# Patient Record
Sex: FEMALE | Race: WHITE | NOT HISPANIC OR LATINO | Employment: FULL TIME | URBAN - METROPOLITAN AREA
[De-identification: names, ages, dates, MRNs, and addresses within clinical notes are randomized per-mention and may not be internally consistent; named-entity substitution may affect disease eponyms.]

---

## 2023-02-22 LAB
BASOPHILS (10*3/UL) IN BLOOD BY AUTOMATED COUNT: 0.09 X10E9/L (ref 0–0.1)
BASOPHILS/100 LEUKOCYTES IN BLOOD BY AUTOMATED COUNT: 1.4 % (ref 0–2)
EOSINOPHILS (10*3/UL) IN BLOOD BY AUTOMATED COUNT: 0.34 X10E9/L (ref 0–0.7)
EOSINOPHILS/100 LEUKOCYTES IN BLOOD BY AUTOMATED COUNT: 5.3 % (ref 0–6)
ERYTHROCYTE DISTRIBUTION WIDTH (RATIO) BY AUTOMATED COUNT: 11.7 % (ref 11.5–14.5)
ERYTHROCYTE MEAN CORPUSCULAR HEMOGLOBIN CONCENTRATION (G/DL) BY AUTOMATED: 32.5 G/DL (ref 32–36)
ERYTHROCYTE MEAN CORPUSCULAR VOLUME (FL) BY AUTOMATED COUNT: 98 FL (ref 80–100)
ERYTHROCYTES (10*6/UL) IN BLOOD BY AUTOMATED COUNT: 3.78 X10E12/L (ref 4–5.2)
HEMATOCRIT (%) IN BLOOD BY AUTOMATED COUNT: 37.2 % (ref 36–46)
HEMOGLOBIN (G/DL) IN BLOOD: 12.1 G/DL (ref 12–16)
IMMATURE GRANULOCYTES/100 LEUKOCYTES IN BLOOD BY AUTOMATED COUNT: 0.5 % (ref 0–0.9)
LEUKOCYTES (10*3/UL) IN BLOOD BY AUTOMATED COUNT: 6.4 X10E9/L (ref 4.4–11.3)
LYMPHOCYTES (10*3/UL) IN BLOOD BY AUTOMATED COUNT: 2.29 X10E9/L (ref 1.2–4.8)
LYMPHOCYTES/100 LEUKOCYTES IN BLOOD BY AUTOMATED COUNT: 35.9 % (ref 13–44)
MONOCYTES (10*3/UL) IN BLOOD BY AUTOMATED COUNT: 0.94 X10E9/L (ref 0.1–1)
MONOCYTES/100 LEUKOCYTES IN BLOOD BY AUTOMATED COUNT: 14.8 % (ref 2–10)
NEUTROPHILS (10*3/UL) IN BLOOD BY AUTOMATED COUNT: 2.68 X10E9/L (ref 1.2–7.7)
NEUTROPHILS/100 LEUKOCYTES IN BLOOD BY AUTOMATED COUNT: 42.1 % (ref 40–80)
NRBC (PER 100 WBCS) BY AUTOMATED COUNT: 0 /100 WBC (ref 0–0)
PLATELETS (10*3/UL) IN BLOOD AUTOMATED COUNT: 324 X10E9/L (ref 150–450)

## 2023-04-11 LAB
CREATININE (MG/DL) IN SER/PLAS: 0.66 MG/DL (ref 0.5–1.05)
GFR FEMALE: >90 ML/MIN/1.73M2
UREA NITROGEN (MG/DL) IN SER/PLAS: 17 MG/DL (ref 6–23)

## 2023-05-23 ENCOUNTER — OFFICE VISIT (OUTPATIENT)
Dept: PRIMARY CARE | Facility: CLINIC | Age: 58
End: 2023-05-23
Payer: COMMERCIAL

## 2023-05-23 VITALS
HEIGHT: 62 IN | SYSTOLIC BLOOD PRESSURE: 122 MMHG | TEMPERATURE: 97.4 F | WEIGHT: 98 LBS | DIASTOLIC BLOOD PRESSURE: 70 MMHG | HEART RATE: 68 BPM | BODY MASS INDEX: 18.03 KG/M2

## 2023-05-23 DIAGNOSIS — H92.03: Primary | ICD-10-CM

## 2023-05-23 PROBLEM — M51.369 DISC DEGENERATION, LUMBAR: Status: ACTIVE | Noted: 2019-04-05

## 2023-05-23 PROBLEM — F31.10 BIPOLAR DISORDER, CURRENT EPISODE MANIC WITHOUT PSYCHOTIC FEATURES (MULTI): Status: ACTIVE | Noted: 2023-05-23

## 2023-05-23 PROBLEM — F17.200 NICOTINE DEPENDENCE, UNSPECIFIED, UNCOMPLICATED: Status: ACTIVE | Noted: 2023-05-23

## 2023-05-23 PROBLEM — M54.32 BILATERAL SCIATICA: Status: ACTIVE | Noted: 2023-05-23

## 2023-05-23 PROBLEM — G35 MULTIPLE SCLEROSIS (MULTI): Status: ACTIVE | Noted: 2019-04-05

## 2023-05-23 PROBLEM — I10 BENIGN ESSENTIAL HYPERTENSION: Status: ACTIVE | Noted: 2019-04-05

## 2023-05-23 PROBLEM — E55.9 VITAMIN D DEFICIENCY: Status: ACTIVE | Noted: 2019-04-05

## 2023-05-23 PROBLEM — M51.36 DISC DEGENERATION, LUMBAR: Status: ACTIVE | Noted: 2019-04-05

## 2023-05-23 PROBLEM — M81.0 OSTEOPOROSIS: Status: ACTIVE | Noted: 2019-04-05

## 2023-05-23 PROBLEM — K29.70 GASTRITIS: Status: ACTIVE | Noted: 2019-04-05

## 2023-05-23 PROBLEM — E78.49 FAMILIAL HYPERLIPIDEMIA: Status: ACTIVE | Noted: 2023-05-23

## 2023-05-23 PROBLEM — M54.31 BILATERAL SCIATICA: Status: ACTIVE | Noted: 2023-05-23

## 2023-05-23 PROBLEM — M54.16 LUMBAR RADICULOPATHY: Status: ACTIVE | Noted: 2023-05-23

## 2023-05-23 PROCEDURE — 3078F DIAST BP <80 MM HG: CPT | Performed by: FAMILY MEDICINE

## 2023-05-23 PROCEDURE — 3074F SYST BP LT 130 MM HG: CPT | Performed by: FAMILY MEDICINE

## 2023-05-23 PROCEDURE — 99213 OFFICE O/P EST LOW 20 MIN: CPT | Performed by: FAMILY MEDICINE

## 2023-05-23 RX ORDER — LISINOPRIL 20 MG/1
20 TABLET ORAL DAILY
COMMUNITY
Start: 2019-01-28 | End: 2023-06-01 | Stop reason: WASHOUT

## 2023-05-23 RX ORDER — ALENDRONATE SODIUM 70 MG/1
70 TABLET ORAL
COMMUNITY
Start: 2020-03-19 | End: 2023-06-01 | Stop reason: SDUPTHER

## 2023-05-23 RX ORDER — OMEPRAZOLE 20 MG/1
20 TABLET, DELAYED RELEASE ORAL DAILY
COMMUNITY

## 2023-05-23 RX ORDER — MULTIVITAMIN/IRON/FOLIC ACID 18MG-0.4MG
1 TABLET ORAL DAILY
COMMUNITY

## 2023-05-23 RX ORDER — DIMETHYL FUMARATE 240 MG/1
240 CAPSULE ORAL 2 TIMES DAILY
COMMUNITY
Start: 2013-05-06 | End: 2023-12-28 | Stop reason: ALTCHOICE

## 2023-05-23 RX ORDER — CHOLECALCIFEROL (VITAMIN D3) 50 MCG
2000 TABLET ORAL DAILY
COMMUNITY
Start: 2018-01-07

## 2023-05-23 RX ORDER — HYDROCODONE BITARTRATE AND ACETAMINOPHEN 7.5; 325 MG/1; MG/1
1 TABLET ORAL EVERY 6 HOURS PRN
COMMUNITY
End: 2023-10-10 | Stop reason: SDUPTHER

## 2023-05-23 RX ORDER — ASPIRIN 81 MG/1
81 TABLET ORAL DAILY
COMMUNITY
End: 2024-01-10 | Stop reason: WASHOUT

## 2023-05-23 ASSESSMENT — ENCOUNTER SYMPTOMS
COUGH: 0
FEVER: 0
CHILLS: 0

## 2023-05-23 NOTE — PROGRESS NOTES
"Subjective   Patient ID: Caitlyn Atkins is a 58 y.o. female who presents for bumps on ears (Bilat x 2 weeks).    Caitlyn is here to discuss ear pain.  She has noticed ear pain over the last 2 weeks.  Located on the front part of her outer ear.  This was noticed after wearing earplugs.  She has been experimenting with cutting her earplugs in half to see if it will help.  Noticed a few small bumps on the outer ear and wants to make sure that there is no infection.         Review of Systems   Constitutional:  Negative for chills and fever.   HENT:  Negative for congestion.    Respiratory:  Negative for cough.        Objective   /70   Pulse 68   Temp 36.3 °C (97.4 °F)   Ht 1.575 m (5' 2\")   Wt (!) 44.5 kg (98 lb)   BMI 17.92 kg/m²     Physical Exam  Constitutional:       General: She is not in acute distress.  HENT:      Head: Normocephalic and atraumatic.      Right Ear: Tympanic membrane and external ear normal. No decreased hearing noted. No drainage or swelling. No mastoid tenderness.      Left Ear: Tympanic membrane and external ear normal. No decreased hearing noted. No drainage or swelling. No mastoid tenderness.      Mouth/Throat:      Mouth: Mucous membranes are moist. No oral lesions.      Pharynx: Oropharynx is clear.   Eyes:      Extraocular Movements: Extraocular movements intact.   Pulmonary:      Effort: Pulmonary effort is normal.   Neurological:      Mental Status: She is alert.   Psychiatric:         Mood and Affect: Mood normal.         Assessment/Plan   Diagnoses and all orders for this visit:  Pain of both earlobes  Comments:  Likely due to pressure from earplugs.  Recommend trying different sides of earplugs or over-the-ear hearing protection.         "

## 2023-06-01 ENCOUNTER — OFFICE VISIT (OUTPATIENT)
Dept: PRIMARY CARE | Facility: CLINIC | Age: 58
End: 2023-06-01
Payer: COMMERCIAL

## 2023-06-01 ENCOUNTER — TELEPHONE (OUTPATIENT)
Dept: PRIMARY CARE | Facility: CLINIC | Age: 58
End: 2023-06-01

## 2023-06-01 VITALS
BODY MASS INDEX: 17.19 KG/M2 | DIASTOLIC BLOOD PRESSURE: 70 MMHG | OXYGEN SATURATION: 97 % | TEMPERATURE: 97.1 F | WEIGHT: 94 LBS | HEART RATE: 64 BPM | SYSTOLIC BLOOD PRESSURE: 110 MMHG

## 2023-06-01 DIAGNOSIS — M25.551 PAIN OF RIGHT HIP: ICD-10-CM

## 2023-06-01 DIAGNOSIS — S32.9XXA CLOSED NONDISPLACED FRACTURE OF PELVIS, UNSPECIFIED PART OF PELVIS, INITIAL ENCOUNTER (MULTI): Primary | ICD-10-CM

## 2023-06-01 DIAGNOSIS — M80.00XG OSTEOPOROSIS WITH CURRENT PATHOLOGICAL FRACTURE WITH DELAYED HEALING, UNSPECIFIED OSTEOPOROSIS TYPE, SUBSEQUENT ENCOUNTER: ICD-10-CM

## 2023-06-01 PROCEDURE — 3078F DIAST BP <80 MM HG: CPT | Performed by: PHYSICIAN ASSISTANT

## 2023-06-01 PROCEDURE — 99214 OFFICE O/P EST MOD 30 MIN: CPT | Performed by: PHYSICIAN ASSISTANT

## 2023-06-01 PROCEDURE — 3074F SYST BP LT 130 MM HG: CPT | Performed by: PHYSICIAN ASSISTANT

## 2023-06-01 RX ORDER — ALENDRONATE SODIUM 70 MG/1
70 TABLET ORAL
Qty: 4 TABLET | Refills: 5 | Status: SHIPPED | OUTPATIENT
Start: 2023-06-01 | End: 2023-12-18

## 2023-06-01 ASSESSMENT — ENCOUNTER SYMPTOMS
ABDOMINAL PAIN: 0
SHORTNESS OF BREATH: 0

## 2023-06-01 NOTE — PROGRESS NOTES
"Subjective   Patient ID: Caitlyn Atkins is a 58 y.o. female who presents for pt to discuss pain from pelvic fx (Dx in April ).    HPI   Pt c/o pelvic fracture (multiple) and requests referral to orthopedic.   Has had right hip and pelvis pain since December. No known injuries. No falls prior to pain starting. Complained of this to pain management but they just said it was arthritis and they just did cortisone injection which didn't help. Pain mgmt just did cortisone injections.  Went to ER -- had negative xrays. .     Eventually had had MRI that showed questionable pelvic fx.  So had CT to clarify  that showed:  \"1. Findings suggestive of right-sided supra-acetabular insufficiency  fracture with diffuse periosteal reaction and a fracture line  extending the medial cortex with associated mild cortical step of as  described above.  2. Insufficiency fracture of the right sacral ala extending from  superior to the inferior aspect as described above.  3. Moderate lumbar spondylosis with grade 1 anterolisthesis of L4  over L5 vertebral body which is described in detail on immediate  prior MRI lumbar spine.\"    Patient states that her back hurts with prolonged sitting and walking/bending/lifting.   Continues to see specialists, includng for her MS. Has been stable.   Sees Pain mgmt Dr Maher for DDD lumbar.     Taking Calcium and Vit D for her osteoporosis Is inconsistent with Fosamax.     Smoking 1ppd still.     Review of Systems   Respiratory:  Negative for shortness of breath.    Cardiovascular:  Negative for chest pain.   Gastrointestinal:  Negative for abdominal pain.       Objective   /70   Pulse 64   Temp 36.2 °C (97.1 °F)   Wt (!) 42.6 kg (94 lb)   SpO2 97%   BMI 17.19 kg/m²     Physical Exam  Vitals and nursing note reviewed.   Constitutional:       Appearance: Normal appearance. She is well-developed.   Eyes:      General: No scleral icterus.  Cardiovascular:      Rate and Rhythm: Normal rate and " regular rhythm.      Heart sounds: No murmur heard.  Pulmonary:      Effort: Pulmonary effort is normal.      Breath sounds: Normal breath sounds.   Abdominal:      Palpations: Abdomen is soft. There is no mass.      Tenderness: There is no abdominal tenderness.   Musculoskeletal:      Cervical back: Neck supple.      Comments: Has mild tenderness over the lower lumbar spine diffusely.    Skin:     General: Skin is warm and dry.   Neurological:      Mental Status: She is alert.         Assessment/Plan   Diagnoses and all orders for this visit:  Closed nondisplaced fracture of pelvis, unspecified part of pelvis, initial encounter (CMS/[facility])  -     Referral to Orthopaedic Surgery; Future  Osteoporosis with current pathological fracture with delayed healing, unspecified osteoporosis type, subsequent encounter  -     XR DEXA bone density; Future  -     Referral to Rheumatology; Future  -     alendronate (Fosamax) 70 mg tablet; Take 1 tablet (70 mg) by mouth every 7 days.  Pain of right hip  -     Referral to Orthopaedic Surgery; Future       Reviewed imaging results. Referred to orthopedic such as Dr Ochoa.   Get DEXA scan to evaluate her osteoporosis. Due to multiple fractures that are probably pathological fractures, referred to rheumatologist for further treatment of her osteoporosis. In the meantime, take her Fosamax consistently as well as her Vit D and Calcium.   Follow up prn.

## 2023-07-21 LAB
ALANINE AMINOTRANSFERASE (SGPT) (U/L) IN SER/PLAS: 12 U/L (ref 7–45)
ALBUMIN (G/DL) IN SER/PLAS: 4.3 G/DL (ref 3.4–5)
ALKALINE PHOSPHATASE (U/L) IN SER/PLAS: 79 U/L (ref 33–110)
ANION GAP IN SER/PLAS: 14 MMOL/L (ref 10–20)
ASPARTATE AMINOTRANSFERASE (SGOT) (U/L) IN SER/PLAS: 18 U/L (ref 9–39)
BILIRUBIN TOTAL (MG/DL) IN SER/PLAS: 0.2 MG/DL (ref 0–1.2)
CALCIDIOL (25 OH VITAMIN D3) (NG/ML) IN SER/PLAS: 63 NG/ML
CALCIUM (MG/DL) IN SER/PLAS: 9.1 MG/DL (ref 8.6–10.6)
CARBON DIOXIDE, TOTAL (MMOL/L) IN SER/PLAS: 23 MMOL/L (ref 21–32)
CHLORIDE (MMOL/L) IN SER/PLAS: 111 MMOL/L (ref 98–107)
CREATININE (MG/DL) IN SER/PLAS: 0.68 MG/DL (ref 0.5–1.05)
GFR FEMALE: >90 ML/MIN/1.73M2
GLUCOSE (MG/DL) IN SER/PLAS: 95 MG/DL (ref 74–99)
MAGNESIUM (MG/DL) IN SER/PLAS: 1.85 MG/DL (ref 1.6–2.4)
PARATHYRIN INTACT (PG/ML) IN SER/PLAS: 21.6 PG/ML (ref 18.5–88)
PHOSPHATE (MG/DL) IN SER/PLAS: 3.7 MG/DL (ref 2.5–4.9)
POTASSIUM (MMOL/L) IN SER/PLAS: 3.5 MMOL/L (ref 3.5–5.3)
PROTEIN TOTAL: 6.5 G/DL (ref 6.4–8.2)
SODIUM (MMOL/L) IN SER/PLAS: 144 MMOL/L (ref 136–145)
UREA NITROGEN (MG/DL) IN SER/PLAS: 21 MG/DL (ref 6–23)

## 2023-07-24 LAB
ALBUMIN ELP: 4 G/DL (ref 3.4–5)
ALPHA 1: 0.2 G/DL (ref 0.2–0.6)
ALPHA 2: 0.6 G/DL (ref 0.4–1.1)
BETA: 0.7 G/DL (ref 0.5–1.2)
GAMMA GLOBULIN: 0.9 G/DL (ref 0.5–1.4)
PATH REVIEW-SERUM PROTEIN ELECTROPHORESIS: NORMAL
PROTEIN ELECTROPHORESIS INTERPRETATION: NORMAL
PROTEIN TOTAL: 6.5 G/DL (ref 6.4–8.2)

## 2023-07-25 LAB — N-TELOPEPTIDE,SERUM: 18.7 NM BCE

## 2023-08-16 ENCOUNTER — HOSPITAL ENCOUNTER (OUTPATIENT)
Dept: DATA CONVERSION | Facility: HOSPITAL | Age: 58
End: 2023-08-17
Attending: ORTHOPAEDIC SURGERY | Admitting: ORTHOPAEDIC SURGERY
Payer: COMMERCIAL

## 2023-08-16 DIAGNOSIS — M87.051 IDIOPATHIC ASEPTIC NECROSIS OF RIGHT FEMUR (MULTI): ICD-10-CM

## 2023-08-16 LAB
ABO GROUP (TYPE) IN BLOOD: NORMAL
ANTIBODY SCREEN: NORMAL
RH FACTOR: NORMAL

## 2023-08-17 LAB
ALANINE AMINOTRANSFERASE (SGPT) (U/L) IN SER/PLAS: 14 U/L (ref 7–45)
ALBUMIN (G/DL) IN SER/PLAS: 2.9 G/DL (ref 3.4–5)
ALKALINE PHOSPHATASE (U/L) IN SER/PLAS: 55 U/L (ref 33–110)
ANION GAP IN SER/PLAS: 9 MMOL/L (ref 10–20)
APPEARANCE, URINE: CLEAR
ASPARTATE AMINOTRANSFERASE (SGOT) (U/L) IN SER/PLAS: 41 U/L (ref 9–39)
BASOPHILS (10*3/UL) IN BLOOD BY AUTOMATED COUNT: 0.08 X10E9/L (ref 0–0.1)
BASOPHILS/100 LEUKOCYTES IN BLOOD BY AUTOMATED COUNT: 0.4 % (ref 0–2)
BILIRUBIN TOTAL (MG/DL) IN SER/PLAS: 0.2 MG/DL (ref 0–1.2)
BILIRUBIN, URINE: NEGATIVE
BLOOD, URINE: NEGATIVE
CALCIUM (MG/DL) IN SER/PLAS: 7.9 MG/DL (ref 8.6–10.3)
CARBON DIOXIDE, TOTAL (MMOL/L) IN SER/PLAS: 24 MMOL/L (ref 21–32)
CHLORIDE (MMOL/L) IN SER/PLAS: 108 MMOL/L (ref 98–107)
COLOR, URINE: YELLOW
CREATININE (MG/DL) IN SER/PLAS: 0.79 MG/DL (ref 0.5–1.05)
EOSINOPHILS (10*3/UL) IN BLOOD BY AUTOMATED COUNT: 0.01 X10E9/L (ref 0–0.7)
EOSINOPHILS/100 LEUKOCYTES IN BLOOD BY AUTOMATED COUNT: 0.1 % (ref 0–6)
ERYTHROCYTE DISTRIBUTION WIDTH (RATIO) BY AUTOMATED COUNT: 11.9 % (ref 11.5–14.5)
ERYTHROCYTE MEAN CORPUSCULAR HEMOGLOBIN CONCENTRATION (G/DL) BY AUTOMATED: 32.9 G/DL (ref 32–36)
ERYTHROCYTE MEAN CORPUSCULAR VOLUME (FL) BY AUTOMATED COUNT: 96 FL (ref 80–100)
ERYTHROCYTES (10*6/UL) IN BLOOD BY AUTOMATED COUNT: 2.97 X10E12/L (ref 4–5.2)
GFR FEMALE: 86 ML/MIN/1.73M2
GLUCOSE (MG/DL) IN SER/PLAS: 91 MG/DL (ref 74–99)
GLUCOSE, URINE: NEGATIVE MG/DL
HEMATOCRIT (%) IN BLOOD BY AUTOMATED COUNT: 28.6 % (ref 36–46)
HEMOGLOBIN (G/DL) IN BLOOD: 9.4 G/DL (ref 12–16)
IMMATURE GRANULOCYTES/100 LEUKOCYTES IN BLOOD BY AUTOMATED COUNT: 0.6 % (ref 0–0.9)
KETONES, URINE: NEGATIVE MG/DL
LEUKOCYTE ESTERASE, URINE: NEGATIVE
LEUKOCYTES (10*3/UL) IN BLOOD BY AUTOMATED COUNT: 18.5 X10E9/L (ref 4.4–11.3)
LYMPHOCYTES (10*3/UL) IN BLOOD BY AUTOMATED COUNT: 2.22 X10E9/L (ref 1.2–4.8)
LYMPHOCYTES/100 LEUKOCYTES IN BLOOD BY AUTOMATED COUNT: 12 % (ref 13–44)
MONOCYTES (10*3/UL) IN BLOOD BY AUTOMATED COUNT: 1.59 X10E9/L (ref 0.1–1)
MONOCYTES/100 LEUKOCYTES IN BLOOD BY AUTOMATED COUNT: 8.6 % (ref 2–10)
NEUTROPHILS (10*3/UL) IN BLOOD BY AUTOMATED COUNT: 14.46 X10E9/L (ref 1.2–7.7)
NEUTROPHILS/100 LEUKOCYTES IN BLOOD BY AUTOMATED COUNT: 78.3 % (ref 40–80)
NITRITE, URINE: NEGATIVE
PH, URINE: 6 (ref 5–8)
PLATELETS (10*3/UL) IN BLOOD AUTOMATED COUNT: 406 X10E9/L (ref 150–450)
POTASSIUM (MMOL/L) IN SER/PLAS: 3.9 MMOL/L (ref 3.5–5.3)
PROTEIN TOTAL: 5.3 G/DL (ref 6.4–8.2)
PROTEIN, URINE: NEGATIVE MG/DL
SODIUM (MMOL/L) IN SER/PLAS: 137 MMOL/L (ref 136–145)
SPECIFIC GRAVITY, URINE: 1.02 (ref 1–1.03)
UREA NITROGEN (MG/DL) IN SER/PLAS: 18 MG/DL (ref 6–23)
UROBILINOGEN, URINE: <2 MG/DL (ref 0–1.9)

## 2023-09-29 VITALS — WEIGHT: 93.03 LBS | HEIGHT: 60 IN | BODY MASS INDEX: 18.27 KG/M2

## 2023-09-30 NOTE — H&P
"    History & Physical Reviewed:   Pregnant/Lactating:  ·  Are You Pregnant no (1)   ·  Are You Currently Breastfeeding no (1)     I have reviewed the History and Physical dated:  31-Jul-2023   History and Physical reviewed and relevant findings noted. Patient examined to review pertinent physical  findings.: No significant changes   Home Medications Reviewed: no changes noted   Allergies Reviewed: no changes noted       ERAS (Enhanced Recovery After Surgery):  ·  ERAS Patient: yes   ·  CPM/PAT Utilization: yes   ·  Immunonutrition Recovery Drink Utilization: no   ·  Carbohydrate Supplement Drink Utilization: no     Consent:   COVID-19 Consent:  ·  COVID-19 Risk Consent Surgeon has reviewed key risks related to the risk of julio césar COVID-19 and if they contract COVID-19 what the risks are.       Electronic Signatures:  Anne Ochoa ()  (Signed 16-Aug-2023 14:12)   Authored: History & Physical Reviewed, ERAS, Consent,  Note Completion      Last Updated: 16-Aug-2023 14:12 by Anne Ochoa ()    References:  1.  Data Referenced From \"Patient Profile - Preop v3\" 16-Aug-2023 09:41   "

## 2023-09-30 NOTE — DISCHARGE SUMMARY
Send Summary:   Discharge Summary Providers:  Provider Role Provider Name   · Referring Zane Ang   · Primary Rafi Nicole       Note Recipients: Zane Ang, [facility] - [phone]  []  Rafi Nicole MD       Discharge:    Summary:   Admission Date: .16-Aug-2023 09:26:00   Discharge Date: 17-Aug-2023   Attending Physician at Discharge: Anne Ochoa   Admission Reason: Right hip avascular necrosis   Final Discharge Diagnoses: Status post right hip  replacement   Procedures: Date: 16-Aug-2023 12:30:00  Procedure Name: 1.  RIGHT TOTAL HIP ARTHROPLASTY   Condition at Discharge: Satisfactory   Disposition at Discharge: .Home   Hospital Course:    ORTHOPEDIC SURGERY DISCHARGE SUMMARY  Attending Physician: Dr Anne Ochoa   Reason for Hospitalization: Elective total hip arthroplasty     Admitting Diagnosis: Right hip AVN  Discharge Diagnosis: Same as admitting     Operations During Hospitalization: Right hip AVN     Consultations: Physical Therapy, Case Management      Hospital Course:  This patient presented to admitting provider as an outpatient for hip AVN. It was determined that they would benefit from surgery in the form of a hip replacement. The procedure, its risks, benefits, and potential complications were discussed in detail  with the patient prior to surgery. Understanding of all topics was conveyed by the patient, and consent was given for surgery. The patient was electively admitted for surgery. Surgery was scheduled and they underwent a hip replacement.  The procedure  was tolerated well and they were sent to the post operative recovery room in stable condition, where they also did well. They were subsequently sent to their hospital room for postoperative management. Once on the floor their postoperative course was  unremarkable and they did well.  Thier diet was advanced and well tolerated. Their pain was well controlled on oral pain meds; this was eventually transitioned to oral  medication alone prior to discharge.  They worked with Physical and Occupational Therapy  starting on POD# 0 who recommended they be discharged home. They remained afebrile with stable vital signs throughout her stay. Complete and comprehensive discharge instructions were provided to the patient as well as necessary prescriptions. The patient  had no further questions and was advised to call with any questions, concerns, or problems.     Patient was hemodynamically stable postoperatively. Discharge Antibiotics: Prior to surgery the patient was treated with antibiotics and continued with antibiotics 24 hours postoperatively    Transfers: PACU and then to the hospital surgical floor when PACU criteria was met.  Complications: Continued throughout the hospital course without complications.     PHYSICAL EXAM   GENERAL: A/Ox3, NAD. Appears healthy, well nourished  PSYCHIATRIC: Mood stable, appropriate memory recall  EYES: EOM intact, no scleral icterus  CARDIAC: regular rate  LUNGS: Breathing non-labored  SKIN: no erythema, rashes, or ecchymoses     MUSCULOSKELETAL:  Laterality:   Operative Hip Exam  - ROM, Extension: Full  - Dressing: clean and dry  - Negative homans  - Compartments soft  - Grossly NVI Distally on operative leg    NEUROVASCULAR:  - Neurovascular Status: sensation intact to light touch distally  - Capillary refill brisk at extremities, Bilateral dorsalis pedis pulse 2+        Discharge Information:    and Continuing Care:   Lab Results - Pending:    None  Radiology Results - Pending: None   Discharge Instructions:    Additional Orders:           Additional Instructions:   Anne Ochoa DO  Phone: [phone] - [facility] Medical Assistant    Postoperative Instructions: TOTAL HIP ARTHROPLASTY    The following is a general guide and may be applied to most patients that go home from the hospital after surgery. If you go to a rehab facility the timeline may deviate a little. Please do not hesitate to call  our office for any questions or additional  guidance. We will work with you to get the best experience from your new joint replacement.     WEEK 1  Relax?Don?t Overdo it!  - Get up once an hour for light activity while you are awake. (get a drink, go to the bathroom, etc.)  - Keep the surgical site iced and elevated above your heart, if possible, while you are resting.  - You will have some light exercises given to you from the physical therapist in the hospital. They will teach you posterior hip precautions that you should be mindful of for the first six weeks after surgery.    SWELLING AND BRUISING    BRUISING AND SWELLING AFTER SURGERY IS NORMAL. As the patient becomes more mobile the bruising and swelling will begin to migrate towards the ankle and foot and is usually noticed toward the end of the day.    WEEK 2-3  You will have a prescription for physical therapy given to you or electronically prescribed and you should start outpatient therapy one week after your operation. Be sure to do the home exercises on the days you are not attending physical therapy.    - Continue to progress walking as tolerated.   - Continue to use ice for soreness on the surgical site  - You may wean from your walker to a cane when you feel safe and comfortable to do so. Your physical therapist will also be helpful with progressing your assistive device.   - Be careful with bending and twisting - If it hurts, stop!!    FOLLOW UP  - Your first post-operative visit with Dr. Anne Ochoa should be scheduled for 2 WEEKS after surgery.  - You do not need new xrays for this visit  - Don?t be nervous! This visit is to see how you are doing and make sure your incision is healing nicely.     POSTOPERATIVE MEDICATIONS    PAIN MEDICATION    _______ Oxycodone  ? Oxycodone has been prescribed for post-operative pain control. Take one 5 mg tablet every 6 hours for pain. Alternate with Tramadol. See medication example sheet. This medication will  only be refilled ONCE every 7 days for a period of 6 weeks.  After 6 weeks, you will transition to acetaminophen (Tylenol) and over -the- counter anti-inflammatories such as Ibuprofen, Advil or Aleve in conjunction with ICE.   ? Side effects may be constipation and nausea, vomiting, sleepiness, dizziness, lightheadedness, headache, blurred vision, dry mouth sweating, itching (if you have itching, over-the -counter Benadryl can be used as needed).  ? You may NOT operate a motor vehicle while taking this medication or have been cleared by your surgeon or PA.     ________ Tramadol  ? Tramadol has been prescribed for post-operative pain control. Take one 50 mg tablet every 6 hours for pain. Alternate with Oxycodone. See medication example sheet. This medication will only be refilled ONCE every 7 days for a period of 6  weeks. After 6 weeks, you will transition to acetaminophen (Tylenol) and over- the- counter anti-inflammatories such as Ibuprofen, Advil or Aleve in conjunction with ICE.   ? Side effects may be constipation and nausea, vomiting, sleepiness, dizziness, lightheadedness, headache, blurred vision, dry mouth, sweating, itching (if you have itching, over-the -counter Benadryl can be used as needed).  ? You may NOT operate a motor vehicle while taking this medication or have been cleared by your surgeon or PA.     _________ Celecoxib (Celebrex) or Meloxicam (Mobic)  ? One of these will be prescribed (if you are able to take it) as an adjunct anti-inflammatory to assist in pain control. Take one twice daily for 4 weeks. See medication example sheet. You will not receive refills on this medication.  ? Side effects may include nausea or upset stomach    _________ Acetaminophen (Tylenol)  ? Acetaminophen has been prescribed as an adjunct for pain control. Take two 500 mg tablet every 6 - 8 hours for 4 weeks. See medication example sheet. No refills will be given after initial prescription.  ? Side effects may  include nausea, heartburn, drowsiness, and headache.    _________Cyclobenzaprine (Flexeril)  ? This is a muscle relaxer that will be prescribed   ? Take this AS NEEDED per instructions on bottle  ? This will be only prescribed once and is available to help with muscle spasms. There will be no refills of this medication    BLOOD THINNER    __________ Aspirin or Other Blood Thinner  ? Aspirin or another medication has been prescribed as a blood thinner to prevent blood clots in your leg or lungs. Take as prescribed on the bottle for 4 weeks. You will not receive a refill on this medication.  ? Do not take this medication if you are on another blood thinner.    ANTI NAUSEA    __________ Pantoprazole  ? Pantoprazole has been prescribed to help with nausea and protect your stomach while taking pain medication. Take one 40 mg tablet once daily for 4 weeks. You will not receive a refill on this medication.    ANTIBIOTIC    ? If you are deemed ?high risk? for infection after surgery and antibiotic will be prescribed. Please take this as directed for one week.     STOOL SOFTENERS    __________ Senna  ? Post-operative constipation can result due to a combination of inactivity, anesthesia and pain medication. To help prevent this, you should increase your water and fiber intake. Physical activity such as walking will also help stimulate the  bowels.   ? Senna has been prescribed to help with constipation while on Oxycodone and Tramadol. Take one tablet twice daily for 4 weeks. No refills will be given.  ? You may also take Miralax in combination with Senna to prevent constipation.  This can be purchased over the counter at your local pharmacy.  Take as instructed on the bottle.     Medication Refills - [phone]    If you need to request a medication refill, calls can be taken between Monday through Friday, 8am-1pm.  Any calls received outside of this timeframe will be handled on the next business day.  Medication requests  received on Saturday or Sunday will be  handled on Monday.    Per State and Institutional policy, pain medications can only be refilled every 7 days for six weeks following surgery.    Prescription refills will be placed upon request, if there are any issues we will contact you accordingly.  We are unable to place follow up calls to confirm receipt of refill requests.  Please follow up with your pharmacy to verify that your refill has  been sent and is ready for .    ICE  ? You have been prescribed to ice your total joint at a minimum of twice per hour for 20 minutes while awake during the first 6 weeks after surgery if you are using ice packs. This will help with pain control.  ? If you are using an ice machine, please follow ice machine instructions.    WOUND CARE    ? You will have an ace wrap on your leg put on during surgery. This compression wrap is for comfort and may be removed at any time. You may continue to use compression throughout your recovery if this is comfortable for you.  ? You have a waterproof bandage on your wound and may shower with this on. The waterproof bandage is to remain in place for 7 days. You may remove it yourself. You may leave your incision open to air after the bandage has been removed.    ? Under your waterproof bandage you have a mesh and glue dressin in place. Do not peel this off. This will be on for 3-4 weeks. You may trim the edges as they peel off on their own. You can continue to shower with this on. Let the water run  freely over your incision when showering and do not scrub.     ? DO NOT soak your incision in a bath, hot tub, pool or pond/lake for a minimum of 3 weeks following your surgery.    ? DO NOT use lotions, creams, ointments on your wound for a minimum of 3 weeks following your surgery. At that time you may use vitamin E to assist with softening of your incision.    DENTAL VISITS  It is recommended that you avoid any elective dental work (cleaning,  whitening, etc) for 3 months after your surgery. In case of a dental emergency (cavity, root canal) within the 3 month postoperative period you should be pre-medicated with antibiotics.  Please call us before any dental work so we can provide you with antibiotic coverage.     After 3 months, most healthy individuals do not require antibiotics for dental visits.    ?High Risk? patients (chemotherapy, history of transplant, immunocompromised, rheumatoid arthritis) will need antibiotics prior to dental work.  If you think you may be in this category please call the office for assistance.    EMERGENCIES  When to contact our office immediately:  ? Fever >101.5 for at least 48 hours after surgery or chills.  ? Excessive bleeding from incision(s). A small amount of drainage is normal and expected.  ? Signs of infection of incision(s)-excessive drainage that is soaking through your dressing (especially if it is pus-like), redness that is spreading out from the edges of your incision, or increased warmth around the area.  ? Excruciating pain for which the pain medication is not helping.  ? Severe calf pain.  ? Go directly to the emergency room or call 911, if you are experiencing chest pain or difficulty breathing.    RESTARTING HOME MEDICATIONS  ? You may restart your home medications the following day after your surgery UNLESS you have been given alternate instructions.    DIET  ? Resume your normal diet after surgery. If you are on a specific type of diet for your condition, resume that instead.        Total Joint Replacement Cold Therapy Recommendations      Cold therapy devices can be used before and after surgery to assist in comfort and help to reduce pain and swelling.  These devices differ from ice or ice packs whereas the mechanism circulates water through tubing and a pad to provide longer periods  of cold therapy to the desired site.  While in the hospital, you can use your cold devices around the clock for  optimal comfort.  We recommend using cold therapy after working with therapy or completing exercises on your own.  Once you are discharged  home, there is no set schedule in which you must follow while using cold therapy.  Below are a few points to remember when using a cold therapy device:    ? Read the ?s instructions prior to first the use.  ? Follow instructions for filling the cooler (water first, then ice).  ? Always make sure there is a layer of protection between the cold pad and your skin  o Clothing, Towel, Ace Bandage, etc.  ? Allow the device to circulate cold water throughout the pad prior wrapping the pad (approximately 10 minutes).  ? Place the pad appropriately to accommodate your needs and use the wraps provided to secure the pad to your body.  ? During waking hours, remove the cold pad every 1-2 hours to perform a skin check  o Detach the pad from the cooler and ambulate at least once every hour  ? After removing the pad, allow at least 30 minutes before resuming cold therapy  ? You may wear the cold therapy device during periods of sleep including overnight  o   If you wake up during the night, you can check the skin at this time.  You do not need to   wake up specifically to perform skin checks.  ? Empty the cooler and pad when device is not in use.  ? Follow ?s instructions for cleaning your cold therapy device.    OFFICE LOCATIONS    Location 1: [facility]  [address], [address]  Office Number: [phone]    Location 2: [facility]  [address]  [facility], [address]  Office Number: [phone]    Location 3: [address] [address]  Office Number: [phone]    Anne Ochoa DO  Joint Replacement and Adult Reconstructive Surgery  [facility]      Home Care Certification:           Home Care Agency:    Home Team [phone]          Skilled Disciplines  Ordered:   PT,  OT    Home Care Services:           Home Care Skilled Service:   Rehab (PT/OT/SP eval and treat)    Discharge Medications: Home Medication   Tecfidera 240 mg oral delayed release capsule - 1 cap(s) orally 2 times a day  Narcan 4 mg/0.1 mL nasal spray - null  Norco 7.5 mg-325 mg oral tablet - 1 tab(s) orally 3 times a day  Vitamin D3 2000 intl units (50 mcg) oral capsule - 1 cap(s) orally 2 times a day  B Complex 50 oral tablet, extended release - 1 tab(s) orally once a day  Lidoderm 5% topical film - Apply topically to affected area once a day   acetaminophen 500 mg oral tablet - 2 tab(s) orally every 6 to 8 hours  for pain   aspirin 81 mg oral tablet, chewable - 1 tab(s) chewed 2 times a day for four weeks to prevent the formation of blood clots  CeleBREX 100 mg oral capsule - 1 cap(s) orally 2 times a day for pain, inflammation, swelling  cyclobenzaprine 5 mg oral tablet - 1 tab(s) orally 3 times a day as needed for muscle spasms  doxycycline hyclate 100 mg oral capsule - 1 cap(s) orally 2 times a day to prevent infection  Protonix 40 mg oral delayed release tablet - 1 tab(s) orally once a day   senna 8.6 mg oral tablet - 1 tab(s) orally 2 times a day as needed to prevent constipation   traMADol 50 mg oral tablet - 1 tab(s) oral every 6 hours  Calcium 600 mg oral tablet - 1 cap(s) oral once a day  gabapentin 100 mg oral capsule - 1 cap(s) oral 3 times a day     PRN Medication     DNR Status:   ·  Code Status Code Status order at time of discharge: Full Code       Electronic Signatures:  Anne Ochoa)  (Signed 17-Aug-2023 09:17)   Authored: Send Summary, Summary Content, Ongoing Care,  DNR Status, Note Completion      Last Updated: 17-Aug-2023 09:17 by Anne Ochoa ()

## 2023-09-30 NOTE — PROGRESS NOTES
Service: General Internal Medicine     Subjective Data:   JOSE IBARRA is a 58 year old Female who is Hospital Day # 2 and POD #1 for 1.  RIGHT TOTAL HIP ARTHROPLASTY;    Additional Information:    JOSE IBARRA is a 58 year old Female with PMH of right hip AVN, MS, recently diagnosed HFpEF, chronic pain, tobacco use disorder, GERD, who presents for elective  right RITIKA.  IMS consulted for medical management.  Patient reports postop pain around 6-7/10 over the right hip.  Does not radiate down the leg.  Denies any fevers, cough, shortness of breath, chest pain, nausea, vomiting, diarrhea.  Only complaint at  this time is pain.  Postop vitals have been stable    8/17: Patient was seen and examined.  CBC shows leukocytosis which is likely a reaction patient has cough but no fever urinalysis was done and shows no signs of UTI, chest x-ray was done which showed no acute intrathoracic process.  Patient will be discharged  on p.o. doxycycline in view of cough and productive of yellowish sputum.  She is to follow-up as outpatient with primary care physician within 1 week of discharge and with orthopedic surgery as scheduled.  Orthopedic surgery plans discharge so general  medicine will therefore sign off.    Objective Data:     Objective Information:      T   P  R  BP   MAP  SpO2   Value  36.5  59  16  112/66      97%  Date/Time 8/17 13:47 8/17 13:47 8/17 13:47 8/17 13:47    8/17 13:47  Range  (36.5C - 37.3C )  (57 - 89 )  (16 - 16 )  (99 - 131 )/ (61 - 70 )    (95% - 99% )  Highest temp of 37.3 C was recorded at 8/17 4:38      Pain reported at 8/17 13:52: 8 = Severe    Physical Exam Narrative:  ·  Physical Exam:    PE:  Constitutional: Well developed, well nourished, in no acute distress. Laying back in bed comfortably and talkative  Eyes: PERRL, EOMI  Head/Neck: Normocephalic, atraumatic. Neck supple, no thyromegaly, JVD. Trachea midline  Respiratory/Thorax: Normal respiratory effort. Lungs CTAB with no  wheezing, rales, or rhonchi noted  Cardiovascular: RRR, no murmurs, rubs, or gallops noted. Peripheral pulses 2+  Gastrointestinal: Bowel sounds normal. Abdomen soft, nontender, nondistended, with no palpable masses  Musculoskeletal: No gross deformities.  Right leg strength diminished due to pain  Extremities: No lower extremity edema noted bilaterally  Neurological: Alert and oriented x3, CN II - VII grossly intact  Psychological: Appropriate mood and affect  Skin: No rashes or lesions noted    Recent Lab Results:    Results:    CBC: 8/17/2023 04:09              \     Hgb     /                              \     9.4 L    /  WBC  ----------------  Plt               18.5 H    ----------------    406              /     Hct     \                              /     28.6 L    \            RBC: 2.97 L    MCV: 96     Neutrophil %: 78.3      CMP: 8/17/2023 04:09  NA+        Cl-     BUN  /                         137    108 H   18  /  --------------------------------  Glucose                ---------------------------  91    K+     HCO3-   Creat \                         3.9    24    0.79  \           \  T Bili  /                    \  0.2  /  AST  x ---- x ALT        41 Hx ---- x 14         /  Alk P   \               /  55  \  Calcium : 7.9 L    Anion Gap : 9 L     Albumin : 2.9 L    T Protein : 5.3 L           Radiology Results:    Results:    I have reviewed this radiology result:         Impression:    Internal fixation of acetabular fracture and placement of right hip  endoprosthesis        MACRO:  None     Xray Pelvis 1 or 2 View [Aug 16 2023  4:47PM]      Impression:    Internal fixation of acetabular fracture and placement of right hip  endoprosthesis        MACRO:  None     Xray Pelvis 1 or 2 View [Aug 16 2023  4:47PM]      Assessment and Plan:   Code Status:  ·  Code Status Full Code       Impression 1: Right hip avascular necrosis   Plan for Impression 1: POD #0 right RITIKA.  PRN pain  control.  Incentive  spirometry.  Management per primary.  Continue to follow   Impression 2: MS   Plan for Impression 2: Continue home medications   Impression 3: HFpEF   Plan for Impression 3: Recently diagnosed.  Appears  well compensated.  Continue home medications, outpatient follow-up as planned   Impression 4: GERD   Plan for Impression 4: Continue home medications   Impression 5: Tobacco use disorder   Plan for Impression 5: Smoking cessation education   Impression 6: Leukocytosis   Plan for Impression 6: Urinalysis negative chest  x-ray shows  Acute intrathoracic process   Impression 7: DVT ppx   Plan for Impression 7: SCDs per primary       Electronic Signatures:  Soni Aranda)  (Signed 17-Aug-2023 15:35)   Authored: Service, Subjective Data, Objective Data, Assessment  and Plan, Note Completion      Last Updated: 17-Aug-2023 15:35 by Soni Aranda)

## 2023-10-01 NOTE — OP NOTE
PROCEDURE DETAILS    Preoperative Diagnosis:  1.  Right hip avascular necrosis  2.  Healed acetabular fracture, right hip  Postoperative Diagnosis:  1.  Right hip avascular necrosis  2.  Healed acetabular fracture, right hip  Surgeon: Anne Ochoa  Resident/Fellow/Other Assistant: None of these were associated with this case    Procedure:  1.  RIGHT TOTAL HIP ARTHROPLASTY    Anesthesia: No anesthesiologist associated with this case  Estimated Blood Loss: 50 cc  Findings: See operative note  Additional Details: OPERATIVE NOTE   MRN: [id number]  Surgery Date: 8/16/2023    Anesthesia: General, local    Implant(s):   Charlie Trident Hemispherical Shell: 46 with cobalt chrome dual mobility liner  [place] Accolade II femoral stem: 3 high offset  36 mm outer cross-linked polyethylene head with inner 22 mm +3 mm head  Charlie 6.5 mm cancellous screw x3    A 22 modifier is being added to this case due to increased complexity due to patient's recent acetabular fracture that required additional care and attention to preparation of the acetabulum for implant and additionally required use of a multihole cup  to place screws both proximally and distally to help bridge the fracture to help decrease the risk of postoperative fracture with weightbearing as well.    OPERATIVE INDICATIONS:  The patient  is well-known to me and initially presented as an outpatient. They were seen and evaluated for hip pain and found to have AVN of their femoral head with subchondral collapse.  Additionally patient had a recent pelvic fracture that appears  to be healed on CT scan.  We therefore reviewed the alternative option of elective total hip arthroplasty. The risks and benefits of this procedure were discussed in detail as an outpatient and are documented in our outpatient record. The patient expressed  full understanding and wished to proceed. Informed consent was obtained and was placed on the medical chart    The risks, benefits and  potential complications of the arthroplasty surgery were discussed with the patient in detail.  Risks including but not limited to the risk of anesthesia, DVT, pulmonary embolism with the possibility of death, retained infection,  neurovascular injury, and leg length discrepancy.  Specific details of the procedure, hospitalization, recovery, rehabilitation, and long-term precautions were also provided. Pre-operative teaching was provided. Implant/prosthesis selection was outlined,  and the many options available were explained; the final choice will be made at the time of the procedure to match the anatomy and condition of the bone, ligaments, tendons, and muscles. Understanding of all topics was conveyed to me by the patient, and  consent was given to proceed with a total hip arthroplasty.       Procedure:  The patient was identified in the preoperative area .Their hip was then marked by myself and the patient agreed to proceed with the outlined procedure. They were transferred to the operating room and positioned supine on the OR table. Appropriate surgical  huddle was performed with myself present. Anesthesia was then successfully induced. The patient was then positioned in the lateral decubitus position on a pegboard. All bony prominences were well-padded. The operative lower extremity was then prepped  and draped in the normal sterile fashion. A critical pause was taken and we identified the patient, the site of surgery, as well as the administration of preoperative IV antibiotics. The limb was then positioned neutral on a padded Stallworth stand and bony  landmarks were identified on the skin.    A posterior- superior hip incision was then performed with the #10 blade scalpel and a minimally  invasive direct superior approach was made. The subcutaneous tissues were developed down to the level of the fascia. Electrocautery was used to achieve hemostasis. The fibers of the gluteus rahul were split bluntly just short  of the level of the fascia  iwona.The exposed pericapsular fat was removed with electrocautery. The interval between the gluteus medius and the piriformis tendon was then developed. The conjoint tendon was isolated and released from its insertion on the trochanter, tagged and retracted  posteriorly, protecting the sciatic nerve through the remainder of the case. The gluteus minimus was elevated from the capsule and a capsulotomy was then performed. Intracapsular retractors were positioned around the femoral neck and the hip was dislocated  posteriorly. The dislocated femoral head was noted to be eburnated over the majority of its surface with circumferential head and neck junction osteophytes. Using a saw, a  provisional femoral neck osteotomy was then performed at a level based on the preoperative template. The femoral head was excised and evaluated with noted significant cartilage collapse due to underling AVN. The limb was then positioned neutral on a Stallworth  stand and I proceeded with acetabular exposure. A Surya-type retractor was placed over the anterior column and an inferior capsular retractor was positioned below the acetabular teardrop. The acetabulum was prepared with hemispherical reamers.  Patient's  head was significantly small and measured about a 42 mm.  We started with a size 39 mm reamer and the acetabular bed was medialized to the level of the medial wall. Reamers were then directed posteriorly and superiorly into the sclerotic subchondral bone.   We had to ream up to a size 46 mm for good fit given the smallest cup available is a 46 mm.  There is no fractures identified in the acetabulum while reaming.  Once good, bleeding cancellous bone was encountered in all four quadrants we stopped reaming.  The corresponding size of the last reamer was used to select a  Trident II hemispherical shell. This was then opened and impacted in approximately 40 degrees of abduction and 20 degrees of anteversion.   Given the previous fracture of the acetabulum I  elected to place 2 screws proximally in the cup superior into the ilium and one screw inferiorly to help bridge the fracture site to decrease the risk of postoperative fracture with weightbearing.  A trial liner was placed at this time at this time to  allow for visualization of the screws with intraoperative x-rays during trialing.  Given the patient's 46 mm head and her young age the only had options would be a 28 mm with a thick polyethylene or a 32 mm with a thin polyethylene.  Given her young age  elected to convert to dual mobility option due to the ability to get a 36 mm outer head with thick polyethylene outer head and to decrease the risk of postoperative dislocation.    The retractors were then removed and attention was drawn towards the femur. The femur was delivered into the wound and a box chisel was placed into the piriformis fossa. A canal awl was placed down the canal without resistance. Using the Accolade Broach  System, the femur was prepared to accept a broach with good fill and rotational stability. With this in place, there was good rotational and axial stability. The broach was noted to seat at the level of the calcar resection. No fractures were identified.  Multiple trial head and neck combinations were then made and the reduced hip was stable to 90 degrees of flexion, 70 degrees of internal rotation, and 20 degrees of adduction. The hip could be fully extended, externally rotated, and abducted without any  anterior dislocation. The leg lengths were restored equally. Intraoperative radiographs were then obtained which demonstrated satisfactory positioning of the components. No fractures were identified.  At this point interestingly enough 2 and 3 screws  are noted to be adequate position one of the screws 1 proximal to the ileum had Migrated through the Screw Hole.  This was noted to be in bone and somehow there was potential defect of the  screw that allowed it to go past the acetabular shell further  up into the bone.  The reconstruction however was satisfactory.    Satisfied with the reconstruction, the hip was dislocated and the femoral trials were removed.  The acetabulum was once again exposed.  I did try to remove the screw going through the superior aspect of the acetabulum and that it traveled through the  cup however I was unable to identify this.  Given that there is a good press-fit of the cup with good fixation of the proximal and distal screws I elected to just leave the screw in place given that I was unable to remove this without having to remove  the whole acetabular component.  At this point the final dual mobility coliform line was impacted into place.  The proximal femur was then reexposed.  The trial femoral broach was removed.    The final femoral stem and head were then impacted without complication. The hip was reduced one final time and all of the tissues were thoroughly irrigated. An anesthetic injection cocktail was infiltrated throughout the soft tissues. The capsule was  repaired anatomically with #1 Vicryl suture. The conjoint tendon was approximated to the posterior edge of the trochanter with #1 Vicryl suture and the gluteal fascia was repaired with interrupted suture. The rest of the incision was closed in layers  with a final layer of monocryl and skin glue. Occlusive dressing was then applied.  The drapes were then removed. The patient was then transferred to the PACU in stable condition. All counts were correct at the end of the case.     Specimen(s): none     Drains: none     Complications:  none     Plan:                         Weight Bearing Status:  WBAT Bilateral LE                        Range of Motion: Posterior hip precautions x6 weeks                        Aguirre: none placed                        Dressing: Maintain for one week                        DVT Prophylaxis:   Aspirin 81 mg twice daily for 4  weeks                        Antibiotics: Continue x24 hours ofelia-operatively and discharged home with 1 week oral doxycycline                        Discharge/Follow-up: Follow up in clinic in two weeks    Electronically signed by:  Anne Ochoa DO  Joint Replacement and Adult Reconstructive Surgery  [facility]                                  Attestation:   Note Completion:  Attending Attestation I performed the procedure without a resident         Electronic Signatures:  Anne Ochoa ()  (Signed 16-Aug-2023 12:40)   Authored: Post-Operative Note, Chart Review, Note Completion      Last Updated: 16-Aug-2023 12:40 by Anne Ochoa ()

## 2023-10-02 ENCOUNTER — TELEPHONE (OUTPATIENT)
Dept: ORTHOPEDIC SURGERY | Facility: CLINIC | Age: 58
End: 2023-10-02
Payer: COMMERCIAL

## 2023-10-02 DIAGNOSIS — M87.051 AVASCULAR NECROSIS OF BONE OF RIGHT HIP (MULTI): Primary | ICD-10-CM

## 2023-10-02 RX ORDER — CYCLOBENZAPRINE HCL 5 MG
5 TABLET ORAL 3 TIMES DAILY PRN
Qty: 30 TABLET | Refills: 0 | Status: SHIPPED | OUTPATIENT
Start: 2023-10-02 | End: 2023-10-10 | Stop reason: SDUPTHER

## 2023-10-03 ENCOUNTER — APPOINTMENT (OUTPATIENT)
Dept: PHYSICAL THERAPY | Facility: HOSPITAL | Age: 58
End: 2023-10-03
Payer: COMMERCIAL

## 2023-10-03 PROBLEM — S13.4XXA WHIPLASH INJURIES: Status: ACTIVE | Noted: 2023-10-03

## 2023-10-03 PROBLEM — L30.9 DERMATITIS: Status: ACTIVE | Noted: 2023-10-03

## 2023-10-03 PROBLEM — M80.00XA: Status: ACTIVE | Noted: 2023-10-03

## 2023-10-03 PROBLEM — S32.409A ACETABULAR FRACTURE (MULTI): Status: ACTIVE | Noted: 2023-10-03

## 2023-10-03 PROBLEM — M25.561 RIGHT KNEE PAIN: Status: ACTIVE | Noted: 2023-10-03

## 2023-10-03 PROBLEM — R29.898 LOWER EXTREMITY WEAKNESS: Status: ACTIVE | Noted: 2023-10-03

## 2023-10-03 PROBLEM — M46.1 SACROILIITIS (CMS-HCC): Status: ACTIVE | Noted: 2023-10-03

## 2023-10-03 PROBLEM — M87.051 AVASCULAR NECROSIS OF BONE OF HIP, RIGHT (MULTI): Status: ACTIVE | Noted: 2023-10-03

## 2023-10-03 PROBLEM — H52.4 BILATERAL PRESBYOPIA: Status: ACTIVE | Noted: 2023-10-03

## 2023-10-03 PROBLEM — R94.31 ABNORMAL EKG: Status: ACTIVE | Noted: 2023-10-03

## 2023-10-03 PROBLEM — M70.61 GREATER TROCHANTERIC BURSITIS OF RIGHT HIP: Status: ACTIVE | Noted: 2023-10-03

## 2023-10-03 PROBLEM — R26.9 GAIT ABNORMALITY: Status: ACTIVE | Noted: 2023-10-03

## 2023-10-03 PROBLEM — I25.10 CAD (CORONARY ARTERY DISEASE): Status: ACTIVE | Noted: 2023-10-03

## 2023-10-03 PROBLEM — R07.9 CHEST PAIN: Status: ACTIVE | Noted: 2023-10-03

## 2023-10-03 PROBLEM — S62.660A CLOSED NONDISPLACED FRACTURE OF DISTAL PHALANX OF RIGHT INDEX FINGER: Status: ACTIVE | Noted: 2023-10-03

## 2023-10-03 PROBLEM — R26.89 ANTALGIC GAIT: Status: ACTIVE | Noted: 2023-10-03

## 2023-10-03 PROBLEM — Z98.890 STATUS POST BILATERAL LASIK SURGERY: Status: ACTIVE | Noted: 2023-10-03

## 2023-10-03 PROBLEM — M54.9 BACKACHE: Status: ACTIVE | Noted: 2023-10-03

## 2023-10-03 PROBLEM — L03.011 PARONYCHIA OF FINGER OF RIGHT HAND: Status: ACTIVE | Noted: 2023-10-03

## 2023-10-03 PROBLEM — H43.819 POSTERIOR VITREOUS DETACHMENT: Status: ACTIVE | Noted: 2023-10-03

## 2023-10-03 PROBLEM — Z79.891 LONG TERM (CURRENT) USE OF OPIATE ANALGESIC: Status: ACTIVE | Noted: 2023-10-03

## 2023-10-03 PROBLEM — S61.309A NAIL AVULSION, FINGER: Status: ACTIVE | Noted: 2023-10-03

## 2023-10-03 PROBLEM — H90.3 BILATERAL SENSORINEURAL HEARING LOSS: Status: ACTIVE | Noted: 2023-10-03

## 2023-10-03 PROBLEM — M25.551 RIGHT HIP PAIN: Status: ACTIVE | Noted: 2023-10-03

## 2023-10-03 PROBLEM — H25.13 NUCLEAR SCLEROSIS OF BOTH EYES: Status: ACTIVE | Noted: 2023-10-03

## 2023-10-03 RX ORDER — DIROXIMEL FUMARATE 231 MG/1
462 CAPSULE ORAL 2 TIMES DAILY
COMMUNITY
End: 2023-12-28 | Stop reason: SDUPTHER

## 2023-10-03 RX ORDER — IBUPROFEN 600 MG/1
1 TABLET ORAL EVERY 6 HOURS PRN
COMMUNITY

## 2023-10-03 RX ORDER — OMEPRAZOLE 40 MG/1
40 CAPSULE, DELAYED RELEASE ORAL DAILY
COMMUNITY
End: 2024-01-10 | Stop reason: WASHOUT

## 2023-10-03 RX ORDER — ACETAMINOPHEN 325 MG/1
650 TABLET ORAL EVERY 4 HOURS
COMMUNITY

## 2023-10-03 RX ORDER — HYDROCODONE BITARTRATE AND ACETAMINOPHEN 10; 325 MG/1; MG/1
1 TABLET ORAL EVERY 6 HOURS PRN
COMMUNITY
Start: 2023-08-20 | End: 2023-10-10 | Stop reason: ALTCHOICE

## 2023-10-03 RX ORDER — LISINOPRIL 20 MG/1
20 TABLET ORAL DAILY
COMMUNITY
End: 2024-01-10 | Stop reason: WASHOUT

## 2023-10-03 RX ORDER — GABAPENTIN 100 MG/1
100 CAPSULE ORAL 3 TIMES DAILY
COMMUNITY
Start: 2023-07-26 | End: 2023-10-10 | Stop reason: SDUPTHER

## 2023-10-03 RX ORDER — NALOXONE HYDROCHLORIDE 4 MG/.1ML
4 SPRAY NASAL AS NEEDED
COMMUNITY

## 2023-10-05 ENCOUNTER — TREATMENT (OUTPATIENT)
Dept: PHYSICAL THERAPY | Facility: HOSPITAL | Age: 58
End: 2023-10-05
Payer: COMMERCIAL

## 2023-10-05 DIAGNOSIS — R26.9 UNSPECIFIED ABNORMALITIES OF GAIT AND MOBILITY: ICD-10-CM

## 2023-10-05 DIAGNOSIS — R29.898 WEAKNESS OF RIGHT LOWER EXTREMITY: ICD-10-CM

## 2023-10-05 DIAGNOSIS — R26.9 GAIT ABNORMALITY: Primary | ICD-10-CM

## 2023-10-05 DIAGNOSIS — M87.051 IDIOPATHIC ASEPTIC NECROSIS OF RIGHT FEMUR (MULTI): ICD-10-CM

## 2023-10-05 DIAGNOSIS — M87.051 AVASCULAR NECROSIS OF BONE OF HIP, RIGHT (MULTI): ICD-10-CM

## 2023-10-05 PROCEDURE — 97110 THERAPEUTIC EXERCISES: CPT | Mod: GP | Performed by: PHYSICAL THERAPIST

## 2023-10-05 NOTE — PROGRESS NOTES
Physical Therapy    Physical Therapy Treatment    Patient Name: Caitlyn Atkins  MRN: [id number]  Today's Date: 10/5/2023  Time Calculation  Start Time: 1115  Stop Time: 1155  Time Calculation (min): 40 min    Visit #7  Assessment:  Pt. Has progressed well with PT and wishes to be discharged with HEP       Plan:  Dishcarge       Current Problem  1. Gait abnormality        2. Unspecified abnormalities of gait and mobility  Follow Up In Physical Therapy      3. 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 Weakness of right lower extremity        5. Avascular necrosis of bone of hip, right (CMS/HCC)            Subjective   Pt reports 2/10 right lateral calf and foot pain     Precautions  Precautions  Precautions Comment: RITIKA precautions         Objective   Minimal antalgic gait pattern  5/5 right LE strength throughout           Outcome Measures:  70Other Measures  Lower Extremity Funtional Score (LEFS): 70    Treatments:  EXERCISES Date 10/5/23 Date Date Date    REPS REPS REPS REPS          Nustep L3 10 min      Bike              Shuttle  DLP       Shuttle SLP       Shuttle TR/HR              Qhip Flexion       Qhip Extension       Qhip Abduction       Qhip  TKE              Q Quad       Q Hamstring               3 way LE raise  X10 each      Gait assessment 5 minutes      Isometric right hip flexion, abd, add X2 each      Isometric right knee flexio and ext X2 each      Mini squat 2x10      Step up 5 in x10      Side step up 5 in x10                                                         Goals:  Encounter Problems       Encounter Problems (Active)       PT Problem       PT Goals       Start:  10/05/23    Expected End:  10/18/23       Gait/Locomotion: Pt. will present with normalized gait pattern for improved gait safety  , by week 5   Strength: Pt's right LE strength will improve to 4+/5 throughout to allow for improved  , by week 6

## 2023-10-10 ENCOUNTER — OFFICE VISIT (OUTPATIENT)
Dept: PAIN MEDICINE | Facility: CLINIC | Age: 58
End: 2023-10-10
Payer: COMMERCIAL

## 2023-10-10 DIAGNOSIS — M87.051 AVASCULAR NECROSIS OF BONE OF HIP, RIGHT (MULTI): Primary | ICD-10-CM

## 2023-10-10 DIAGNOSIS — M51.36 DISC DEGENERATION, LUMBAR: ICD-10-CM

## 2023-10-10 DIAGNOSIS — M87.051 AVASCULAR NECROSIS OF BONE OF RIGHT HIP (MULTI): ICD-10-CM

## 2023-10-10 DIAGNOSIS — Z79.891 LONG TERM (CURRENT) USE OF OPIATE ANALGESIC: ICD-10-CM

## 2023-10-10 DIAGNOSIS — M54.32 BILATERAL SCIATICA: ICD-10-CM

## 2023-10-10 DIAGNOSIS — M54.31 BILATERAL SCIATICA: ICD-10-CM

## 2023-10-10 PROCEDURE — 99214 OFFICE O/P EST MOD 30 MIN: CPT | Performed by: PHYSICAL MEDICINE & REHABILITATION

## 2023-10-10 RX ORDER — HYDROCODONE BITARTRATE AND ACETAMINOPHEN 7.5; 325 MG/1; MG/1
1 TABLET ORAL EVERY 6 HOURS PRN
Qty: 112 TABLET | Refills: 0 | Status: SHIPPED | OUTPATIENT
Start: 2023-11-12 | End: 2023-11-30 | Stop reason: SDUPTHER

## 2023-10-10 RX ORDER — CYCLOBENZAPRINE HCL 5 MG
5 TABLET ORAL 3 TIMES DAILY PRN
Qty: 84 TABLET | Refills: 1 | Status: SHIPPED | OUTPATIENT
Start: 2023-10-10 | End: 2023-12-11

## 2023-10-10 RX ORDER — HYDROCODONE BITARTRATE AND ACETAMINOPHEN 7.5; 325 MG/1; MG/1
1 TABLET ORAL EVERY 6 HOURS PRN
Qty: 112 TABLET | Refills: 0 | Status: SHIPPED | OUTPATIENT
Start: 2023-10-15 | End: 2023-11-12

## 2023-10-10 RX ORDER — GABAPENTIN 100 MG/1
100 CAPSULE ORAL 3 TIMES DAILY
Qty: 270 CAPSULE | Refills: 1 | Status: SHIPPED | OUTPATIENT
Start: 2023-10-10 | End: 2024-01-30 | Stop reason: SDUPTHER

## 2023-10-10 NOTE — PROGRESS NOTES
Chief complaint  Back and lower limb pain hip pain    History  Mrs. Atkins returned today for follow up office visit . she had the epidural steroid injections and the pain went down to 1/10. that lasted 4 weeks ago and the pain is coming back again  she had the bilateral L5 transforaminal epidural steroid injections in December the left side pain is gone. the pain in the right side was worse and we repeated the epidural steroid injections in January 30. that helped as above and the pain is coming back only on the right side       The lower back pain is across the lumbar spine . that is mechanical in characteristics , continuous and gets worse with movements mainly with forward flexion and bending.      the pain down the right lower limb . that is reaching the lateral leg down to the leg and lateral malleolus. the pain is intermittent and that is burning and tingling on a continuous fashion. the pain goes in aggravation intermittently with sharp lancinating, electrical like jolts and sensations. These are felt on the skin and deeper in the tissues.   the right sciatica pain is occurring once or twice a week when she does constant bending     The pain is interfering with activities of daily living, quality of life and quality of sleep. It is limiting the functions and everything takes longer to complete because of the slowing related to the pain. Movements are cautious to avoid aggravation of the symptoms.      she is on schedule with the pain medications and using those as prescribed for the pain control. Denied euphoria associated with the use of the pain medications. the pain is rated at 7/10 without the medications. But, with the pain medications the pain level drops to 2/10.      opioids treatment agreement renewed in January 2023  Oarrs pulled and scanned in the chart no concerns OD score is 130 and Narx is 361.  last urine toxicology testing in August 2022 and June 2023 and that was compatible with  hydrocodone and metabolites   Xray updated cervical spine   ORT Score is 0  Pain pathology and pain generators cervical spine   Modalities tried injections, elsewhere PT TENS and Non steroidal anti inflammatory medications     Physical examination  declined Chaperone for the visit and was adequately  draped for the exam.  Awake, alert, oriented for time, place and persons stressed about the pain from the neck  The speech is coherent well articulated and understood.   Pupils are equal, midsize, reactive to light and accommodations.      gait limping on the right . no cane      Elvin negative for axial loading and log rotation no cane and no assistive devices      straight leg raising increased the pain in the right lower limb at 55 Â° with decreased sensory over the lateral leg  plantar cutaneous reflex are downgoing   spasms in the lower back with tight range of motion      no pain behavior    Diagnosis     · Degeneration, intervertebral disc, lumbosacral (722.52) (M51.37)   · Right lumbar radiculopathy (724.4) (M54.16)    Plan  reviewed the pain generators in the cervical spine and whiplash   Discussed the mechanism of action of pain and ligaments injury will get TENS unit and X-ray discussed with her about home exercises program and trigger points   Went over the pain medications and mechanism of action and side effects  increase hydrocodone to three times a day for the next 10 days and then go back to twice a day   I explained the side effects from the acetaminophen in the medication and made aware of those. I also explained about the cumulative effects on the organs and mainly the liver.      continue with home exercises program and stretches and home exercises program       hydrocodone 7.5 mg at 4 times because the hydrocodone is not lasting more than 6hours. I explained the side effects from the acetaminophen in the medication and made aware of those. I also explained about the cumulative effects on the  organs and mainly the liver.     Based on the above findings and the clinical response to the opioids medications and improvement of the activities of daily living, sleep, and work performance. We made this complex decision to continue the opioids therapy in light of the evidence of the patient's responsibility in using the pain medications as prescribed for the nonmalignant chronic pain condition. We discussed about the use of the pain medications to treat the symptoms of chronic nonmalignant pain and we are not trying the repair the permanent damage in the tissues, rather we are trying to control the symptoms induced by the permanent damage to the tissues inducing the chronic pain situation and resulting disability. I explained the difference between controlling the pain and permanent tissues damage inducing the chronic pain and discussed it with the patient and stressed the importance of knowing the difference especially because of the side effects and the addicting and habit forming nature of the dangerous drugs we are using to treat the symptoms of the chronic pain.     discussed with her about the above and in light of the above, consider either repeat epidural steroid injections of surgery opinion     for the pain medications back to four times a day      We discussed that we are prescribing the medications on good rambo and legitimate medical reason   ,Follow-up 8 weeks or earlier if needed

## 2023-11-07 ENCOUNTER — APPOINTMENT (OUTPATIENT)
Dept: PODIATRY | Facility: CLINIC | Age: 58
End: 2023-11-07
Payer: COMMERCIAL

## 2023-11-07 ENCOUNTER — OFFICE VISIT (OUTPATIENT)
Dept: ORTHOPEDIC SURGERY | Facility: CLINIC | Age: 58
End: 2023-11-07
Payer: COMMERCIAL

## 2023-11-07 ENCOUNTER — ANCILLARY PROCEDURE (OUTPATIENT)
Dept: RADIOLOGY | Facility: CLINIC | Age: 58
End: 2023-11-07
Payer: COMMERCIAL

## 2023-11-07 VITALS — HEIGHT: 62 IN | BODY MASS INDEX: 18.4 KG/M2 | WEIGHT: 100 LBS

## 2023-11-07 DIAGNOSIS — M25.551 RIGHT HIP PAIN: ICD-10-CM

## 2023-11-07 DIAGNOSIS — M87.051 AVASCULAR NECROSIS OF BONE OF RIGHT HIP (MULTI): Primary | ICD-10-CM

## 2023-11-07 PROCEDURE — 73502 X-RAY EXAM HIP UNI 2-3 VIEWS: CPT | Mod: RIGHT SIDE | Performed by: RADIOLOGY

## 2023-11-07 PROCEDURE — 73502 X-RAY EXAM HIP UNI 2-3 VIEWS: CPT | Mod: RT,FY

## 2023-11-07 PROCEDURE — 99024 POSTOP FOLLOW-UP VISIT: CPT | Performed by: ORTHOPAEDIC SURGERY

## 2023-11-07 PROCEDURE — 3074F SYST BP LT 130 MM HG: CPT | Performed by: ORTHOPAEDIC SURGERY

## 2023-11-07 PROCEDURE — 3078F DIAST BP <80 MM HG: CPT | Performed by: ORTHOPAEDIC SURGERY

## 2023-11-07 ASSESSMENT — PAIN - FUNCTIONAL ASSESSMENT: PAIN_FUNCTIONAL_ASSESSMENT: 0-10

## 2023-11-07 ASSESSMENT — PAIN SCALES - GENERAL: PAINLEVEL_OUTOF10: 7

## 2023-11-07 NOTE — PROGRESS NOTES
ORTHOPEDIC TOTAL JOINT  POST-OPERATIVE FOLLOW UP      ============================  IMPRESSION/PLAN:  ============================  58 y.o. female s/p Right Total Hip Replacement completed on 08/16/2023.    PLAN:  -Weightbearing: Weight bearing as tolerated  -DVT Prophylaxis: No longer needed  -Radiology Studies: Reviewed current x-rays with her demonstrating stable implants and compared to prior films  -Therapies: Continue home exercises  -Follow-up at the 1 year cj of surgery with new x-rays        Caitlyn Atkins presents today for follow up of joint replacement above. Pain controlled with current treatment plan. Patient has completed physical therapy. She went back to work on 10/16/2023 and she started having increased pain due to a fall.  She states she also had a fall at work a week ago which made her pain worse.  They are currently ambulating with  no assistance . She states she has pain into her thigh now.     Review of Systems:   Constitutional: See HPI for pain assessment, No significant weight loss, recent trauma. Denies fevers/chills  Cardiovascular: No chest pain, shortness of breath  Respiratory: No difficulty breathing, cough  Gastrointestinal: No nausea, vomiting, diarrhea, constipation  Musculoskeletal: Noted in HPI, no arthralgias   Integumentary: No rashes, easy bruising, redness   Neurological: no numbness or tingling in extremities, no gait disturbances     Patient Active Problem List   Diagnosis    Benign essential hypertension    Bilateral sciatica    Bipolar disorder, current episode manic without psychotic features (CMS/HCC)    Gastritis    Disc degeneration, lumbar    Familial hyperlipidemia    Lumbar radiculopathy    Multiple sclerosis (CMS/HCC)    Nicotine dependence, unspecified, uncomplicated    Osteoporosis    Vitamin D deficiency    TMJ (dislocation of temporomandibular joint)    Abnormal EKG    Acetabular fracture (CMS/HCC)    Antalgic gait    Avascular necrosis of bone of hip,  right (CMS/HCC)    Backache    Bilateral presbyopia    Bilateral sensorineural hearing loss    CAD (coronary artery disease)    Chest pain    Closed nondisplaced fracture of distal phalanx of right index finger    Dermatitis    Gait abnormality    Greater trochanteric bursitis of right hip    Long term (current) use of opiate analgesic    Lower extremity weakness    Nail avulsion, finger    Nuclear sclerosis of both eyes    Paronychia of finger of right hand    Postmenopausal osteoporosis with pathological fracture    Posterior vitreous detachment    Right hip pain    Right knee pain    Sacroiliitis (CMS/HCC)    Status post bilateral LASIK surgery    Whiplash injuries       =================================  EXAM  =================================  GENERAL: A/Ox3, NAD. Appears healthy, well nourished  CARDIAC: regular rate  LUNGS: Breathing non-labored    MUSCULOSKELETAL:  Laterality: right Hip exam  - Strength: Abduction 5/5, Flexion 5/5  - Palpation: non TTP along surgical site  - EHL/PF/DF motor intact  - compartments soft, negative homans  - Gait: using no assistive device    NEUROVASCULAR:  - Neurovascular Status: sensation intact to light touch distally  - Capillary refill brisk at extremities, Bilateral dorsalis pedis pulse 2+     IMAGING: Multiple views right hip and pelvis demonstrate no signs of loosening or failure of right total arthroplasty. X-rays were personally reviewed by me.  Radiology reports were reviewed by me as well, if available at the time.      Anne Ochoa DO  Attending Surgeon  [facility] and Adult Reconstructive Surgery  [place], [place]

## 2023-11-14 ENCOUNTER — APPOINTMENT (OUTPATIENT)
Dept: ORTHOPEDIC SURGERY | Facility: CLINIC | Age: 58
End: 2023-11-14
Payer: COMMERCIAL

## 2023-11-16 ENCOUNTER — PREP FOR PROCEDURE (OUTPATIENT)
Dept: RADIOLOGY | Facility: HOSPITAL | Age: 58
End: 2023-11-16

## 2023-11-16 ENCOUNTER — HOSPITAL ENCOUNTER (OUTPATIENT)
Dept: RADIOLOGY | Facility: HOSPITAL | Age: 58
Discharge: HOME | End: 2023-11-16
Payer: COMMERCIAL

## 2023-11-16 VITALS
DIASTOLIC BLOOD PRESSURE: 59 MMHG | RESPIRATION RATE: 16 BRPM | SYSTOLIC BLOOD PRESSURE: 100 MMHG | HEART RATE: 52 BPM | TEMPERATURE: 98.4 F

## 2023-11-16 DIAGNOSIS — I25.10 ATHEROSCLEROTIC HEART DISEASE OF NATIVE CORONARY ARTERY WITHOUT ANGINA PECTORIS: ICD-10-CM

## 2023-11-16 LAB
CREAT SERPL-MCNC: 0.5 MG/DL (ref 0.6–1.3)
GFR SERPL CREATININE-BSD FRML MDRD: >90 ML/MIN/1.73M*2

## 2023-11-16 PROCEDURE — 2550000001 HC RX 255 CONTRASTS: Performed by: NURSE PRACTITIONER

## 2023-11-16 PROCEDURE — 2500000005 HC RX 250 GENERAL PHARMACY W/O HCPCS: Performed by: RADIOLOGY

## 2023-11-16 PROCEDURE — 82565 ASSAY OF CREATININE: CPT

## 2023-11-16 PROCEDURE — 75574 CT ANGIO HRT W/3D IMAGE: CPT | Performed by: RADIOLOGY

## 2023-11-16 PROCEDURE — 75574 CT ANGIO HRT W/3D IMAGE: CPT

## 2023-11-16 RX ORDER — METOPROLOL TARTRATE 1 MG/ML
5 INJECTION, SOLUTION INTRAVENOUS ONCE AS NEEDED
Status: CANCELLED | OUTPATIENT
Start: 2023-11-16

## 2023-11-16 RX ORDER — METOPROLOL TARTRATE 1 MG/ML
5 INJECTION, SOLUTION INTRAVENOUS ONCE AS NEEDED
Status: DISCONTINUED | OUTPATIENT
Start: 2023-11-16 | End: 2023-11-17 | Stop reason: HOSPADM

## 2023-11-16 RX ORDER — METOPROLOL TARTRATE 1 MG/ML
5 INJECTION, SOLUTION INTRAVENOUS ONCE
Status: CANCELLED | OUTPATIENT
Start: 2023-11-16 | End: 2023-11-16

## 2023-11-16 RX ORDER — METOPROLOL TARTRATE 50 MG/1
100 TABLET ORAL ONCE AS NEEDED
Status: DISCONTINUED | OUTPATIENT
Start: 2023-11-16 | End: 2023-11-17 | Stop reason: HOSPADM

## 2023-11-16 RX ORDER — LORAZEPAM 2 MG/ML
0.5 INJECTION INTRAMUSCULAR EVERY 5 MIN PRN
Status: CANCELLED | OUTPATIENT
Start: 2023-11-16

## 2023-11-16 RX ORDER — METOPROLOL TARTRATE 1 MG/ML
5 INJECTION, SOLUTION INTRAVENOUS ONCE
Status: COMPLETED | OUTPATIENT
Start: 2023-11-16 | End: 2023-11-16

## 2023-11-16 RX ORDER — METOPROLOL TARTRATE 1 MG/ML
5 INJECTION, SOLUTION INTRAVENOUS ONCE AS NEEDED
Status: COMPLETED | OUTPATIENT
Start: 2023-11-16 | End: 2023-11-16

## 2023-11-16 RX ORDER — METOPROLOL TARTRATE 100 MG/1
100 TABLET ORAL ONCE
Status: CANCELLED | OUTPATIENT
Start: 2023-11-16 | End: 2023-11-16

## 2023-11-16 RX ORDER — LORAZEPAM 2 MG/ML
0.5 INJECTION INTRAMUSCULAR EVERY 5 MIN PRN
Status: DISCONTINUED | OUTPATIENT
Start: 2023-11-16 | End: 2023-11-17 | Stop reason: HOSPADM

## 2023-11-16 RX ORDER — METOPROLOL TARTRATE 100 MG/1
100 TABLET ORAL ONCE AS NEEDED
Status: CANCELLED | OUTPATIENT
Start: 2023-11-16

## 2023-11-16 RX ORDER — NITROGLYCERIN 0.4 MG/1
0.8 TABLET SUBLINGUAL ONCE
Status: CANCELLED | OUTPATIENT
Start: 2023-11-16 | End: 2023-11-16

## 2023-11-16 RX ORDER — NITROGLYCERIN 0.4 MG/1
0.8 TABLET SUBLINGUAL ONCE
Status: DISCONTINUED | OUTPATIENT
Start: 2023-11-16 | End: 2023-11-17 | Stop reason: HOSPADM

## 2023-11-16 RX ORDER — METOPROLOL TARTRATE 50 MG/1
100 TABLET ORAL ONCE
Status: DISCONTINUED | OUTPATIENT
Start: 2023-11-16 | End: 2023-11-17 | Stop reason: HOSPADM

## 2023-11-16 RX ADMIN — METOPROLOL TARTRATE 5 MG: 1 INJECTION, SOLUTION INTRAVENOUS at 11:16

## 2023-11-16 RX ADMIN — IOHEXOL 75 ML: 350 INJECTION, SOLUTION INTRAVENOUS at 11:48

## 2023-11-16 RX ADMIN — METOPROLOL TARTRATE 5 MG: 1 INJECTION, SOLUTION INTRAVENOUS at 11:32

## 2023-11-16 RX ADMIN — METOPROLOL TARTRATE 5 MG: 1 INJECTION, SOLUTION INTRAVENOUS at 11:21

## 2023-11-16 RX ADMIN — METOPROLOL TARTRATE 5 MG: 1 INJECTION, SOLUTION INTRAVENOUS at 11:11

## 2023-11-20 ENCOUNTER — HOSPITAL ENCOUNTER (OUTPATIENT)
Dept: CARDIOLOGY | Facility: CLINIC | Age: 58
Discharge: HOME | End: 2023-11-20
Payer: COMMERCIAL

## 2023-11-20 ENCOUNTER — LAB (OUTPATIENT)
Dept: LAB | Facility: LAB | Age: 58
End: 2023-11-20
Payer: COMMERCIAL

## 2023-11-20 ENCOUNTER — TELEPHONE (OUTPATIENT)
Dept: CARDIOLOGY | Facility: CLINIC | Age: 58
End: 2023-11-20

## 2023-11-20 ENCOUNTER — OFFICE VISIT (OUTPATIENT)
Dept: NEUROLOGY | Facility: CLINIC | Age: 58
End: 2023-11-20
Payer: COMMERCIAL

## 2023-11-20 VITALS
DIASTOLIC BLOOD PRESSURE: 77 MMHG | HEIGHT: 62 IN | SYSTOLIC BLOOD PRESSURE: 143 MMHG | RESPIRATION RATE: 18 BRPM | BODY MASS INDEX: 18.4 KG/M2 | WEIGHT: 100 LBS | HEART RATE: 71 BPM

## 2023-11-20 DIAGNOSIS — G35 MULTIPLE SCLEROSIS (MULTI): ICD-10-CM

## 2023-11-20 DIAGNOSIS — R00.2 PALPITATIONS: ICD-10-CM

## 2023-11-20 DIAGNOSIS — R00.2 PALPITATIONS: Primary | ICD-10-CM

## 2023-11-20 DIAGNOSIS — G35 MULTIPLE SCLEROSIS (MULTI): Primary | ICD-10-CM

## 2023-11-20 LAB
ALBUMIN SERPL BCP-MCNC: 4 G/DL (ref 3.4–5)
ALP SERPL-CCNC: 128 U/L (ref 33–110)
ALT SERPL W P-5'-P-CCNC: 15 U/L (ref 7–45)
AST SERPL W P-5'-P-CCNC: 21 U/L (ref 9–39)
BASOPHILS # BLD AUTO: 0.08 X10*3/UL (ref 0–0.1)
BASOPHILS NFR BLD AUTO: 1.2 %
BILIRUB DIRECT SERPL-MCNC: 0.1 MG/DL (ref 0–0.3)
BILIRUB SERPL-MCNC: 0.4 MG/DL (ref 0–1.2)
EOSINOPHIL # BLD AUTO: 0.11 X10*3/UL (ref 0–0.7)
EOSINOPHIL NFR BLD AUTO: 1.6 %
ERYTHROCYTE [DISTWIDTH] IN BLOOD BY AUTOMATED COUNT: 13.3 % (ref 11.5–14.5)
HCT VFR BLD AUTO: 39.6 % (ref 36–46)
HGB BLD-MCNC: 12.6 G/DL (ref 12–16)
IMM GRANULOCYTES # BLD AUTO: 0.02 X10*3/UL (ref 0–0.7)
IMM GRANULOCYTES NFR BLD AUTO: 0.3 % (ref 0–0.9)
LYMPHOCYTES # BLD AUTO: 1.02 X10*3/UL (ref 1.2–4.8)
LYMPHOCYTES NFR BLD AUTO: 14.9 %
MCH RBC QN AUTO: 31.7 PG (ref 26–34)
MCHC RBC AUTO-ENTMCNC: 31.8 G/DL (ref 32–36)
MCV RBC AUTO: 100 FL (ref 80–100)
MONOCYTES # BLD AUTO: 0.93 X10*3/UL (ref 0.1–1)
MONOCYTES NFR BLD AUTO: 13.6 %
NEUTROPHILS # BLD AUTO: 4.67 X10*3/UL (ref 1.2–7.7)
NEUTROPHILS NFR BLD AUTO: 68.4 %
NRBC BLD-RTO: 0 /100 WBCS (ref 0–0)
PLATELET # BLD AUTO: 349 X10*3/UL (ref 150–450)
PROT SERPL-MCNC: 6.5 G/DL (ref 6.4–8.2)
RBC # BLD AUTO: 3.97 X10*6/UL (ref 4–5.2)
WBC # BLD AUTO: 6.8 X10*3/UL (ref 4.4–11.3)

## 2023-11-20 PROCEDURE — 99214 OFFICE O/P EST MOD 30 MIN: CPT | Performed by: NURSE PRACTITIONER

## 2023-11-20 PROCEDURE — 85025 COMPLETE CBC W/AUTO DIFF WBC: CPT

## 2023-11-20 PROCEDURE — 3078F DIAST BP <80 MM HG: CPT | Performed by: NURSE PRACTITIONER

## 2023-11-20 PROCEDURE — 36415 COLL VENOUS BLD VENIPUNCTURE: CPT

## 2023-11-20 PROCEDURE — 80076 HEPATIC FUNCTION PANEL: CPT

## 2023-11-20 PROCEDURE — 93248 EXT ECG>7D<15D REV&INTERPJ: CPT | Performed by: INTERNAL MEDICINE

## 2023-11-20 PROCEDURE — 3077F SYST BP >= 140 MM HG: CPT | Performed by: NURSE PRACTITIONER

## 2023-11-20 ASSESSMENT — VISUAL ACUITY: VA_NORMAL: 1

## 2023-11-20 ASSESSMENT — PAIN SCALES - GENERAL: PAINLEVEL: 0-NO PAIN

## 2023-11-20 NOTE — TELEPHONE ENCOUNTER
Spoke with pt and went over below info. Pt would like monitor mailed to her. I enrolled the pt onto [employer] and monitor will be sent to pt.

## 2023-11-20 NOTE — PROGRESS NOTES
Patient name:   Diagnosis if known: RRMS  Date of onset: 2010  Date of diagnosis: 2013  disease course at onset: RR  disease course now: RR  Current DMT: DMF  Previous DMTs: [facility]  Last MRI brain: 3/2023- stable  Last MRI cervical: 3/18/13  Last MRI thoracic: unknown  Last OCT:  CSF: 3/28/13  JCV:   VZV:  HEP panel:   NMO:   MOG:     Subjective   HPI  Caitlyn Atkins is a 58 y.o.   female, here for a follow up of her MS, she takes DMF, and tolerating well.  She reports right hip surgery back in August 2023, and is doing well and back to work.  She reports no new MS symptoms.      Objective   Neurological Exam  Mental Status  Alert. Oriented to person, place, time and situation. Oriented to person, place, and time. Recent and remote memory are intact. Speech is normal. Language is fluent with no aphasia. Attention and concentration are normal.    Cranial Nerves  CN I: Sense of smell is normal.  CN II: Visual acuity is normal.  CN III, IV, VI: Extraocular movements intact bilaterally. Normal lids and orbits bilaterally. Pupils equal round and reactive to light bilaterally.  CN V: Facial sensation is normal.  CN VII: Full and symmetric facial movement.  CN VIII: Hearing is normal.  CN IX, X: Palate elevates symmetrically  CN XI: Shoulder shrug strength is normal.  CN XII: Tongue midline without atrophy or fasciculations.    Motor  Normal muscle bulk throughout. Normal muscle tone. Strength is 5/5 throughout all four extremities.  Right hip replacement a few months ago..    Coordination    Finger-to-nose, rapid alternating movements and heel-to-shin normal bilaterally without dysmetria.  9 hole peg test: Right hand 27.1 sec. Left hand 24.2 sec..    Gait  Casual gait is normal including stance, stride, and arm swing. Timed get up and go test: 7 seconds.    Physical Exam  Constitutional:       Appearance: Normal appearance. She is underweight.   HENT:      Head: Normocephalic.      Right Ear: Tympanic membrane  normal.      Left Ear: Tympanic membrane normal.      Nose: Nose normal.      Mouth/Throat:      Mouth: Mucous membranes are moist.   Eyes:      General: Lids are normal.      Extraocular Movements: Extraocular movements intact.      Pupils: Pupils are equal, round, and reactive to light.   Cardiovascular:      Rate and Rhythm: Normal rate.   Pulmonary:      Effort: Pulmonary effort is normal.   Abdominal:      General: Abdomen is flat.   Musculoskeletal:         General: Normal range of motion.      Cervical back: Full passive range of motion without pain and normal range of motion.   Skin:     General: Skin is warm and dry.   Neurological:      Mental Status: She is alert and oriented to person, place, and time.      Motor: Motor strength is normal.     Coordination: Coordination is intact.   Psychiatric:         Attention and Perception: Attention normal.         Mood and Affect: Mood normal.         Speech: Speech normal.         Behavior: Behavior is cooperative.         Cognition and Memory: Cognition normal.     Provider Impression  Caitlyn Atkins is a 58 y.o.   female, here for a follow up of her MS, she takes DMF, and tolerating well.  She reports right hip surgery back in August 2023, and is doing well and back to work.  She reports no new MS symptoms.    We discussed the importance of living healthy life style with diet and exercise and the importance of V-D in patient's with MS.    The total face to face appointment was 35 minutes and more than 50% of the visit was spent counseling and coordination of care.    I personally reviewed laboratory, radiographic, and medical studies which were pertinent for today's visit.    Plan  - Continue DMF.  - Labs today.  - MRI March 2024.  - Follow up 6 mo.

## 2023-11-20 NOTE — TELEPHONE ENCOUNTER
Pt called and would like CT angio results. The Ct was done 11/16/23. Results are in epic. Please advise

## 2023-11-20 NOTE — TELEPHONE ENCOUNTER
Spoke with pt and went over below info. Pt states she had 2 days last weekend that she got flushed on her face and chest, felt heart racing and chest tightness. Please advise. Thank you

## 2023-11-30 ENCOUNTER — OFFICE VISIT (OUTPATIENT)
Dept: PAIN MEDICINE | Facility: CLINIC | Age: 58
End: 2023-11-30
Payer: COMMERCIAL

## 2023-11-30 ENCOUNTER — LAB (OUTPATIENT)
Dept: LAB | Facility: LAB | Age: 58
End: 2023-11-30
Payer: COMMERCIAL

## 2023-11-30 DIAGNOSIS — M87.051 AVASCULAR NECROSIS OF BONE OF HIP, RIGHT (MULTI): ICD-10-CM

## 2023-11-30 DIAGNOSIS — M54.32 BILATERAL SCIATICA: ICD-10-CM

## 2023-11-30 DIAGNOSIS — Z79.891 LONG TERM CURRENT USE OF OPIATE ANALGESIC: ICD-10-CM

## 2023-11-30 DIAGNOSIS — M54.31 BILATERAL SCIATICA: ICD-10-CM

## 2023-11-30 DIAGNOSIS — M51.36 DISC DEGENERATION, LUMBAR: ICD-10-CM

## 2023-11-30 DIAGNOSIS — Z79.891 LONG TERM (CURRENT) USE OF OPIATE ANALGESIC: ICD-10-CM

## 2023-11-30 DIAGNOSIS — M87.051 AVASCULAR NECROSIS OF BONE OF HIP, RIGHT (MULTI): Primary | ICD-10-CM

## 2023-11-30 DIAGNOSIS — M54.16 LUMBAR RADICULOPATHY: ICD-10-CM

## 2023-11-30 PROCEDURE — 99214 OFFICE O/P EST MOD 30 MIN: CPT | Performed by: PHYSICAL MEDICINE & REHABILITATION

## 2023-11-30 RX ORDER — HYDROCODONE BITARTRATE AND ACETAMINOPHEN 7.5; 325 MG/1; MG/1
1 TABLET ORAL EVERY 6 HOURS PRN
Qty: 112 TABLET | Refills: 0 | Status: SHIPPED | OUTPATIENT
Start: 2024-01-07 | End: 2024-01-30 | Stop reason: SDUPTHER

## 2023-11-30 RX ORDER — HYDROCODONE BITARTRATE AND ACETAMINOPHEN 7.5; 325 MG/1; MG/1
1 TABLET ORAL EVERY 6 HOURS PRN
Qty: 112 TABLET | Refills: 0 | Status: SHIPPED | OUTPATIENT
Start: 2023-12-10 | End: 2023-12-11 | Stop reason: SDUPTHER

## 2023-11-30 NOTE — PROGRESS NOTES
Chief complaint  Back pain and right leg pain   R hip pain after THR    History  Ms Atkins is back for visit. Still have back pain   Hip pain is better after THR  The pain in the back is deep achy worse in the mid back area.  This is associated with tight muscle bands.  This limits the range of motion of the lumbar spine mainly in forward flexion.  The pain in the back is radiating around the side on the lateral aspect of the right thigh going down toward the lateral aspect of the right leg into the lateral malleolus toward the lateral aspect of the foot towards the big toe.    This pain down to the right lower limb is more of a burning tingling sensation.  The pain in the right lower limb worsens with bending forward or with any lifting, it improves with laying on the side and resting.  This is similar to the associated pain in the middle to lower back.  Denied any bowel or bladder incontinence.  With the worst pain there is tendency to catch the toe especially on carpeted area.  This occurs mostly when tired and toward the end of the day.    The skin is intact with no breakdown.  No vesicles.    Pain level without medication is 8/10 , with the medication pain level 4/10.     The pain meds are helping control the pain and improving Activities of Daily living and quality of life and quality of sleep.    opioids treatment agreement 2023  Oarrs pulled and scanned in the chart  no concerns  last urine toxicology testing earlier this year and it was compliant we will repeat  Xray updated hip and spine   ORT Score is  0  Pain pathology and pain generators spine and hip   Modalities tried injection, surgery, physical therapy, TENS unit, nonsteroidal anti-inflammatory medication THR      Denied any fever or chills. No weight loss and no night sweats. No cough or sputum production. No diarrhea   The constipation has been responding to fibers and over the counter medications.     No bladder and bowel incontinence and no  other changes in bladder and bowel. No skin changes.  Reports tiredness and fatigability only if the pain is not controlled.   Denied opioids diversion and abuse and denies alcoholism. Denies overuse of the pain medications.    The control of the pain with the pain medications is helping the control of the symptoms and allowing the function and activities of daily living, enjoyment of life, improving the quality of life and sleep with less interruption by the pain. The goal is symptomatic control of the nonmalignant chronic pain and not to repair the permanent damage in the tissues inducing the chronic pain conditions. We are aiming to shift the focus from the nonmalignant chronic pain to other aspects of life by symptomatically treating this chronic pain. If this pain is not treated it will lead to major morbidity and it is also associated with increased risks of mortality. The patient understands those very clearly and also understand high risks of morbidity and mortality if not strictly adherent to the treatment recommendations and reporting any associated side effects. Also patient understand the full responsibility associated with these medications to avoid abuse or overuse or any use of these medications for anything besides treating the patient's own chronic pain and nothing else under any circumstances.        Physical examination  Awake, alert and oriented for time place and persons   declined Chaperone for the visit and was adequately  draped for the exam.      The pain in the back is deep achy worse in the mid back area.  This is associated with tight muscle bands.  This limits the range of motion of the lumbar spine mainly in forward flexion.  The pain in the back is radiating around the side on the lateral aspect of the right thigh going down toward the lateral aspect of the right leg into the lateral malleolus toward the lateral aspect of the foot towards the big toe.    This pain down to the right  lower limb is more of a burning tingling sensation.  The pain in the right lower limb worsens with bending forward or with any lifting, it improves with laying on the side and resting.  This is similar to the associated pain in the middle to lower back.  Denied any bowel or bladder incontinence.  With the worst pain there is tendency to catch the toe especially on carpeted area.  This occurs mostly when tired and toward the end of the day.    The skin is intact with no breakdown.  No vesicles.      Diagnosis  Problem List Items Addressed This Visit       Bilateral sciatica    Relevant Medications    HYDROcodone-acetaminophen (Norco) 7.5-325 mg tablet (Start on 1/7/2024)    HYDROcodone-acetaminophen (Norco) 7.5-325 mg tablet (Start on 12/10/2023)    Disc degeneration, lumbar    Relevant Medications    HYDROcodone-acetaminophen (Norco) 7.5-325 mg tablet (Start on 1/7/2024)    HYDROcodone-acetaminophen (Norco) 7.5-325 mg tablet (Start on 12/10/2023)    Lumbar radiculopathy    Relevant Medications    HYDROcodone-acetaminophen (Norco) 7.5-325 mg tablet (Start on 1/7/2024)    HYDROcodone-acetaminophen (Norco) 7.5-325 mg tablet (Start on 12/10/2023)    Avascular necrosis of bone of hip, right (CMS/HCC) - Primary    Relevant Medications    HYDROcodone-acetaminophen (Norco) 7.5-325 mg tablet (Start on 1/7/2024)    HYDROcodone-acetaminophen (Norco) 7.5-325 mg tablet (Start on 12/10/2023)    Long term current use of opiate analgesic    Relevant Medications    HYDROcodone-acetaminophen (Norco) 7.5-325 mg tablet (Start on 1/7/2024)    HYDROcodone-acetaminophen (Norco) 7.5-325 mg tablet (Start on 12/10/2023)        Plan  Reviewed the pain generators.  Went over the types of pain with neuropathic and nociceptive and different pathologies and therapeutic modalities. Discussed the mechanism of action of interventions from acupuncture, physical therapy , regular exercises, injections, botox, spinal cord stimulation, and role of  surgery     Went over pathology of the intervertebral disc displacement and the anatomical relation to the Nerve roots and relation to the radicular symptoms. Went over treatment modalities with conservative treatment including acupuncture   and epidural steroid injection with fluoroscopy guidance and last resort of surgery    Based on the above findings and the clinical response to the opioids medications and improvement of the activities of daily living, sleep, and work performance. We made this complex decision to continue the opioids therapy in light of the evidence of the patient's responsibility in using the pain medications as prescribed for the nonmalignant chronic pain condition. We discussed about the use of the pain medications to treat the symptoms of chronic nonmalignant pain and we are not trying the repair the permanent damage in the tissues, rather we are trying to control the symptoms induced by the permanent damage to the tissues inducing the chronic pain condition and resulting disability. I explained the difference and discussed it with the patient and stressed the importance of knowing the difference especially because of the potential side effects and the potential addicting effect and habit forming nature of the dangerous drugs we are using to treat the symptoms of the chronic pain.      We discussed that we are prescribing the medications on good rambo and legitimate medical reason.     We reviewed the side effects and precautions of opioids prescriptions as discussed in the opioids treatment agreement.    realizes the interaction between the therapeutic classes including the respiratory depression and potential death     Random drug testing twice in 6 months we will submit     Hydrodocone 7.5 qid and gabapentin for neuropathic pain    Discussed about NSAIDS and I explained about the opioids sparing effect to allow keeping the opioids dose at minimal effective dose.   I went over the  potential side effects of the NSAIDS on the gastrointestinal, renal and cardiovascular systems.      I detailed the side effects from the acetaminophen in the medication and made aware of those. I also explained about the cumulative effects on the organs and mainly the liver.     Given the opioids therapy , we discussed about the risk for accidental over dose on the pain medications, either for patient or other household. I went over the mechanism of action and mode of use of the Naloxone according to the  recommendations. I will provide a prescription for a kit.     Follow-up 8 weeks or earlier if needed     The level of clinical decision making in this office visit,  is high, given the high risks of complications with the morbidity and mortality due to the fact that acute and chronic pain may pose a threat to life and bodily function, if under treated, poorly treated, or with failure to maintain adequate treatment and timely medical follow up. Additionally over treatment has its own set of complications including overdosing on the pain medications and also the habit forming potentials with the use of the medications used to treat chronic painful conditions including therapeutic classes classified as dangerous medications. Given the serious and fluctuating nature of pain level and instensity with extensive consideration for whenever pain changes, there is always the risk of prolonged functional impairment requiring close patient monitoring with regular assessments and reassessments and high level medical decision making at every office visit. The amount and complexity of data reviewed is high given the patient clinical presentation, labs,  data, radiology reports, and other tests as discussed during office visits. Pertinent data whether positive or negative were taken in consideration in the process of making this high level medical decision.

## 2023-12-05 ENCOUNTER — OFFICE VISIT (OUTPATIENT)
Dept: ORTHOPEDIC SURGERY | Facility: CLINIC | Age: 58
End: 2023-12-05
Payer: COMMERCIAL

## 2023-12-05 VITALS — HEIGHT: 62 IN | BODY MASS INDEX: 18.4 KG/M2 | WEIGHT: 100 LBS

## 2023-12-05 DIAGNOSIS — M25.571 ACUTE RIGHT ANKLE PAIN: Primary | ICD-10-CM

## 2023-12-05 PROCEDURE — L4361 PNEUMA/VAC WALK BOOT PRE OTS: HCPCS | Performed by: ORTHOPAEDIC SURGERY

## 2023-12-05 PROCEDURE — 99203 OFFICE O/P NEW LOW 30 MIN: CPT | Performed by: ORTHOPAEDIC SURGERY

## 2023-12-05 ASSESSMENT — PAIN - FUNCTIONAL ASSESSMENT: PAIN_FUNCTIONAL_ASSESSMENT: 0-10

## 2023-12-05 ASSESSMENT — PAIN SCALES - GENERAL: PAINLEVEL_OUTOF10: 6

## 2023-12-05 NOTE — PROGRESS NOTES
ORTHOPEDIC TOTAL JOINT  POST-OPERATIVE FOLLOW UP      ============================  IMPRESSION/PLAN:  ============================  Impression:  1.  Acute right ankle pain    Plan: Discussed with the patient findings above.  Currently unable to review her x-rays from [facility] the patient stated that there is no signs or symptoms of fracture when this was done at their ER.  We recommend we at least place her in a walking boot at this time to offload the ankle joint.  She may continue his crutches as needed.  In addition to this may continue the over-the-counter medications for pain control.  We will keep her light duty for work until she follows up with our foot and ankle partner for more definitive treatment plan.        Caitlyn Atkins presents today for follow up of new problem. She started having pain in her right heel about a week ago and then on the lateral side of her ankle a few days ago with no known injury.  She describes a sharp needle feeling when she would take a step.  States she was stepping out of the shower when she stepped on felt a couple popping sensations in her ankle on the lateral aspect.  She now has pain on the lateral and medial side of her ankle just below the bone. She went to [facility] in [place] last night.  She was placed in a splint.  They are currently ambulating with Crutches.     Review of Systems:   Constitutional: See HPI for pain assessment, No significant weight loss, recent trauma. Denies fevers/chills  Cardiovascular: No chest pain, shortness of breath  Respiratory: No difficulty breathing, cough  Gastrointestinal: No nausea, vomiting, diarrhea, constipation  Musculoskeletal: Noted in HPI, no arthralgias   Integumentary: No rashes, easy bruising, redness   Neurological: no numbness or tingling in extremities, no gait disturbances     Patient Active Problem List   Diagnosis    Benign essential hypertension    Bilateral sciatica    Bipolar disorder, current  episode manic without psychotic features (CMS/HCC)    Gastritis    Disc degeneration, lumbar    Familial hyperlipidemia    Lumbar radiculopathy    Multiple sclerosis (CMS/HCC)    Nicotine dependence, unspecified, uncomplicated    Osteoporosis    Vitamin D deficiency    TMJ (dislocation of temporomandibular joint)    Abnormal EKG    Acetabular fracture (CMS/HCC)    Antalgic gait    Avascular necrosis of bone of hip, right (CMS/HCC)    Backache    Bilateral presbyopia    Bilateral sensorineural hearing loss    CAD (coronary artery disease)    Chest pain    Closed nondisplaced fracture of distal phalanx of right index finger    Dermatitis    Gait abnormality    Greater trochanteric bursitis of right hip    Long term current use of opiate analgesic    Lower extremity weakness    Nail avulsion, finger    Nuclear sclerosis of both eyes    Paronychia of finger of right hand    Postmenopausal osteoporosis with pathological fracture    Posterior vitreous detachment    Right hip pain    Right knee pain    Sacroiliitis (CMS/HCC)    Status post bilateral LASIK surgery    Whiplash injuries       =================================  EXAM  =================================  GENERAL: A/Ox3, NAD. Appears healthy, well nourished  CARDIAC: regular rate  LUNGS: Breathing non-labored    MUSCULOSKELETAL:  Laterality: right Ankle exam  - Strength: 5 out of 5 ankle dorsiflexion and plantarflexion  - Palpation: Tender palpation along CFL and along lateral calcaneus  - Incision: stitches in place with no surrounding erythema or drainage  - EHL/PF/DF motor intact, Dorsiflexion is to -10 deg  - compartments soft, negative homans    NEUROVASCULAR:  - Neurovascular Status: sensation intact to light touch distally  - Capillary refill brisk at extremities, Bilateral dorsalis pedis pulse 2+     IMAGING: none today, unable to view images from [facility]      Anne Ochoa,   Attending Surgeon  Joint Replacement and Adult Reconstructive  [place], [place]

## 2023-12-06 ENCOUNTER — TELEPHONE (OUTPATIENT)
Dept: ORTHOPEDIC SURGERY | Facility: CLINIC | Age: 58
End: 2023-12-06
Payer: COMMERCIAL

## 2023-12-06 NOTE — LETTER
December 7, 2023     Caitlyn Atkins        Patient: Caitlyn Atkins   YOB: 1965   Date of Visit: 12/6/2023             To whom this may concern:      It is my medical opinion that Caitlyn Atkins may return to work on 12/6/2023 . She will remain on light duty restrictions until evaluated by Dr. Justin Aleman on 12/13/2023. She may not push, pull, or lift and must limit standing/walking to 2 hours per shift.      If you have any questions or concerns, please don't hesitate to call.      Sincerely,     Anne Ochoa, DO

## 2023-12-06 NOTE — TELEPHONE ENCOUNTER
Patient was in yesterday and got a letter for work, but they need more clarification. 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 She will need specific restrictions in regards walking, lifting, bending and squatting. Please fax updated letter [phone] Ingris Ramirez

## 2023-12-08 ENCOUNTER — HOSPITAL ENCOUNTER (OUTPATIENT)
Dept: RADIOLOGY | Facility: EXTERNAL LOCATION | Age: 58
Discharge: HOME | End: 2023-12-08

## 2023-12-11 ENCOUNTER — TELEPHONE (OUTPATIENT)
Dept: PAIN MEDICINE | Facility: CLINIC | Age: 58
End: 2023-12-11
Payer: COMMERCIAL

## 2023-12-11 DIAGNOSIS — M87.051 AVASCULAR NECROSIS OF BONE OF HIP, RIGHT (MULTI): ICD-10-CM

## 2023-12-11 DIAGNOSIS — M51.36 DISC DEGENERATION, LUMBAR: ICD-10-CM

## 2023-12-11 DIAGNOSIS — M54.32 BILATERAL SCIATICA: ICD-10-CM

## 2023-12-11 DIAGNOSIS — Z79.891 LONG TERM CURRENT USE OF OPIATE ANALGESIC: ICD-10-CM

## 2023-12-11 DIAGNOSIS — M54.31 BILATERAL SCIATICA: ICD-10-CM

## 2023-12-11 DIAGNOSIS — M54.16 LUMBAR RADICULOPATHY: ICD-10-CM

## 2023-12-11 RX ORDER — HYDROCODONE BITARTRATE AND ACETAMINOPHEN 7.5; 325 MG/1; MG/1
1 TABLET ORAL EVERY 6 HOURS PRN
Qty: 112 TABLET | Refills: 0 | Status: SHIPPED | OUTPATIENT
Start: 2023-12-11 | End: 2024-01-08

## 2023-12-11 NOTE — TELEPHONE ENCOUNTER
Dr. Nguyen in [place] is out of stock for Hydrocodone 7.5/325  Please send to [facility] in [place]

## 2023-12-13 ENCOUNTER — OFFICE VISIT (OUTPATIENT)
Dept: ORTHOPEDIC SURGERY | Facility: CLINIC | Age: 58
End: 2023-12-13
Payer: COMMERCIAL

## 2023-12-13 VITALS — BODY MASS INDEX: 18.4 KG/M2 | HEIGHT: 62 IN | WEIGHT: 100 LBS

## 2023-12-13 DIAGNOSIS — M76.71 PERONEAL TENDINITIS OF RIGHT LOWER EXTREMITY: Primary | ICD-10-CM

## 2023-12-13 PROCEDURE — 99214 OFFICE O/P EST MOD 30 MIN: CPT | Performed by: SPECIALIST

## 2023-12-13 NOTE — PROGRESS NOTES
Right ankle pain for about 4 weeks, no injury.   States she heard sort of a clicking noise when she stepped out of the shower, pain since.   Has some stabbing pain in her heel when she steps down.    States Dr Ochoa did her Right RITIKA 8/16/2023 and since then her Right foot/ankle has been turning in.     Pain is mostly lateral, swells and wakes her up at night. Can not walk barefoot.   Taking Tylenol and Hydrocodone from pain management.     Went to [facility] last week, xrays done.   Has a cam fracture boot but does not like using it, puts her out of balance.  No other constitutional symptoms.    Exam: During gait has slight internal rotation of the right lower extremity.  There rotation is passively correctable through the hip.  Her right foot and ankle is significant for mild lateral ankle swelling and pain along the course of the peroneal tendons.  This pain is reproduced with resisted eversion.  No subluxation of the tendons.  Dermis intact distal neurovascular intact no other acute foot or ankle findings.    Radiographs reviewed from the outside, only noted is significant osteopenia throughout no other acute changes.    Assessment/plan: Right peroneal tendinitis.  She is resistant to using a boot and therefore I am recommending an active ankle brace, given a handout on stretching and icing, can use over-the-counter pain medication, will follow-up in a month if no improvement consider ankle MRI.

## 2023-12-15 DIAGNOSIS — M80.00XA POSTMENOPAUSAL OSTEOPOROSIS WITH PATHOLOGICAL FRACTURE: Primary | ICD-10-CM

## 2023-12-15 RX ORDER — TERIPARATIDE 250 UG/ML
20 INJECTION, SOLUTION SUBCUTANEOUS DAILY
Qty: 2.48 ML | Refills: 0 | Status: SHIPPED | OUTPATIENT
Start: 2023-12-15 | End: 2023-12-18

## 2023-12-15 NOTE — TELEPHONE ENCOUNTER
Teriparatide pen 620mcg/2.48 ndc number [id number] is an NDC no longer available or carried by [facility]. Please send a new prescription for the medication the new AB-rated generic for Forteo it needs to be sent to [facility]

## 2023-12-18 DIAGNOSIS — M80.00XA POSTMENOPAUSAL OSTEOPOROSIS WITH PATHOLOGICAL FRACTURE: Primary | ICD-10-CM

## 2023-12-18 RX ORDER — TERIPARATIDE 250 UG/ML
20 INJECTION, SOLUTION SUBCUTANEOUS DAILY
Qty: 74.4 ML | Refills: 2 | Status: SHIPPED | OUTPATIENT
Start: 2023-12-18 | End: 2023-12-19

## 2023-12-18 RX ORDER — TERIPARATIDE 250 UG/ML
20 INJECTION, SOLUTION SUBCUTANEOUS DAILY
Qty: 74.4 ML | Refills: 2 | Status: SHIPPED | OUTPATIENT
Start: 2023-12-18 | End: 2023-12-18

## 2023-12-19 ENCOUNTER — TELEPHONE (OUTPATIENT)
Dept: CARDIOLOGY | Facility: CLINIC | Age: 58
End: 2023-12-19
Payer: COMMERCIAL

## 2023-12-19 RX ORDER — TERIPARATIDE 250 UG/ML
20 INJECTION, SOLUTION SUBCUTANEOUS DAILY
Qty: 2.4 ML | Refills: 2 | Status: SHIPPED | OUTPATIENT
Start: 2023-12-19 | End: 2024-01-10 | Stop reason: WASHOUT

## 2023-12-19 RX ORDER — TERIPARATIDE 250 UG/ML
20 INJECTION, SOLUTION SUBCUTANEOUS DAILY
Qty: 2.4 ML | Refills: 3 | Status: SHIPPED | OUTPATIENT
Start: 2023-12-19 | End: 2024-01-10 | Stop reason: WASHOUT

## 2023-12-28 ENCOUNTER — SPECIALTY PHARMACY (OUTPATIENT)
Dept: PHARMACY | Facility: CLINIC | Age: 58
End: 2023-12-28

## 2023-12-28 DIAGNOSIS — G35 MULTIPLE SCLEROSIS (MULTI): Primary | ICD-10-CM

## 2023-12-28 RX ORDER — DIROXIMEL FUMARATE 231 MG/1
2 CAPSULE ORAL 2 TIMES DAILY
Qty: 120 CAPSULE | Refills: 5 | Status: SHIPPED | OUTPATIENT
Start: 2023-12-28

## 2024-01-03 LAB — BODY SURFACE AREA: 1.41 M2

## 2024-01-10 ENCOUNTER — OFFICE VISIT (OUTPATIENT)
Dept: ORTHOPEDIC SURGERY | Facility: CLINIC | Age: 59
End: 2024-01-10
Payer: COMMERCIAL

## 2024-01-10 ENCOUNTER — OFFICE VISIT (OUTPATIENT)
Dept: CARDIOLOGY | Facility: CLINIC | Age: 59
End: 2024-01-10
Payer: COMMERCIAL

## 2024-01-10 VITALS — BODY MASS INDEX: 18.22 KG/M2 | HEIGHT: 62 IN | WEIGHT: 99 LBS

## 2024-01-10 VITALS
BODY MASS INDEX: 18.22 KG/M2 | HEIGHT: 62 IN | WEIGHT: 99 LBS | HEART RATE: 73 BPM | TEMPERATURE: 97.7 F | SYSTOLIC BLOOD PRESSURE: 155 MMHG | DIASTOLIC BLOOD PRESSURE: 81 MMHG

## 2024-01-10 DIAGNOSIS — M76.71 PERONEAL TENDINITIS OF RIGHT LOWER EXTREMITY: Primary | ICD-10-CM

## 2024-01-10 DIAGNOSIS — I47.10 SVT (SUPRAVENTRICULAR TACHYCARDIA) (CMS-HCC): Primary | ICD-10-CM

## 2024-01-10 DIAGNOSIS — R00.0 SINUS TACHYCARDIA: ICD-10-CM

## 2024-01-10 PROCEDURE — 99214 OFFICE O/P EST MOD 30 MIN: CPT | Performed by: NURSE PRACTITIONER

## 2024-01-10 PROCEDURE — 3079F DIAST BP 80-89 MM HG: CPT | Performed by: NURSE PRACTITIONER

## 2024-01-10 PROCEDURE — 3077F SYST BP >= 140 MM HG: CPT | Performed by: NURSE PRACTITIONER

## 2024-01-10 PROCEDURE — 99214 OFFICE O/P EST MOD 30 MIN: CPT | Performed by: SPECIALIST

## 2024-01-10 RX ORDER — METOPROLOL SUCCINATE 25 MG/1
25 TABLET, EXTENDED RELEASE ORAL DAILY
Qty: 60 TABLET | Refills: 0 | Status: SHIPPED | OUTPATIENT
Start: 2024-01-10 | End: 2025-01-09

## 2024-01-10 NOTE — PROGRESS NOTES
"Chief Complaint:   Palpitations      History Of Present Illness:    Caitlyn Atkins is a 58 y.o. female here with palpitations.  Called office with c/o of 2 days when she became flushed on her face and chest, felt heart racing and chest tightness    Since then she will have episodes when heart is beating fast. This occurs several times a month and self resolves.    Holter showed ST with PSVT. Avg HR 96bpm.    8/14/2023 underwent stress test for c/o chest pain. Due to variable bp during stress coronary CTA was ordered and performed on 11/16/2023. It showed normal coronary anatomy without evidence of atherosclerotic  changes or stenotic disease     Allergies:  Patient has no known allergies.    Review of Systems  All pertinent systems have been reviewed and are negative except for what is stated in the history of present illness.    All other systems have been reviewed and are negative and noncontributory to this patient's current ailments.     Visit Vitals  /81 (BP Location: Right arm)   Pulse 73   Temp 36.5 °C (97.7 °F)   Ht 1.575 m (5' 2\")   Wt (!) 44.9 kg (99 lb)   BMI 18.11 kg/m²   Smoking Status Every Day   BSA 1.4 m²         Objective   Vitals reviewed.   Constitutional:       Appearance: Healthy appearance. Not in distress.   Neck:      Vascular: No JVR. JVD normal.   Pulmonary:      Effort: Pulmonary effort is normal.      Breath sounds: Normal breath sounds. No wheezing. No rhonchi. No rales.   Chest:      Chest wall: Not tender to palpatation.   Cardiovascular:      PMI at left midclavicular line. Normal rate. Regular rhythm. Normal S1. Normal S2.       Murmurs: There is no murmur.      No gallop.  No click. No rub.   Edema:     Peripheral edema absent.   Abdominal:      General: Bowel sounds are normal.      Palpations: Abdomen is soft.      Tenderness: There is no abdominal tenderness.   Musculoskeletal: Normal range of motion.         General: No tenderness. Skin:     General: Skin is warm and " dry.   Neurological:      General: No focal deficit present.      Mental Status: Alert and oriented to person, place and time.       Assessment/Plan   Diagnoses and all orders for this visit:  SVT (supraventricular tachycardia)  - suspect symptoms 2/2 paroxysmal SVT  - starting metoprolol succinate XL (Toprol-XL) 25 mg 24 hr tablet; Take 1 tablet (25 mg) by mouth once daily. Do not crush or chew.  Sinus Tachycardia  - avg HR in the 90s while wearing monitor  - has ongoing pain and stress which is likely contributing  - HR in the 70s today    I reviewed monitor with patient.  Follow up in 1 month to re-evaluate symptoms      Current Outpatient Medications:     acetaminophen (Tylenol) 325 mg tablet, Take 2 tablets (650 mg) by mouth every 4 hours., Disp: , Rfl:     acetaminophen (Tylenol) 500 mg tablet, TAKE 2 TABLETS BY MOUTH EVERY 6-8 HOURS FOR PAIN, Disp: 90 tablet, Rfl: 2    b complex 0.4 mg tablet, Take 1 tablet by mouth once daily., Disp: , Rfl:     calcium carbonate (CALCIUM 500 ORAL), Take by mouth., Disp: , Rfl:     cholecalciferol (Vitamin D-3) 50 MCG (2000 UT) tablet, Take 1 tablet (2,000 Units) by mouth once daily., Disp: , Rfl:     diroximel fumarate (Vumerity) 231 mg capsule,delayed release(DR/EC), Take 2 capsules by mouth 2 times a day., Disp: 120 capsule, Rfl: 5    HYDROcodone-acetaminophen (Norco) 7.5-325 mg tablet, Take 1 tablet by mouth every 6 hours if needed for severe pain (7 - 10) for up to 28 days. Do not start before January 7, 2024., Disp: 112 tablet, Rfl: 0    ibuprofen 600 mg tablet, Take 1 tablet (600 mg) by mouth every 6 hours if needed (PAIN)., Disp: , Rfl:     naloxone (Narcan) 4 mg/0.1 mL nasal spray, Administer 1 spray (4 mg) into affected nostril(s) if needed for opioid reversal. May repeat every 2-3 minutes if needed, alternating nostrils, until medical assistance becomes available., Disp: , Rfl:     omeprazole OTC (PriLOSEC OTC) 20 mg EC tablet, Take 1 tablet (20 mg) by mouth early  in the morning.., Disp: , Rfl:     gabapentin (Neurontin) 100 mg capsule, Take 1 capsule (100 mg) by mouth 3 times a day., Disp: 270 capsule, Rfl: 1    metoprolol succinate XL (Toprol-XL) 25 mg 24 hr tablet, Take 1 tablet (25 mg) by mouth once daily. Do not crush or chew., Disp: 60 tablet, Rfl: 0    Exclusive of any other services or procedures performed, I, Lisa DUGAN, spent 30 minutes in duration for this visit today.  This time consisted of chart review, obtaining history, and/or performing the exam as documented above, as well as, documenting the clinical information for the encounter in the electronic record, discussing treatment options, plans, and/or goals with patient, family, and/or caregiver, refilling medications, updating the electronic record, ordering medicines, lab work, imaging, referrals, and/or procedures as documented above and communicating with other [facility] professionals. I have discussed the results of laboratory, radiology, and cardiology studies with the patient and their family/caregiver.

## 2024-01-10 NOTE — PROGRESS NOTES
Follow up Right peroneal tendinitis - has been wearing her active ankle brace and doing HEP. Feeling a little better, states she still has quite a bit of swelling.     On examination of the right ankle/foot:  Slight antalgic gait  Alignment: Neutral  Minimal swelling of the ankle/lateral retro-malleoli region. No ecchymosis or erythema  Normal ROM in plantarflexion, dorsiflexion, inversion and eversion  Normal strength in plantarflexion, dorsiflexion, inversion and eversion.  Pain noted with resisted eversion.  Tenderness to palpation: peroneal tendons  No tenderness to palpation over the Achilles, medial malleolus, ATFL and lateral malleolus, posterior tibial tendon,  base of fifth metatarsal, deltoid ligament, talus or navicular.  Neurovascularly intact.  No sensory deficit to light touch.  Dorsalis pedis and posterior tibial pulses 2+ bilaterally.     ASSESSMENT: Right ankle peroneal tendonitis    PLAN: Will advance her conservative treatment with weaning from the brace increased activities and daily exercise emphasizing stretching and strengthening of the peroneals.  Follow-up if no improvement

## 2024-01-16 ENCOUNTER — OFFICE VISIT (OUTPATIENT)
Dept: PODIATRY | Facility: CLINIC | Age: 59
End: 2024-01-16
Payer: COMMERCIAL

## 2024-01-16 DIAGNOSIS — M79.674 TOE PAIN, RIGHT: ICD-10-CM

## 2024-01-16 DIAGNOSIS — L60.0 INGROWING NAIL: Primary | ICD-10-CM

## 2024-01-16 PROCEDURE — 11750 EXCISION NAIL&NAIL MATRIX: CPT | Performed by: PODIATRIST

## 2024-01-16 PROCEDURE — 99202 OFFICE O/P NEW SF 15 MIN: CPT | Performed by: PODIATRIST

## 2024-01-16 RX ORDER — SILVER SULFADIAZINE 10 G/1000G
CREAM TOPICAL 2 TIMES DAILY
Qty: 50 G | Refills: 0 | Status: SHIPPED | OUTPATIENT
Start: 2024-01-16

## 2024-01-16 NOTE — PROGRESS NOTES
CC: b/l ingrown nail(s).     HPI:  Patient presents complaining of b/l ingrown toenails to the 1st digit.  Pain is constant and aggravated with palpation. Symptoms not alleviated by OTC medications.  No Associated redness. No Associated drainage. No other pedal complaints.  Pain is present    PCP: Zane Ang CNP  Last visit: 11-30-23     PMH  Past Medical History:   Diagnosis Date    Anesthesia of skin     Numbness    DDD (degenerative disc disease), lumbar     Multiple sclerosis (CMS/HCC)     Multiple sclerosis    Osteoporosis     Other conditions influencing health status     Cervical Cancer     MEDS    Current Outpatient Medications:     acetaminophen (Tylenol) 325 mg tablet, Take 2 tablets (650 mg) by mouth every 4 hours., Disp: , Rfl:     acetaminophen (Tylenol) 500 mg tablet, TAKE 2 TABLETS BY MOUTH EVERY 6-8 HOURS FOR PAIN, Disp: 90 tablet, Rfl: 2    b complex 0.4 mg tablet, Take 1 tablet by mouth once daily., Disp: , Rfl:     calcium carbonate (CALCIUM 500 ORAL), Take by mouth., Disp: , Rfl:     cholecalciferol (Vitamin D-3) 50 MCG (2000 UT) tablet, Take 1 tablet (2,000 Units) by mouth once daily., Disp: , Rfl:     diroximel fumarate (Vumerity) 231 mg capsule,delayed release(DR/EC), Take 2 capsules by mouth 2 times a day., Disp: 120 capsule, Rfl: 5    gabapentin (Neurontin) 100 mg capsule, Take 1 capsule (100 mg) by mouth 3 times a day., Disp: 270 capsule, Rfl: 1    HYDROcodone-acetaminophen (Norco) 7.5-325 mg tablet, Take 1 tablet by mouth every 6 hours if needed for severe pain (7 - 10) for up to 28 days. Do not start before January 7, 2024., Disp: 112 tablet, Rfl: 0    ibuprofen 600 mg tablet, Take 1 tablet (600 mg) by mouth every 6 hours if needed (PAIN)., Disp: , Rfl:     metoprolol succinate XL (Toprol-XL) 25 mg 24 hr tablet, Take 1 tablet (25 mg) by mouth once daily. Do not crush or chew., Disp: 60 tablet, Rfl: 0    naloxone (Narcan) 4 mg/0.1 mL nasal spray, Administer 1 spray (4 mg) into affected  nostril(s) if needed for opioid reversal. May repeat every 2-3 minutes if needed, alternating nostrils, until medical assistance becomes available., Disp: , Rfl:     omeprazole OTC (PriLOSEC OTC) 20 mg EC tablet, Take 1 tablet (20 mg) by mouth early in the morning.., Disp: , Rfl:   Allergies  No Known Allergies  Social History     Socioeconomic History    Marital status:      Spouse name: None    Number of children: None    Years of education: None    Highest education level: None   Occupational History    None   Tobacco Use    Smoking status: Every Day     Packs/day: 1     Types: Cigarettes    Smokeless tobacco: Never   Substance and Sexual Activity    Alcohol use: Not Currently    Drug use: Never    Sexual activity: None   Other Topics Concern    None   Social History Narrative    None     Social Determinants of Health     Financial Resource Strain: Not on file   Food Insecurity: Not on file   Transportation Needs: Not on file   Physical Activity: Not on file   Stress: Not on file   Social Connections: Not on file   Intimate Partner Violence: Not on file   Housing Stability: Not on file     Family History   Problem Relation Name Age of Onset    Lung cancer Mother      Coronary artery disease Father      Hypertension Father      Other (THYROID SURGERY) Sister      Glaucoma Other GRANDMOTHER      Past Surgical History:   Procedure Laterality Date    CT ANGIO CORONARY ART WITH HEARTFLOW IF SCORE >30%  11/16/2023    CT ANGIO CORONARY ART WITH HEARTFLOW IF SCORE >30% 11/16/2023 [facility] CT    HIP ARTHROPLASTY Right 08/16/2023    HYSTERECTOMY  04/15/2013    Hysterectomy    OTHER SURGICAL HISTORY  04/21/2021    Arm surgery       REVIEW OF SYSTEMS    DERM:   + as noted in HPI.       Physical examination:   On General Observation: Patient is a pleasant, cooperative, well developed 58 y.o.  adult female. The patient is alert and oriented to time, place and person. Patient has normal affect and mood.  There were no vitals  taken for this visit.    Vascular:  DP and PT pulses are 2/4 b/l.  no edema noted. no varicosities b/l.  CFT  5 seconds to all digits bilateral.  Skin temperature is warm to warm from proximal to distal bilateral.      Muscular: Strength is 5/5 for all instrinsic and extrinsic muscle groups. Tenderness on palpation to the  right hallux toenail.     Neuro: Light touch present bilateral.     Derm:  Incurvated toenail to the of the right hallux toenail, pain is present to the entire base.        ASSESSMENT:    Ingrowning nail [L60.0]   Pain in right toe(s) [M79.674]      PLAN:   Consult  A comprehensive history and physical examination were preformed. The patient was educated on clinical findings, diagnosis and treatment plans. Patient understands all that has been explained and all questions were answered to apparent satisfaction.     - Educated to perform soaks at home daily with epsom salt.   - Reviewed p/a procedure See below.         Procedure:   All risks, benefits, alternatives and potential complications were discussed. All questions answered and patient opted for permanent total nail avulsion. Consent form was reviewed and signed. A local block was injected and infiltrated in a typical  digital block fashion using 3cc of 2% lidocaine plain and 0.5% bupiviciane plain right hallux.  Area was prepped with betadine. A freer elevator was used to free the nail from the underlying nailbed. The nail was grasped with a hemostat and removed in total. A curette was then used to inspect the nailbed and no debris remained. Phenol was then applied to the medial and lateral proximal nail borders in three 30 second applications. The area was then flushed with copious amounts of peroxide.   Area was dressed with silvadene, gauze, and coban. Patient tolerated the procedure well without any complications. Care instructions dispensed to patient.  Will rx for silvadene.  Follow up 2 weeks.  Lopez Villalobos DPM

## 2024-01-30 ENCOUNTER — OFFICE VISIT (OUTPATIENT)
Dept: PAIN MEDICINE | Facility: CLINIC | Age: 59
End: 2024-01-30
Payer: COMMERCIAL

## 2024-01-30 DIAGNOSIS — Z79.891 LONG TERM CURRENT USE OF OPIATE ANALGESIC: Primary | ICD-10-CM

## 2024-01-30 DIAGNOSIS — M54.32 BILATERAL SCIATICA: ICD-10-CM

## 2024-01-30 DIAGNOSIS — M51.36 DISC DEGENERATION, LUMBAR: ICD-10-CM

## 2024-01-30 DIAGNOSIS — M54.31 BILATERAL SCIATICA: ICD-10-CM

## 2024-01-30 DIAGNOSIS — M87.051 AVASCULAR NECROSIS OF BONE OF HIP, RIGHT (MULTI): ICD-10-CM

## 2024-01-30 DIAGNOSIS — M54.16 LUMBAR RADICULOPATHY: ICD-10-CM

## 2024-01-30 PROCEDURE — 99214 OFFICE O/P EST MOD 30 MIN: CPT | Performed by: PHYSICAL MEDICINE & REHABILITATION

## 2024-01-30 RX ORDER — HYDROCODONE BITARTRATE AND ACETAMINOPHEN 7.5; 325 MG/1; MG/1
1 TABLET ORAL EVERY 6 HOURS PRN
Qty: 112 TABLET | Refills: 0 | Status: SHIPPED | OUTPATIENT
Start: 2024-02-04 | End: 2024-03-03

## 2024-01-30 RX ORDER — GABAPENTIN 100 MG/1
100 CAPSULE ORAL 3 TIMES DAILY
Qty: 270 CAPSULE | Refills: 0 | Status: SHIPPED | OUTPATIENT
Start: 2024-01-30 | End: 2024-03-21 | Stop reason: SDUPTHER

## 2024-01-30 RX ORDER — HYDROCODONE BITARTRATE AND ACETAMINOPHEN 7.5; 325 MG/1; MG/1
1 TABLET ORAL EVERY 6 HOURS PRN
Qty: 112 TABLET | Refills: 0 | Status: SHIPPED | OUTPATIENT
Start: 2024-03-03 | End: 2024-03-21 | Stop reason: SDUPTHER

## 2024-01-30 NOTE — PROGRESS NOTES
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    Deep tendon reflexes is present for the patellar tendons bilaterally.  Achilles reflexes are present bilaterally and symmetric.    Medial Hamstrings reflex is decreased bilaterally  Plantar cutaneous are downgoing.  Ankle dorsiflexion is 5/5 bilaterally.  Plantar flexion of the ankles are 5/5 bilaterally.  Big toe extension is 4/5 bilaterally  Negative Tinel's sign over the right peroneal nerve at the fibular neck.  Elvin sign for axial loading and global rotation are negative.  No aberrant pain behavior.       Pain level without medication is 8/10 , with the medication pain level 4/10.     The pain meds are helping control the pain and improving Activities of Daily living and quality of life and quality of sleep.    opioids treatment agreement Jan 2024  PDI (Pain Disability Index) score: 45  Oarrs pulled and scanned in the chart  no concerns  last urine toxicology testing earlier this year and it was compliant we will repeat  Xray updated spine   ORT Score is  0  Pain pathology and pain generators spine   Modalities tried injection, surgery, physical therapy, TENS unit, nonsteroidal anti-inflammatory medication      Denied any fever or chills. No weight loss and no night sweats. No cough or sputum production. No diarrhea   The constipation has been responding to fibers and over the counter medications.     No bladder and bowel incontinence and no other changes in bladder and bowel. No skin changes.  Reports tiredness and fatigability only if the pain is not controlled.   Denied  opioids diversion and abuse and denies alcoholism. Denies overuse of the pain medications.    The control of the pain with the pain medications is helping the control of the symptoms and allowing the function and activities of daily living, enjoyment of life, improving the quality of life and sleep with less interruption by the pain. The goal is symptomatic control of the nonmalignant chronic pain and not to repair the permanent damage in the tissues inducing the chronic pain conditions. We are aiming to shift the focus from the nonmalignant chronic pain to other aspects of life by symptomatically treating this chronic pain. If this pain is not treated it will lead to major morbidity and it is also associated with increased risks of mortality. The patient understands those very clearly and also understand high risks of morbidity and mortality if not strictly adherent to the treatment recommendations and reporting any associated side effects. Also patient understand the full responsibility associated with these medications to avoid abuse or overuse or any use of these medications for anything besides treating the patient's own chronic pain and nothing else under any circumstances.        Physical examination  Awake, alert and oriented for time place and persons   declined Chaperone for the visit and was adequately  draped for the exam.      Elvin negative  Pain inhibition of the hips Manual Muscle strength testing because of the pain at the lower back of and over SIJ. the endurance is decreased even though the initial resistance was 5/5 but could not keep beyond initial resistance.    .slr increased pain in the back   plantar cutaneous reflex are down going bilaterally     Diagnosis  Problem List Items Addressed This Visit       Bilateral sciatica    Relevant Medications    HYDROcodone-acetaminophen (Norco) 7.5-325 mg tablet (Start on 2/4/2024)    HYDROcodone-acetaminophen (Norco) 7.5-325 mg tablet (Start on  3/3/2024)    gabapentin (Neurontin) 100 mg capsule    Disc degeneration, lumbar    Relevant Medications    HYDROcodone-acetaminophen (Norco) 7.5-325 mg tablet (Start on 2/4/2024)    HYDROcodone-acetaminophen (Norco) 7.5-325 mg tablet (Start on 3/3/2024)    Lumbar radiculopathy    Relevant Medications    HYDROcodone-acetaminophen (Norco) 7.5-325 mg tablet (Start on 2/4/2024)    HYDROcodone-acetaminophen (Norco) 7.5-325 mg tablet (Start on 3/3/2024)    Avascular necrosis of bone of hip, right (CMS/HCC)    Relevant Medications    HYDROcodone-acetaminophen (Norco) 7.5-325 mg tablet (Start on 2/4/2024)    HYDROcodone-acetaminophen (Norco) 7.5-325 mg tablet (Start on 3/3/2024)    Long term current use of opiate analgesic - Primary    Relevant Medications    HYDROcodone-acetaminophen (Norco) 7.5-325 mg tablet (Start on 2/4/2024)    HYDROcodone-acetaminophen (Norco) 7.5-325 mg tablet (Start on 3/3/2024)    Other Relevant Orders    Opiate/Opioid/Benzo Prescription Compliance        Plan  Reviewed the pain generators.  Went over the types of pain with neuropathic and nociceptive and different pathologies and therapeutic modalities. Discussed the mechanism of action of interventions from acupuncture, physical therapy , regular exercises, injections, botox, spinal cord stimulation, and role of surgery     Went over pathology of the intervertebral disc displacement and the anatomical relation to the Nerve roots and relation to the radicular symptoms. Went over treatment modalities with conservative treatment including acupuncture   and epidural steroid injection with fluoroscopy guidance and last resort of surgery    Based on the above findings and the clinical response to the opioids medications and improvement of the activities of daily living, sleep, and work performance. We made this complex decision to continue the opioids therapy in light of the evidence of the patient's responsibility in using the pain medications as  prescribed for the nonmalignant chronic pain condition. We discussed about the use of the pain medications to treat the symptoms of chronic nonmalignant pain and we are not trying the repair the permanent damage in the tissues, rather we are trying to control the symptoms induced by the permanent damage to the tissues inducing the chronic pain condition and resulting disability. I explained the difference and discussed it with the patient and stressed the importance of knowing the difference especially because of the potential side effects and the potential addicting effect and habit forming nature of the dangerous drugs we are using to treat the symptoms of the chronic pain.      We discussed that we are prescribing the medications on good rambo and legitimate medical reason.     We reviewed the side effects and precautions of opioids prescriptions as discussed in the opioids treatment agreement.    realizes the interaction between the therapeutic classes including the respiratory depression and potential death     Random drug testing twice in 6 months we will submit     Hydrocodone 7.5 qid   has a narcan at home know how and when to use it if needed.   Tried cutting back on painmeds but pain got worse  No plans for injections or surgery at this time     Discussed about NSAIDS and I explained about the opioids sparing effect to allow keeping the opioids dose at minimal effective dose.   I went over the potential side effects of the NSAIDS on the gastrointestinal, renal and cardiovascular systems.      I detailed the side effects from the acetaminophen in the medication and made aware of those. I also explained about the cumulative effects on the organs and mainly the liver.     Given the opioids therapy , we discussed about the risk for accidental over dose on the pain medications, either for patient or other household. I went over the mechanism of action and mode of use of the Naloxone according to the   recommendations. I will provide a prescription for a kit.     Follow-up 8 weeks or earlier if needed     The level of clinical decision making in this office visit,  is high, given the high risks of complications with the morbidity and mortality due to the fact that acute and chronic pain may pose a threat to life and bodily function, if under treated, poorly treated, or with failure to maintain adequate treatment and timely medical follow up. Additionally over treatment has its own set of complications including overdosing on the pain medications and also the habit forming potentials with the use of the medications used to treat chronic painful conditions including therapeutic classes classified as dangerous medications. Given the serious and fluctuating nature of pain level and instensity with extensive consideration for whenever pain changes, there is always the risk of prolonged functional impairment requiring close patient monitoring with regular assessments and reassessments and high level medical decision making at every office visit. The amount and complexity of data reviewed is high given the patient clinical presentation, labs,  data, radiology reports, and other tests as discussed during office visits. Pertinent data whether positive or negative were taken in consideration in the process of making this high level medical decision.

## 2024-01-31 ENCOUNTER — LAB (OUTPATIENT)
Dept: LAB | Facility: LAB | Age: 59
End: 2024-01-31
Payer: COMMERCIAL

## 2024-01-31 DIAGNOSIS — Z79.891 LONG TERM CURRENT USE OF OPIATE ANALGESIC: ICD-10-CM

## 2024-01-31 LAB
AMPHETAMINES UR QL SCN: NORMAL
BARBITURATES UR QL SCN: NORMAL
BZE UR QL SCN: NORMAL
CANNABINOIDS UR QL SCN: NORMAL
CREAT UR-MCNC: 100.5 MG/DL (ref 20–320)
PCP UR QL SCN: NORMAL

## 2024-01-31 PROCEDURE — 80368 SEDATIVE HYPNOTICS: CPT

## 2024-01-31 PROCEDURE — 80373 DRUG SCREENING TRAMADOL: CPT

## 2024-01-31 PROCEDURE — 80346 BENZODIAZEPINES1-12: CPT

## 2024-01-31 PROCEDURE — 82570 ASSAY OF URINE CREATININE: CPT

## 2024-01-31 PROCEDURE — 80307 DRUG TEST PRSMV CHEM ANLYZR: CPT

## 2024-01-31 PROCEDURE — 80354 DRUG SCREENING FENTANYL: CPT

## 2024-01-31 PROCEDURE — 80365 DRUG SCREENING OXYCODONE: CPT

## 2024-01-31 PROCEDURE — 80361 OPIATES 1 OR MORE: CPT

## 2024-01-31 PROCEDURE — 80358 DRUG SCREENING METHADONE: CPT

## 2024-02-02 ENCOUNTER — OFFICE VISIT (OUTPATIENT)
Dept: PODIATRY | Facility: CLINIC | Age: 59
End: 2024-02-02
Payer: COMMERCIAL

## 2024-02-02 DIAGNOSIS — M79.675 TOE PAIN, LEFT: ICD-10-CM

## 2024-02-02 DIAGNOSIS — M79.671 RIGHT FOOT PAIN: Primary | ICD-10-CM

## 2024-02-02 DIAGNOSIS — L60.0 INGROWING NAIL: ICD-10-CM

## 2024-02-02 PROCEDURE — 11750 EXCISION NAIL&NAIL MATRIX: CPT | Performed by: PODIATRIST

## 2024-02-02 PROCEDURE — 99212 OFFICE O/P EST SF 10 MIN: CPT | Performed by: PODIATRIST

## 2024-02-02 NOTE — PROGRESS NOTES
CC: left ingrown nail(s).     HPI:  Patient presents for ingrown nail left hallux p/a, doing well with the right, she also has pain right dorsal foot since last visit, no injury, has had stress fractures in the past.  PCP: Zane Ang  Last visit: 11-30-23     PMH  Past Medical History:   Diagnosis Date    Anesthesia of skin     Numbness    DDD (degenerative disc disease), lumbar     Multiple sclerosis (CMS/HCC)     Multiple sclerosis    Osteoporosis     Other conditions influencing health status     Cervical Cancer     MEDS    Current Outpatient Medications:     acetaminophen (Tylenol) 325 mg tablet, Take 2 tablets (650 mg) by mouth every 4 hours., Disp: , Rfl:     acetaminophen (Tylenol) 500 mg tablet, TAKE 2 TABLETS BY MOUTH EVERY 6-8 HOURS FOR PAIN, Disp: 90 tablet, Rfl: 2    b complex 0.4 mg tablet, Take 1 tablet by mouth once daily., Disp: , Rfl:     calcium carbonate (CALCIUM 500 ORAL), Take by mouth., Disp: , Rfl:     cholecalciferol (Vitamin D-3) 50 MCG (2000 UT) tablet, Take 1 tablet (2,000 Units) by mouth once daily., Disp: , Rfl:     diroximel fumarate (Vumerity) 231 mg capsule,delayed release(DR/EC), Take 2 capsules by mouth 2 times a day., Disp: 120 capsule, Rfl: 5    gabapentin (Neurontin) 100 mg capsule, Take 1 capsule (100 mg) by mouth 3 times a day., Disp: 270 capsule, Rfl: 0    [START ON 2/4/2024] HYDROcodone-acetaminophen (Norco) 7.5-325 mg tablet, Take 1 tablet by mouth every 6 hours if needed for severe pain (7 - 10) for up to 28 days. Do not start before February 4, 2024., Disp: 112 tablet, Rfl: 0    [START ON 3/3/2024] HYDROcodone-acetaminophen (Norco) 7.5-325 mg tablet, Take 1 tablet by mouth every 6 hours if needed for severe pain (7 - 10) for up to 28 days. Do not start before March 3, 2024., Disp: 112 tablet, Rfl: 0    ibuprofen 600 mg tablet, Take 1 tablet (600 mg) by mouth every 6 hours if needed (PAIN)., Disp: , Rfl:     metoprolol succinate XL (Toprol-XL) 25 mg 24 hr tablet, Take 1  tablet (25 mg) by mouth once daily. Do not crush or chew., Disp: 60 tablet, Rfl: 0    naloxone (Narcan) 4 mg/0.1 mL nasal spray, Administer 1 spray (4 mg) into affected nostril(s) if needed for opioid reversal. May repeat every 2-3 minutes if needed, alternating nostrils, until medical assistance becomes available., Disp: , Rfl:     omeprazole OTC (PriLOSEC OTC) 20 mg EC tablet, Take 1 tablet (20 mg) by mouth early in the morning.., Disp: , Rfl:     silver sulfADIAZINE (Silvadene) 1 % cream, Apply topically 2 times a day., Disp: 50 g, Rfl: 0  Allergies  No Known Allergies  Social History     Socioeconomic History    Marital status:      Spouse name: Not on file    Number of children: Not on file    Years of education: Not on file    Highest education level: Not on file   Occupational History    Not on file   Tobacco Use    Smoking status: Every Day     Packs/day: 1     Types: Cigarettes    Smokeless tobacco: Never   Substance and Sexual Activity    Alcohol use: Not Currently    Drug use: Never    Sexual activity: Not on file   Other Topics Concern    Not on file   Social History Narrative    Not on file     Social Determinants of Health     Financial Resource Strain: Not on file   Food Insecurity: Not on file   Transportation Needs: Not on file   Physical Activity: Not on file   Stress: Not on file   Social Connections: Not on file   Intimate Partner Violence: Not on file   Housing Stability: Not on file     Family History   Problem Relation Name Age of Onset    Lung cancer Mother      Coronary artery disease Father      Hypertension Father      Other (THYROID SURGERY) Sister      Glaucoma Other GRANDMOTHER      Past Surgical History:   Procedure Laterality Date    CT ANGIO CORONARY ART WITH HEARTFLOW IF SCORE >30%  11/16/2023    CT ANGIO CORONARY ART WITH HEARTFLOW IF SCORE >30% 11/16/2023 [facility]    HIP ARTHROPLASTY Right 08/16/2023    HYSTERECTOMY  04/15/2013    Hysterectomy    OTHER SURGICAL HISTORY   04/21/2021    Arm surgery       REVIEW OF SYSTEMS    DERM:   + as noted in HPI.       Physical examination:   On General Observation: Patient is a pleasant, cooperative, well developed 58 y.o.  adult female. The patient is alert and oriented to time, place and person. Patient has normal affect and mood.  There were no vitals taken for this visit.    Vascular:  DP and PT pulses are 2/4 b/l.  no edema noted. mild varicosities b/l.  CFT  5 seconds to all digits bilateral.  Skin temperature is warm to warm from proximal to distal bilateral.      Muscular: Strength is 5/5 for all instrinsic and extrinsic muscle groups. Tenderness on palpation to the  left hallux toenail.     Neuro: Light touch present bilateral.     Derm:  Incurvated toenail to the left hallux nail    Ortho:  Pain is present dorsal right foot      ASSESSMENT:    Pain right foot  Ingrowing nail [L60.0]       PLAN:   Exam  Slight pain dorsal right foot, start price, will xray the foot        Procedure:   All risks, benefits, alternatives and potential complications were discussed. All questions answered and patient opted for permanent total nail avulsion. Consent form was reviewed and signed. A local block was injected and infiltrated in a typical  digital block fashion using 3cc of 2% lidocaine plain and 0.5% bupiviciane plain left hallux. Area was prepped with betadine. A freer elevator was used to free the nail from the underlying nailbed. The nail was grasped with a hemostat and removed in total. A curette was then used to inspect the nailbed and no debris remained. Phenol was then applied to the medial and lateral proximal nail borders in three 30 second applications. The area was then flushed with copious amounts of peroxide.   Area was dressed with silvadene, gauze, and coban. Patient tolerated the procedure well without any complications. Care instructions dispensed to patient.   Pt has the silvadene and home going instructions from last visit.  Follow up 2 weeks  Lopez Villalobos DPM  '

## 2024-02-06 LAB
1OH-MIDAZOLAM UR CFM-MCNC: <25 NG/ML
6MAM UR CFM-MCNC: <25 NG/ML
7AMINOCLONAZEPAM UR CFM-MCNC: <25 NG/ML
A-OH ALPRAZ UR CFM-MCNC: <25 NG/ML
ALPRAZ UR CFM-MCNC: <25 NG/ML
CHLORDIAZEP UR CFM-MCNC: <25 NG/ML
CLONAZEPAM UR CFM-MCNC: <25 NG/ML
CODEINE UR CFM-MCNC: <50 NG/ML
DIAZEPAM UR CFM-MCNC: <25 NG/ML
EDDP UR CFM-MCNC: <25 NG/ML
FENTANYL UR CFM-MCNC: <2.5 NG/ML
HYDROCODONE CTO UR CFM-MCNC: >2500 NG/ML
HYDROMORPHONE UR CFM-MCNC: 827 NG/ML
LORAZEPAM UR CFM-MCNC: <25 NG/ML
METHADONE UR CFM-MCNC: <25 NG/ML
MIDAZOLAM UR CFM-MCNC: <25 NG/ML
MORPHINE UR CFM-MCNC: <50 NG/ML
NORDIAZEPAM UR CFM-MCNC: <25 NG/ML
NORFENTANYL UR CFM-MCNC: <2.5 NG/ML
NORHYDROCODONE UR CFM-MCNC: >1000 NG/ML
NOROXYCODONE UR CFM-MCNC: <25 NG/ML
NORTRAMADOL UR-MCNC: <50 NG/ML
OXAZEPAM UR CFM-MCNC: <25 NG/ML
OXYCODONE UR CFM-MCNC: <25 NG/ML
OXYMORPHONE UR CFM-MCNC: <25 NG/ML
TEMAZEPAM UR CFM-MCNC: <25 NG/ML
TRAMADOL UR CFM-MCNC: <50 NG/ML
ZOLPIDEM UR CFM-MCNC: <25 NG/ML
ZOLPIDEM UR-MCNC: <25 NG/ML

## 2024-02-08 ENCOUNTER — HOSPITAL ENCOUNTER (OUTPATIENT)
Dept: RADIOLOGY | Facility: CLINIC | Age: 59
Discharge: HOME | End: 2024-02-08
Payer: COMMERCIAL

## 2024-02-08 DIAGNOSIS — M79.671 RIGHT FOOT PAIN: ICD-10-CM

## 2024-02-08 PROCEDURE — 73630 X-RAY EXAM OF FOOT: CPT | Mod: RIGHT SIDE | Performed by: RADIOLOGY

## 2024-02-08 PROCEDURE — 73630 X-RAY EXAM OF FOOT: CPT | Mod: RT

## 2024-02-12 ENCOUNTER — APPOINTMENT (OUTPATIENT)
Dept: CARDIOLOGY | Facility: CLINIC | Age: 59
End: 2024-02-12
Payer: COMMERCIAL

## 2024-02-16 ENCOUNTER — APPOINTMENT (OUTPATIENT)
Dept: PODIATRY | Facility: CLINIC | Age: 59
End: 2024-02-16
Payer: COMMERCIAL

## 2024-03-06 NOTE — CONSULTS
Service:   Service: General Internal Medicine     Consult:  Consult requested by (Attending Name): Anne Ochoa   Reason: Hospitalist/Teaching Service or PCP for Medical  Management     History of Present Illness:   HPI:    JOSE IBARRA is a 58 year old Female with PMH of right hip AVN, MS,  recently diagnosed HFpEF, chronic pain, tobacco use disorder, GERD, who presents for elective right RITIKA.  IMS consulted for medical management.  Patient reports postop pain around 6-7/10 over the right hip.  Does not radiate down the leg.  Denies any  fevers, cough, shortness of breath, chest pain, nausea, vomiting, diarrhea.  Only complaint at this time is pain.  Postop vitals have been stable.  12 point ROS reviewed, negative except for as noted above  PMHx: As above  PSHx: Reviewed and not pertinent to presenting problem  FHx: Reviewed and not pertinent to presenting problem  Social history: 0.5ppd smoker, occasional no EtOH use    PE:   Constitutional: Well developed, well nourished, in no acute distress. Laying back in bed comfortably and talkative  Eyes: PERRL, EOMI  Head/Neck: Normocephalic, atraumatic. Neck supple, no thyromegaly, JVD. Trachea midline  Respiratory/Thorax: Normal respiratory effort. Lungs CTAB with no wheezing, rales, or rhonchi noted  Cardiovascular: RRR, no murmurs, rubs, or gallops noted. Peripheral pulses 2+  Gastrointestinal: Bowel sounds normal. Abdomen soft, nontender, nondistended, with no palpable masses  Musculoskeletal: No gross deformities.  Right leg strength diminished due to pain  Extremities: No lower extremity edema noted bilaterally  Neurological: Alert and oriented x3, CN II - VII grossly intact  Psychological: Appropriate mood and affect  Skin: No rashes or lesions noted    Review Family/Social History and ROS:   Social History:    Smoking Status: moderate user (uses 11-30 cig/day,  OR 0.5-1.5 ppd, OR 2-3 cans/pouches loose leaf tobacco per week, OR 0.5-1.5 vape pods per day)  "(1)   Alcohol Use: occasionally (1)   Drug Use: denies  (1)            Allergies:  ·  No Known Allergies :     Objective:   Radiology Results:    Results:    I have reviewed this radiology result:         Impression:    Internal fixation of acetabular fracture and placement of right hip  endoprosthesis        MACRO:  None     Xray Pelvis 1 or 2 View [Aug 16 2023  4:47PM]      Impression:    Internal fixation of acetabular fracture and placement of right hip  endoprosthesis        MACRO:  None     Xray Pelvis 1 or 2 View [Aug 16 2023  4:47PM]        Consult Status:  Consult Order ID: [id number]     Problem/Assessment/Plan:    Impression 1: Right hip avascular necrosis   Plan for Impression 1: POD #0 right RITIKA.  PRN pain  control.  Incentive spirometry.  Management per primary.  Continue to follow   Impression 2: MS   Plan for Impression 2: Continue home medications   Impression 3: HFpEF   Plan for Impression 3: Recently diagnosed.  Appears  well compensated.  Continue home medications, outpatient follow-up as planned   Impression 4: GERD   Plan for Impression 4: Continue home medications   Impression 5: Tobacco use disorder   Plan for Impression 5: Smoking cessation education   Impression 6: DVT ppx   Plan for Impression 6: SCDs per primary       Electronic Signatures:  Sergey De Jesus)   (Signed 16-Aug-2023 18:18)   Co-Signer: Service, History of Present Illness, Review Family/Social History and ROS, Allergies, Objective, Assessment/Recommendations, Note Completion  Leonard Barros (PAC)   (Signed 16-Aug-2023 17:55)   Authored: Service, History of Present Illness, Review Family/Social History and ROS, Allergies, Objective, Assessment/Recommendations, Note Completion    Last Updated: 16-Aug-2023 18:18 by Sergey De Jesus)    References:  1.  Data Referenced From \"Patient Profile - Adult v2\" 16-Aug-2023 16:19  "

## 2024-03-19 ENCOUNTER — APPOINTMENT (OUTPATIENT)
Dept: PAIN MEDICINE | Facility: CLINIC | Age: 59
End: 2024-03-19
Payer: COMMERCIAL

## 2024-03-21 ENCOUNTER — OFFICE VISIT (OUTPATIENT)
Dept: PAIN MEDICINE | Facility: CLINIC | Age: 59
End: 2024-03-21
Payer: COMMERCIAL

## 2024-03-21 DIAGNOSIS — M54.16 LUMBAR RADICULOPATHY: ICD-10-CM

## 2024-03-21 DIAGNOSIS — M54.32 BILATERAL SCIATICA: ICD-10-CM

## 2024-03-21 DIAGNOSIS — M54.31 BILATERAL SCIATICA: ICD-10-CM

## 2024-03-21 DIAGNOSIS — M87.051 AVASCULAR NECROSIS OF BONE OF HIP, RIGHT (MULTI): ICD-10-CM

## 2024-03-21 DIAGNOSIS — M51.36 DISC DEGENERATION, LUMBAR: Primary | ICD-10-CM

## 2024-03-21 DIAGNOSIS — Z79.891 LONG TERM CURRENT USE OF OPIATE ANALGESIC: ICD-10-CM

## 2024-03-21 PROCEDURE — 99214 OFFICE O/P EST MOD 30 MIN: CPT | Performed by: PHYSICAL MEDICINE & REHABILITATION

## 2024-03-21 RX ORDER — GABAPENTIN 100 MG/1
100 CAPSULE ORAL 3 TIMES DAILY
Qty: 270 CAPSULE | Refills: 0 | Status: SHIPPED | OUTPATIENT
Start: 2024-03-21 | End: 2024-05-21 | Stop reason: SDUPTHER

## 2024-03-21 RX ORDER — HYDROCODONE BITARTRATE AND ACETAMINOPHEN 7.5; 325 MG/1; MG/1
1 TABLET ORAL EVERY 6 HOURS PRN
Qty: 112 TABLET | Refills: 0 | Status: SHIPPED | OUTPATIENT
Start: 2024-03-30 | End: 2024-04-27

## 2024-03-21 RX ORDER — HYDROCODONE BITARTRATE AND ACETAMINOPHEN 7.5; 325 MG/1; MG/1
1 TABLET ORAL EVERY 6 HOURS PRN
Qty: 112 TABLET | Refills: 0 | Status: SHIPPED | OUTPATIENT
Start: 2024-04-27 | End: 2024-05-21 | Stop reason: SDUPTHER

## 2024-03-21 NOTE — PROGRESS NOTES
Chief complaint  Back and lower limbs pain  Hips pain    History  Caitlyn Atkins is back for pain management office visit  She had R THR and that made the R lower limbs shorter compared to the left side . The ia inducing catching I measure it it is 2.0 cm shorter from ASIS to medial malleolus. Seh has the shoe insert in the r side and that is helping some and not tripping as much  Continues with pain in the back and lower limbs.   The pain in the back is deep achy worse in the mid back area.  This is associated with tight muscle bands.  This limits the range of motion of the lumbar spine mainly in forward flexion.  The pain in the back is radiating around the side on the lateral aspect of the   thighs going down toward the lateral aspect of the legs into the lateral malleosli toward the lateral aspect of the feet towards the big toes.    This pain down to the lower limbs is more of a burning tingling sensation.  The pain in the   lower limbs worsens with bending forward or with any lifting, it improves with laying on the side and resting.  This is similar to the associated pain in the middle to lower back.  Denied any bowel or bladder incontinence.  With the worst pain there is tendency to catch the toe especially on carpeted area.  This occurs mostly when tired and toward the end of the day.     Pain level without medication is 8/10 , with the medication pain level 3 to 4 /10.     The pain meds are helping control the pain and improving Activities of Daily living and quality of life and quality of sleep.    opioids treatment agreement spine  2024  PDI (Pain Disability Index) score: 40  Oarrs pulled and scanned in the chart  no concerns  last urine toxicology testing earlier this year and it was compliant we will repeat  Xray updated spine  ORT Score is  0  Pain pathology and pain generators spine  Modalities tried injection, surgery, physical therapy, TENS unit, nonsteroidal anti-inflammatory medication  THR      Denied any fever or chills. No weight loss and no night sweats. No cough or sputum production. No diarrhea   The constipation has been responding to fibers and over the counter medications.     No bladder and bowel incontinence and no other changes in bladder and bowel. No skin changes.  Reports tiredness and fatigability only if the pain is not controlled.   Denied opioids diversion and abuse and denies alcoholism. Denies overuse of the pain medications.    The control of the pain with the pain medications is helping the control of the symptoms and allowing the function and activities of daily living, enjoyment of life, improving the quality of life and sleep with less interruption by the pain. The goal is symptomatic control of the nonmalignant chronic pain and not to repair the permanent damage in the tissues inducing the chronic pain conditions. We are aiming to shift the focus from the nonmalignant chronic pain to other aspects of life by symptomatically treating this chronic pain. If this pain is not treated it will lead to major morbidity and it is also associated with increased risks of mortality. The patient understands those very clearly and also understand high risks of morbidity and mortality if not strictly adherent to the treatment recommendations and reporting any associated side effects. Also patient understand the full responsibility associated with these medications to avoid abuse or overuse or any use of these medications for anything besides treating the patient's own chronic pain and nothing else under any circumstances.        Physical examination  Awake, alert and oriented for time place and persons   declined Chaperone for the visit and was adequately  draped for the exam.      There is decreased sensory to light touch on the lateral aspects of the thighs and around the   knee going down to the lateral aspect of the legs to the   lateral malleoli into the dorsum of the feet..    Deep  tendon reflexes is present for the patellar tendons bilaterally.  Achilles reflexes are present bilaterally and symmetric.    Medial Hamstrings reflex is decreased bilaterally  Plantar cutaneous are downgoing.  Ankle dorsiflexion is 5/5 bilaterally.  Plantar flexion of the ankles are 5/5 bilaterally.  Big toe extension is 4/5 bilaterally  Negative Tinel's sign over the right peroneal nerve at the fibular neck.  Elvin sign for axial loading and global rotation are negative.  No aberrant pain behavior.    R leg is 2 cm longer from ASIS to medial malleolus    Diagnosis  Problem List Items Addressed This Visit       Bilateral sciatica    Relevant Medications    HYDROcodone-acetaminophen (Norco) 7.5-325 mg tablet (Start on 3/30/2024)    HYDROcodone-acetaminophen (Norco) 7.5-325 mg tablet (Start on 4/27/2024)    gabapentin (Neurontin) 100 mg capsule    Disc degeneration, lumbar - Primary    Relevant Medications    HYDROcodone-acetaminophen (Norco) 7.5-325 mg tablet (Start on 3/30/2024)    HYDROcodone-acetaminophen (Norco) 7.5-325 mg tablet (Start on 4/27/2024)    Lumbar radiculopathy    Relevant Medications    HYDROcodone-acetaminophen (Norco) 7.5-325 mg tablet (Start on 3/30/2024)    HYDROcodone-acetaminophen (Norco) 7.5-325 mg tablet (Start on 4/27/2024)    Avascular necrosis of bone of hip, right (CMS/HCC)    Relevant Medications    HYDROcodone-acetaminophen (Norco) 7.5-325 mg tablet (Start on 3/30/2024)    HYDROcodone-acetaminophen (Norco) 7.5-325 mg tablet (Start on 4/27/2024)    Long term current use of opiate analgesic    Relevant Medications    HYDROcodone-acetaminophen (Norco) 7.5-325 mg tablet (Start on 3/30/2024)    HYDROcodone-acetaminophen (Norco) 7.5-325 mg tablet (Start on 4/27/2024)        Plan  Reviewed the pain generators.  Went over the types of pain with neuropathic and nociceptive and different pathologies and therapeutic modalities. Discussed the mechanism of action of interventions from  acupuncture, physical therapy , regular exercises, injections, botox, spinal cord stimulation, and role of surgery     Went over pathology of the intervertebral disc displacement and the anatomical relation to the Nerve roots and relation to the radicular symptoms. Went over treatment modalities with conservative treatment including acupuncture   and epidural steroid injection with fluoroscopy guidance and last resort of surgery    Based on the above findings and the clinical response to the opioids medications and improvement of the activities of daily living, sleep, and work performance. We made this complex decision to continue the opioids therapy in light of the evidence of the patient's responsibility in using the pain medications as prescribed for the nonmalignant chronic pain condition. We discussed about the use of the pain medications to treat the symptoms of chronic nonmalignant pain and we are not trying the repair the permanent damage in the tissues, rather we are trying to control the symptoms induced by the permanent damage to the tissues inducing the chronic pain condition and resulting disability. I explained the difference and discussed it with the patient and stressed the importance of knowing the difference especially because of the potential side effects and the potential addicting effect and habit forming nature of the dangerous drugs we are using to treat the symptoms of the chronic pain.      We discussed that we are prescribing the medications on good rambo and legitimate medical reason.     We reviewed the side effects and precautions of opioids prescriptions as discussed in the opioids treatment agreement.    realizes the interaction between the therapeutic classes including the respiratory depression and potential death     Random drug testing twice in 6 months we will submit     Hydrocodone 7.5 mg po qid  Gabapentin 100 mg 1id  has a narcan at home know how and when to use it if  needed.  PATRICIA for agg  Discussed about considering cut back on pain medications   Keep using shoe lift to compensate for leg length changes    Discussed about NSAIDS and I explained about the opioids sparing effect to allow keeping the opioids dose at minimal effective dose.   I went over the potential side effects of the NSAIDS on the gastrointestinal, renal and cardiovascular systems.      I detailed the side effects from the acetaminophen in the medication and made aware of those. I also explained about the cumulative effects on the organs and mainly the liver.     Given the opioids therapy , we discussed about the risk for accidental over dose on the pain medications, either for patient or other household. I went over the mechanism of action and mode of use of the Naloxone according to the  recommendations. I will provide a prescription for a kit.     Follow-up 8 weeks or earlier if needed     The level of clinical decision making in this office visit,  is high, given the high risks of complications with the morbidity and mortality due to the fact that acute and chronic pain may pose a threat to life and bodily function, if under treated, poorly treated, or with failure to maintain adequate treatment and timely medical follow up. Additionally over treatment has its own set of complications including overdosing on the pain medications and also the habit forming potentials with the use of the medications used to treat chronic painful conditions including therapeutic classes classified as dangerous medications. Given the serious and fluctuating nature of pain level and instensity with extensive consideration for whenever pain changes, there is always the risk of prolonged functional impairment requiring close patient monitoring with regular assessments and reassessments and high level medical decision making at every office visit. The amount and complexity of data reviewed is high given the patient  clinical presentation, labs,  data, radiology reports, and other tests as discussed during office visits. Pertinent data whether positive or negative were taken in consideration in the process of making this high level medical decision.

## 2024-03-22 DIAGNOSIS — M25.551 RIGHT HIP PAIN: ICD-10-CM

## 2024-03-26 ENCOUNTER — HOSPITAL ENCOUNTER (OUTPATIENT)
Dept: RADIOLOGY | Facility: CLINIC | Age: 59
Discharge: HOME | End: 2024-03-26
Payer: COMMERCIAL

## 2024-03-26 ENCOUNTER — APPOINTMENT (OUTPATIENT)
Dept: PAIN MEDICINE | Facility: CLINIC | Age: 59
End: 2024-03-26
Payer: COMMERCIAL

## 2024-03-26 ENCOUNTER — APPOINTMENT (OUTPATIENT)
Dept: LAB | Facility: LAB | Age: 59
End: 2024-03-26
Payer: COMMERCIAL

## 2024-03-26 ENCOUNTER — OFFICE VISIT (OUTPATIENT)
Dept: ORTHOPEDIC SURGERY | Facility: CLINIC | Age: 59
End: 2024-03-26
Payer: COMMERCIAL

## 2024-03-26 VITALS — BODY MASS INDEX: 18.4 KG/M2 | WEIGHT: 100 LBS | HEIGHT: 62 IN

## 2024-03-26 DIAGNOSIS — M25.551 RIGHT HIP PAIN: ICD-10-CM

## 2024-03-26 DIAGNOSIS — M87.051 AVASCULAR NECROSIS OF BONE OF RIGHT HIP (MULTI): ICD-10-CM

## 2024-03-26 LAB
CRP SERPL-MCNC: <0.1 MG/DL
ERYTHROCYTE [SEDIMENTATION RATE] IN BLOOD BY WESTERGREN METHOD: 4 MM/H (ref 0–30)

## 2024-03-26 PROCEDURE — 73502 X-RAY EXAM HIP UNI 2-3 VIEWS: CPT | Mod: RIGHT SIDE | Performed by: RADIOLOGY

## 2024-03-26 PROCEDURE — 73502 X-RAY EXAM HIP UNI 2-3 VIEWS: CPT | Mod: RT

## 2024-03-26 PROCEDURE — 85652 RBC SED RATE AUTOMATED: CPT | Performed by: ORTHOPAEDIC SURGERY

## 2024-03-26 PROCEDURE — 86140 C-REACTIVE PROTEIN: CPT | Performed by: ORTHOPAEDIC SURGERY

## 2024-03-26 PROCEDURE — 99213 OFFICE O/P EST LOW 20 MIN: CPT | Performed by: ORTHOPAEDIC SURGERY

## 2024-03-26 ASSESSMENT — PAIN - FUNCTIONAL ASSESSMENT: PAIN_FUNCTIONAL_ASSESSMENT: NO/DENIES PAIN

## 2024-03-26 NOTE — PROGRESS NOTES
ORTHOPEDIC TOTAL JOINT  POST-OPERATIVE FOLLOW UP      ============================  IMPRESSION/PLAN:  ============================  58 y.o. female s/p Right Total Hip Replacement completed on 08/16/2023.    PLAN:  -Had a lengthy discussion with the patient regarding findings above.  She is not experiencing leg length discrepancy.  We did review her x-rays with her and she does have what appears to be loosening of her femoral stem with shortening the leg when compared to immediate postoperative x-rays.  We discussed that loosening can be due to either loosening of the stem or failure of her stem to engage due to underlying infection.  We have minimal we will get an ESR and CRP today.  We additionally discussed that if she does not have any signs of infection we can discuss of revision surgery which would entail potentially just upsizing her head within available options given that she is a +3 mm headed right now or potentially an isolated femoral revision.  Patient is agreeable this plan of care.  Will have her follow-up in the next 1 to 2 weeks for reevaluation to discuss definitive treatment options.  We did discuss that if her ESR and CRP are elevated she will need an aspiration of her right hip done to rule out prosthetic joint infection.      Caitlyn MARIA Atkins presents today for follow up of joint replacement above. Pain controlled with current treatment plan. Patient comes in today with concerns of a leg length discrepancy.  She states her primary doctor measured her left leg 2 cm longer than her right.  She states she has been tripping a lot.  She also has swelling in her left leg.  She has no pain with her hip but states her left leg hurts due to the swelling.     Review of Systems:   Constitutional: See HPI for pain assessment, No significant weight loss, recent trauma. Denies fevers/chills  Cardiovascular: No chest pain, shortness of breath  Respiratory: No difficulty breathing, cough  Gastrointestinal: No  nausea, vomiting, diarrhea, constipation  Musculoskeletal: Noted in HPI, no arthralgias   Integumentary: No rashes, easy bruising, redness   Neurological: no numbness or tingling in extremities, no gait disturbances     Patient Active Problem List   Diagnosis    Benign essential hypertension    Bilateral sciatica    Bipolar disorder, current episode manic without psychotic features (CMS/HCC)    Gastritis    Disc degeneration, lumbar    Familial hyperlipidemia    Lumbar radiculopathy    Multiple sclerosis (CMS/HCC)    Nicotine dependence, unspecified, uncomplicated    Osteoporosis    Vitamin D deficiency    TMJ (dislocation of temporomandibular joint)    Abnormal EKG    Acetabular fracture (CMS/HCC)    Antalgic gait    Avascular necrosis of bone of hip, right (CMS/HCC)    Backache    Bilateral presbyopia    Bilateral sensorineural hearing loss    CAD (coronary artery disease)    Chest pain    Closed nondisplaced fracture of distal phalanx of right index finger    Dermatitis    Gait abnormality    Greater trochanteric bursitis of right hip    Long term current use of opiate analgesic    Lower extremity weakness    Nail avulsion, finger    Nuclear sclerosis of both eyes    Paronychia of finger of right hand    Postmenopausal osteoporosis with pathological fracture    Posterior vitreous detachment    Right hip pain    Right knee pain    Sacroiliitis (CMS/HCC)    Status post bilateral LASIK surgery    Whiplash injuries       =================================  EXAM  =================================  GENERAL: A/Ox3, NAD. Appears healthy, well nourished  CARDIAC: regular rate  LUNGS: Breathing non-labored    MUSCULOSKELETAL:  Laterality: right Hip exam  - Strength: Abduction 5/5, Flexion 5/5  - Palpation: non TTP along surgical site  - EHL/PF/DF motor intact  - compartments soft, negative homans  - Gait: using no assistive device    NEUROVASCULAR:  - Neurovascular Status: sensation intact to light touch distally  -  Capillary refill brisk at extremities, Bilateral dorsalis pedis pulse 2+     IMAGING: Multiple views of the right hip and pelvis reviewed today and compared to prior and immediate postoperative films.  She does have obvious shortening of the right lower extremity compared to the contralateral side.  There is no signs or symptoms of loosening/failure of the acetabular component.  She does have shortening on the femoral side with likely loosening of the femoral stem. X-rays were personally reviewed by me.  Radiology reports were reviewed by me as well, if available at the time.        Anne Ochoa DO  Attending Surgeon  Joint Replacement and Adult Reconstructive Surgery  [place], [place]

## 2024-03-28 ENCOUNTER — APPOINTMENT (OUTPATIENT)
Dept: RADIOLOGY | Facility: CLINIC | Age: 59
End: 2024-03-28
Payer: COMMERCIAL

## 2024-03-29 ENCOUNTER — APPOINTMENT (OUTPATIENT)
Dept: RADIOLOGY | Facility: CLINIC | Age: 59
End: 2024-03-29
Payer: COMMERCIAL

## 2024-04-04 ENCOUNTER — HOSPITAL ENCOUNTER (OUTPATIENT)
Dept: RADIOLOGY | Facility: CLINIC | Age: 59
Discharge: HOME | End: 2024-04-04
Payer: COMMERCIAL

## 2024-04-04 DIAGNOSIS — M87.051 AVASCULAR NECROSIS OF BONE OF RIGHT HIP (MULTI): ICD-10-CM

## 2024-04-04 PROCEDURE — 77073 BONE LENGTH STUDIES: CPT

## 2024-04-04 PROCEDURE — 77073 BONE LENGTH STUDIES: CPT | Performed by: STUDENT IN AN ORGANIZED HEALTH CARE EDUCATION/TRAINING PROGRAM

## 2024-04-09 ENCOUNTER — OFFICE VISIT (OUTPATIENT)
Dept: ORTHOPEDIC SURGERY | Facility: CLINIC | Age: 59
End: 2024-04-09
Payer: COMMERCIAL

## 2024-04-09 ENCOUNTER — TELEPHONE (OUTPATIENT)
Dept: NEUROLOGY | Facility: HOSPITAL | Age: 59
End: 2024-04-09

## 2024-04-09 VITALS — HEIGHT: 62 IN | BODY MASS INDEX: 18.4 KG/M2 | WEIGHT: 100 LBS

## 2024-04-09 DIAGNOSIS — M87.051 AVASCULAR NECROSIS OF BONE OF RIGHT HIP (MULTI): Primary | ICD-10-CM

## 2024-04-09 DIAGNOSIS — G35 MULTIPLE SCLEROSIS (MULTI): Primary | ICD-10-CM

## 2024-04-09 PROCEDURE — 99213 OFFICE O/P EST LOW 20 MIN: CPT | Performed by: ORTHOPAEDIC SURGERY

## 2024-04-09 ASSESSMENT — PAIN SCALES - GENERAL: PAINLEVEL_OUTOF10: 5 - MODERATE PAIN

## 2024-04-09 ASSESSMENT — PAIN - FUNCTIONAL ASSESSMENT: PAIN_FUNCTIONAL_ASSESSMENT: 0-10

## 2024-04-09 NOTE — PROGRESS NOTES
ORTHOPEDIC TOTAL JOINT  POST-OPERATIVE FOLLOW UP      ============================  IMPRESSION/PLAN:  ============================  58 y.o. female s/p Right Total Hip Replacement completed on 08/16/2023.    PLAN:  -Had a lengthy discussion with the patient regarding findings above.  Reviewed her full-length films with her.  She does of note have a slightly longer right lower extremity but her right thigh has effectively shortened since her surgery due to likely loosening of her stem.  There are changes on her implant that appear that the stem is now stable and grown in.  We did discuss the option of a revision surgery with possibly at minimum extending the length of her leg by the head size given her current implant options.  We also discussed that if she did proceed with a revision surgery at the test her femoral stem to make sure she is not grossly loose and if she is unable to get her full return of length and potential need an isolated femoral revision as well.  At this point she would like to hold off on surgical intervention and I do agree with this as she has a high risk of infection in the first year after surgery and would potentially consider revision surgery in the fall if necessary.  She will follow-up as needed otherwise.      Caitlyn MARIA Wilbert presents today for follow up of joint replacement above. Pain controlled with current treatment plan. Patient comes in today with concerns of a leg length discrepancy.  She states she has been falling a lot lately..  Review full-length film x-rays and discussed treatment plan    Review of Systems:   Constitutional: See HPI for pain assessment, No significant weight loss, recent trauma. Denies fevers/chills  Cardiovascular: No chest pain, shortness of breath  Respiratory: No difficulty breathing, cough  Gastrointestinal: No nausea, vomiting, diarrhea, constipation  Musculoskeletal: Noted in HPI, no arthralgias   Integumentary: No rashes, easy bruising, redness    Neurological: no numbness or tingling in extremities, no gait disturbances     Patient Active Problem List   Diagnosis    Benign essential hypertension    Bilateral sciatica    Bipolar disorder, current episode manic without psychotic features (CMS/HCC)    Gastritis    Disc degeneration, lumbar    Familial hyperlipidemia    Lumbar radiculopathy    Multiple sclerosis (CMS/HCC)    Nicotine dependence, unspecified, uncomplicated    Osteoporosis    Vitamin D deficiency    TMJ (dislocation of temporomandibular joint)    Abnormal EKG    Acetabular fracture (CMS/HCC)    Antalgic gait    Avascular necrosis of bone of hip, right (CMS/HCC)    Backache    Bilateral presbyopia    Bilateral sensorineural hearing loss    CAD (coronary artery disease)    Chest pain    Closed nondisplaced fracture of distal phalanx of right index finger    Dermatitis    Gait abnormality    Greater trochanteric bursitis of right hip    Long term current use of opiate analgesic    Lower extremity weakness    Nail avulsion, finger    Nuclear sclerosis of both eyes    Paronychia of finger of right hand    Postmenopausal osteoporosis with pathological fracture    Posterior vitreous detachment    Right hip pain    Right knee pain    Sacroiliitis (CMS/HCC)    Status post bilateral LASIK surgery    Whiplash injuries       =================================  EXAM  =================================  GENERAL: A/Ox3, NAD. Appears healthy, well nourished  CARDIAC: regular rate  LUNGS: Breathing non-labored    MUSCULOSKELETAL:  Laterality: right Hip exam  - Strength: Abduction 5/5, Flexion 5/5  - Palpation: non TTP along surgical site  - EHL/PF/DF motor intact  - compartments soft, negative homans  - Gait: using no assistive device    NEUROVASCULAR:  - Neurovascular Status: sensation intact to light touch distally  - Capillary refill brisk at extremities, Bilateral dorsalis pedis pulse 2+     IMAGING: Multiple views of the right hip and pelvis reviewed today  and compared to prior and immediate postoperative films.  She does have obvious shortening of the right lower extremity compared to the contralateral side.  The femoral stem does have proximal metaphyseal remodeling that shows that is likely stable at this point.  There is no signs or symptoms of loosening/failure of the acetabular component.  She does have shortening on the femoral side with likely loosening of the femoral stem. X-rays were personally reviewed by me.  Radiology reports were reviewed by me as well, if available at the time.        Anne Ochoa DO  Attending Surgeon  Joint Replacement and Adult Reconstructive Surgery  [place], [place]

## 2024-04-12 ENCOUNTER — APPOINTMENT (OUTPATIENT)
Dept: RADIOLOGY | Facility: CLINIC | Age: 59
End: 2024-04-12
Payer: COMMERCIAL

## 2024-04-23 ENCOUNTER — LAB (OUTPATIENT)
Dept: LAB | Facility: LAB | Age: 59
End: 2024-04-23
Payer: COMMERCIAL

## 2024-04-23 DIAGNOSIS — G35 MULTIPLE SCLEROSIS (MULTI): ICD-10-CM

## 2024-04-23 PROCEDURE — 36415 COLL VENOUS BLD VENIPUNCTURE: CPT

## 2024-04-23 PROCEDURE — 82565 ASSAY OF CREATININE: CPT

## 2024-04-23 PROCEDURE — 84520 ASSAY OF UREA NITROGEN: CPT

## 2024-04-24 LAB
BUN SERPL-MCNC: 10 MG/DL (ref 6–23)
CREAT SERPL-MCNC: 0.79 MG/DL (ref 0.5–1.05)
EGFRCR SERPLBLD CKD-EPI 2021: 86 ML/MIN/1.73M*2

## 2024-04-26 ENCOUNTER — HOSPITAL ENCOUNTER (OUTPATIENT)
Dept: RADIOLOGY | Facility: CLINIC | Age: 59
Discharge: HOME | End: 2024-04-26
Payer: COMMERCIAL

## 2024-04-26 ENCOUNTER — OFFICE VISIT (OUTPATIENT)
Dept: PRIMARY CARE | Facility: CLINIC | Age: 59
End: 2024-04-26
Payer: COMMERCIAL

## 2024-04-26 VITALS
HEART RATE: 76 BPM | SYSTOLIC BLOOD PRESSURE: 100 MMHG | HEIGHT: 62 IN | OXYGEN SATURATION: 96 % | TEMPERATURE: 96.9 F | BODY MASS INDEX: 18.03 KG/M2 | WEIGHT: 98 LBS | DIASTOLIC BLOOD PRESSURE: 60 MMHG

## 2024-04-26 DIAGNOSIS — M62.838 CERVICAL PARASPINAL MUSCLE SPASM: ICD-10-CM

## 2024-04-26 DIAGNOSIS — M54.2 NECK PAIN: Primary | ICD-10-CM

## 2024-04-26 DIAGNOSIS — Z12.31 VISIT FOR SCREENING MAMMOGRAM: ICD-10-CM

## 2024-04-26 DIAGNOSIS — M54.2 NECK PAIN: ICD-10-CM

## 2024-04-26 DIAGNOSIS — Z00.00 ROUTINE GENERAL MEDICAL EXAMINATION AT A HEALTH CARE FACILITY: ICD-10-CM

## 2024-04-26 DIAGNOSIS — G35 MULTIPLE SCLEROSIS (MULTI): ICD-10-CM

## 2024-04-26 PROCEDURE — 99214 OFFICE O/P EST MOD 30 MIN: CPT | Performed by: PHYSICIAN ASSISTANT

## 2024-04-26 PROCEDURE — 3074F SYST BP LT 130 MM HG: CPT | Performed by: PHYSICIAN ASSISTANT

## 2024-04-26 PROCEDURE — A9575 INJ GADOTERATE MEGLUMI 0.1ML: HCPCS | Performed by: NURSE PRACTITIONER

## 2024-04-26 PROCEDURE — 3078F DIAST BP <80 MM HG: CPT | Performed by: PHYSICIAN ASSISTANT

## 2024-04-26 PROCEDURE — 72050 X-RAY EXAM NECK SPINE 4/5VWS: CPT | Performed by: RADIOLOGY

## 2024-04-26 PROCEDURE — 72050 X-RAY EXAM NECK SPINE 4/5VWS: CPT

## 2024-04-26 PROCEDURE — 2550000001 HC RX 255 CONTRASTS: Performed by: NURSE PRACTITIONER

## 2024-04-26 PROCEDURE — 70553 MRI BRAIN STEM W/O & W/DYE: CPT

## 2024-04-26 PROCEDURE — 70553 MRI BRAIN STEM W/O & W/DYE: CPT | Performed by: RADIOLOGY

## 2024-04-26 RX ORDER — GADOTERATE MEGLUMINE 376.9 MG/ML
9 INJECTION INTRAVENOUS
Status: COMPLETED | OUTPATIENT
Start: 2024-04-26 | End: 2024-04-26

## 2024-04-26 RX ORDER — TIZANIDINE 2 MG/1
1-2 TABLET ORAL EVERY 8 HOURS PRN
Qty: 20 TABLET | Refills: 0 | Status: SHIPPED | OUTPATIENT
Start: 2024-04-26

## 2024-04-26 RX ADMIN — GADOTERATE MEGLUMINE 9 ML: 376.9 INJECTION INTRAVENOUS at 11:18

## 2024-04-26 ASSESSMENT — ENCOUNTER SYMPTOMS: HEADACHES: 0

## 2024-04-26 NOTE — PROGRESS NOTES
"Subjective   Patient ID: Caitlyn Atkins is a 58 y.o. female who presents for pt having neck pain,nki x 2 wks.    HPI   Patient complains of neck pain the past 2 weeks, worse on the right side. It is very stiff.  NKI. Extends to between her shoulder blades. No pain or paresthesias radiating into arms.   Has had some intermittent soreness in her neck over the years.    Symptoms are slightly improving the past few days.   Using IcyHot with lidocaine helps.   Is on ibuprofen and norco from pain mgmt.     No recent flare ups of her MS (has MRI brain today).   No changes is usually physical activity -- stays very active with physical work, both at her job and in the yard at home.     Overdue for mammogram and has been a few years since lipids checked.      reports that she has been smoking cigarettes. She has never used smokeless tobacco.    Review of Systems   Cardiovascular:  Negative for chest pain.   Neurological:  Negative for headaches.       Objective   /60   Pulse 76   Temp 36.1 °C (96.9 °F)   Ht 1.575 m (5' 2\")   Wt (!) 44.5 kg (98 lb)   SpO2 96%   BMI 17.92 kg/m²     Physical Exam  Vitals and nursing note reviewed.   Constitutional:       General: She is not in acute distress.     Appearance: Normal appearance. She is well-developed.   HENT:      Head: Normocephalic.   Eyes:      General: No scleral icterus.  Cardiovascular:      Rate and Rhythm: Normal rate and regular rhythm.   Pulmonary:      Effort: Pulmonary effort is normal.      Breath sounds: Normal breath sounds.   Musculoskeletal:      Cervical back: Neck supple.      Comments: Has tenderness and tension of paracervical musculature (R>L) extending down into right upper trapezius. Decreased ROM with extension, rotation, and sidebending.    Lymphadenopathy:      Cervical: No cervical adenopathy.   Skin:     General: Skin is warm and dry.   Neurological:      Mental Status: She is alert.         Assessment/Plan   Diagnoses and all orders for " this visit:  Neck pain  -     XR cervical spine complete 4-5 views; Future  -     tiZANidine (Zanaflex) 2 mg tablet; Take 0.5-1 tablets (1-2 mg) by mouth every 8 hours if needed for muscle spasms.  Cervical paraspinal muscle spasm  -     XR cervical spine complete 4-5 views; Future  -     tiZANidine (Zanaflex) 2 mg tablet; Take 0.5-1 tablets (1-2 mg) by mouth every 8 hours if needed for muscle spasms.  Visit for screening mammogram  -     BI mammo bilateral screening; Future  Routine general medical examination at a health care facility  -     Lipid Panel; Future  -     Basic Metabolic Panel; Future       Get xray of c-spine.   Use heating pad on neck.   Reviewed cervical stretches to do daily.   Rx tizanidine (warned of sedation).   Use ibuprofen that she has (with food and drink plenty of water).   Overdue for mammogram, so gave order for this.   Gave order for fasting labs to do in near future.   Follow up if symptoms increase or persist. Consider PT order if persists.

## 2024-05-21 ENCOUNTER — OFFICE VISIT (OUTPATIENT)
Dept: PAIN MEDICINE | Facility: CLINIC | Age: 59
End: 2024-05-21
Payer: COMMERCIAL

## 2024-05-21 ENCOUNTER — LAB (OUTPATIENT)
Dept: LAB | Facility: LAB | Age: 59
End: 2024-05-21
Payer: COMMERCIAL

## 2024-05-21 DIAGNOSIS — M54.16 LUMBAR RADICULOPATHY: ICD-10-CM

## 2024-05-21 DIAGNOSIS — M54.32 BILATERAL SCIATICA: ICD-10-CM

## 2024-05-21 DIAGNOSIS — M87.051 AVASCULAR NECROSIS OF BONE OF HIP, RIGHT (MULTI): ICD-10-CM

## 2024-05-21 DIAGNOSIS — Z79.891 LONG TERM CURRENT USE OF OPIATE ANALGESIC: ICD-10-CM

## 2024-05-21 DIAGNOSIS — M51.36 DISC DEGENERATION, LUMBAR: Primary | ICD-10-CM

## 2024-05-21 DIAGNOSIS — M54.31 BILATERAL SCIATICA: ICD-10-CM

## 2024-05-21 DIAGNOSIS — M47.816 LUMBAR SPONDYLOSIS: ICD-10-CM

## 2024-05-21 PROCEDURE — 99214 OFFICE O/P EST MOD 30 MIN: CPT | Performed by: PHYSICAL MEDICINE & REHABILITATION

## 2024-05-21 PROCEDURE — 80354 DRUG SCREENING FENTANYL: CPT

## 2024-05-21 PROCEDURE — 80368 SEDATIVE HYPNOTICS: CPT

## 2024-05-21 PROCEDURE — 80365 DRUG SCREENING OXYCODONE: CPT

## 2024-05-21 PROCEDURE — 80361 OPIATES 1 OR MORE: CPT

## 2024-05-21 PROCEDURE — 80307 DRUG TEST PRSMV CHEM ANLYZR: CPT

## 2024-05-21 PROCEDURE — 80358 DRUG SCREENING METHADONE: CPT

## 2024-05-21 PROCEDURE — 82570 ASSAY OF URINE CREATININE: CPT

## 2024-05-21 PROCEDURE — 80346 BENZODIAZEPINES1-12: CPT

## 2024-05-21 PROCEDURE — 80373 DRUG SCREENING TRAMADOL: CPT

## 2024-05-21 RX ORDER — GABAPENTIN 100 MG/1
100 CAPSULE ORAL 3 TIMES DAILY
Qty: 270 CAPSULE | Refills: 0 | Status: SHIPPED | OUTPATIENT
Start: 2024-05-21 | End: 2024-08-19

## 2024-05-21 RX ORDER — HYDROCODONE BITARTRATE AND ACETAMINOPHEN 7.5; 325 MG/1; MG/1
1 TABLET ORAL EVERY 6 HOURS PRN
Qty: 112 TABLET | Refills: 0 | Status: SHIPPED | OUTPATIENT
Start: 2024-06-22 | End: 2024-07-20

## 2024-05-21 RX ORDER — HYDROCODONE BITARTRATE AND ACETAMINOPHEN 7.5; 325 MG/1; MG/1
1 TABLET ORAL EVERY 6 HOURS PRN
Qty: 112 TABLET | Refills: 0 | Status: SHIPPED | OUTPATIENT
Start: 2024-05-25 | End: 2024-06-22

## 2024-05-21 NOTE — PROGRESS NOTES
Chief complaint  Back and neck pain     History  Caitlyn Atkins is back for pain management office visit  Seeing chiro and got xray and showed DDD as we know  The pain in the back is deep on both sides.  The pain is above the belt line, it is continuous but it gets worse with extension of the lumbar spine to either side. The pain improves with leaning forward.  Extension combined with rotation to the either side worsens the pain.  There are no reported radiation of the pain to the lower limbs.     No bowel or bladder incontinence.  No sensory or motor changes in the lower limbs.    No changes or in the color or textures of the skin of the lower limbs.       Pain level without medication is 8/10 , with the medication pain level 3 to 4/10.     The pain meds are helping control the pain and improving Activities of Daily living and quality of life and quality of sleep.    opioids treatment agreement Jan 2024  Pill count today, using count tray, and in front of patient :  15    pills , last fill was on 4/27  for 112 hydrocodone 7.5 tabs,  the count is correct  Oarrs pulled and scanned in the chart  no concerns  last urine toxicology testing earlier this year and it was compliant we will repeat  Xray updated spine   ORT Score is  0  Pain pathology and pain generators spine the femur pain is better   Modalities tried injection, surgery, physical therapy, TENS unit, nonsteroidal anti-inflammatory medication       Denied any fever or chills. No weight loss and no night sweats. No cough or sputum production. No diarrhea   The constipation has been responding to fibers and over the counter medications.     No bladder and bowel incontinence and no other changes in bladder and bowel. No skin changes.  Reports tiredness and fatigability only if the pain is not controlled.   Denied opioids diversion and abuse and denies alcoholism. Denies overuse of the pain medications.    The control of the pain with the pain medications is  helping the control of the symptoms and allowing the function and activities of daily living, enjoyment of life, improving the quality of life and sleep with less interruption by the pain. The goal is symptomatic control of the nonmalignant chronic pain and not to repair the permanent damage in the tissues inducing the chronic pain conditions. We are aiming to shift the focus from the nonmalignant chronic pain to other aspects of life by symptomatically treating this chronic pain. If this pain is not treated it will lead to major morbidity and it is also associated with increased risks of mortality. The patient understands those very clearly and also understand high risks of morbidity and mortality if not strictly adherent to the treatment recommendations and reporting any associated side effects. Also patient understand the full responsibility associated with these medications to avoid abuse or overuse or any use of these medications for anything besides treating the patient's own chronic pain and nothing else under any circumstances.        Physical examination  Awake, alert and oriented for time place and persons   declined Chaperone for the visit and was adequately  draped for the exam.  Examination of the lumbar spine showed mild reversal of the lumbar lordosis .    Tight muscle bands over the   lower lumbar paraspinals area.  Staples test on the   lower lumbar area increases the pain with stabilization of the lower lumbar facets bilaterally at  L45 and L5S1,  combined with extension and rotation of the lumbar spine to the eith side reproduced and worsened the back pain on that side.  The pain improved with leaning forward.  Straight leg raising was negative.  No sensorimotor deficits in the lower limbs.  Elvin testing were negative for axial loading and log rotation.  No aberrant pain behavior.      Diagnosis  Problem List Items Addressed This Visit       Bilateral sciatica    Relevant Medications     HYDROcodone-acetaminophen (Norco) 7.5-325 mg tablet (Start on 5/25/2024)    HYDROcodone-acetaminophen (Norco) 7.5-325 mg tablet (Start on 6/22/2024)    gabapentin (Neurontin) 100 mg capsule    Disc degeneration, lumbar - Primary    Relevant Medications    HYDROcodone-acetaminophen (Norco) 7.5-325 mg tablet (Start on 5/25/2024)    HYDROcodone-acetaminophen (Norco) 7.5-325 mg tablet (Start on 6/22/2024)    Lumbar radiculopathy    Relevant Medications    HYDROcodone-acetaminophen (Norco) 7.5-325 mg tablet (Start on 5/25/2024)    HYDROcodone-acetaminophen (Norco) 7.5-325 mg tablet (Start on 6/22/2024)    Avascular necrosis of bone of hip, right (Multi)    Relevant Medications    HYDROcodone-acetaminophen (Norco) 7.5-325 mg tablet (Start on 5/25/2024)    HYDROcodone-acetaminophen (Norco) 7.5-325 mg tablet (Start on 6/22/2024)    Long term current use of opiate analgesic    Relevant Medications    HYDROcodone-acetaminophen (Norco) 7.5-325 mg tablet (Start on 5/25/2024)    HYDROcodone-acetaminophen (Norco) 7.5-325 mg tablet (Start on 6/22/2024)    Other Relevant Orders    Opiate/Opioid/Benzo Prescription Compliance    Lumbar spondylosis        Plan  Reviewed the pain generators.  Went over the types of pain with neuropathic and nociceptive and different pathologies and therapeutic modalities. Discussed the mechanism of action of interventions from acupuncture, physical therapy , regular exercises, injections, botox, spinal cord stimulation, and role of surgery     Went over pathology of the intervertebral disc displacement and the anatomical relation to the Nerve roots and relation to the radicular symptoms. Went over treatment modalities with conservative treatment including acupuncture   and epidural steroid injection with fluoroscopy guidance and last resort of surgery    Based on the above findings and the clinical response to the opioids medications and improvement of the activities of daily living, sleep, and work  performance. We made this complex decision to continue the opioids therapy in light of the evidence of the patient's responsibility in using the pain medications as prescribed for the nonmalignant chronic pain condition. We discussed about the use of the pain medications to treat the symptoms of chronic nonmalignant pain and we are not trying the repair the permanent damage in the tissues, rather we are trying to control the symptoms induced by the permanent damage to the tissues inducing the chronic pain condition and resulting disability. I explained the difference and discussed it with the patient and stressed the importance of knowing the difference especially because of the potential side effects and the potential addicting effect and habit forming nature of the dangerous drugs we are using to treat the symptoms of the chronic pain.      We discussed that we are prescribing the medications on good rambo and legitimate medical reason.     We reviewed the side effects and precautions of opioids prescriptions as discussed in the opioids treatment agreement.    realizes the interaction between the therapeutic classes including the respiratory depression and potential death     Random drug testing   we will submit     Long discussion about putting effort in cutting back on pain medications. Discussed about pain level changing and we do not know if the medications at this amount are still needed until we try and cut back slowly on pain medications. If the cut is tolerated then we continue. If cut not tolerated then, will go back on the pain medications level.  The goal from this is to keep the pain medications at the lowest effective dose.   Hydrocodone 7.5 mg qid   has a narcan at home know how and when to use it if needed.   Discussed about considering cut back on pain medications    Gabapenting 100 mg tid for burning p0ain      Discussed about NSAIDS and I explained about the opioids sparing effect to allow  keeping the opioids dose at minimal effective dose.   I went over the potential side effects of the NSAIDS on the gastrointestinal, renal and cardiovascular systems.      I detailed the side effects from the acetaminophen in the medication and made aware of those. I also explained about the cumulative effects on the organs and mainly the liver.     Given the opioids therapy , we discussed about the risk for accidental over dose on the pain medications, either for patient or other household. I went over the mechanism of action and mode of use of the Naloxone according to the  recommendations. I will provide a prescription for a kit.     Follow-up 8 weeks or earlier if needed     The level of clinical decision making in this office visit,  is high, given the high risks of complications with the morbidity and mortality due to the fact that acute and chronic pain may pose a threat to life and bodily function, if under treated, poorly treated, or with failure to maintain adequate treatment and timely medical follow up. Additionally over treatment has its own set of complications including overdosing on the pain medications and also the habit forming potentials with the use of the medications used to treat chronic painful conditions including therapeutic classes classified as dangerous medications. Given the serious and fluctuating nature of pain level and instensity with extensive consideration for whenever pain changes, there is always the risk of prolonged functional impairment requiring close patient monitoring with regular assessments and reassessments and high level medical decision making at every office visit. The amount and complexity of data reviewed is high given the patient clinical presentation, labs,  data, radiology reports, and other tests as discussed during office visits. Pertinent data whether positive or negative were taken in consideration in the process of making this high level  medical decision.

## 2024-05-22 LAB
AMPHETAMINES UR QL SCN: NORMAL
BARBITURATES UR QL SCN: NORMAL
BZE UR QL SCN: NORMAL
CANNABINOIDS UR QL SCN: NORMAL
CREAT UR-MCNC: 115.8 MG/DL (ref 20–320)
PCP UR QL SCN: NORMAL

## 2024-05-24 LAB
1OH-MIDAZOLAM UR CFM-MCNC: <25 NG/ML
6MAM UR CFM-MCNC: <25 NG/ML
7AMINOCLONAZEPAM UR CFM-MCNC: <25 NG/ML
A-OH ALPRAZ UR CFM-MCNC: <25 NG/ML
ALPRAZ UR CFM-MCNC: <25 NG/ML
CHLORDIAZEP UR CFM-MCNC: <25 NG/ML
CLONAZEPAM UR CFM-MCNC: <25 NG/ML
CODEINE UR CFM-MCNC: <50 NG/ML
DIAZEPAM UR CFM-MCNC: <25 NG/ML
EDDP UR CFM-MCNC: <25 NG/ML
FENTANYL UR CFM-MCNC: <2.5 NG/ML
HYDROCODONE CTO UR CFM-MCNC: >2500 NG/ML
HYDROMORPHONE UR CFM-MCNC: 893 NG/ML
LORAZEPAM UR CFM-MCNC: <25 NG/ML
METHADONE UR CFM-MCNC: <25 NG/ML
MIDAZOLAM UR CFM-MCNC: <25 NG/ML
MORPHINE UR CFM-MCNC: <50 NG/ML
NORDIAZEPAM UR CFM-MCNC: <25 NG/ML
NORFENTANYL UR CFM-MCNC: <2.5 NG/ML
NORHYDROCODONE UR CFM-MCNC: >1000 NG/ML
NOROXYCODONE UR CFM-MCNC: <25 NG/ML
NORTRAMADOL UR-MCNC: <50 NG/ML
OXAZEPAM UR CFM-MCNC: <25 NG/ML
OXYCODONE UR CFM-MCNC: <25 NG/ML
OXYMORPHONE UR CFM-MCNC: <25 NG/ML
TEMAZEPAM UR CFM-MCNC: <25 NG/ML
TRAMADOL UR CFM-MCNC: <50 NG/ML
ZOLPIDEM UR CFM-MCNC: <25 NG/ML
ZOLPIDEM UR-MCNC: <25 NG/ML

## 2024-06-12 ENCOUNTER — APPOINTMENT (OUTPATIENT)
Dept: RADIOLOGY | Facility: HOSPITAL | Age: 59
End: 2024-06-12
Payer: COMMERCIAL

## 2024-06-26 ENCOUNTER — HOSPITAL ENCOUNTER (OUTPATIENT)
Dept: RADIOLOGY | Facility: HOSPITAL | Age: 59
Discharge: HOME | End: 2024-06-26
Payer: COMMERCIAL

## 2024-06-26 VITALS — HEIGHT: 62 IN | WEIGHT: 100 LBS | BODY MASS INDEX: 18.4 KG/M2

## 2024-06-26 DIAGNOSIS — Z12.31 VISIT FOR SCREENING MAMMOGRAM: ICD-10-CM

## 2024-06-26 PROCEDURE — 77067 SCR MAMMO BI INCL CAD: CPT | Performed by: RADIOLOGY

## 2024-06-26 PROCEDURE — 77067 SCR MAMMO BI INCL CAD: CPT

## 2024-06-26 PROCEDURE — 77063 BREAST TOMOSYNTHESIS BI: CPT | Performed by: RADIOLOGY

## 2024-07-11 ENCOUNTER — TELEPHONE (OUTPATIENT)
Dept: PAIN MEDICINE | Facility: CLINIC | Age: 59
End: 2024-07-11

## 2024-07-15 ENCOUNTER — APPOINTMENT (OUTPATIENT)
Dept: PAIN MEDICINE | Facility: CLINIC | Age: 59
End: 2024-07-15
Payer: COMMERCIAL

## 2024-07-15 DIAGNOSIS — M51.36 DISC DEGENERATION, LUMBAR: ICD-10-CM

## 2024-07-15 DIAGNOSIS — Z79.891 LONG TERM CURRENT USE OF OPIATE ANALGESIC: ICD-10-CM

## 2024-07-15 DIAGNOSIS — M47.816 LUMBAR SPONDYLOSIS: ICD-10-CM

## 2024-07-15 DIAGNOSIS — S32.401A CLOSED NONDISPLACED FRACTURE OF RIGHT ACETABULUM, UNSPECIFIED PORTION OF ACETABULUM, INITIAL ENCOUNTER (MULTI): Primary | ICD-10-CM

## 2024-07-15 DIAGNOSIS — M54.31 BILATERAL SCIATICA: ICD-10-CM

## 2024-07-15 DIAGNOSIS — M87.051 AVASCULAR NECROSIS OF BONE OF HIP, RIGHT (MULTI): ICD-10-CM

## 2024-07-15 DIAGNOSIS — M54.32 BILATERAL SCIATICA: ICD-10-CM

## 2024-07-15 DIAGNOSIS — M54.16 LUMBAR RADICULOPATHY: ICD-10-CM

## 2024-07-15 PROCEDURE — 99214 OFFICE O/P EST MOD 30 MIN: CPT | Performed by: PHYSICAL MEDICINE & REHABILITATION

## 2024-07-15 RX ORDER — HYDROCODONE BITARTRATE AND ACETAMINOPHEN 7.5; 325 MG/1; MG/1
1 TABLET ORAL EVERY 6 HOURS PRN
Qty: 112 TABLET | Refills: 0 | Status: SHIPPED | OUTPATIENT
Start: 2024-08-17 | End: 2024-09-14

## 2024-07-15 RX ORDER — HYDROCODONE BITARTRATE AND ACETAMINOPHEN 7.5; 325 MG/1; MG/1
1 TABLET ORAL EVERY 6 HOURS PRN
Qty: 112 TABLET | Refills: 0 | Status: SHIPPED | OUTPATIENT
Start: 2024-07-20 | End: 2024-08-17

## 2024-07-15 NOTE — PROGRESS NOTES
Chief complaint  Back and leg pain intermittent     History  Caitlyn Atkins is back for pain management office visit  Back pain is the worse . The pain in the back is deep on both sides.  The pain is above the belt line, it is continuous but it gets worse with extension of the lumbar spine to either side. The pain improves with leaning forward.  Extension combined with rotation to the either side worsens the pain.  There are no reported radiation of the pain to the lower limbs.     No bowel or bladder incontinence.  No sensory or motor changes in the lower limbs.    No changes or in the color or textures of the skin of the lower limbs.     Long discussion about putting effort in cutting back on pain medications. Discussed about pain level changing and we do not know if the medications at this amount are still needed until we try and cut back slowly on pain medications. If the cut is tolerated then we continue. If cut not tolerated then, will go back on the pain medications level.  The goal from this is to keep the pain medications at the lowest effective dose.       Pain level without medication is 7/10 , with the medication pain level 3/10.     The pain meds are helping control the pain and improving Activities of Daily living and quality of life and quality of sleep.    opioids treatment agreement Jan 2024  Pill count today, using count tray, and in front of patient :  20    pills , last fill was on 6/22  for 112 tabs,  the count is correct  Oarrs pulled and reviewed, no concerns  last urine toxicology testing earlier this year and it was compliant we will repeat  Xray updated spine   ORT Score is   0  Pain pathology and pain generators spine   Modalities tried injection, surgery, physical therapy, TENS unit, nonsteroidal anti-inflammatory medication       Review of Systems :  Denied any fever or chills. No weight loss and no night sweats. No cough or sputum production. No diarrhea   The constipation has been  "responding to fibers and over the counter medications.     No bladder and bowel incontinence and no other changes in bladder and bowel. No skin changes.  Reports tiredness and fatigability only if the pain is not controlled.   Denied opioids diversion and abuse and denies alcoholism. Denies overuse of the pain medications.  No reported euphoria sensation or getting a \"high\" on the pain medications.    The control of the pain with the pain medications is helping the control of the symptoms and allowing the function and activities of daily living, enjoyment of life, improving the quality of life and sleep with less interruption by the pain. The goal is symptomatic control of the nonmalignant chronic pain and not to repair the permanent damage in the tissues inducing the chronic pain conditions. We are aiming to shift the focus from the nonmalignant chronic pain to other aspects of life by symptomatically treating this chronic pain. If this pain is not treated it will lead to major morbidity and it is also associated with increased risks of mortality. The patient understands those very clearly and also understand high risks of morbidity and mortality if not strictly adherent to the treatment recommendations and reporting any associated side effects. Also patient understand the full responsibility associated with these medications to avoid abuse or overuse or any use of these medications for anything besides treating the patient's own chronic pain and nothing else under any circumstances.        Physical examination  Awake, alert and oriented for time place and persons   declined Chaperone for the visit and was adequately  draped for the exam.  Examination of the lumbar spine showed mild reversal of the lumbar lordosis .    Tight muscle bands over the   lower lumbar paraspinals area.  Staples test on the   lower lumbar area increases the pain with stabilization of the lower lumbar facets bilaterally at  L45 and L5S1,  " combined with extension and rotation of the lumbar spine to the eith side reproduced and worsened the back pain on that side.  The pain improved with leaning forward.  Straight leg raising was negative.  No sensorimotor deficits in the lower limbs.  Elvin testing were negative for axial loading and log rotation.  No aberrant pain behavior.      Diagnosis  Problem List Items Addressed This Visit       Bilateral sciatica    Relevant Medications    HYDROcodone-acetaminophen (Norco) 7.5-325 mg tablet (Start on 7/20/2024)    HYDROcodone-acetaminophen (Norco) 7.5-325 mg tablet (Start on 8/17/2024)    Disc degeneration, lumbar    Relevant Medications    HYDROcodone-acetaminophen (Norco) 7.5-325 mg tablet (Start on 7/20/2024)    HYDROcodone-acetaminophen (Norco) 7.5-325 mg tablet (Start on 8/17/2024)    Lumbar radiculopathy    Relevant Medications    HYDROcodone-acetaminophen (Norco) 7.5-325 mg tablet (Start on 7/20/2024)    HYDROcodone-acetaminophen (Norco) 7.5-325 mg tablet (Start on 8/17/2024)    Acetabular fracture (Multi) - Primary    Avascular necrosis of bone of hip, right (Multi)    Relevant Medications    HYDROcodone-acetaminophen (Norco) 7.5-325 mg tablet (Start on 7/20/2024)    HYDROcodone-acetaminophen (Norco) 7.5-325 mg tablet (Start on 8/17/2024)    Long term current use of opiate analgesic    Relevant Medications    HYDROcodone-acetaminophen (Norco) 7.5-325 mg tablet (Start on 7/20/2024)    HYDROcodone-acetaminophen (Norco) 7.5-325 mg tablet (Start on 8/17/2024)    Lumbar spondylosis        Plan  Reviewed the pain generators.  Went over the types of pain with neuropathic and nociceptive and different pathologies and therapeutic modalities. Discussed the mechanism of action of interventions from acupuncture, physical therapy , regular exercises, injections, botox, spinal cord stimulation, and role of surgery     Went over pathology of the intervertebral disc displacement and the anatomical relation to the  Nerve roots and relation to the radicular symptoms. Went over treatment modalities with conservative treatment including acupuncture   and epidural steroid injection with fluoroscopy guidance and last resort of surgery    Based on the above findings and the clinical response to the opioids medications and improvement of the activities of daily living, sleep, and work performance. We made this complex decision to continue the opioids therapy in light of the evidence of the patient's responsibility in using the pain medications as prescribed for the nonmalignant chronic pain condition. We discussed about the use of the pain medications to treat the symptoms of chronic nonmalignant pain and we are not trying the repair the permanent damage in the tissues, rather we are trying to control the symptoms induced by the permanent damage to the tissues inducing the chronic pain condition and resulting disability. I explained the difference and discussed it with the patient and stressed the importance of knowing the difference especially because of the potential side effects and the potential addicting effect and habit forming nature of the dangerous drugs we are using to treat the symptoms of the chronic pain.      We discussed that we are prescribing the medications on good rambo and legitimate medical reason.     We reviewed the side effects and precautions of opioids prescriptions as discussed in the opioids treatment agreement.    realizes the interaction between the therapeutic classes including the respiratory depression and potential death     Random drug testing   we will submit     Consider facet blocks and RFA  realizes the interaction between the therapeutic classes including the respiratory depression and potential death   has a narcan at home know how and when to use it if needed.     Discussed about NSAIDS and I explained about the opioids sparing effect to allow keeping the opioids dose at minimal effective  dose.   I went over the potential side effects of the NSAIDS on the gastrointestinal, renal and cardiovascular systems.      I detailed the side effects from the acetaminophen in the medication and made aware of those. I also explained about the cumulative effects on the organs and mainly the liver.     Given the opioids therapy , we discussed about the risk for accidental over dose on the pain medications, either for patient or other household. I went over the mechanism of action and mode of use of the Naloxone according to the  recommendations. I will provide a prescription for a kit.     Follow-up 8 weeks or earlier if needed     The level of clinical decision making in this office visit,  is high, given the high risks of complications with the morbidity and mortality due to the fact that acute and chronic pain may pose a threat to life and bodily function, if under treated, poorly treated, or with failure to maintain adequate treatment and timely medical follow up. Additionally over treatment has its own set of complications including overdosing on the pain medications and also the habit forming potentials with the use of the medications used to treat chronic painful conditions including therapeutic classes classified as dangerous medications. Given the serious and fluctuating nature of pain level and instensity with extensive consideration for whenever pain changes, there is always the risk of prolonged functional impairment requiring close patient monitoring with regular assessments and reassessments and high level medical decision making at every office visit. The amount and complexity of data reviewed is high given the patient clinical presentation, labs,  data, radiology reports, and other tests as discussed during office visits. Pertinent data whether positive or negative were taken in consideration in the process of making this high level medical decision.

## 2024-07-16 ENCOUNTER — APPOINTMENT (OUTPATIENT)
Dept: PAIN MEDICINE | Facility: CLINIC | Age: 59
End: 2024-07-16
Payer: COMMERCIAL

## 2024-08-13 ENCOUNTER — APPOINTMENT (OUTPATIENT)
Dept: ORTHOPEDIC SURGERY | Facility: CLINIC | Age: 59
End: 2024-08-13
Payer: COMMERCIAL

## 2024-08-27 VITALS
HEART RATE: 69 BPM | OXYGEN SATURATION: 99 % | WEIGHT: 100 LBS | SYSTOLIC BLOOD PRESSURE: 128 MMHG | DIASTOLIC BLOOD PRESSURE: 74 MMHG | TEMPERATURE: 97.2 F | HEIGHT: 62 IN | RESPIRATION RATE: 16 BRPM | BODY MASS INDEX: 18.4 KG/M2

## 2024-08-27 PROCEDURE — 4500999001 HC ED NO CHARGE

## 2024-08-27 ASSESSMENT — COLUMBIA-SUICIDE SEVERITY RATING SCALE - C-SSRS
6. HAVE YOU EVER DONE ANYTHING, STARTED TO DO ANYTHING, OR PREPARED TO DO ANYTHING TO END YOUR LIFE?: NO
1. IN THE PAST MONTH, HAVE YOU WISHED YOU WERE DEAD OR WISHED YOU COULD GO TO SLEEP AND NOT WAKE UP?: NO
2. HAVE YOU ACTUALLY HAD ANY THOUGHTS OF KILLING YOURSELF?: NO

## 2024-08-28 ENCOUNTER — HOSPITAL ENCOUNTER (EMERGENCY)
Facility: HOSPITAL | Age: 59
Discharge: HOME | End: 2024-08-28
Payer: COMMERCIAL

## 2024-09-10 ENCOUNTER — APPOINTMENT (OUTPATIENT)
Dept: PAIN MEDICINE | Facility: CLINIC | Age: 59
End: 2024-09-10
Payer: COMMERCIAL

## 2024-09-10 DIAGNOSIS — M54.32 BILATERAL SCIATICA: ICD-10-CM

## 2024-09-10 DIAGNOSIS — M54.16 LUMBAR RADICULOPATHY: ICD-10-CM

## 2024-09-10 DIAGNOSIS — M54.31 BILATERAL SCIATICA: ICD-10-CM

## 2024-09-10 DIAGNOSIS — M47.816 LUMBAR SPONDYLOSIS: ICD-10-CM

## 2024-09-10 DIAGNOSIS — M54.2 NECK PAIN: ICD-10-CM

## 2024-09-10 DIAGNOSIS — M87.051 AVASCULAR NECROSIS OF BONE OF HIP, RIGHT (MULTI): ICD-10-CM

## 2024-09-10 DIAGNOSIS — Z79.891 LONG TERM CURRENT USE OF OPIATE ANALGESIC: ICD-10-CM

## 2024-09-10 DIAGNOSIS — M51.36 DISC DEGENERATION, LUMBAR: Primary | ICD-10-CM

## 2024-09-10 DIAGNOSIS — M62.838 CERVICAL PARASPINAL MUSCLE SPASM: ICD-10-CM

## 2024-09-10 PROCEDURE — 99214 OFFICE O/P EST MOD 30 MIN: CPT | Performed by: PHYSICAL MEDICINE & REHABILITATION

## 2024-09-10 RX ORDER — HYDROCODONE BITARTRATE AND ACETAMINOPHEN 7.5; 325 MG/1; MG/1
1 TABLET ORAL EVERY 6 HOURS PRN
Qty: 112 TABLET | Refills: 0 | Status: SHIPPED | OUTPATIENT
Start: 2024-09-14 | End: 2024-10-12

## 2024-09-10 RX ORDER — HYDROCODONE BITARTRATE AND ACETAMINOPHEN 7.5; 325 MG/1; MG/1
1 TABLET ORAL EVERY 6 HOURS PRN
Qty: 112 TABLET | Refills: 0 | Status: SHIPPED | OUTPATIENT
Start: 2024-10-12 | End: 2024-11-09

## 2024-09-10 RX ORDER — TIZANIDINE 2 MG/1
1-2 TABLET ORAL DAILY PRN
Qty: 28 TABLET | Refills: 1 | Status: SHIPPED | OUTPATIENT
Start: 2024-09-10 | End: 2024-10-08

## 2024-09-10 RX ORDER — GABAPENTIN 100 MG/1
100 CAPSULE ORAL 3 TIMES DAILY
Qty: 270 CAPSULE | Refills: 0 | Status: SHIPPED | OUTPATIENT
Start: 2024-09-10 | End: 2024-12-09

## 2024-09-10 NOTE — PROGRESS NOTES
Chief complaint  Back and R leg pain   Rshoulder pain     History  Caitlyn Atkins is back for pain management office visit  The worse is the pain in the R leg  The pain in the back is deep achy worse in the mid back area.  This is associated with tight muscle bands.  This limits the range of motion of the lumbar spine mainly in forward flexion.  The pain in the back is radiating around the side on the lateral aspect of the right thigh going down toward the lateral aspect of the right leg into the lateral malleolus toward the lateral aspect of the foot towards the big toe.    This pain down to the right lower limb is more of a burning tingling sensation.  The pain in the right lower limb worsens with bending forward or with any lifting, it improves with laying on the side and resting.  This is similar to the associated pain in the middle to lower back.  Denied any bowel or bladder incontinence.  With the worst pain there is tendency to catch the toe especially on carpeted area.  This occurs mostly when tired and toward the end of the day.    The skin is intact with no breakdown.  No vesicles.    Also shldr impingment    Went over pain meds and injection. Recommeneded injection to consider but back on p ain meds. Long discussion about putting effort in cutting back on pain medications. Discussed about pain level changing and we do not know if the medications at this amount are still needed until we try and cut back slowly on pain medications. If the cut is tolerated then we continue. If cut not tolerated then, will go back on the pain medications level.  The goal from this is to keep the pain medications at the lowest effective dose.       Pain level without medication is 8/10 , with the medication pain level 3/10.     The pain meds are helping control the pain and improving Activities of Daily living and quality of life and quality of sleep.    opioids treatment agreement Jan 2024  Pill count today, using count  "tray, and in front of patient :  13    pills , last fill was on 8/17  for 112 tabs,  the count is correct  Oarrs pulled and reviewed, no concerns  last urine toxicology testing earlier this year and it was compliant we will repeat  Xray updated spine   ORT Score is  0  Pain pathology and pain generators spine   Modalities tried injection, surgery, physical therapy, TENS unit, nonsteroidal anti-inflammatory medication       Review of Systems :  Denied any fever or chills. No weight loss and no night sweats. No cough or sputum production. No diarrhea   The constipation has been responding to fibers and over the counter medications.     No bladder and bowel incontinence and no other changes in bladder and bowel. No skin changes.  Reports tiredness and fatigability only if the pain is not controlled.     Denied opioids diversion and abuse and denies alcoholism. Denies overuse of the pain medications.  No reported euphoria sensation or getting a \"high\" on the pain medications.    The control of the pain with the pain medications is helping the control of the symptoms and allowing the function and activities of daily living, enjoyment of life, improving the quality of life and sleep with less interruption by the pain. The goal is symptomatic control of the nonmalignant chronic pain and not to repair the permanent damage in the tissues inducing the chronic pain conditions. We are aiming to shift the focus from the nonmalignant chronic pain to other aspects of life by symptomatically treating this chronic pain. If this pain is not treated it will lead to major morbidity and it is also associated with increased risks of mortality. The patient understands those very clearly and also understand high risks of morbidity and mortality if not strictly adherent to the treatment recommendations and reporting any associated side effects. Also patient understand the full responsibility associated with these medications to avoid abuse " or overuse or any use of these medications for anything besides treating the patient's own chronic pain and nothing else under any circumstances.        Physical examination  Awake, alert and oriented for time place and persons   declined Chaperone for the visit and was adequately  draped for the exam.      R shoulder impnhigment    Examination of the lumbar spine showed tight muscle bands in the mid and lower back area, this is more pronounced on the right compared to the left.  Additionally, this is inducing mild reversal of the lumbar lordosis with functional scoliosis with right-sided concavity.  This scoliosis corrected with  bending of the lumbar spine.     Straight leg raising increased the pain in the back and down the lateral aspect of the right knee onto the lateral leg to the lateral malleolus and foot.     There is decreased sensory to light touch on the lateral aspect of the thigh and around the right knee going down to the lateral aspect of the leg to the right lateral malleolus into the dorsum of the foot..    Deep tendon reflexes is present for the patellar tendons bilaterally.  Achilles reflexes are present bilaterally and symmetric.    Medial Hamstrings reflex is decreased on the right compared to the left side.  Plantar cutaneous are downgoing.  Ankle dorsiflexion is 5/5 bilaterally.  Plantar flexion of the ankles are 5/5 bilaterally.  Big toe extension is 4/5 on the right compared to 5/5 on the left side.    Negative Tinel's sign over the right peroneal nerve at the fibular neck.  Elvin sign for axial loading and global rotation are negative.  No aberrant pain behavior.           Diagnosis  Problem List Items Addressed This Visit       Bilateral sciatica    Relevant Medications    HYDROcodone-acetaminophen (Norco) 7.5-325 mg tablet (Start on 9/14/2024)    HYDROcodone-acetaminophen (Norco) 7.5-325 mg tablet (Start on 10/12/2024)    gabapentin (Neurontin) 100 mg capsule    Disc degeneration,  lumbar - Primary    Relevant Medications    HYDROcodone-acetaminophen (Norco) 7.5-325 mg tablet (Start on 9/14/2024)    HYDROcodone-acetaminophen (Norco) 7.5-325 mg tablet (Start on 10/12/2024)    Lumbar radiculopathy    Relevant Medications    HYDROcodone-acetaminophen (Norco) 7.5-325 mg tablet (Start on 9/14/2024)    HYDROcodone-acetaminophen (Norco) 7.5-325 mg tablet (Start on 10/12/2024)    Avascular necrosis of bone of hip, right (Multi)    Relevant Medications    HYDROcodone-acetaminophen (Norco) 7.5-325 mg tablet (Start on 9/14/2024)    HYDROcodone-acetaminophen (Norco) 7.5-325 mg tablet (Start on 10/12/2024)    Long term current use of opiate analgesic    Relevant Medications    HYDROcodone-acetaminophen (Norco) 7.5-325 mg tablet (Start on 9/14/2024)    HYDROcodone-acetaminophen (Norco) 7.5-325 mg tablet (Start on 10/12/2024)    Lumbar spondylosis     Other Visit Diagnoses       Neck pain        Relevant Medications    tiZANidine (Zanaflex) 2 mg tablet    Cervical paraspinal muscle spasm        Relevant Medications    tiZANidine (Zanaflex) 2 mg tablet             Plan  Reviewed the pain generators.  Went over the types of pain with neuropathic and nociceptive and different pathologies and therapeutic modalities. Discussed the mechanism of action of interventions from acupuncture, physical therapy , regular exercises, injections, botox, spinal cord stimulation, and role of surgery     Went over pathology of the intervertebral disc displacement and the anatomical relation to the Nerve roots and relation to the radicular symptoms. Went over treatment modalities with conservative treatment including acupuncture   and epidural steroid injection with fluoroscopy guidance and last resort of surgery    Based on the above findings and the clinical response to the opioids medications and improvement of the activities of daily living, sleep, and work performance. We made this complex decision to continue the opioids  therapy in light of the evidence of the patient's responsibility in using the pain medications as prescribed for the nonmalignant chronic pain condition. We discussed about the use of the pain medications to treat the symptoms of chronic nonmalignant pain and we are not trying the repair the permanent damage in the tissues, rather we are trying to control the symptoms induced by the permanent damage to the tissues inducing the chronic pain condition and resulting disability. I explained the difference and discussed it with the patient and stressed the importance of knowing the difference especially because of the potential side effects and the potential addicting effect and habit forming nature of the dangerous drugs we are using to treat the symptoms of the chronic pain.      We discussed that we are prescribing the medications on good rambo and legitimate medical reason.     We reviewed the side effects and precautions of opioids prescriptions as discussed in the opioids treatment agreement.    realizes the interaction between the therapeutic classes including the respiratory depression and potential death     Random drug testing   we will submit     Cnsider MRI of R shoulder  Also dw her about PATRICIA for the short term radic relief and consider SCS for longer term  Risks not limited to infection, sepsis, abscess, bleeding , nerve injury, paralysis, and death...   Continue with meds and sarita for neuropathic pain . Tizanidine for muscle relaxers     Discussed about NSAIDS and I explained about the opioids sparing effect to allow keeping the opioids dose at minimal effective dose.   I went over the potential side effects of the NSAIDS on the gastrointestinal, renal and cardiovascular systems.      I detailed the side effects from the acetaminophen in the medication and made aware of those. I also explained about the cumulative effects on the organs and mainly the liver.     Given the opioids therapy , we discussed  about the risk for accidental over dose on the pain medications, either for patient or other household. I went over the mechanism of action and mode of use of the Naloxone according to the  recommendations. I will provide a prescription for a kit.     Follow-up 8 weeks or earlier if needed     The level of clinical decision making in this office visit,  is high, given the high risks of complications with the morbidity and mortality due to the fact that acute and chronic pain may pose a threat to life and bodily function, if under treated, poorly treated, or with failure to maintain adequate treatment and timely medical follow up. Additionally over treatment has its own set of complications including overdosing on the pain medications and also the habit forming potentials with the use of the medications used to treat chronic painful conditions including therapeutic classes classified as dangerous medications. Given the serious and fluctuating nature of pain level and instensity with extensive consideration for whenever pain changes, there is always the risk of prolonged functional impairment requiring close patient monitoring with regular assessments and reassessments and high level medical decision making at every office visit. The amount and complexity of data reviewed is high given the patient clinical presentation, labs,  data, radiology reports, and other tests as discussed during office visits. Pertinent data whether positive or negative were taken in consideration in the process of making this high level medical decision.

## 2024-11-05 ENCOUNTER — APPOINTMENT (OUTPATIENT)
Dept: PAIN MEDICINE | Facility: CLINIC | Age: 59
End: 2024-11-05
Payer: COMMERCIAL

## 2024-11-05 ENCOUNTER — LAB (OUTPATIENT)
Dept: LAB | Facility: LAB | Age: 59
End: 2024-11-05
Payer: COMMERCIAL

## 2024-11-05 DIAGNOSIS — M62.838 CERVICAL PARASPINAL MUSCLE SPASM: ICD-10-CM

## 2024-11-05 DIAGNOSIS — M54.2 NECK PAIN: ICD-10-CM

## 2024-11-05 DIAGNOSIS — Z79.891 LONG TERM CURRENT USE OF OPIATE ANALGESIC: ICD-10-CM

## 2024-11-05 DIAGNOSIS — M51.369 DISC DEGENERATION, LUMBAR: ICD-10-CM

## 2024-11-05 DIAGNOSIS — Z79.891 LONG TERM CURRENT USE OF OPIATE ANALGESIC: Primary | ICD-10-CM

## 2024-11-05 DIAGNOSIS — M54.31 BILATERAL SCIATICA: ICD-10-CM

## 2024-11-05 DIAGNOSIS — M54.32 BILATERAL SCIATICA: ICD-10-CM

## 2024-11-05 DIAGNOSIS — M54.16 LUMBAR RADICULOPATHY: ICD-10-CM

## 2024-11-05 DIAGNOSIS — M87.051 AVASCULAR NECROSIS OF BONE OF HIP, RIGHT (MULTI): ICD-10-CM

## 2024-11-05 PROCEDURE — 80365 DRUG SCREENING OXYCODONE: CPT

## 2024-11-05 PROCEDURE — 80358 DRUG SCREENING METHADONE: CPT

## 2024-11-05 PROCEDURE — 80307 DRUG TEST PRSMV CHEM ANLYZR: CPT

## 2024-11-05 PROCEDURE — 80346 BENZODIAZEPINES1-12: CPT

## 2024-11-05 PROCEDURE — 80354 DRUG SCREENING FENTANYL: CPT

## 2024-11-05 PROCEDURE — 80368 SEDATIVE HYPNOTICS: CPT

## 2024-11-05 PROCEDURE — 99214 OFFICE O/P EST MOD 30 MIN: CPT | Performed by: PHYSICAL MEDICINE & REHABILITATION

## 2024-11-05 PROCEDURE — 80373 DRUG SCREENING TRAMADOL: CPT

## 2024-11-05 PROCEDURE — 80361 OPIATES 1 OR MORE: CPT

## 2024-11-05 PROCEDURE — 82570 ASSAY OF URINE CREATININE: CPT

## 2024-11-05 RX ORDER — HYDROCODONE BITARTRATE AND ACETAMINOPHEN 7.5; 325 MG/1; MG/1
1 TABLET ORAL EVERY 6 HOURS PRN
Qty: 112 TABLET | Refills: 0 | Status: SHIPPED | OUTPATIENT
Start: 2024-11-08 | End: 2024-12-06

## 2024-11-05 RX ORDER — HYDROCODONE BITARTRATE AND ACETAMINOPHEN 7.5; 325 MG/1; MG/1
1 TABLET ORAL EVERY 6 HOURS PRN
Qty: 112 TABLET | Refills: 0 | Status: SHIPPED | OUTPATIENT
Start: 2024-12-06 | End: 2025-01-03

## 2024-11-05 RX ORDER — TIZANIDINE 2 MG/1
1-2 TABLET ORAL DAILY PRN
Qty: 28 TABLET | Refills: 1 | Status: SHIPPED | OUTPATIENT
Start: 2024-11-05 | End: 2024-12-03

## 2024-11-05 NOTE — PROGRESS NOTES
Chief complaint  Back and R leg pain     History  Caitlyn Atkins is back for pain management office visit  Continues with back and R leg pain with sciatica as before  Tried cutting back and now at the lowest effective for her  With the cold weather pain is starting to get worse, will not try cutting back further at this time pending the weather warming up again next season    The pain is interfering with activities of daily living, quality of life and quality of sleep. It is limiting the functions and everything takes longer to complete because of the slowing related to the pain. Movements are cautious to avoid aggravation of the symptoms.  The meds are heling    Long discussion about putting effort in cutting back on pain medications. Discussed about pain level changing and we do not know if the medications at this amount are still needed until we try and cut back slowly on pain medications. If the cut is tolerated then we continue. If cut not tolerated then, will go back on the pain medications level.  The goal from this is to keep the pain medications at the lowest effective dose.       Pain level without medication is 8/10 , with the medication pain level 2/10.     The pain meds are helping control the pain and improving Activities of Daily living and quality of life and quality of sleep.    opioids treatment agreement Jan 2024  Pill count today, using count tray, and in front of patient :  9    pills , last fill was on 10/12  for 112 tabs,  the count is correct  Oarrs pulled and reviewed, no concerns  last urine toxicology testing earlier this year and it was compliant we will repeat  Xray updated spine  ORT Score is  0  Pain pathology and pain generators spine  Modalities tried injection, surgery, physical therapy, TENS unit, nonsteroidal anti-inflammatory medication spine      Review of Systems :  Denied any fever or chills. No weight loss and no night sweats. No cough or sputum production. No diarrhea  Patient requesting past ultrasound and biopsy results to be faxed to the referring doctor.      [address]    [phone] - Attn: Dr. Destiny Roland "  The constipation has been responding to fibers and over the counter medications.     No bladder and bowel incontinence and no other changes in bladder and bowel. No skin changes.  Reports tiredness and fatigability only if the pain is not controlled.     Denied opioids diversion and abuse and denies alcoholism. Denies overuse of the pain medications.  No reported euphoria sensation or getting a \"high\" on the pain medications.    The control of the pain with the pain medications is helping the control of the symptoms and allowing the function and activities of daily living, enjoyment of life, improving the quality of life and sleep with less interruption by the pain. The goal is symptomatic control of the nonmalignant chronic pain and not to repair the permanent damage in the tissues inducing the chronic pain conditions. We are aiming to shift the focus from the nonmalignant chronic pain to other aspects of life by symptomatically treating this chronic pain. If this pain is not treated it will lead to major morbidity and it is also associated with increased risks of mortality. The patient understands those very clearly and also understand high risks of morbidity and mortality if not strictly adherent to the treatment recommendations and reporting any associated side effects. Also patient understand the full responsibility associated with these medications to avoid abuse or overuse or any use of these medications for anything besides treating the patient's own chronic pain and nothing else under any circumstances.        Physical examination  Awake, alert and oriented for time place and persons   declined Chaperone for the visit and was adequately  draped for the exam.    Examination of the lumbar spine showed tight muscle bands in the mid and lower back area, this is more pronounced on the right compared to the left.  Additionally, this is inducing mild reversal of the lumbar lordosis with functional scoliosis with " right-sided concavity.  This scoliosis corrected with  bending of the lumbar spine.     Straight leg raising increased the pain in the back and down the lateral aspect of the right knee onto the lateral leg to the lateral malleolus and foot.     There is decreased sensory to light touch on the lateral aspect of the thigh and around the right knee going down to the lateral aspect of the leg to the right lateral malleolus into the dorsum of the foot..    Deep tendon reflexes is present for the patellar tendons bilaterally.  Achilles reflexes are present bilaterally and symmetric.    Medial Hamstrings reflex is decreased on the right compared to the left side.  Plantar cutaneous are downgoing.  Ankle dorsiflexion is 5/5 bilaterally.  Plantar flexion of the ankles are 5/5 bilaterally.  Big toe extension is 4/5 on the right compared to 5/5 on the left side.    Negative Tinel's sign over the right peroneal nerve at the fibular neck.  Elvin sign for axial loading and global rotation are negative.  No aberrant pain behavior.           Diagnosis  Problem List Items Addressed This Visit       Bilateral sciatica    Relevant Medications    HYDROcodone-acetaminophen (Norco) 7.5-325 mg tablet (Start on 11/8/2024)    HYDROcodone-acetaminophen (Norco) 7.5-325 mg tablet (Start on 12/6/2024)    Disc degeneration, lumbar    Relevant Medications    HYDROcodone-acetaminophen (Norco) 7.5-325 mg tablet (Start on 11/8/2024)    HYDROcodone-acetaminophen (Norco) 7.5-325 mg tablet (Start on 12/6/2024)    Lumbar radiculopathy    Relevant Medications    HYDROcodone-acetaminophen (Norco) 7.5-325 mg tablet (Start on 11/8/2024)    HYDROcodone-acetaminophen (Norco) 7.5-325 mg tablet (Start on 12/6/2024)    Avascular necrosis of bone of hip, right (Multi)    Relevant Medications    HYDROcodone-acetaminophen (Norco) 7.5-325 mg tablet (Start on 11/8/2024)    HYDROcodone-acetaminophen (Norco) 7.5-325 mg tablet (Start on 12/6/2024)    Long term  current use of opiate analgesic - Primary    Relevant Medications    HYDROcodone-acetaminophen (Norco) 7.5-325 mg tablet (Start on 11/8/2024)    HYDROcodone-acetaminophen (Norco) 7.5-325 mg tablet (Start on 12/6/2024)    Other Relevant Orders    Opiate/Opioid/Benzo Prescription Compliance     Other Visit Diagnoses       Neck pain        Relevant Medications    tiZANidine (Zanaflex) 2 mg tablet    Cervical paraspinal muscle spasm        Relevant Medications    tiZANidine (Zanaflex) 2 mg tablet             Plan  Reviewed the pain generators.  Went over the types of pain with neuropathic and nociceptive and different pathologies and therapeutic modalities. Discussed the mechanism of action of interventions from acupuncture, physical therapy , regular exercises, injections, botox, spinal cord stimulation, and role of surgery     Went over pathology of the intervertebral disc displacement and the anatomical relation to the Nerve roots and relation to the radicular symptoms. Went over treatment modalities with conservative treatment including acupuncture   and epidural steroid injection with fluoroscopy guidance and last resort of surgery    Based on the above findings and the clinical response to the opioids medications and improvement of the activities of daily living, sleep, and work performance. We made this complex decision to continue the opioids therapy in light of the evidence of the patient's responsibility in using the pain medications as prescribed for the nonmalignant chronic pain condition. We discussed about the use of the pain medications to treat the symptoms of chronic nonmalignant pain and we are not trying the repair the permanent damage in the tissues, rather we are trying to control the symptoms induced by the permanent damage to the tissues inducing the chronic pain condition and resulting disability. I explained the difference and discussed it with the patient and stressed the importance of knowing  the difference especially because of the potential side effects and the potential addicting effect and habit forming nature of the dangerous drugs we are using to treat the symptoms of the chronic pain.      We discussed that we are prescribing the medications on good rambo and legitimate medical reason.     We reviewed the side effects and precautions of opioids prescriptions as discussed in the opioids treatment agreement.    realizes the interaction between the therapeutic classes including the respiratory depression and potential death     Random drug testing   we will submit     Cnsider scs and surgery consultation if not candidate  realizes the interaction between the therapeutic classes including the respiratory depression and potential death   Long discussion about putting effort in cutting back on pain medications. Discussed about pain level changing and we do not know if the medications at this amount are still needed until we try and cut back slowly on pain medications. If the cut is tolerated then we continue. If cut not tolerated then, will go back on the pain medications level.  The goal from this is to keep the pain medications at the lowest effective dose.     Discussed about NSAIDS and I explained about the opioids sparing effect to allow keeping the opioids dose at minimal effective dose.   I went over the potential side effects of the NSAIDS on the gastrointestinal, renal and cardiovascular systems.      I detailed the side effects from the acetaminophen in the medication and made aware of those. I also explained about the cumulative effects on the organs and mainly the liver.     Given the opioids therapy , we discussed about the risk for accidental over dose on the pain medications, either for patient or other household. I went over the mechanism of action and mode of use of the Naloxone according to the  recommendations. I will provide a prescription for a kit.     Follow-up 8 weeks  or earlier if needed     The level of clinical decision making in this office visit,  is high, given the high risks of complications with the morbidity and mortality due to the fact that acute and chronic pain may pose a threat to life and bodily function, if under treated, poorly treated, or with failure to maintain adequate treatment and timely medical follow up. Additionally over treatment has its own set of complications including overdosing on the pain medications and also the habit forming potentials with the use of the medications used to treat chronic painful conditions including therapeutic classes classified as dangerous medications. Given the serious and fluctuating nature of pain level and instensity with extensive consideration for whenever pain changes, there is always the risk of prolonged functional impairment requiring close patient monitoring with regular assessments and reassessments and high level medical decision making at every office visit. The amount and complexity of data reviewed is high given the patient clinical presentation, labs,  data, radiology reports, and other tests as discussed during office visits. Pertinent data whether positive or negative were taken in consideration in the process of making this high level medical decision.

## 2024-11-06 LAB
AMPHETAMINES UR QL SCN: NORMAL
BARBITURATES UR QL SCN: NORMAL
BZE UR QL SCN: NORMAL
CANNABINOIDS UR QL SCN: NORMAL
CREAT UR-MCNC: 92.7 MG/DL (ref 20–320)
PCP UR QL SCN: NORMAL

## 2024-11-12 LAB
1OH-MIDAZOLAM UR CFM-MCNC: <25 NG/ML
6MAM UR CFM-MCNC: <25 NG/ML
7AMINOCLONAZEPAM UR CFM-MCNC: <25 NG/ML
A-OH ALPRAZ UR CFM-MCNC: <25 NG/ML
ALPRAZ UR CFM-MCNC: <25 NG/ML
CHLORDIAZEP UR CFM-MCNC: <25 NG/ML
CLONAZEPAM UR CFM-MCNC: <25 NG/ML
CODEINE UR CFM-MCNC: <50 NG/ML
DIAZEPAM UR CFM-MCNC: <25 NG/ML
EDDP UR CFM-MCNC: <25 NG/ML
FENTANYL UR CFM-MCNC: <2.5 NG/ML
HYDROCODONE CTO UR CFM-MCNC: 1466 NG/ML
HYDROMORPHONE UR CFM-MCNC: 417 NG/ML
LORAZEPAM UR CFM-MCNC: <25 NG/ML
METHADONE UR CFM-MCNC: <25 NG/ML
MIDAZOLAM UR CFM-MCNC: <25 NG/ML
MORPHINE UR CFM-MCNC: <50 NG/ML
NORDIAZEPAM UR CFM-MCNC: <25 NG/ML
NORFENTANYL UR CFM-MCNC: <2.5 NG/ML
NORHYDROCODONE UR CFM-MCNC: >1000 NG/ML
NOROXYCODONE UR CFM-MCNC: <25 NG/ML
NORTRAMADOL UR-MCNC: <50 NG/ML
OXAZEPAM UR CFM-MCNC: <25 NG/ML
OXYCODONE UR CFM-MCNC: <25 NG/ML
OXYMORPHONE UR CFM-MCNC: <25 NG/ML
TEMAZEPAM UR CFM-MCNC: <25 NG/ML
TRAMADOL UR CFM-MCNC: <50 NG/ML
ZOLPIDEM UR CFM-MCNC: <25 NG/ML
ZOLPIDEM UR-MCNC: <25 NG/ML

## 2024-12-11 DIAGNOSIS — M54.31 BILATERAL SCIATICA: ICD-10-CM

## 2024-12-11 DIAGNOSIS — Z79.891 LONG TERM CURRENT USE OF OPIATE ANALGESIC: ICD-10-CM

## 2024-12-11 DIAGNOSIS — M51.369 DISC DEGENERATION, LUMBAR: ICD-10-CM

## 2024-12-11 DIAGNOSIS — M87.051 AVASCULAR NECROSIS OF BONE OF HIP, RIGHT (MULTI): ICD-10-CM

## 2024-12-11 DIAGNOSIS — M54.32 BILATERAL SCIATICA: ICD-10-CM

## 2024-12-11 DIAGNOSIS — M54.16 LUMBAR RADICULOPATHY: ICD-10-CM

## 2024-12-11 RX ORDER — HYDROCODONE BITARTRATE AND ACETAMINOPHEN 7.5; 325 MG/1; MG/1
1 TABLET ORAL EVERY 6 HOURS PRN
Qty: 56 TABLET | Refills: 0 | Status: SHIPPED | OUTPATIENT
Start: 2024-12-11 | End: 2024-12-25

## 2024-12-16 ENCOUNTER — APPOINTMENT (OUTPATIENT)
Dept: ORTHOPEDIC SURGERY | Facility: CLINIC | Age: 59
End: 2024-12-16
Payer: COMMERCIAL

## 2024-12-16 ENCOUNTER — HOSPITAL ENCOUNTER (OUTPATIENT)
Dept: RADIOLOGY | Facility: CLINIC | Age: 59
Discharge: HOME | End: 2024-12-16
Payer: COMMERCIAL

## 2024-12-16 VITALS — HEIGHT: 62 IN | WEIGHT: 100 LBS | BODY MASS INDEX: 18.4 KG/M2

## 2024-12-16 DIAGNOSIS — M25.561 RIGHT KNEE PAIN, UNSPECIFIED CHRONICITY: ICD-10-CM

## 2024-12-16 DIAGNOSIS — S83.241A ACUTE MEDIAL MENISCUS TEAR OF RIGHT KNEE, INITIAL ENCOUNTER: ICD-10-CM

## 2024-12-16 PROCEDURE — 99204 OFFICE O/P NEW MOD 45 MIN: CPT | Performed by: STUDENT IN AN ORGANIZED HEALTH CARE EDUCATION/TRAINING PROGRAM

## 2024-12-16 PROCEDURE — 3008F BODY MASS INDEX DOCD: CPT | Performed by: STUDENT IN AN ORGANIZED HEALTH CARE EDUCATION/TRAINING PROGRAM

## 2024-12-16 PROCEDURE — 73564 X-RAY EXAM KNEE 4 OR MORE: CPT | Mod: RT

## 2024-12-16 NOTE — PROGRESS NOTES
PRIMARY CARE PHYSICIAN: Zane Ang PA-C  REFERRING PROVIDER: No referring provider defined for this encounter.     CONSULT ORTHOPAEDIC: Knee Evaluation    ASSESSMENT & PLAN    Impression: 59 y.o. female with an acute right knee injury concerning for a medial meniscus tear.    Plan:   I explained to the patient the nature of their diagnosis.  I reviewed their imaging studies with them.    Based on the history, physical exam and imaging studies above, the patient's presentation is consistent with the above diagnosis.  I had a long discussion with the patient regarding their presentation and the treatment options.  We discussed initial nonoperative versus operative management options as well as potential further diagnostic imaging.  Patient had an acute mechanism of injury, symptoms and physical exam findings as described below that are concerning for medial meniscus tear of the right knee.  Her pain and dysfunction are affecting activities of daily living. At this time I recommend proceeding with further diagnostic imaging with an MRI of the right knee. The MRI is medically necessary and indicated given her subjective complaints of instability and physical exam findings as described below . The MRI will be used for potential presurgical planning purposes. The MRI should be performed in a hospital setting so the surgeon has immediate access to the images.  In the meantime patient will ice, elevate and avoid aggravating positions.  She will follow-up once MRI is completed to review the images and discuss a treatment plan going forward.  Once the MRI is completed to review the images and discuss a treatment plan going forward      Follow-Up: Patient will follow-up once the MRI is completed to review the images and discuss a treatment plan going forward    At the end of the visit, all questions were answered in full. The patient is in agreement with the plan and recommendations. They will call the office with any  questions/concerns.    Note dictated with [employer] software. Completed without full typed error editing and sent to avoid delay.       SUBJECTIVE  CHIEF COMPLAINT:   Chief Complaint   Patient presents with    Right Knee - Pain        HPI: Caitlyn Atkins is a 59 y.o. patient who presents today with right knee pain.  Patient reports her pain began on 11/30/2024 after an acute injury she sustained.  She states that her car broke down and she had to walk home.  While walking home she stepped in a hole, twisting her right knee.  She had immediate pain localized to the medial joint line.  This pain has been persistent since then and she feels that it is getting worse.  Pain is worse with ambulation, direct pressure, and lateral movements.  She does feel that she is suffering from mechanical symptoms including popping catching locking of the knee.  Is causing her to struggle at her job.  She has also noticed associated swelling.    They deny any constant or progressive numbness or tingling in their legs.     REVIEW OF SYSTEMS  Constitutional: See HPI for pain assessment, No significant weight loss, recent trauma  Cardiovascular: No chest pain, shortness of breath  Respiratory: No difficulty breathing, cough  Gastrointestinal: No nausea, vomiting, diarrhea, constipation  Musculoskeletal: Noted in HPI, positive for pain, restricted motion, stiffness  Integumentary: No rashes, easy bruising, redness   Neurological: no numbness or tingling in extremities, no gait disturbances   Psychiatric: No mood changes, memory changes, social issues  Heme/Lymph: no excessive swelling, easy bruising, excessive bleeding  ENT: No hearing changes  Eyes: No vision changes    Past Medical History:   Diagnosis Date    Anesthesia of skin     Numbness    DDD (degenerative disc disease), lumbar     Multiple sclerosis (Multi)     Multiple sclerosis    Osteoporosis     Other conditions influencing health status     Cervical  Cancer        No Known Allergies     Past Surgical History:   Procedure Laterality Date    CT ANGIO CORONARY ART WITH HEARTFLOW IF SCORE >30%  11/16/2023    CT ANGIO CORONARY ART WITH HEARTFLOW IF SCORE >30% 11/16/2023 [facility]    HIP ARTHROPLASTY Right 08/16/2023    HYSTERECTOMY  04/15/2013    Hysterectomy    OTHER SURGICAL HISTORY  04/21/2021    Arm surgery        Family History   Problem Relation Name Age of Onset    Lung cancer Mother      Coronary artery disease Father      Hypertension Father      Other (THYROID SURGERY) Sister      Glaucoma Other GRANDMOTHER         Social History     Socioeconomic History    Marital status:      Spouse name: Not on file    Number of children: Not on file    Years of education: Not on file    Highest education level: Not on file   Occupational History    Not on file   Tobacco Use    Smoking status: Every Day     Current packs/day: 1.00     Types: Cigarettes    Smokeless tobacco: Never   Substance and Sexual Activity    Alcohol use: Not Currently    Drug use: Never    Sexual activity: Not on file   Other Topics Concern    Not on file   Social History Narrative    Not on file     Social Drivers of Health     Financial Resource Strain: Not on file   Food Insecurity: Not on file   Transportation Needs: Not on file   Physical Activity: Not on file   Stress: Not on file   Social Connections: Not on file   Intimate Partner Violence: Not on file   Housing Stability: Not on file        CURRENT MEDICATIONS:   Current Outpatient Medications   Medication Sig Dispense Refill    acetaminophen (Tylenol) 325 mg tablet Take 2 tablets (650 mg) by mouth every 4 hours.      b complex 0.4 mg tablet Take 1 tablet by mouth once daily.      calcium carbonate (CALCIUM 500 ORAL) Take by mouth.      cholecalciferol (Vitamin D-3) 50 MCG (2000 UT) tablet Take 1 tablet (2,000 Units) by mouth once daily.      diroximel fumarate (Vumerity) 231 mg capsule,delayed release(DR/EC) Take 2 capsules by mouth  "2 times a day. 120 capsule 5    gabapentin (Neurontin) 100 mg capsule Take 1 capsule (100 mg) by mouth 3 times a day. 270 capsule 0    HYDROcodone-acetaminophen (Norco) 7.5-325 mg tablet Take 1 tablet by mouth every 6 hours if needed for severe pain (7 - 10) for up to 14 days. 56 tablet 0    ibuprofen 600 mg tablet Take 1 tablet (600 mg) by mouth every 6 hours if needed (PAIN).      metoprolol succinate XL (Toprol-XL) 25 mg 24 hr tablet Take 1 tablet (25 mg) by mouth once daily. Do not crush or chew. 60 tablet 0    naloxone (Narcan) 4 mg/0.1 mL nasal spray Administer 1 spray (4 mg) into affected nostril(s) if needed for opioid reversal. May repeat every 2-3 minutes if needed, alternating nostrils, until medical assistance becomes available.      omeprazole OTC (PriLOSEC OTC) 20 mg EC tablet Take 1 tablet (20 mg) by mouth early in the morning..      silver sulfADIAZINE (Silvadene) 1 % cream Apply topically 2 times a day. 50 g 0    tiZANidine (Zanaflex) 2 mg tablet Take 0.5-1 tablets (1-2 mg) by mouth once daily as needed for muscle spasms for up to 28 days. 28 tablet 1     No current facility-administered medications for this visit.        OBJECTIVE    PHYSICAL EXAM      1/10/2024     9:04 AM 1/10/2024    10:43 AM 3/26/2024     8:24 AM 4/9/2024    10:01 AM 4/26/2024     8:15 AM 6/26/2024     9:59 AM 8/27/2024    11:34 PM   Vitals   Systolic 155    100  128   Diastolic 81    60  74   BP Location Right arm         Heart Rate 73    76  69   Temp 36.5 °C (97.7 °F)    36.1 °C (96.9 °F)  36.2 °C (97.2 °F)   Resp       16   Height 1.575 m (5' 2\") 1.575 m (5' 2\") 1.575 m (5' 2\") 1.575 m (5' 2\") 1.575 m (5' 2\") 1.575 m (5' 2\") 1.575 m (5' 2\")   Weight (lb) 99 99 100 100 98 100 100   BMI 18.11 kg/m2 18.11 kg/m2 18.29 kg/m2 18.29 kg/m2 17.92 kg/m2 18.29 kg/m2 18.29 kg/m2   BSA (m2) 1.4 m2 1.4 m2 1.41 m2 1.41 m2 1.4 m2 1.41 m2 1.41 m2   Visit Report Report    Report Report    Report Report Report Report        There is no height " or weight on file to calculate BMI.    GENERAL: A/Ox3, NAD. Appears healthy, well nourished  PSYCHIATRIC: Mood stable, appropriate memory recall  EYES: EOM intact, no scleral icterus  CARDIOVASCULAR: Palpable peripheral pulses  LUNGS: Breathing non-labored on room air  SKIN: no erythema, rashes, or ecchymoses     MUSCULOSKELETAL:  Laterality: Right knee Exam  - Skin intact  - No erythema or warmth  - No ecchymosis or soft tissue swelling  - Alignment: Neutral  - Palpation: Positive tenderness to palpation medial joint line  - ROM: 0 - 0 - 120  - Effusion: Trace  - Strength: knee extension and flexion 5/5, EHL/PF/DF motor intact  - Stability:        Anterior drawer stable       Posterior drawer stable       Varus/valgus stable       Negative Lachman  -Positive Davi's  - Gait: Antalgic  - Hip Exam: flexion to 100+ degrees, full extension, internal/external rotation adequate, and no pain with log roll  NEUROVASCULAR:  - Neurovascular Status: sensation intact to light touch distally, lower extremity motor intact  - Capillary refill brisk at extremities, Bilateral dorsalis pedis pulse 2+    Imaging: Multiple views of the affected right knee(s) demonstrate: No acute osseous abnormality, no fracture, minimal degenerative changes.   X-rays were personally reviewed and interpreted by me.  Radiology reports were reviewed by me as well, if readily available at the time.      Juan Garcia MD  Attending Surgeon    Sports Medicine Orthopaedic Surgery  [facility] [place]  [facility] [employer]

## 2024-12-30 ENCOUNTER — APPOINTMENT (OUTPATIENT)
Dept: PAIN MEDICINE | Facility: CLINIC | Age: 59
End: 2024-12-30
Payer: COMMERCIAL

## 2024-12-30 DIAGNOSIS — M54.16 LUMBAR RADICULOPATHY: ICD-10-CM

## 2024-12-30 DIAGNOSIS — M51.369 DISC DEGENERATION, LUMBAR: ICD-10-CM

## 2024-12-30 DIAGNOSIS — Z79.891 LONG TERM CURRENT USE OF OPIATE ANALGESIC: ICD-10-CM

## 2024-12-30 DIAGNOSIS — M87.051 AVASCULAR NECROSIS OF BONE OF HIP, RIGHT (MULTI): ICD-10-CM

## 2024-12-30 DIAGNOSIS — M54.32 BILATERAL SCIATICA: ICD-10-CM

## 2024-12-30 DIAGNOSIS — M47.816 LUMBAR SPONDYLOSIS: ICD-10-CM

## 2024-12-30 DIAGNOSIS — M51.362 DEGENERATION OF INTERVERTEBRAL DISC OF LUMBAR REGION WITH DISCOGENIC BACK PAIN AND LOWER EXTREMITY PAIN: Primary | ICD-10-CM

## 2024-12-30 DIAGNOSIS — M62.838 CERVICAL PARASPINAL MUSCLE SPASM: ICD-10-CM

## 2024-12-30 DIAGNOSIS — M54.2 NECK PAIN: ICD-10-CM

## 2024-12-30 DIAGNOSIS — M54.31 BILATERAL SCIATICA: ICD-10-CM

## 2024-12-30 PROCEDURE — 99214 OFFICE O/P EST MOD 30 MIN: CPT | Performed by: PHYSICAL MEDICINE & REHABILITATION

## 2024-12-30 RX ORDER — HYDROCODONE BITARTRATE AND ACETAMINOPHEN 7.5; 325 MG/1; MG/1
1 TABLET ORAL EVERY 6 HOURS PRN
Qty: 100 TABLET | Refills: 0 | Status: SHIPPED | OUTPATIENT
Start: 2025-01-02 | End: 2025-01-30

## 2024-12-30 RX ORDER — TIZANIDINE 2 MG/1
2 TABLET ORAL DAILY PRN
Qty: 28 TABLET | Refills: 2 | Status: SHIPPED | OUTPATIENT
Start: 2024-12-30 | End: 2025-01-27

## 2024-12-30 RX ORDER — GABAPENTIN 100 MG/1
100 CAPSULE ORAL 3 TIMES DAILY
Qty: 270 CAPSULE | Refills: 1 | Status: SHIPPED | OUTPATIENT
Start: 2024-12-30 | End: 2025-03-30

## 2024-12-30 RX ORDER — HYDROCODONE BITARTRATE AND ACETAMINOPHEN 7.5; 325 MG/1; MG/1
1 TABLET ORAL EVERY 6 HOURS PRN
Qty: 100 TABLET | Refills: 0 | Status: SHIPPED | OUTPATIENT
Start: 2025-01-30 | End: 2025-02-27

## 2024-12-30 NOTE — PROGRESS NOTES
Chief complaint  R knee pain       History  Caitlyn Atkins is back for pain management office visit  Caitlyn Atkins was at home.  Could not physically come to the office because of upper respiratory tract symptoms .  I was at the office.  I used secure audiovisual communication provided by the Epic system. Caitlyn Atkins  agreed on this form of communication and consented to the visit via A/V method.  The patient also understood that this will be billed as office visit.    She continues to have the pain in the back this is deep achy stabbing intermittently radiating to the lower limb.  The right hip pain has been getting better after the surgery however she is lately having pain in the knee.  She consulted with orthopedics and she was prescribed an MRI of the knee.  Going to have MRI fo the knee.  She will let us know when this is done and we will track it and we will check on the results  She has a mechanical pain she is taking the medication to control the symptoms to allow her to continue to function and to work.  I discussed with her about the pain medication and she has been on this dose for a while we should try to see where her actual pain level stand and we can decrease the medication slowly to check where her lowest effective dose is    Pain level without medication is 7 or 8/10 , with the medication pain level 1 or 2/10.     The pain meds are helping control the pain and improving Activities of Daily living and quality of life and quality of sleep.    opioids treatment agreement in early 2024 2024  Pill count this is a secure A/V visit but she mentions that she had enough medication until the next fill date which is January 2 this is compatible with the OARRS report with the last filled on December 5  Oarrs pulled and reviewed, no concerns  last urine toxicology testing earlier this year and it was compliant we will repeat  Xray updated spine and joints mainly the hip and knee  ORT Score is 0  Pain  "pathology and pain generators spine and knee  Modalities tried injection, surgery, physical therapy, TENS unit, nonsteroidal anti-inflammatory medication spine and knee    Review of Systems :  Denied any fever or chills. No weight loss and no night sweats. No cough or sputum production. No diarrhea   The constipation has been responding to fibers and over the counter medications.     No bladder and bowel incontinence and no other changes in bladder and bowel. No skin changes.  Reports tiredness and fatigability only if the pain is not controlled.     Denied opioids diversion and abuse and denies alcoholism. Denies overuse of the pain medications.  No reported euphoria sensation or getting a \"high\" on the pain medications.    The control of the pain with the pain medications is helping the control of the symptoms and allowing the function and activities of daily living, enjoyment of life, improving the quality of life and sleep with less interruption by the pain. The goal is symptomatic control of the nonmalignant chronic pain and not to repair the permanent damage in the tissues inducing the chronic pain conditions. We are aiming to shift the focus from the nonmalignant chronic pain to other aspects of life by symptomatically treating this chronic pain. If this pain is not treated it will lead to major morbidity and it is also associated with increased risks of mortality. The patient understands those very clearly and also understand high risks of morbidity and mortality if not strictly adherent to the treatment recommendations and reporting any associated side effects. Also patient understand the full responsibility associated with these medications to avoid abuse or overuse or any use of these medications for anything besides treating the patient's own chronic pain and nothing else under any circumstances.        Physical examination  Awake, alert and oriented for time place and persons   This is a secure A/V " visit    Diagnosis  Problem List Items Addressed This Visit       Bilateral sciatica    Relevant Medications    HYDROcodone-acetaminophen (Norco) 7.5-325 mg tablet (Start on 1/2/2025)    HYDROcodone-acetaminophen (Norco) 7.5-325 mg tablet (Start on 1/30/2025)    gabapentin (Neurontin) 100 mg capsule    Disc degeneration, lumbar - Primary    Relevant Medications    HYDROcodone-acetaminophen (Norco) 7.5-325 mg tablet (Start on 1/2/2025)    HYDROcodone-acetaminophen (Norco) 7.5-325 mg tablet (Start on 1/30/2025)    Lumbar radiculopathy    Relevant Medications    HYDROcodone-acetaminophen (Norco) 7.5-325 mg tablet (Start on 1/2/2025)    HYDROcodone-acetaminophen (Norco) 7.5-325 mg tablet (Start on 1/30/2025)    Avascular necrosis of bone of hip, right (Multi)    Relevant Medications    HYDROcodone-acetaminophen (Norco) 7.5-325 mg tablet (Start on 1/2/2025)    HYDROcodone-acetaminophen (Norco) 7.5-325 mg tablet (Start on 1/30/2025)    Long term current use of opiate analgesic    Relevant Medications    HYDROcodone-acetaminophen (Norco) 7.5-325 mg tablet (Start on 1/2/2025)    HYDROcodone-acetaminophen (Norco) 7.5-325 mg tablet (Start on 1/30/2025)    Lumbar spondylosis     Other Visit Diagnoses       Neck pain        Relevant Medications    tiZANidine (Zanaflex) 2 mg tablet    Cervical paraspinal muscle spasm        Relevant Medications    tiZANidine (Zanaflex) 2 mg tablet             Plan  Reviewed the pain generators.  Went over the types of pain with neuropathic and nociceptive and different pathologies and therapeutic modalities. Discussed the mechanism of action of interventions from acupuncture, physical therapy , regular exercises, injections, botox, spinal cord stimulation, and role of surgery     Went over pathology of the intervertebral disc displacement and the anatomical relation to the Nerve roots and relation to the radicular symptoms. Went over treatment modalities with conservative treatment including  acupuncture   and epidural steroid injection with fluoroscopy guidance and last resort of surgery    Based on the above findings and the clinical response to the opioids medications and improvement of the activities of daily living, sleep, and work performance. We made this complex decision to continue the opioids therapy in light of the evidence of the patient's responsibility in using the pain medications as prescribed for the nonmalignant chronic pain condition. We discussed about the use of the pain medications to treat the symptoms of chronic nonmalignant pain and we are not trying the repair the permanent damage in the tissues, rather we are trying to control the symptoms induced by the permanent damage to the tissues inducing the chronic pain condition and resulting disability. I explained the difference and discussed it with the patient and stressed the importance of knowing the difference especially because of the potential side effects and the potential addicting effect and habit forming nature of the dangerous drugs we are using to treat the symptoms of the chronic pain.      We discussed that we are prescribing the medications on good rambo and legitimate medical reason.     We reviewed the side effects and precautions of opioids prescriptions as discussed in the opioids treatment agreement.    realizes the interaction between the therapeutic classes including the respiratory depression and potential death     Random drug testing   we will submit     As we discussed above at this time we will decrease the amount of pain medication from 112 tablets for the month 100 tablets for the month.  Again today, long discussion about putting effort in cutting back on pain medications. Discussed about pain level changing and we do not know if the medications at this amount are still needed until we try and cut back slowly on pain medications. If the cut is tolerated then we continue. If cut not tolerated then, will  go back on the pain medications level.  The goal from this is to keep the pain medications at the lowest effective dose.    Discussed about NSAIDS and I explained about the opioids sparing effect to allow keeping the opioids dose at minimal effective dose.   I went over the potential side effects of the NSAIDS on the gastrointestinal, renal and cardiovascular systems.      I detailed the side effects from the acetaminophen in the medication and made aware of those. I also explained about the cumulative effects on the organs and mainly the liver.     Given the opioids therapy , we discussed about the risk for accidental over dose on the pain medications, either for patient or other household. I went over the mechanism of action and mode of use of the Naloxone according to the  recommendations. I will provide a prescription for a kit.     Follow-up 8 weeks or earlier if needed     The level of clinical decision making in this office visit,  is high, given the high risks of complications with the morbidity and mortality due to the fact that acute and chronic pain may pose a threat to life and bodily function, if under treated, poorly treated, or with failure to maintain adequate treatment and timely medical follow up. Additionally over treatment has its own set of complications including overdosing on the pain medications and also the habit forming potentials with the use of the medications used to treat chronic painful conditions including therapeutic classes classified as dangerous medications. Given the serious and fluctuating nature of pain level and instensity with extensive consideration for whenever pain changes, there is always the risk of prolonged functional impairment requiring close patient monitoring with regular assessments and reassessments and high level medical decision making at every office visit. The amount and complexity of data reviewed is high given the patient clinical presentation,  labs,  data, radiology reports, and other tests as discussed during office visits. Pertinent data whether positive or negative were taken in consideration in the process of making this high level medical decision.

## 2024-12-31 ENCOUNTER — HOSPITAL ENCOUNTER (OUTPATIENT)
Dept: RADIOLOGY | Facility: HOSPITAL | Age: 59
Discharge: HOME | End: 2024-12-31
Payer: COMMERCIAL

## 2024-12-31 DIAGNOSIS — S83.241A ACUTE MEDIAL MENISCUS TEAR OF RIGHT KNEE, INITIAL ENCOUNTER: ICD-10-CM

## 2024-12-31 PROCEDURE — 73721 MRI JNT OF LWR EXTRE W/O DYE: CPT | Mod: RIGHT SIDE | Performed by: STUDENT IN AN ORGANIZED HEALTH CARE EDUCATION/TRAINING PROGRAM

## 2024-12-31 PROCEDURE — 73721 MRI JNT OF LWR EXTRE W/O DYE: CPT | Mod: RT

## 2025-01-03 ENCOUNTER — TELEMEDICINE (OUTPATIENT)
Dept: ORTHOPEDIC SURGERY | Facility: HOSPITAL | Age: 60
End: 2025-01-03
Payer: COMMERCIAL

## 2025-01-03 DIAGNOSIS — S72.431D CLOSED BICONDYLAR FRACTURE OF DISTAL FEMUR, RIGHT, WITH ROUTINE HEALING, SUBSEQUENT ENCOUNTER: Primary | ICD-10-CM

## 2025-01-03 DIAGNOSIS — S72.421D CLOSED BICONDYLAR FRACTURE OF DISTAL FEMUR, RIGHT, WITH ROUTINE HEALING, SUBSEQUENT ENCOUNTER: Primary | ICD-10-CM

## 2025-01-03 NOTE — PROGRESS NOTES
PRIMARY CARE PHYSICIAN: Zane Ang PA-C  REFERRING PROVIDER: No referring provider defined for this encounter.     CONSULT ORTHOPAEDIC: Knee Evaluation    ASSESSMENT & PLAN    Impression: 59 y.o. female with an acute right knee injury resulting in a nondisplaced metaphyseal of the distal femur fracture.    Plan:   I explained to the patient the nature of their diagnosis.  I reviewed their imaging studies with them.    Based on the history, physical exam and imaging studies above, the patient's presentation is consistent with the above diagnosis.  I had a long discussion with the patient regarding their presentation and the treatment options.  We discussed initial nonoperative versus operative management options as well as potential further diagnostic imaging.  I reviewed the patient's MRI findings with her.  She has a nondisplaced distal femur metaphyseal fracture.  This is not visible on x-ray.  I believe that this will heal fine without surgery.  She will focus on low impact activities.  She will utilize crutches to offload the knee as needed.  She will work on gentle knee range of motion.  She will ice and elevate.  She can progressively return to her activities over the next few months as she tolerates but will allow pain to be her guide.    Follow-Up: Patient will follow-up as needed    At the end of the visit, all questions were answered in full. The patient is in agreement with the plan and recommendations. They will call the office with any questions/concerns.    The patient provided verbal consent for the encounter. They were unable to attend a virtual video visit so a telephone visit was selected. A total of 30 minutes was spent reviewing the patient's history, examination and imaging, formulating and discussing a treatment plan with them, answering all their questions, documenting the visit and performing tasks related to the visit.    Note dictated with R2G software.  Completed without full typed error editing and sent to avoid delay.       SUBJECTIVE  CHIEF COMPLAINT: Acute right knee injury    HPI: Caitlyn Atkins returns for a follow-up evaluation of her acute right knee injury.  She presents via virtual telephone visit.  She is here to review her MRI and discuss a treatment plan going forward.  She notes that her pain in the knee is improving.    Please see below for full history from prior visit:    59 y.o. female who presents today with right knee pain.  Patient reports her pain began on 11/30/2024 after an acute injury she sustained.  She states that her car broke down and she had to walk home.  While walking home she stepped in a hole, twisting her right knee.  She had immediate pain localized to the medial joint line.  This pain has been persistent since then and she feels that it is getting worse.  Pain is worse with ambulation, direct pressure, and lateral movements.  She does feel that she is suffering from mechanical symptoms including popping catching locking of the knee.  Is causing her to struggle at her job.  She has also noticed associated swelling.    They deny any constant or progressive numbness or tingling in their legs.     REVIEW OF SYSTEMS  Constitutional: See HPI for pain assessment, No significant weight loss, recent trauma  Cardiovascular: No chest pain, shortness of breath  Respiratory: No difficulty breathing, cough  Gastrointestinal: No nausea, vomiting, diarrhea, constipation  Musculoskeletal: Noted in HPI, positive for pain, restricted motion, stiffness  Integumentary: No rashes, easy bruising, redness   Neurological: no numbness or tingling in extremities, no gait disturbances   Psychiatric: No mood changes, memory changes, social issues  Heme/Lymph: no excessive swelling, easy bruising, excessive bleeding  ENT: No hearing changes  Eyes: No vision changes    Past Medical History:   Diagnosis Date    Anesthesia of skin     Numbness    DDD  (degenerative disc disease), lumbar     Multiple sclerosis (Multi)     Multiple sclerosis    Osteoporosis     Other conditions influencing health status     Cervical Cancer        No Known Allergies     Past Surgical History:   Procedure Laterality Date    CT ANGIO CORONARY ART WITH HEARTFLOW IF SCORE >30%  11/16/2023    CT ANGIO CORONARY ART WITH HEARTFLOW IF SCORE >30% 11/16/2023 [facility]    HIP ARTHROPLASTY Right 08/16/2023    HYSTERECTOMY  04/15/2013    Hysterectomy    OTHER SURGICAL HISTORY  04/21/2021    Arm surgery        Family History   Problem Relation Name Age of Onset    Lung cancer Mother      Coronary artery disease Father      Hypertension Father      Other (THYROID SURGERY) Sister      Glaucoma Other GRANDMOTHER         Social History     Socioeconomic History    Marital status:      Spouse name: Not on file    Number of children: Not on file    Years of education: Not on file    Highest education level: Not on file   Occupational History    Not on file   Tobacco Use    Smoking status: Every Day     Current packs/day: 1.00     Types: Cigarettes    Smokeless tobacco: Never   Substance and Sexual Activity    Alcohol use: Not Currently    Drug use: Never    Sexual activity: Not on file   Other Topics Concern    Not on file   Social History Narrative    Not on file     Social Drivers of Health     Financial Resource Strain: Not on file   Food Insecurity: Not on file   Transportation Needs: Not on file   Physical Activity: Not on file   Stress: Not on file   Social Connections: Not on file   Intimate Partner Violence: Not on file   Housing Stability: Not on file        CURRENT MEDICATIONS:   Current Outpatient Medications   Medication Sig Dispense Refill    acetaminophen (Tylenol) 325 mg tablet Take 2 tablets (650 mg) by mouth every 4 hours.      b complex 0.4 mg tablet Take 1 tablet by mouth once daily.      calcium carbonate (CALCIUM 500 ORAL) Take by mouth.      cholecalciferol (Vitamin D-3) 50  "MCG (2000 UT) tablet Take 1 tablet (2,000 Units) by mouth once daily.      diroximel fumarate (Vumerity) 231 mg capsule,delayed release(DR/EC) Take 2 capsules by mouth 2 times a day. 120 capsule 5    gabapentin (Neurontin) 100 mg capsule Take 1 capsule (100 mg) by mouth 3 times a day. 270 capsule 1    HYDROcodone-acetaminophen (Norco) 7.5-325 mg tablet Take 1 tablet by mouth every 6 hours if needed for severe pain (7 - 10) for up to 28 days. Do not fill before January 2, 2025. 100 tablet 0    [START ON 1/30/2025] HYDROcodone-acetaminophen (Norco) 7.5-325 mg tablet Take 1 tablet by mouth every 6 hours if needed for severe pain (7 - 10) for up to 28 days. Do not fill before January 30, 2025. 100 tablet 0    ibuprofen 600 mg tablet Take 1 tablet (600 mg) by mouth every 6 hours if needed (PAIN).      metoprolol succinate XL (Toprol-XL) 25 mg 24 hr tablet Take 1 tablet (25 mg) by mouth once daily. Do not crush or chew. 60 tablet 0    naloxone (Narcan) 4 mg/0.1 mL nasal spray Administer 1 spray (4 mg) into affected nostril(s) if needed for opioid reversal. May repeat every 2-3 minutes if needed, alternating nostrils, until medical assistance becomes available.      omeprazole OTC (PriLOSEC OTC) 20 mg EC tablet Take 1 tablet (20 mg) by mouth early in the morning..      tiZANidine (Zanaflex) 2 mg tablet Take 1 tablet (2 mg) by mouth once daily as needed for muscle spasms for up to 28 days. 28 tablet 2     No current facility-administered medications for this visit.        OBJECTIVE    PHYSICAL EXAM      3/26/2024     8:24 AM 4/9/2024    10:01 AM 4/26/2024     8:15 AM 6/26/2024     9:59 AM 8/27/2024    11:34 PM 12/16/2024     8:13 AM 12/31/2024     1:47 PM   Vitals   Systolic   100  128     Diastolic   60  74     Heart Rate   76  69     Temp   36.1 °C (96.9 °F)  36.2 °C (97.2 °F)     Resp     16     Height 1.575 m (5' 2\") 1.575 m (5' 2\") 1.575 m (5' 2\") 1.575 m (5' 2\") 1.575 m (5' 2\") 1.575 m (5' 2\")    Weight (lb) 100 100 " 98 100 100 100 100   BMI 18.29 kg/m2 18.29 kg/m2 17.92 kg/m2 18.29 kg/m2 18.29 kg/m2 18.29 kg/m2 18.29 kg/m2   BSA (m2) 1.41 m2 1.41 m2 1.4 m2 1.41 m2 1.41 m2 1.41 m2 1.41 m2   Visit Report Report Report Report   Report       There is no height or weight on file to calculate BMI.    Prior in office examination demonstrated:    GENERAL: A/Ox3, NAD. Appears healthy, well nourished  PSYCHIATRIC: Mood stable, appropriate memory recall  EYES: EOM intact, no scleral icterus  CARDIOVASCULAR: Palpable peripheral pulses  LUNGS: Breathing non-labored on room air  SKIN: no erythema, rashes, or ecchymoses     MUSCULOSKELETAL:  Laterality: Right knee Exam  - Skin intact  - No erythema or warmth  - No ecchymosis or soft tissue swelling  - Alignment: Neutral  - Palpation: Positive tenderness to palpation medial joint line  - ROM: 0 - 0 - 120  - Effusion: Trace  - Strength: knee extension and flexion 5/5, EHL/PF/DF motor intact  - Stability:        Anterior drawer stable       Posterior drawer stable       Varus/valgus stable       Negative Lachman  -Positive Davi's  - Gait: Antalgic  - Hip Exam: flexion to 100+ degrees, full extension, internal/external rotation adequate, and no pain with log roll  NEUROVASCULAR:  - Neurovascular Status: sensation intact to light touch distally, lower extremity motor intact  - Capillary refill brisk at extremities, Bilateral dorsalis pedis pulse 2+    Imaging: MRI of the right knee reviewed by me demonstrates a nondisplaced distal femoral metaphyseal fracture, no evidence of displaced meniscus tear or ligamentous injury.  Images were personally reviewed and interpreted by me.  Radiology reports were reviewed by me as well, if readily available at the time.      Juan Garcia MD  Attending Surgeon    Sports Medicine Orthopaedic Surgery  [facility] [employer] [facility]

## 2025-01-13 ENCOUNTER — APPOINTMENT (OUTPATIENT)
Dept: ORTHOPEDIC SURGERY | Facility: CLINIC | Age: 60
End: 2025-01-13
Payer: COMMERCIAL

## 2025-02-14 ENCOUNTER — OFFICE VISIT (OUTPATIENT)
Dept: URGENT CARE | Age: 60
End: 2025-02-14
Payer: COMMERCIAL

## 2025-02-14 ENCOUNTER — ANCILLARY PROCEDURE (OUTPATIENT)
Dept: URGENT CARE | Age: 60
End: 2025-02-14
Payer: COMMERCIAL

## 2025-02-14 VITALS
DIASTOLIC BLOOD PRESSURE: 88 MMHG | TEMPERATURE: 98.3 F | OXYGEN SATURATION: 96 % | RESPIRATION RATE: 16 BRPM | SYSTOLIC BLOOD PRESSURE: 146 MMHG | HEART RATE: 66 BPM

## 2025-02-14 DIAGNOSIS — M79.671 RIGHT FOOT PAIN: ICD-10-CM

## 2025-02-14 DIAGNOSIS — L03.119 CELLULITIS OF FOOT: ICD-10-CM

## 2025-02-14 DIAGNOSIS — M25.571 ACUTE RIGHT ANKLE PAIN: Primary | ICD-10-CM

## 2025-02-14 DIAGNOSIS — M25.571 ACUTE RIGHT ANKLE PAIN: ICD-10-CM

## 2025-02-14 PROCEDURE — 73610 X-RAY EXAM OF ANKLE: CPT | Mod: RIGHT SIDE

## 2025-02-14 PROCEDURE — 73630 X-RAY EXAM OF FOOT: CPT | Mod: RIGHT SIDE

## 2025-02-14 RX ORDER — CEFADROXIL 500 MG/1
500 CAPSULE ORAL 2 TIMES DAILY
Qty: 14 CAPSULE | Refills: 0 | Status: SHIPPED | OUTPATIENT
Start: 2025-02-14 | End: 2025-02-21

## 2025-02-14 RX ORDER — METHYLPREDNISOLONE 4 MG/1
TABLET ORAL
Qty: 21 TABLET | Refills: 0 | Status: SHIPPED | OUTPATIENT
Start: 2025-02-14 | End: 2025-02-20

## 2025-02-14 ASSESSMENT — ENCOUNTER SYMPTOMS
DEPRESSION: 0
OCCASIONAL FEELINGS OF UNSTEADINESS: 0
LOSS OF SENSATION IN FEET: 0

## 2025-02-14 ASSESSMENT — COLUMBIA-SUICIDE SEVERITY RATING SCALE - C-SSRS
1. IN THE PAST MONTH, HAVE YOU WISHED YOU WERE DEAD OR WISHED YOU COULD GO TO SLEEP AND NOT WAKE UP?: NO
6. HAVE YOU EVER DONE ANYTHING, STARTED TO DO ANYTHING, OR PREPARED TO DO ANYTHING TO END YOUR LIFE?: NO
2. HAVE YOU ACTUALLY HAD ANY THOUGHTS OF KILLING YOURSELF?: NO

## 2025-02-14 ASSESSMENT — PATIENT HEALTH QUESTIONNAIRE - PHQ9
2. FEELING DOWN, DEPRESSED OR HOPELESS: NOT AT ALL
1. LITTLE INTEREST OR PLEASURE IN DOING THINGS: NOT AT ALL
SUM OF ALL RESPONSES TO PHQ9 QUESTIONS 1 AND 2: 0

## 2025-02-14 ASSESSMENT — PAIN SCALES - GENERAL: PAINLEVEL_OUTOF10: 10-WORST PAIN EVER

## 2025-02-14 NOTE — PROGRESS NOTES
Subjective   Patient ID: Caitlyn Atkins is a 59 y.o. female. They present today with a chief complaint of R foot pain (C/O R foot pain for 10 days, no trauma.).      Past Medical History  Allergies as of 02/14/2025    (No Known Allergies)       (Not in a hospital admission)       Past Medical History:   Diagnosis Date    Anesthesia of skin     Numbness    DDD (degenerative disc disease), lumbar     Multiple sclerosis (Multi)     Multiple sclerosis    Osteoporosis     Other conditions influencing health status     Cervical Cancer       Past Surgical History:   Procedure Laterality Date    CT ANGIO CORONARY ART WITH HEARTFLOW IF SCORE >30%  11/16/2023    CT ANGIO CORONARY ART WITH HEARTFLOW IF SCORE >30% 11/16/2023 [facility]    HIP ARTHROPLASTY Right 08/16/2023    HYSTERECTOMY  04/15/2013    Hysterectomy    OTHER SURGICAL HISTORY  04/21/2021    Arm surgery        reports that she has been smoking cigarettes. She has never used smokeless tobacco. She reports that she does not currently use alcohol. She reports that she does not use drugs.    Review of Systems  Review of Systems                               Objective    Vitals:    02/14/25 1842   BP: 146/88   BP Location: Left arm   Patient Position: Sitting   BP Cuff Size: Adult   Pulse: 66   Resp: 16   Temp: 36.8 °C (98.3 °F)   TempSrc: Oral   SpO2: 96%     No LMP recorded. Patient has had a hysterectomy.    Physical Exam  HENT:      Head: Normocephalic and atraumatic.      Nose: Nose normal.      Mouth/Throat:      Mouth: Mucous membranes are moist.   Eyes:      Extraocular Movements: Extraocular movements intact.      Conjunctiva/sclera: Conjunctivae normal.   Cardiovascular:      Rate and Rhythm: Normal rate.   Pulmonary:      Effort: Pulmonary effort is normal.   Musculoskeletal:      Right ankle: Swelling present. Tenderness present over the medial malleolus. Normal range of motion. Normal pulse.      Right Achilles Tendon: Normal.      Right foot: Normal range  of motion and normal capillary refill. Swelling and tenderness present. Normal pulse.   Skin:     General: Skin is warm.   Neurological:      Mental Status: She is alert and oriented to person, place, and time.   Psychiatric:         Mood and Affect: Mood normal.         Behavior: Behavior normal.             Point of Care Test & Imaging Results from this visit  No results found for this visit on 02/14/25.   XR foot right 3+ views    Result Date: 2/14/2025  Interpreted By:  Elise Flores, STUDY: XR FOOT RIGHT 3+ VIEWS;  2/14/2025 7:30 pm   INDICATION: Signs/Symptoms:R. foot pain.   COMPARISON: Radiographs 02/08/2024.   ACCESSION NUMBER(S): [id number]   ORDERING CLINICIAN: CRAIG SINGLETON   FINDINGS: Soft tissue edema along the right lateral foot and surrounding the 5th metatarsal joint. No acute fracture or dislocation. No cortical indistinctness or destruction to suggest acute osteomyelitis. Posttraumatic appearance of 5th metatarsal bone.       Lateral foot soft tissue edema; please correlate with cellulitis. No radiographic evidence of acute osteomyelitis.   MACRO None   Signed by: Elise Flores 2/14/2025 8:05 PM Dictation workstation:   [id number]    XR ankle right 3+ views    Result Date: 2/14/2025  Interpreted By:  Marisela Dean, STUDY: XR ANKLE RIGHT 3+ VIEWS; ;  2/14/2025 6:59 pm   INDICATION: Signs/Symptoms:ankle pain R..   ,M25.571 Pain in right ankle and joints of right foot   COMPARISON: None.   ACCESSION NUMBER(S): [id number]   ORDERING CLINICIAN: CRAIG SINGLETON   FINDINGS: Three views of the right ankle   Ankle mortise and talar dome are intact. Bones appear demineralized with mild degenerative changes present. No acute fracture dislocation identified. No significant joint effusion or soft tissue abnormality.       No acute osseous abnormality identified.     MACRO: None   Signed by: Marisela Dean 2/14/2025 7:11 PM Dictation workstation:   [id number]     Diagnostic study results (if any)  were reviewed by Prateek Zhou PA-C.    Assessment/Plan   Allergies, medications, history, and pertinent labs/EKGs/Imaging reviewed by Prateek Zhou PA-C.     Medical Decision Making  X-ray of right ankle shows no fracture or dislocation.  X-ray of right foot shows Lateral foot soft tissue edema; please correlate with cellulitis .  Will start patient on Medrol Dosepak.  Advised patient to apply Ace wrap to the right foot and ankle.  And to elevate the right foot.  And to apply ice as needed. I will start pt on cefadroxil for foot cellulitis.   -         Patient is educated about their diagnoses.     -          Discussed medications benefits and adverse effects.     -          Answered all patient’s questions.     -          Patient will call 911 or go to the nearest ED if worsen symptoms .     -          Patient is agreeable to the plan of care and is deemed stable upon discharge.     -          Follow up with your primary care provider in two days.    Orders and Diagnoses  Diagnoses and all orders for this visit:  Acute right ankle pain  -     XR ankle right 3+ views; Future  -     methylPREDNISolone (Medrol Dospak) 4 mg tablets; Take as directed on package.  Right foot pain  -     XR foot right 3+ views; Future  -     methylPREDNISolone (Medrol Dospak) 4 mg tablets; Take as directed on package.  Cellulitis of foot  -     cefadroxil (Duricef) 500 mg capsule; Take 1 capsule (500 mg) by mouth 2 times a day for 7 days.      Medical Admin Record      Patient disposition: Home    Electronically signed by Prateek Zhou PA-C  8:13 PM

## 2025-02-19 ENCOUNTER — TELEPHONE (OUTPATIENT)
Dept: PRIMARY CARE | Facility: CLINIC | Age: 60
End: 2025-02-19
Payer: COMMERCIAL

## 2025-02-19 DIAGNOSIS — M25.571 RIGHT ANKLE PAIN, UNSPECIFIED CHRONICITY: Primary | ICD-10-CM

## 2025-02-19 NOTE — TELEPHONE ENCOUNTER
Patient had an appt tomorrow with [facility] was cancelled is asking if she can have a Referral to Ortho was seen in Urgent Care Please Advise

## 2025-02-20 ENCOUNTER — APPOINTMENT (OUTPATIENT)
Dept: PRIMARY CARE | Facility: CLINIC | Age: 60
End: 2025-02-20
Payer: COMMERCIAL

## 2025-02-20 ENCOUNTER — APPOINTMENT (OUTPATIENT)
Dept: NEUROLOGY | Facility: CLINIC | Age: 60
End: 2025-02-20
Payer: COMMERCIAL

## 2025-02-25 ENCOUNTER — APPOINTMENT (OUTPATIENT)
Facility: CLINIC | Age: 60
End: 2025-02-25
Payer: COMMERCIAL

## 2025-02-25 DIAGNOSIS — M54.2 NECK PAIN: ICD-10-CM

## 2025-02-25 DIAGNOSIS — M62.838 CERVICAL PARASPINAL MUSCLE SPASM: ICD-10-CM

## 2025-02-25 DIAGNOSIS — M54.32 BILATERAL SCIATICA: ICD-10-CM

## 2025-02-25 DIAGNOSIS — M54.31 BILATERAL SCIATICA: ICD-10-CM

## 2025-02-25 DIAGNOSIS — M87.051 AVASCULAR NECROSIS OF BONE OF HIP, RIGHT (MULTI): ICD-10-CM

## 2025-02-25 DIAGNOSIS — Z79.891 LONG TERM CURRENT USE OF OPIATE ANALGESIC: ICD-10-CM

## 2025-02-25 DIAGNOSIS — M54.16 LUMBAR RADICULOPATHY: ICD-10-CM

## 2025-02-25 DIAGNOSIS — M51.369 DISC DEGENERATION, LUMBAR: ICD-10-CM

## 2025-02-25 PROCEDURE — 99214 OFFICE O/P EST MOD 30 MIN: CPT | Performed by: PHYSICAL MEDICINE & REHABILITATION

## 2025-02-25 RX ORDER — HYDROCODONE BITARTRATE AND ACETAMINOPHEN 7.5; 325 MG/1; MG/1
1 TABLET ORAL EVERY 6 HOURS PRN
Qty: 100 TABLET | Refills: 0 | Status: SHIPPED | OUTPATIENT
Start: 2025-03-27 | End: 2025-04-24

## 2025-02-25 RX ORDER — TIZANIDINE 2 MG/1
2 TABLET ORAL DAILY PRN
Qty: 28 TABLET | Refills: 2 | Status: SHIPPED | OUTPATIENT
Start: 2025-02-25 | End: 2025-03-25

## 2025-02-25 RX ORDER — HYDROCODONE BITARTRATE AND ACETAMINOPHEN 7.5; 325 MG/1; MG/1
1 TABLET ORAL EVERY 6 HOURS PRN
Qty: 100 TABLET | Refills: 0 | Status: SHIPPED | OUTPATIENT
Start: 2025-02-27 | End: 2025-03-27

## 2025-02-25 RX ORDER — GABAPENTIN 100 MG/1
100 CAPSULE ORAL 3 TIMES DAILY
Qty: 270 CAPSULE | Refills: 1 | Status: SHIPPED | OUTPATIENT
Start: 2025-02-25 | End: 2025-05-26

## 2025-02-25 NOTE — PROGRESS NOTES
Chief complaint  Back pain   New fracture r leg       History  Caitlyn Atkins is back for pain management office visit  Caitlyn Atkins was at home.  Could not physically come to the office today .  I was at the office.  I used secure audiovisual communication provided by the Epic system. Caitlyn Atkins  agreed on this form of communication and consented to the visit via A/V method.  The patient also understood that this will be billed as office visit.   Since after she had the surgery for her right hip she has been having weakness in the right leg last week she fell and she twisted it it turned out to have fracture.  She is cutting back on the medication but she is having difficulty adjusting she is currently taking 7.5 mg between 3 to 4 tablets a day she has tried to cut back further but she has not been able to.  She wanted to stay at the same dose now and actually asked for some increase to overcome the fracture of the and she is going to see orthopedic surgeon for that    Again today,  Long discussion about putting effort in cutting back on pain medications. Discussed about pain level changing and we do not know if the medications at this amount are still needed until we try and cut back slowly on pain medications. If the cut is tolerated then we continue. If cut not tolerated then, will go back on the pain medications level.  The goal from this is to keep the pain medications at the lowest effective dose.     Pain level without medication is 8/10 , with the medication pain level between 2 and 3/10.     Pain disability index improvement by 3-4 points, across different functional categories, with the pain control with the meds.  Went over the questionnaires during the AV visit today. Will fill the forms at the next in person visit.  The pain meds are helping control the pain and improving Activities of Daily living and quality of life and quality of sleep.    opioids treatment agreement today I sent him  "digital copy to sign after I went over the agreement with him       Pill count reported being on schedule with the medication. We will do full pill count at the next in person visit  last urine toxicology testing was compliant this was done on : November 2024  Xray updated spine and leg  ORT Score is 0  Pain pathology and pain generators spine and leg  Modalities tried injection, surgery, physical therapy, TENS unit, nonsteroidal anti-inflammatory medication multiple injections and surgeries    Review of Systems :  Denied any fever or chills. No weight loss and no night sweats. No cough or sputum production. No diarrhea   The constipation has been responding to fibers and over the counter medications.     No bladder and bowel incontinence and no other changes in bladder and bowel. No skin changes.  Reports tiredness and fatigability only if the pain is not controlled.     Denied opioids diversion and abuse and denies alcoholism. Denies overuse of the pain medications.  No reported euphoria sensation or getting a \"high\" on the pain medications.    The control of the pain with the pain medications is helping the control of the symptoms and allowing the function and activities of daily living, enjoyment of life, improving the quality of life and sleep with less interruption by the pain. The goal is symptomatic control of the nonmalignant chronic pain and not to repair the permanent damage in the tissues inducing the chronic pain conditions. We are aiming to shift the focus from the nonmalignant chronic pain to other aspects of life by symptomatically treating this chronic pain. If this pain is not treated it will lead to major morbidity and it is also associated with increased risks of mortality. The patient understands those very clearly and also understand high risks of morbidity and mortality if not strictly adherent to the treatment recommendations and reporting any associated side effects. Also patient understand " the full responsibility associated with these medications to avoid abuse or overuse or any use of these medications for anything besides treating the patient's own chronic pain and nothing else under any circumstances.        Physical examination  Awake, alert and oriented for time place and persons     This is a secure A/V visit    Diagnosis  Problem List Items Addressed This Visit       Bilateral sciatica    Relevant Medications    HYDROcodone-acetaminophen (Norco) 7.5-325 mg tablet (Start on 2/27/2025)    HYDROcodone-acetaminophen (Norco) 7.5-325 mg tablet (Start on 3/27/2025)    gabapentin (Neurontin) 100 mg capsule    Disc degeneration, lumbar    Relevant Medications    HYDROcodone-acetaminophen (Norco) 7.5-325 mg tablet (Start on 2/27/2025)    HYDROcodone-acetaminophen (Norco) 7.5-325 mg tablet (Start on 3/27/2025)    Lumbar radiculopathy    Relevant Medications    HYDROcodone-acetaminophen (Norco) 7.5-325 mg tablet (Start on 2/27/2025)    HYDROcodone-acetaminophen (Norco) 7.5-325 mg tablet (Start on 3/27/2025)    Avascular necrosis of bone of hip, right (Multi)    Relevant Medications    HYDROcodone-acetaminophen (Norco) 7.5-325 mg tablet (Start on 2/27/2025)    HYDROcodone-acetaminophen (Norco) 7.5-325 mg tablet (Start on 3/27/2025)    Long term current use of opiate analgesic    Relevant Medications    HYDROcodone-acetaminophen (Norco) 7.5-325 mg tablet (Start on 2/27/2025)    HYDROcodone-acetaminophen (Norco) 7.5-325 mg tablet (Start on 3/27/2025)     Other Visit Diagnoses       Neck pain        Relevant Medications    tiZANidine (Zanaflex) 2 mg tablet    Cervical paraspinal muscle spasm        Relevant Medications    tiZANidine (Zanaflex) 2 mg tablet             Plan  Reviewed the pain generators.  Went over the types of pain with neuropathic and nociceptive and different pathologies and therapeutic modalities. Discussed the mechanism of action of interventions from acupuncture, physical therapy , regular  exercises, injections, botox, spinal cord stimulation, and role of surgery     Went over pathology of the intervertebral disc displacement and the anatomical relation to the Nerve roots and relation to the radicular symptoms. Went over treatment modalities with conservative treatment including acupuncture   and epidural steroid injection with fluoroscopy guidance and last resort of surgery    Based on the above findings and the clinical response to the opioids medications and improvement of the activities of daily living, sleep, and work performance. We made this complex decision to continue the opioids therapy in light of the evidence of the patient's responsibility in using the pain medications as prescribed for the nonmalignant chronic pain condition. We discussed about the use of the pain medications to treat the symptoms of chronic nonmalignant pain and we are not trying the repair the permanent damage in the tissues, rather we are trying to control the symptoms induced by the permanent damage to the tissues inducing the chronic pain condition and resulting disability. I explained the difference and discussed it with the patient and stressed the importance of knowing the difference especially because of the potential side effects and the potential addicting effect and habit forming nature of the dangerous drugs we are using to treat the symptoms of the chronic pain.      We discussed that we are prescribing the medications on good rambo and legitimate medical reason.     We reviewed the side effects and precautions of opioids prescriptions as discussed in the opioids treatment agreement.    realizes the interaction between the therapeutic classes including the respiratory depression and potential death     Random drug testing   we will submit     At this time discussed with her about considering epidural injection for aggravation of the back but more importantly she need to see orthopedic for the fracture on  the right.  She had gone to urgent care and had x-rays but she have not sees Ortho yet.  For the pain medications and she is still adjusting we will not cut back further at this time she is at hydrocodone 7.5 mg averaging 100 tablets for the month she also take gabapentin 100 mg at 3 times a day for the neuropathic pain    Discussed about NSAIDS and I explained about the opioids sparing effect to allow keeping the opioids dose at minimal effective dose.   I went over the potential side effects of the NSAIDS on the gastrointestinal, renal and cardiovascular systems.      I detailed the side effects from the acetaminophen in the medication and made aware of those. I also explained about the cumulative effects on the organs and mainly the liver.     Given the opioids therapy , we discussed about the risk for accidental over dose on the pain medications, either for patient or other household. I went over the mechanism of action and mode of use of the Naloxone according to the  recommendations. I will provide a prescription for a kit.     Follow-up 8 weeks or earlier if needed     The level of clinical decision making in this office visit,  is high, given the high risks of complications with the morbidity and mortality due to the fact that acute and chronic pain may pose a threat to life and bodily function, if under treated, poorly treated, or with failure to maintain adequate treatment and timely medical follow up. Additionally over treatment has its own set of complications including overdosing on the pain medications and also the habit forming potentials with the use of the medications used to treat chronic painful conditions including therapeutic classes classified as dangerous medications. Given the serious and fluctuating nature of pain level and instensity with extensive consideration for whenever pain changes, there is always the risk of prolonged functional impairment requiring close patient  monitoring with regular assessments and reassessments and high level medical decision making at every office visit. The amount and complexity of data reviewed is high given the patient clinical presentation, labs,  data, radiology reports, and other tests as discussed during office visits. Pertinent data whether positive or negative were taken in consideration in the process of making this high level medical decision.

## 2025-02-28 ENCOUNTER — TELEPHONE (OUTPATIENT)
Dept: ORTHOPEDIC SURGERY | Facility: HOSPITAL | Age: 60
End: 2025-02-28

## 2025-02-28 ENCOUNTER — OFFICE VISIT (OUTPATIENT)
Dept: ORTHOPEDIC SURGERY | Age: 60
End: 2025-02-28
Payer: COMMERCIAL

## 2025-02-28 VITALS — BODY MASS INDEX: 18.4 KG/M2 | WEIGHT: 100 LBS | HEIGHT: 62 IN

## 2025-02-28 DIAGNOSIS — M84.361A STRESS FRACTURE OF RIGHT TIBIA, INITIAL ENCOUNTER: Primary | ICD-10-CM

## 2025-02-28 PROCEDURE — 3008F BODY MASS INDEX DOCD: CPT | Performed by: SPECIALIST

## 2025-02-28 PROCEDURE — 99214 OFFICE O/P EST MOD 30 MIN: CPT | Performed by: SPECIALIST

## 2025-02-28 NOTE — LETTER
February 28, 2025     Patient: Caitlyn Atkins   YOB: 1965   Date of Visit: 2/28/2025       To Whom It May Concern:    It is my medical opinion that Caitlyn Atkins should remain out of work until next evaluation in a month.  She will be evaluated for full duty at that time.  Next appointment on 03/28/2025.    If you have any questions or concerns, please don't hesitate to call.         Sincerely,        Justin Aleman MD    CC: No Recipients

## 2025-02-28 NOTE — PROGRESS NOTES
Pt is a 59 Y Female presenting today for right ankle pain and swelling. Pt was seen in Urgent care in [place] on 02/14/2025 No injury to foot. Pt does have a previous HX of a break of her 5th metatarsal in 2015.  XR of top of foot showed no fracture according to UC. They diagnosed her with Cellulitis and placed patient on ABX. Pt is currently Smoker. No history of DM    Exam: Right leg with mild diffuse swelling.  She has pain to palpation of the distal third of the tibia especially anteromedial.  Distal neurovascular status intact good active motion of the ankle no other acute findings.    Radiographs taken of the distal tib-fib ankle and foot region shows severe osteopenia but no other acute changes.    Assessment plan: Right tibial stress fracture.  This diagnosis is made by history and exam.  Can be verified with simple radiographs in a month but in the meantime will be treated in a walking cast.  She will likely need to be off full duty work for the next month until reassessment.  At next visit can get nonweightbearing tib-fib x-rays.  I highly recommended she consult with her primary care regarding aggressive treatment of osteoporosis.

## 2025-02-28 NOTE — TELEPHONE ENCOUNTER
I spoke to Caitlyn.  She had her HR fax over [facility] paperwork.  Her first day off will be 02/24/2025 to 03/31/2025 estimated.  We will fax it in when completed and let her know when it was faxed.

## 2025-03-07 ENCOUNTER — TRANSCRIBE ORDERS (OUTPATIENT)
Dept: ORTHOPEDIC SURGERY | Facility: HOSPITAL | Age: 60
End: 2025-03-07
Payer: COMMERCIAL

## 2025-03-07 DIAGNOSIS — M54.50 LOW BACK PAIN, UNSPECIFIED BACK PAIN LATERALITY, UNSPECIFIED CHRONICITY, UNSPECIFIED WHETHER SCIATICA PRESENT: ICD-10-CM

## 2025-03-10 ENCOUNTER — HOSPITAL ENCOUNTER (OUTPATIENT)
Dept: RADIOLOGY | Facility: CLINIC | Age: 60
Discharge: HOME | End: 2025-03-10
Payer: COMMERCIAL

## 2025-03-10 DIAGNOSIS — M54.50 LOW BACK PAIN, UNSPECIFIED BACK PAIN LATERALITY, UNSPECIFIED CHRONICITY, UNSPECIFIED WHETHER SCIATICA PRESENT: ICD-10-CM

## 2025-03-10 PROCEDURE — 72120 X-RAY BEND ONLY L-S SPINE: CPT

## 2025-03-11 ENCOUNTER — OFFICE VISIT (OUTPATIENT)
Dept: ORTHOPEDIC SURGERY | Facility: CLINIC | Age: 60
End: 2025-03-11
Payer: COMMERCIAL

## 2025-03-11 ENCOUNTER — APPOINTMENT (OUTPATIENT)
Dept: RADIOLOGY | Facility: CLINIC | Age: 60
End: 2025-03-11
Payer: COMMERCIAL

## 2025-03-11 VITALS — BODY MASS INDEX: 18.4 KG/M2 | WEIGHT: 100 LBS | HEIGHT: 62 IN

## 2025-03-11 DIAGNOSIS — M48.062 LUMBAR STENOSIS WITH NEUROGENIC CLAUDICATION: ICD-10-CM

## 2025-03-11 DIAGNOSIS — M43.10 ACQUIRED SPONDYLOLISTHESIS: ICD-10-CM

## 2025-03-11 DIAGNOSIS — M54.16 LUMBAR RADICULOPATHY: Primary | ICD-10-CM

## 2025-03-11 PROCEDURE — 99213 OFFICE O/P EST LOW 20 MIN: CPT | Performed by: ORTHOPAEDIC SURGERY

## 2025-03-11 PROCEDURE — 3008F BODY MASS INDEX DOCD: CPT | Performed by: ORTHOPAEDIC SURGERY

## 2025-03-11 ASSESSMENT — PAIN - FUNCTIONAL ASSESSMENT: PAIN_FUNCTIONAL_ASSESSMENT: 0-10

## 2025-03-11 NOTE — PROGRESS NOTES
HPI:Caitlyn Atkins is a pleasant 59-year-old woman who works at [employer] where she does a lot of repetitive lifting.  She has noticed increasing pain in her right buttock which radiates down the leg in an L4 distribution.  It is worse with bending.  In the past she has had steroid injections which have given her temporary relief.  She has not had recent injections.  She has not had recent physical therapy.  Patient is a smoker.      ROS:  Reviewed on EMR and patient intake sheet.    PMH/SH:  Reviewed on EMR and patient intake sheet.    Exam:  Physical Exam    Constitutional: Well appearing; no acute distress  Eyes: pupils are equal and round  Psych: normal affect  Respiratory: non-labored breathing  Cardiovascular: regular rate and rhythm  GI: non-distended abdomen  Musculoskeletal: no pain with range of motion of the hips bilaterally  Neurologic: [4+]/5 strength in the lower extremities bilaterally]; [negative] straight leg raise    Radiology:     X-rays demonstrate L4-5 spondylolisthesis with 13.2 mm of anterolisthesis.  MRI in 2023 demonstrates severe central canal and neuroforaminal stenosis at L4-5 in conjunction with her spondylolisthesis    Diagnosis:    Lumbar radiculopathy; lumbar stenosis; degenerative spondylolisthesis    Assessment and Plan:   59-year-old woman with lumbar radiculopathy secondary to an L4-5 degenerative spondylolisthesis and resulting canal and neuroforaminal stenosis.  We talked about short-term relief with steroid injections as that worked for her last time.  However, given the chronicity of her problem and her structural defect, surgical intervention will be the definitive fix for her problem when her quality of life becomes intolerable.  At that point, she would need physical therapy and smoking cessation.  I will see her back as necessary if the symptoms become intolerable.    The patient was in agreement with the plan. At the end of the visit today, the patient felt that all  questions had been answered satisfactorily.  The patient was pleased with the visit and very appreciative for the care rendered.     Thank you very much for the kind referral.  It is a privilege, and a pleasure, to partner with you in the care of your patients.  I would be delighted to assist you with any further consultations as needed.          Michele King MD    Chief of Spine Surgery, [facility]  Director of Spine Service, [facility]  , Department of Orthopaedics  [facility] [employer]  [address] [id number]  P: [phone]  [email or web address]    This note was dictated with voice recognition software.  It has not been proofread for grammatical errors, typographical mistakes or other semantic inconsistencies.

## 2025-03-11 NOTE — LETTER
March 11, 2025     Zane Ang PA-C  [address]  [address] [address]    Patient: Caitlyn Atkins   YOB: 1965   Date of Visit: 3/11/2025       Dear Dr. Zane Ang PA-C:    Thank you for referring Caitlyn Atkins to me for evaluation. Below are my notes for this consultation.  If you have questions, please do not hesitate to call me. I look forward to following your patient along with you.       Sincerely,     Michele King MD      CC: No Recipients  ______________________________________________________________________________________    HPI:Caitlyn Atkins is a pleasant 59-year-old woman who works at [employer] where she does a lot of repetitive lifting.  She has noticed increasing pain in her right buttock which radiates down the leg in an L4 distribution.  It is worse with bending.  In the past she has had steroid injections which have given her temporary relief.  She has not had recent injections.  She has not had recent physical therapy.  Patient is a smoker.      ROS:  Reviewed on EMR and patient intake sheet.    PMH/SH:  Reviewed on EMR and patient intake sheet.    Exam:  Physical Exam    Constitutional: Well appearing; no acute distress  Eyes: pupils are equal and round  Psych: normal affect  Respiratory: non-labored breathing  Cardiovascular: regular rate and rhythm  GI: non-distended abdomen  Musculoskeletal: no pain with range of motion of the hips bilaterally  Neurologic: [4+]/5 strength in the lower extremities bilaterally]; [negative] straight leg raise    Radiology:     X-rays demonstrate L4-5 spondylolisthesis with 13.2 mm of anterolisthesis.  MRI in 2023 demonstrates severe central canal and neuroforaminal stenosis at L4-5 in conjunction with her spondylolisthesis    Diagnosis:    Lumbar radiculopathy; lumbar stenosis; degenerative spondylolisthesis    Assessment and Plan:   59-year-old woman with lumbar radiculopathy secondary to an L4-5 degenerative  spondylolisthesis and resulting canal and neuroforaminal stenosis.  We talked about short-term relief with steroid injections as that worked for her last time.  However, given the chronicity of her problem and her structural defect, surgical intervention will be the definitive fix for her problem when her quality of life becomes intolerable.  At that point, she would need physical therapy and smoking cessation.  I will see her back as necessary if the symptoms become intolerable.    The patient was in agreement with the plan. At the end of the visit today, the patient felt that all questions had been answered satisfactorily.  The patient was pleased with the visit and very appreciative for the care rendered.     Thank you very much for the kind referral.  It is a privilege, and a pleasure, to partner with you in the care of your patients.  I would be delighted to assist you with any further consultations as needed.          Michele King MD    Chief of Spine Surgery, [facility]  Director of Spine Service, [facility]  , Department of Orthopaedics  [facility] [employer]  [id number] [place] [address]  P: [phone]  [facility]."[employer]    This note was dictated with voice recognition software.  It has not been proofread for grammatical errors, typographical mistakes or other semantic inconsistencies.

## 2025-03-26 ENCOUNTER — HOSPITAL ENCOUNTER (OUTPATIENT)
Dept: RADIOLOGY | Facility: CLINIC | Age: 60
Discharge: HOME | End: 2025-03-26
Payer: COMMERCIAL

## 2025-03-26 DIAGNOSIS — M84.361A STRESS FRACTURE OF RIGHT TIBIA, INITIAL ENCOUNTER: ICD-10-CM

## 2025-03-26 PROCEDURE — 73590 X-RAY EXAM OF LOWER LEG: CPT | Mod: RT

## 2025-03-28 ENCOUNTER — APPOINTMENT (OUTPATIENT)
Dept: ORTHOPEDIC SURGERY | Age: 60
End: 2025-03-28
Payer: COMMERCIAL

## 2025-03-28 VITALS — HEIGHT: 62 IN | BODY MASS INDEX: 18.4 KG/M2 | WEIGHT: 100 LBS

## 2025-03-28 DIAGNOSIS — M84.361A STRESS FRACTURE OF RIGHT TIBIA, INITIAL ENCOUNTER: Primary | ICD-10-CM

## 2025-03-28 PROCEDURE — 3008F BODY MASS INDEX DOCD: CPT | Performed by: SPECIALIST

## 2025-03-28 PROCEDURE — 99214 OFFICE O/P EST MOD 30 MIN: CPT | Performed by: SPECIALIST

## 2025-03-28 ASSESSMENT — ENCOUNTER SYMPTOMS
LOSS OF SENSATION IN FEET: 0
DEPRESSION: 0
OCCASIONAL FEELINGS OF UNSTEADINESS: 0

## 2025-03-28 ASSESSMENT — PAIN - FUNCTIONAL ASSESSMENT: PAIN_FUNCTIONAL_ASSESSMENT: NO/DENIES PAIN

## 2025-03-28 NOTE — LETTER
March 28, 2025     Patient: Caitlyn Atkins   YOB: 1965   Date of Visit: 3/28/2025       To Whom It May Concern:    It is my medical opinion that Caitlyn Atkins may return to work on 03/31/2025.    If you have any questions or concerns, please don't hesitate to call.         Sincerely,        Justin Aleman MD    CC: No Recipients

## 2025-03-28 NOTE — PROGRESS NOTES
Pt is a 60 Y/O Female presenting today for a follow up for right ankle pain and swelling. Pt was seen in Urgent care in [place] on 02/14/2025 No injury to foot. Patient states she has no concerns or complaints today. Patient wore a boot for one month and took advil and tylenol with improvement. Did not do PT or OT. Overall patient is doing well. XR done 3/26/2025    Exam: Right ankle is still mild diffuse swelling no erythema or ecchymosis.  Does have mild pain to palpation anterior joint line of the ankle no proximal tibial pain full range of motion of knee and ankle.  Distal neurovascular intact.  No other acute changes.    Radiographs: AP and lateral tib-fib today does show some increased calcification of the distal tibial metaphyseal region consistent with healing stress injury.    Healing distal tibial stress fracture.  She is weaned from her boot as her pain is under good control.  We will allow her to return to work on Monday, 3/31/2025.  Once again she should probably see her primary care about an up-to-date bone density test, she has been on Fosamax.

## 2025-04-03 ENCOUNTER — APPOINTMENT (OUTPATIENT)
Dept: PRIMARY CARE | Facility: CLINIC | Age: 60
End: 2025-04-03
Payer: COMMERCIAL

## 2025-04-03 VITALS
WEIGHT: 109.2 LBS | BODY MASS INDEX: 19.97 KG/M2 | SYSTOLIC BLOOD PRESSURE: 124 MMHG | TEMPERATURE: 97.5 F | HEART RATE: 81 BPM | OXYGEN SATURATION: 100 % | DIASTOLIC BLOOD PRESSURE: 80 MMHG

## 2025-04-03 DIAGNOSIS — M80.00XA POSTMENOPAUSAL OSTEOPOROSIS WITH PATHOLOGICAL FRACTURE: Primary | ICD-10-CM

## 2025-04-03 PROCEDURE — 3074F SYST BP LT 130 MM HG: CPT | Performed by: PHYSICIAN ASSISTANT

## 2025-04-03 PROCEDURE — 99214 OFFICE O/P EST MOD 30 MIN: CPT | Performed by: PHYSICIAN ASSISTANT

## 2025-04-03 PROCEDURE — 3079F DIAST BP 80-89 MM HG: CPT | Performed by: PHYSICIAN ASSISTANT

## 2025-04-03 RX ORDER — ALENDRONATE SODIUM 70 MG/1
70 TABLET ORAL
Qty: 4 TABLET | Refills: 5 | Status: SHIPPED | OUTPATIENT
Start: 2025-04-03

## 2025-04-03 NOTE — PROGRESS NOTES
Subjective   Patient ID: Caitlyn Atkins is a 59 y.o. female who presents for Ankle Injury (Follow up on Stress fracture of right tibia 2/28).    HPI     Has been seeing Dr Aleman for stress fx of right tibia (felt to be pathological fx).  Also had a fx of her right femur after stepping in a hole in December. Orthopedic told her to follow up here for DEXA scan.     Takes calcium and Vit D consistently. She was on alendronate since [id number] but states that she only took it about once per month (tolerated well). Dr Zhang wanted to switched her to Forteo (teriparatide ) in 2023 but only did those for a few months -- didn't like the shots.  Patient would like to restart alendronate for now.     Last DEXA 6/29/23.   Femur T-score -3.0  Femoral neck -3.2.   L-Spine -3.2     reports that she has been smoking cigarettes. She has never used smokeless tobacco.    Review of Systems    Objective   /80   Pulse 81   Temp 36.4 °C (97.5 °F)   Wt 49.5 kg (109 lb 3.2 oz)   SpO2 100%   BMI 19.97 kg/m²     Physical Exam  Vitals and nursing note reviewed.   Constitutional:       Appearance: Normal appearance. She is well-developed.   Eyes:      General: No scleral icterus.  Cardiovascular:      Rate and Rhythm: Normal rate and regular rhythm.   Pulmonary:      Effort: Pulmonary effort is normal.      Breath sounds: Normal breath sounds.   Abdominal:      Palpations: Abdomen is soft. There is no mass.      Tenderness: There is no abdominal tenderness.   Musculoskeletal:      Cervical back: Neck supple.   Skin:     General: Skin is warm and dry.   Neurological:      Mental Status: She is alert.         Assessment/Plan   Diagnoses and all orders for this visit:  Postmenopausal osteoporosis with pathological fracture  -     XR DEXA bone density; Future  -     alendronate (Fosamax) 70 mg tablet; Take 1 tablet (70 mg) by mouth every 7 days. Take in the morning with a full glass of water, on an empty stomach, and do not take  anything else by mouth or lie down for the next 30 min.  -     Referral to Endocrinology; Future       Get DEXA scan in May.   Restart Rx Alendronate despite taking this for 5 years previously especially since she didn't take this consistently during that time.   Take Calcium and Vit D3 consistently.  Referred to endocrinologist for further osteoporosis treatment.   Follow up prn.

## 2025-04-21 ENCOUNTER — APPOINTMENT (OUTPATIENT)
Dept: RADIOLOGY | Facility: HOSPITAL | Age: 60
DRG: 965 | End: 2025-04-21
Payer: COMMERCIAL

## 2025-04-21 ENCOUNTER — HOSPITAL ENCOUNTER (INPATIENT)
Facility: HOSPITAL | Age: 60
LOS: 1 days | Discharge: HOME | DRG: 965 | End: 2025-04-22
Attending: EMERGENCY MEDICINE | Admitting: SURGERY
Payer: COMMERCIAL

## 2025-04-21 DIAGNOSIS — S32.10XA CLOSED FRACTURE OF SACRUM, UNSPECIFIED PORTION OF SACRUM, INITIAL ENCOUNTER (MULTI): ICD-10-CM

## 2025-04-21 DIAGNOSIS — G89.29 ACUTE EXACERBATION OF CHRONIC LOW BACK PAIN: Primary | ICD-10-CM

## 2025-04-21 DIAGNOSIS — M54.50 ACUTE EXACERBATION OF CHRONIC LOW BACK PAIN: Primary | ICD-10-CM

## 2025-04-21 DIAGNOSIS — E87.6 HYPOKALEMIA: ICD-10-CM

## 2025-04-21 DIAGNOSIS — R29.6 MULTIPLE FALLS: ICD-10-CM

## 2025-04-21 LAB
ALBUMIN SERPL BCP-MCNC: 3.6 G/DL (ref 3.4–5)
ALBUMIN SERPL BCP-MCNC: 3.9 G/DL (ref 3.4–5)
ALP SERPL-CCNC: 209 U/L (ref 33–136)
ALT SERPL W P-5'-P-CCNC: 13 U/L (ref 7–45)
ANION GAP SERPL CALC-SCNC: 12 MMOL/L (ref 10–20)
ANION GAP SERPL CALC-SCNC: 14 MMOL/L
APPEARANCE UR: CLEAR
AST SERPL W P-5'-P-CCNC: 19 U/L (ref 9–39)
BASOPHILS # BLD AUTO: 0.1 X10*3/UL (ref 0–0.1)
BASOPHILS NFR BLD AUTO: 1.3 %
BILIRUB SERPL-MCNC: 0.4 MG/DL (ref 0–1.2)
BILIRUB UR STRIP.AUTO-MCNC: NEGATIVE MG/DL
BUN SERPL-MCNC: 13 MG/DL (ref 6–23)
BUN SERPL-MCNC: 14 MG/DL (ref 6–23)
CALCIUM SERPL-MCNC: 8.6 MG/DL (ref 8.6–10.6)
CALCIUM SERPL-MCNC: 8.9 MG/DL (ref 8.6–10.6)
CHLORIDE SERPL-SCNC: 107 MMOL/L (ref 98–107)
CHLORIDE SERPL-SCNC: 116 MMOL/L (ref 98–107)
CO2 SERPL-SCNC: 16 MMOL/L (ref 21–32)
CO2 SERPL-SCNC: 21 MMOL/L (ref 21–32)
COLOR UR: ABNORMAL
CREAT SERPL-MCNC: 0.85 MG/DL (ref 0.5–1.05)
CREAT SERPL-MCNC: 0.93 MG/DL (ref 0.5–1.05)
EGFRCR SERPLBLD CKD-EPI 2021: 71 ML/MIN/1.73M*2
EGFRCR SERPLBLD CKD-EPI 2021: 79 ML/MIN/1.73M*2
EOSINOPHIL # BLD AUTO: 0.35 X10*3/UL (ref 0–0.7)
EOSINOPHIL NFR BLD AUTO: 4.6 %
ERYTHROCYTE [DISTWIDTH] IN BLOOD BY AUTOMATED COUNT: 13.2 % (ref 11.5–14.5)
GLUCOSE SERPL-MCNC: 119 MG/DL (ref 74–99)
GLUCOSE SERPL-MCNC: 90 MG/DL (ref 74–99)
GLUCOSE UR STRIP.AUTO-MCNC: NORMAL MG/DL
HCT VFR BLD AUTO: 34.5 % (ref 36–46)
HGB BLD-MCNC: 11.9 G/DL (ref 12–16)
IMM GRANULOCYTES # BLD AUTO: 0.03 X10*3/UL (ref 0–0.7)
IMM GRANULOCYTES NFR BLD AUTO: 0.4 % (ref 0–0.9)
KETONES UR STRIP.AUTO-MCNC: NEGATIVE MG/DL
LEUKOCYTE ESTERASE UR QL STRIP.AUTO: ABNORMAL
LYMPHOCYTES # BLD AUTO: 0.87 X10*3/UL (ref 1.2–4.8)
LYMPHOCYTES NFR BLD AUTO: 11.5 %
MCH RBC QN AUTO: 33.2 PG (ref 26–34)
MCHC RBC AUTO-ENTMCNC: 34.5 G/DL (ref 32–36)
MCV RBC AUTO: 96 FL (ref 80–100)
MONOCYTES # BLD AUTO: 0.9 X10*3/UL (ref 0.1–1)
MONOCYTES NFR BLD AUTO: 11.9 %
NEUTROPHILS # BLD AUTO: 5.33 X10*3/UL (ref 1.2–7.7)
NEUTROPHILS NFR BLD AUTO: 70.3 %
NITRITE UR QL STRIP.AUTO: NEGATIVE
NRBC BLD-RTO: 0 /100 WBCS (ref 0–0)
PH UR STRIP.AUTO: 6.5 [PH]
PHOSPHATE SERPL-MCNC: 3.4 MG/DL (ref 2.5–4.9)
PLATELET # BLD AUTO: 408 X10*3/UL (ref 150–450)
POTASSIUM SERPL-SCNC: 2.6 MMOL/L (ref 3.5–5.3)
POTASSIUM SERPL-SCNC: 3.1 MMOL/L (ref 3.5–5.3)
PROT SERPL-MCNC: 7.3 G/DL (ref 6.4–8.2)
PROT UR STRIP.AUTO-MCNC: ABNORMAL MG/DL
RBC # BLD AUTO: 3.58 X10*6/UL (ref 4–5.2)
RBC # UR STRIP.AUTO: NEGATIVE MG/DL
RBC #/AREA URNS AUTO: NORMAL /HPF
SODIUM SERPL-SCNC: 139 MMOL/L (ref 136–145)
SODIUM SERPL-SCNC: 141 MMOL/L (ref 136–145)
SP GR UR STRIP.AUTO: 1.01
SQUAMOUS #/AREA URNS AUTO: NORMAL /HPF
UROBILINOGEN UR STRIP.AUTO-MCNC: NORMAL MG/DL
WBC # BLD AUTO: 7.6 X10*3/UL (ref 4.4–11.3)
WBC #/AREA URNS AUTO: NORMAL /HPF

## 2025-04-21 PROCEDURE — 72131 CT LUMBAR SPINE W/O DYE: CPT | Performed by: RADIOLOGY

## 2025-04-21 PROCEDURE — 2500000001 HC RX 250 WO HCPCS SELF ADMINISTERED DRUGS (ALT 637 FOR MEDICARE OP)

## 2025-04-21 PROCEDURE — 80053 COMPREHEN METABOLIC PANEL: CPT

## 2025-04-21 PROCEDURE — 36415 COLL VENOUS BLD VENIPUNCTURE: CPT

## 2025-04-21 PROCEDURE — 73502 X-RAY EXAM HIP UNI 2-3 VIEWS: CPT | Mod: RT

## 2025-04-21 PROCEDURE — 72128 CT CHEST SPINE W/O DYE: CPT | Performed by: RADIOLOGY

## 2025-04-21 PROCEDURE — 73523 X-RAY EXAM HIPS BI 5/> VIEWS: CPT | Mod: LEFT SIDE | Performed by: RADIOLOGY

## 2025-04-21 PROCEDURE — 87086 URINE CULTURE/COLONY COUNT: CPT

## 2025-04-21 PROCEDURE — 74176 CT ABD & PELVIS W/O CONTRAST: CPT

## 2025-04-21 PROCEDURE — 73552 X-RAY EXAM OF FEMUR 2/>: CPT | Mod: RT

## 2025-04-21 PROCEDURE — 73552 X-RAY EXAM OF FEMUR 2/>: CPT | Mod: LEFT SIDE | Performed by: RADIOLOGY

## 2025-04-21 PROCEDURE — 96375 TX/PRO/DX INJ NEW DRUG ADDON: CPT

## 2025-04-21 PROCEDURE — 99285 EMERGENCY DEPT VISIT HI MDM: CPT | Mod: 25

## 2025-04-21 PROCEDURE — 84100 ASSAY OF PHOSPHORUS: CPT | Performed by: PHYSICIAN ASSISTANT

## 2025-04-21 PROCEDURE — 80069 RENAL FUNCTION PANEL: CPT | Mod: CCI | Performed by: PHYSICIAN ASSISTANT

## 2025-04-21 PROCEDURE — 96366 THER/PROPH/DIAG IV INF ADDON: CPT

## 2025-04-21 PROCEDURE — 70450 CT HEAD/BRAIN W/O DYE: CPT | Performed by: RADIOLOGY

## 2025-04-21 PROCEDURE — 2500000004 HC RX 250 GENERAL PHARMACY W/ HCPCS (ALT 636 FOR OP/ED): Mod: JZ

## 2025-04-21 PROCEDURE — 72125 CT NECK SPINE W/O DYE: CPT

## 2025-04-21 PROCEDURE — 73502 X-RAY EXAM HIP UNI 2-3 VIEWS: CPT | Mod: LT

## 2025-04-21 PROCEDURE — 36415 COLL VENOUS BLD VENIPUNCTURE: CPT | Performed by: PHYSICIAN ASSISTANT

## 2025-04-21 PROCEDURE — 85025 COMPLETE CBC W/AUTO DIFF WBC: CPT

## 2025-04-21 PROCEDURE — 99285 EMERGENCY DEPT VISIT HI MDM: CPT | Performed by: EMERGENCY MEDICINE

## 2025-04-21 PROCEDURE — 72125 CT NECK SPINE W/O DYE: CPT | Performed by: RADIOLOGY

## 2025-04-21 PROCEDURE — 2500000002 HC RX 250 W HCPCS SELF ADMINISTERED DRUGS (ALT 637 FOR MEDICARE OP, ALT 636 FOR OP/ED)

## 2025-04-21 PROCEDURE — 81001 URINALYSIS AUTO W/SCOPE: CPT

## 2025-04-21 PROCEDURE — 72128 CT CHEST SPINE W/O DYE: CPT

## 2025-04-21 PROCEDURE — 73552 X-RAY EXAM OF FEMUR 2/>: CPT | Mod: LT

## 2025-04-21 PROCEDURE — 96376 TX/PRO/DX INJ SAME DRUG ADON: CPT

## 2025-04-21 PROCEDURE — 70450 CT HEAD/BRAIN W/O DYE: CPT

## 2025-04-21 PROCEDURE — 74176 CT ABD & PELVIS W/O CONTRAST: CPT | Performed by: RADIOLOGY

## 2025-04-21 PROCEDURE — 1210000001 HC SEMI-PRIVATE ROOM DAILY

## 2025-04-21 PROCEDURE — 96365 THER/PROPH/DIAG IV INF INIT: CPT

## 2025-04-21 PROCEDURE — 99285 EMERGENCY DEPT VISIT HI MDM: CPT | Mod: 25 | Performed by: EMERGENCY MEDICINE

## 2025-04-21 RX ORDER — METHOCARBAMOL 100 MG/ML
500 INJECTION, SOLUTION INTRAMUSCULAR; INTRAVENOUS ONCE
Status: COMPLETED | OUTPATIENT
Start: 2025-04-21 | End: 2025-04-21

## 2025-04-21 RX ORDER — POTASSIUM CHLORIDE 14.9 MG/ML
20 INJECTION INTRAVENOUS
Status: COMPLETED | OUTPATIENT
Start: 2025-04-21 | End: 2025-04-21

## 2025-04-21 RX ORDER — ACETAMINOPHEN 325 MG/1
975 TABLET ORAL EVERY 8 HOURS SCHEDULED
Status: DISCONTINUED | OUTPATIENT
Start: 2025-04-21 | End: 2025-04-21

## 2025-04-21 RX ORDER — ACETAMINOPHEN 325 MG/1
975 TABLET ORAL ONCE
Status: COMPLETED | OUTPATIENT
Start: 2025-04-21 | End: 2025-04-21

## 2025-04-21 RX ORDER — POTASSIUM CHLORIDE 20 MEQ/1
20 TABLET, EXTENDED RELEASE ORAL DAILY
Status: DISCONTINUED | OUTPATIENT
Start: 2025-04-21 | End: 2025-04-22

## 2025-04-21 RX ADMIN — HYDROMORPHONE HYDROCHLORIDE 0.5 MG: 1 INJECTION, SOLUTION INTRAMUSCULAR; INTRAVENOUS; SUBCUTANEOUS at 10:48

## 2025-04-21 RX ADMIN — METHOCARBAMOL 500 MG: 1000 INJECTION, SOLUTION INTRAMUSCULAR; INTRAVENOUS at 14:33

## 2025-04-21 RX ADMIN — ACETAMINOPHEN 975 MG: 325 TABLET ORAL at 17:12

## 2025-04-21 RX ADMIN — POTASSIUM CHLORIDE 20 MEQ: 1500 TABLET, EXTENDED RELEASE ORAL at 12:52

## 2025-04-21 RX ADMIN — METHOCARBAMOL 500 MG: 1000 INJECTION, SOLUTION INTRAMUSCULAR; INTRAVENOUS at 17:11

## 2025-04-21 RX ADMIN — ACETAMINOPHEN 975 MG: 325 TABLET ORAL at 21:29

## 2025-04-21 RX ADMIN — POTASSIUM CHLORIDE 20 MEQ: 14.9 INJECTION, SOLUTION INTRAVENOUS at 14:40

## 2025-04-21 RX ADMIN — ACETAMINOPHEN 975 MG: 325 TABLET ORAL at 10:48

## 2025-04-21 RX ADMIN — HYDROMORPHONE HYDROCHLORIDE 0.5 MG: 0.5 INJECTION, SOLUTION INTRAMUSCULAR; INTRAVENOUS; SUBCUTANEOUS at 14:32

## 2025-04-21 RX ADMIN — HYDROMORPHONE HYDROCHLORIDE 0.5 MG: 1 INJECTION, SOLUTION INTRAMUSCULAR; INTRAVENOUS; SUBCUTANEOUS at 21:29

## 2025-04-21 RX ADMIN — HYDROMORPHONE HYDROCHLORIDE 0.5 MG: 1 INJECTION, SOLUTION INTRAMUSCULAR; INTRAVENOUS; SUBCUTANEOUS at 17:12

## 2025-04-21 RX ADMIN — POTASSIUM CHLORIDE 20 MEQ: 14.9 INJECTION, SOLUTION INTRAVENOUS at 12:52

## 2025-04-21 ASSESSMENT — PAIN DESCRIPTION - LOCATION
LOCATION: HIP

## 2025-04-21 ASSESSMENT — PAIN DESCRIPTION - ORIENTATION
ORIENTATION: RIGHT;LEFT

## 2025-04-21 ASSESSMENT — ENCOUNTER SYMPTOMS
PALPITATIONS: 0
CHEST TIGHTNESS: 0
NAUSEA: 0
PHOTOPHOBIA: 0
LIGHT-HEADEDNESS: 0
BACK PAIN: 1
ABDOMINAL PAIN: 0
UNEXPECTED WEIGHT CHANGE: 0
DIZZINESS: 0
COUGH: 0
COLOR CHANGE: 0
MYALGIAS: 1
FATIGUE: 0
ACTIVITY CHANGE: 0
RECTAL PAIN: 0
NERVOUS/ANXIOUS: 0
FEVER: 0
SHORTNESS OF BREATH: 0
TREMORS: 0
NUMBNESS: 0
SLEEP DISTURBANCE: 0
AGITATION: 0
CHILLS: 0
VOMITING: 0
SPEECH DIFFICULTY: 0

## 2025-04-21 ASSESSMENT — COLUMBIA-SUICIDE SEVERITY RATING SCALE - C-SSRS
2. HAVE YOU ACTUALLY HAD ANY THOUGHTS OF KILLING YOURSELF?: NO
1. IN THE PAST MONTH, HAVE YOU WISHED YOU WERE DEAD OR WISHED YOU COULD GO TO SLEEP AND NOT WAKE UP?: NO
6. HAVE YOU EVER DONE ANYTHING, STARTED TO DO ANYTHING, OR PREPARED TO DO ANYTHING TO END YOUR LIFE?: NO

## 2025-04-21 ASSESSMENT — LIFESTYLE VARIABLES
HAVE PEOPLE ANNOYED YOU BY CRITICIZING YOUR DRINKING: NO
HAVE YOU EVER FELT YOU SHOULD CUT DOWN ON YOUR DRINKING: NO
EVER HAD A DRINK FIRST THING IN THE MORNING TO STEADY YOUR NERVES TO GET RID OF A HANGOVER: NO
EVER FELT BAD OR GUILTY ABOUT YOUR DRINKING: NO
TOTAL SCORE: 0

## 2025-04-21 ASSESSMENT — PAIN SCALES - GENERAL
PAINLEVEL_OUTOF10: 8
PAINLEVEL_OUTOF10: 9
PAINLEVEL_OUTOF10: 10 - WORST POSSIBLE PAIN
PAINLEVEL_OUTOF10: 8
PAINLEVEL_OUTOF10: 10 - WORST POSSIBLE PAIN
PAINLEVEL_OUTOF10: 10 - WORST POSSIBLE PAIN

## 2025-04-21 ASSESSMENT — PAIN - FUNCTIONAL ASSESSMENT: PAIN_FUNCTIONAL_ASSESSMENT: 0-10

## 2025-04-21 NOTE — ED PROVIDER NOTES
History of Present Illness     History provided by: Patient  Limitations to History: None  External Records Reviewed with Brief Summary: Outpatient progress note from orthopedic surgery on 3/11/2025 which showed patient had previous imaging demonstrating an L4-5 spondylolisthesis.  MRI in April 2023 shows moderate to severe spinal canal stenosis and moderate bilateral neural foraminal stenosis.    HPI:  Caitlyn Atkins is a 59 y.o. female with MS and osteoporosis presenting for frequent falls and low back pain.  Patient reports that she has been having frequent falls in the past few weeks.  3 weeks ago she had a fall onto her tailbone and then 3 days ago she fell again on her left side.  Patient does note that she has foot drop from her MS and leg length discrepancy after her right hip replacement.  She reports since this most recent fall 3 days ago, she has had difficulty ambulating even with her walker.  She has significant pain with bearing weight on both extremities and feels like her legs are getting give out from underneath her.  She denies fever, chest pain, shortness of breath, abdominal pain.  Patient also endorses decreased oral intake as she is having difficulty getting around the kitchen to make herself food and her  is also currently hospitalized, causing increased stress.    Patient denies IV drug use.  She smokes about a pack of cigarettes a day.      Physical Exam   Triage vitals:  T 36.6 °C (97.9 °F)  HR 85  /73  RR 17  O2 100 % None (Room air)    General: Awake, alert, in no acute distress  Eyes: Gaze conjugate. EOMI.   HENT: Normo-cephalic, atraumatic. No stridor.   CV: Regular rate, regular rhythm. Radial pulses 2+ bilaterally, DP pulses 2+ bilaterally.   Resp: Breathing non-labored, speaking in full sentences.  Clear to auscultation bilaterally, mild diminished breath sounds. No wheezing, rales, rhonchi.   GI: Soft, non-distended. No rebound or guarding.  Mild tenderness to  palpation of the suprapubic region.  MSK/Extremities: No gross bony deformities.  Tenderness to palpation over left and right greater trochanteric processes.  Tenderness to palpation of right lumbar region.  No midline lumbar pain.  Positive straight leg raise on left.  Hip pain with straight leg raise on right.  Skin: Warm. Appropriate color.   Neuro: Alert and oriented x3. Face symmetric. Speech is fluent.  5/5 motor strength in bilateral lower extremities, although painful.  Intact sensation bilaterally.  Psych: Appropriate mood and affect      Medical Decision Making & ED Course   Medical Decision Makin y.o. female with MS and osteoporosis presenting with back pain, lower extremity pain and decreased ability to walk after multiple falls.  On exam, patient is hemodynamically stable, afebrile.  Her exam is notable for tenderness to palpation over left and right greater trochanteric processes, tenderness to palpation of right lumbar region. Positive straight leg raise on left.  Hip pain with straight leg raise on right.  She has intact motor strength and sensation bilaterally.  Differential diagnosis includes but is not limited to cauda equina, fracture, epidural abscess, electrolyte abnormalities, UTI.  Lower clinical concern for cauda equina as patient is not endorsing urinary incontinence or saddle anesthesia.  Will obtain bilateral x-rays of femur and hip.  Will also obtain CT lumbar spine and abdomen pelvis to further assess the pelvic region for occult fractures.  Given decreased oral intake and reported weakness, will obtain CBC and CMP in addition to UA to assess for urinary tract infection, although lower suspicion as patient is not endorsing urinary symptoms.      ED Course:  ED Course as of 25 1456      1128 CBC and Auto Differential(!)  No leukocytosis.  Hemoglobin 11.9, no recent hemoglobin to compare. [AC]   1156 Supervising resident attestation:    Patient is a 59-year-old  female with a past medical history of osteoporosis, L4-5 spondylolisthesis, and MS who presents to the ED for falls and hip pain.  Patient notes that she has had 2 falls over the past couple weeks.  She notes that she feels significantly off balance and has been using a walker.  No significant lower back pain that radiates down her legs bilaterally as well as bilateral hip pain.  Patient hemodynamically stable.  5/5 strength throughout the bilateral lower extremities besides 4/5 left hip flexion.  BLE NVI.  TTP of the bilateral hips and midline lumbar spine.  Positive straight leg raise bilaterally.  No cross leg pain during straight leg raise.  No red flag symptoms noted.  Low concern for cauda equina.  No infectious symptoms noted.  X-rays of the bilateral hips and femurs ordered.  CT of the abdomen pelvis as well as lumbar spine ordered given fall with osteoporosis and history of fractures.  Analgesia administered.  Basic labs ordered in anticipation of patient requiring admission for PT/OT as well as nursing facility placement.    Independently seen and evaluated.  In agreement with resident plan. [MH]   1210 Comprehensive metabolic panel(!!)  Potassium 2.6.  Patient denies nausea, vomiting, diarrhea.  She does endorse decreased oral intake due to pain and inability to make herself food in addition to running back and forth between home in the hospital since her  is currently admitted. [AC]   1213 XR femur right 2+ views  IMPRESSION:  1. No acute fracture or malalignment of the pelvis, right femur, or  the right hip.  2. Right hip arthroplasty changes noted with redemonstration of  disconnected lateral/superior acetabular screw similar to prior.  Otherwise, no hardware complication.   [AC]   1214 XR femur left 2+ views  IMPRESSION:  1. No acute fracture or malalignment of the left hip or left femur.   [AC]   1246 Patient reported fall on 3/31/2025 from standing position onto buttocks and fall again on  4/19/2025 from standing position onto her right hip that she attributes to uneven leg length following previous surgery.  No head strike, loss of consciousness, headache, vision changes, neck pain.  Reports no associated chest pain, shortness of breath, abdominal pain.  Describes severe pain with bilateral hip flexion right greater than left.  Typically able to walk without difficulty however for the past several days after the fall has been using a cane or a walker.  No incontinence or retention or sensory changes of upper or lower extremities.  Hip flexion strength symmetric and likely limited due to pain but able to lift heels off of stretcher.  Flexes knees bilaterally to approximately 90 degrees.  Sensation to light touch intact upper and lower extremities. [MC]   1334 Patient ambulating with walker. []   1345 CT lumbar spine retrospective reconstruction protocol  IMPRESSION:  1. Age indeterminate fractures are present within the sacrum in a  pattern that can be seen with sacral insufficiency fractures. The  angulation at S2-S3 is new when compared to the x-ray of March 10,  2025.  2. Unchanged anterolisthesis L4 on L5 compared to prior lumbar  radiograph. There is likely mild spinal canal and neural foraminal  stenosis at this level due to disc uncovering, better evaluated with  MR lumbar spine if there is ongoing clinical concern.   [AC]   1359 Urinalysis with Reflex Culture and Microscopic(!)  UA has some leukocytes, no bacteria and minimal WBCs on microscopic.  Lower concern for urinary tract infection.  Will not treat at this time. [AC]   1400 Given sacral findings, trauma consulted.  Will also discuss with Ortho if there is any interventions needed. Also discussed with orthopedic surgery.  [AC]      ED Course User Index  [AC] Smooth Vogel DO  [] Lalit Lovelace MD  [] Dalton Duff MD         Diagnoses as of 04/21/25 1456   Acute exacerbation of chronic low back pain   Multiple falls    Hypokalemia       EKG Independent Interpretation: EKG not obtained      The patient was discussed with the following consultants/services:  Trauma surgery was consulted given her radiographic findings of the sacrum.  Orthopedic surgery was also consulted for assessment of any interventions needed.  ----         Independent Result Review and Interpretation: Relevant laboratory and radiographic results were reviewed and independently interpreted by myself.  As necessary, they are commented on in the ED Course.    Chronic conditions affecting the patient's care: As documented above in MDM    Care Considerations: As documented above in MDM      Disposition   Patient was signed out to oncoming provider at 15:00 pending completion of their work-up.  Please see the next provider's transition of care note for the remainder of the patient's care.     Procedures   Procedures        Smooth Vogel DO  Emergency Medicine PGY1     Smooth Vogel DO  Resident  04/21/25 2312

## 2025-04-21 NOTE — PROGRESS NOTES
"Emergency Department Transition of Care Note       Signout   I received Caitlyn Atkins in signout from Dr. Vogel.  Please see the ED Provider Note for all HPI, PE and MDM up to the time of signout at 1500.  This is in addition to the primary record.    In brief Caitlyn Atkins is an 59 y.o. female presenting for frequent falls and LBP    At the time of signout we were awaiting:  Trauma. NSGY, ortho    ED Course & Medical Decision Making   Medical Decision Making:  Under my care, pt was able to ambulate to the bathroom with her walker and assistance. Pt reported excruciating pain after the walk. NSGY and ortho consulted; no plans for intervention from either service. CT head, CT cervical and CT thoracic spine with findings significant for \"The right frontal calvarium is expanded and demonstrates irregular contours. While indeterminate, this is concerning for a neoplastic process. Would recommend follow-up with a bone scan to evaluate for metabolic activity.\" Pt will require outpatient follow-up for this.    Given pt's new and recurrent falls, and her pain not well controlled, she will require admission to the hospital for PT/OT.     ED Course:  ED Course as of 04/21/25 1538   Mon Apr 21, 2025   1128 CBC and Auto Differential(!)  No leukocytosis.  Hemoglobin 11.9, no recent hemoglobin to compare. [AC]   1156 Supervising resident attestation:    Patient is a 59-year-old female with a past medical history of osteoporosis, L4-5 spondylolisthesis, and MS who presents to the ED for falls and hip pain.  Patient notes that she has had 2 falls over the past couple weeks.  She notes that she feels significantly off balance and has been using a walker.  No significant lower back pain that radiates down her legs bilaterally as well as bilateral hip pain.  Patient hemodynamically stable.  5/5 strength throughout the bilateral lower extremities besides 4/5 left hip flexion.  BLE NVI.  TTP of the bilateral hips and midline " lumbar spine.  Positive straight leg raise bilaterally.  No cross leg pain during straight leg raise.  No red flag symptoms noted.  Low concern for cauda equina.  No infectious symptoms noted.  X-rays of the bilateral hips and femurs ordered.  CT of the abdomen pelvis as well as lumbar spine ordered given fall with osteoporosis and history of fractures.  Analgesia administered.  Basic labs ordered in anticipation of patient requiring admission for PT/OT as well as nursing facility placement.    Independently seen and evaluated.  In agreement with resident plan. [MH]   1210 Comprehensive metabolic panel(!!)  Potassium 2.6.  Patient denies nausea, vomiting, diarrhea.  She does endorse decreased oral intake due to pain and inability to make herself food in addition to running back and forth between home in the hospital since her  is currently admitted. [AC]   1213 XR femur right 2+ views  IMPRESSION:  1. No acute fracture or malalignment of the pelvis, right femur, or  the right hip.  2. Right hip arthroplasty changes noted with redemonstration of  disconnected lateral/superior acetabular screw similar to prior.  Otherwise, no hardware complication.   [AC]   1214 XR femur left 2+ views  IMPRESSION:  1. No acute fracture or malalignment of the left hip or left femur.   [AC]   1246 Patient reported fall on 3/31/2025 from standing position onto buttocks and fall again on 4/19/2025 from standing position onto her right hip that she attributes to uneven leg length following previous surgery.  No head strike, loss of consciousness, headache, vision changes, neck pain.  Reports no associated chest pain, shortness of breath, abdominal pain.  Describes severe pain with bilateral hip flexion right greater than left.  Typically able to walk without difficulty however for the past several days after the fall has been using a cane or a walker.  No incontinence or retention or sensory changes of upper or lower extremities.   Hip flexion strength symmetric and likely limited due to pain but able to lift heels off of stretcher.  Flexes knees bilaterally to approximately 90 degrees.  Sensation to light touch intact upper and lower extremities. []   1334 Patient ambulating with walker. []   1345 CT lumbar spine retrospective reconstruction protocol  IMPRESSION:  1. Age indeterminate fractures are present within the sacrum in a  pattern that can be seen with sacral insufficiency fractures. The  angulation at S2-S3 is new when compared to the x-ray of March 10,  2025.  2. Unchanged anterolisthesis L4 on L5 compared to prior lumbar  radiograph. There is likely mild spinal canal and neural foraminal  stenosis at this level due to disc uncovering, better evaluated with  MR lumbar spine if there is ongoing clinical concern.   [AC]   1359 Urinalysis with Reflex Culture and Microscopic(!)  UA has some leukocytes, no bacteria and minimal WBCs on microscopic.  Lower concern for urinary tract infection.  Will not treat at this time. [AC]   1400 Given sacral findings, trauma consulted.  Will also discuss with Ortho if there is any interventions needed. Also discussed with orthopedic surgery.  [AC]      ED Course User Index  [AC] Smooth Vogel DO  [] Lalit Lovelace MD  [] Dalton Duff MD         Diagnoses as of 04/21/25 1538   Acute exacerbation of chronic low back pain   Multiple falls   Hypokalemia       Disposition   Admit    Procedures   Procedures    Patient seen and discussed with ED attending physician.    Anita Baird MD  Emergency Medicine

## 2025-04-21 NOTE — H&P
"[facility]  TRAUMA SERVICE - HISTORY AND PHYSICAL / CONSULT    Patient Name: Caitlyn Atkins  MRN: [id number]  Admit Date: 421  : 1965  AGE: 59 y.o.   GENDER: female  ==============================================================================  MECHANISM OF INJURY / CHIEF COMPLAINT:  59-yo female w/ a pmhx of r. Total hip replacement, multiple sclerosis, osteoporosis, and lumbar radiculopathy presents with worsening low back pain following two recent falls. Pt sts that three weeks ago she tripped over the walking boot she was wearing on her right foot--placed for a right distal tibial stress fracture--and landed on her tailbone. She experienced another ground level fall x3 days PTA, landing on her left side after tripping over her left leg, which she reports has \"been longer than [her] right since [her] right hip replacement in 2023. She reports difficulty with ambulation due to pain with bearing weight on both extremities.     She reports constant, diffuse pain throughout her pelvis since the second fall on Friday that is unrelieved by pain medications. She denies fever, chest pain, shortness of breath, or abdominal pain.    She initially delayed seeking care, as she was focused on caring for her  who has cancer and was recently admitted to this hospital.     LOC (yes/no?): no  Anticoagulant / Anti-platelet Rx? (for what dx?): no   Referring Facility Name (N/A for scene EMR run): n/a     INJURIES:   Mildly displaced sacral fracture.   Age-indeterminate sacral fractures w sacral insufficiency fractures. New angulation at S2/3 compared to 3/2025 XR  L4 on L5 anterolisthesis.     OTHER MEDICAL PROBLEMS:  Hypokalemia   Falls   Hx of Healing Osteoporosis, Multiple sclerosis, Sciatic nerve pain, R. Distal tibial stress fx, Lumbar; radiculopathy; lumbar stenosis; degenerative spondylolisthesis .    INCIDENTAL FINDINGS:  ***pending " CTs    ==============================================================================  ADMISSION PLAN OF CARE:  #Mildly displaced sacral fx  -PT/OT when appropriate  -multimodal pain control  -Orthopedic surgery consulted: ***    #Falls  -CT c spine, CT head, CT Thoracic spine ordered  -orthostatic vitals    #L4 on L5 anterolisthesis  -Neurosurgery consulted: ***    #Hypokalemia   -Repleted in the ED  -Re-check PM RFP     #FEN/GI/  -NPO until scans completed  -IVF if NPO overnight  -Bowel regimen as indicated     Discussed with Dr. Jimenez.    CORBIN Dawkins  Addended by Taylor Rivers PA-C and ***  Trauma Surgery  Team Phone: [phone]      ==============================================================================  PAST MEDICAL HISTORY:   PMH: h/o osteoporosis, MS, sciatic nerve pain, healing r. Distal tibial stress fx,   Last menstrual period: postmenopausal  PSH:   : R. Hip replacement  : Hysterectomy  : ORIF of left arm  :     FH:   Diabetes Maternal Grandmother   Breast Cancer Maternal Grandfather   Cancer Mother   other (high blood pressure) Father   other (heart disease/stroke) Maternal Grandfather     SOCIAL HISTORY:    Smokin PPD     Alcohol: Occasional    Drug use: Denies    MEDICATIONS:   Prior to Admission medications    Medication Sig Start Date End Date Taking? Authorizing Provider   acetaminophen (Tylenol) 325 mg tablet Take 2 tablets (650 mg) by mouth every 4 hours.    Historical Provider, MD   alendronate (Fosamax) 70 mg tablet Take 1 tablet (70 mg) by mouth every 7 days. Take in the morning with a full glass of water, on an empty stomach, and do not take anything else by mouth or lie down for the next 30 min. 4/3/25   KESHA Skinner complex 0.4 mg tablet Take 1 tablet by mouth once daily.    Historical Provider, MD   calcium carbonate (CALCIUM 500 ORAL) Take by mouth.    Historical Provider, MD   cholecalciferol (Vitamin D-3) 50 MCG (2000)  tablet Take 1 tablet (2,000 Units) by mouth once daily. 1/7/18   Historical Provider, MD   diroximel fumarate (Vumerity) 231 mg capsule,delayed release(DR/EC) Take 2 capsules by mouth 2 times a day. 12/28/23   Kimberly Gonzales APRN-CNP   gabapentin (Neurontin) 100 mg capsule Take 1 capsule (100 mg) by mouth 3 times a day. 2/25/25 5/26/25  Viet Maher MD   HYDROcodone-acetaminophen (Norco) 7.5-325 mg tablet Take 1 tablet by mouth every 6 hours if needed for severe pain (7 - 10) for up to 28 days. Do not fill before March 27, 2025. 3/27/25 4/24/25  Viet Maher MD   ibuprofen 600 mg tablet Take 1 tablet (600 mg) by mouth every 6 hours if needed (PAIN).    Historical Provider, MD   metoprolol succinate XL (Toprol-XL) 25 mg 24 hr tablet Take 1 tablet (25 mg) by mouth once daily. Do not crush or chew. 1/10/24 1/9/25  Lisa Diaz APRN-CNP   naloxone (Narcan) 4 mg/0.1 mL nasal spray Administer 1 spray (4 mg) into affected nostril(s) if needed for opioid reversal. May repeat every 2-3 minutes if needed, alternating nostrils, until medical assistance becomes available.    Historical Provider, MD   omeprazole OTC (PriLOSEC OTC) 20 mg EC tablet Take 1 tablet (20 mg) by mouth early in the morning..    Historical Provider, MD   tiZANidine (Zanaflex) 2 mg tablet Take 1 tablet (2 mg) by mouth once daily as needed for muscle spasms for up to 28 days. 2/25/25 3/25/25  Viet Maher MD     ALLERGIES: No known allergies      REVIEW OF SYSTEMS:  Review of Systems   Constitutional:  Negative for activity change, chills, fatigue, fever and unexpected weight change.   HENT: Negative.     Eyes:  Negative for photophobia and visual disturbance.   Respiratory:  Negative for cough, chest tightness and shortness of breath.    Cardiovascular:  Negative for chest pain, palpitations and leg swelling.   Gastrointestinal:  Negative for abdominal pain, nausea, rectal pain and vomiting.   Musculoskeletal:  Positive for back pain and  myalgias.   Skin:  Negative for color change.   Neurological:  Negative for dizziness, tremors, syncope, speech difficulty, light-headedness and numbness.   Psychiatric/Behavioral:  Negative for agitation, sleep disturbance and suicidal ideas. The patient is not nervous/anxious.        Physical Exam  Constitutional:       Appearance: Normal appearance.   HENT:      Head: Normocephalic and atraumatic.      Nose: Nose normal.   Eyes:      Extraocular Movements: Extraocular movements intact.      Pupils: Pupils are equal, round, and reactive to light.   Cardiovascular:      Rate and Rhythm: Normal rate and regular rhythm.      Pulses: Normal pulses.      Heart sounds: Normal heart sounds.   Pulmonary:      Effort: Pulmonary effort is normal.      Breath sounds: Normal breath sounds.   Abdominal:      General: Abdomen is flat.      Palpations: Abdomen is soft.   Musculoskeletal:         General: Tenderness (R. ankle & pelvis) present. No swelling or deformity.      Right shoulder: Normal.      Left shoulder: Normal.      Right upper arm: Normal.      Left upper arm: Normal.      Right elbow: Normal.      Left elbow: Normal.      Right forearm: Normal.      Left forearm: Normal.      Right wrist: Normal.      Left wrist: Normal.      Right hand: Normal.      Left hand: Normal.      Cervical back: Normal and neck supple. No swelling, deformity, rigidity or tenderness. Normal range of motion.      Thoracic back: No edema, deformity or signs of trauma. Normal range of motion.      Lumbar back: No deformity. Normal range of motion.      Right lower leg: No edema.      Left lower leg: No edema.      Comments: 5/5 strength in both feet. Full sensation intact bilaterally.    Skin:     General: Skin is warm and dry.   Neurological:      General: No focal deficit present.      Mental Status: She is alert and oriented to person, place, and time.   Psychiatric:         Mood and Affect: Mood normal.         Behavior: Behavior  normal.       IMAGING SUMMARY:   CT Lumbar spine: Age indeterminate fractures are present within the sacrum  Unchanged anterolisthesis L4 on L5 compared to prior lumbar  radiograph.  CT Chest/Abd/Pelvis: Mildly displaced sacral fracture. L4 on L5 anterolisthesis.   CT cervical spine: ***  CT Head:***  CT Thoracic spine: ***    LABS:  Results from last 7 days   Lab Units 04/21/25  1044   WBC AUTO x10*3/uL 7.6   HEMOGLOBIN g/dL 11.9*   HEMATOCRIT % 34.5*   PLATELETS AUTO x10*3/uL 408   NEUTROS PCT AUTO % 70.3   LYMPHS PCT AUTO % 11.5   MONOS PCT AUTO % 11.9   EOS PCT AUTO % 4.6         Results from last 7 days   Lab Units 04/21/25  1044   SODIUM mmol/L 139   POTASSIUM mmol/L 2.6*   CHLORIDE mmol/L 107   CO2 mmol/L 21   BUN mg/dL 13   CREATININE mg/dL 0.93   CALCIUM mg/dL 8.9   PROTEIN TOTAL g/dL 7.3   BILIRUBIN TOTAL mg/dL 0.4   ALK PHOS U/L 209*   ALT U/L 13   AST U/L 19   GLUCOSE mg/dL 119*     Results from last 7 days   Lab Units 04/21/25  1044   BILIRUBIN TOTAL mg/dL 0.4           I have reviewed all laboratory and imaging results ordered/pertinent for this encounter.

## 2025-04-21 NOTE — CONSULTS
Inpatient consult to Neurosurgery  Consult performed by: Myke Carter MD  Consult ordered by: Josef Mcintyre MD          Reason For Consult  Sacral fx    History Of Present Illness  Caitlyn Atkins is a 59 y.o. female presenting with h/o osteoporosis, p/w fall, LBP, 4/21 CT LS healing sacral insufficiency fracture with angulation, grade 1 L4-5 spondy, CTH thin RF chronic SDH. Patient states she has been having falls since her hip surgery due to feeling of one leg being shorter than other. She follows with Fernando with ortho spine for L4-5 spondy. She had fall today and has worsening back pain and radiculopathy. No b/b sx.     Past Medical History  She has a past medical history of Anesthesia of skin, DDD (degenerative disc disease), lumbar, Multiple sclerosis (Multi), Osteoporosis, and Other conditions influencing health status.    Surgical History  She has a past surgical history that includes Hysterectomy (04/15/2013); Other surgical history (04/21/2021); CT angio coronary art with heartflow if score >30% (11/16/2023); and Hip Arthroplasty (Right, 08/16/2023).     Social History  She reports that she has been smoking cigarettes. She has never used smokeless tobacco. She reports that she does not currently use alcohol. She reports that she does not use drugs.    Family History  Family History[1]     Allergies  Patient has no known allergies.    Review of Systems   All other systems reviewed and are negative.       Physical Exam  HENT:      Head: Normocephalic.      Nose: Nose normal.   Eyes:      Pupils: Pupils are equal, round, and reactive to light.   Pulmonary:      Effort: Pulmonary effort is normal.   Musculoskeletal:         General: Normal range of motion.      Cervical back: Normal range of motion.   Skin:     General: Skin is warm.   Neurological:      Mental Status: She is alert.      Comments: A&Ox3  RUE D5 / B5 / T5 / HG 5/ IO 5  LUE D5 / B5 / T5 / HG 5/ IO 5  RLE HF5 / KE 5/ DF 5/ PF  "5  LLE HF5 / KE 5/ DF 5/ PF 5  No clonus, neg Parisi  SILT     Psychiatric:         Mood and Affect: Mood normal.          Last Recorded Vitals  Blood pressure 135/77, pulse 61, temperature 36.5 °C (97.7 °F), temperature source Oral, resp. rate 16, height 1.575 m (5' 2\"), weight 49.4 kg (109 lb), SpO2 99%.    Relevant Results  CT head wo IV contrast  Addendum Date: 4/21/2025  Interpreted By:  Abhi Patel, ADDENDUM: Critical Finding:  See findings. Notification was initiated on 4/21/2025 at 6:59 pm by  Abhi Patel.  (**-OCF-**)   Signed by: Abhi Patel 4/21/2025 7:00 PM   -------- ORIGINAL REPORT -------- Dictation workstation:   [id number]    Result Date: 4/21/2025  Interpreted By:  Abhi Patel,  Valeriy Lundy STUDY: CT HEAD WO IV CONTRAST; CT THORACIC SPINE WO IV CONTRAST; CT CERVICAL SPINE WO IV CONTRAST;  4/21/2025 6:00 pm   INDICATION: Signs/Symptoms:Trauma.     COMPARISON: MRI BRAIN 04/26/2024, 03/08/2023 and CT of March 27, 2013   ACCESSION NUMBER(S): [id number]; [id number]; [id number]   ORDERING CLINICIAN: KENNETH FONSECA   TECHNIQUE: Noncontrast axial CT scan of head was performed. Angled reformats in brain and bone windows were generated. The images were reviewed in bone, brain, blood and soft tissue windows. Axial CT images of the cervical spine are obtained. Axial, coronal and sagittal reconstructions are provided for review. Axial CT images of the thoracic spine are obtained. Axial, coronal and sagittal reconstructions are submitted for review.   FINDINGS: The grey-white differentiation is intact. There is no mass effect or midline shift.  There is no intracranial hemorrhage.   The ventricles, sulci and basal cisterns are within normal limits. There is no extraaxial fluid collection.   The right frontal calvarium is expanded and demonstrates irregular contours.   Visualized paranasal sinuses and mastoids are clear.       CT CERVICAL SPINE:   There is no evidence for an acute fracture of " the cervical spine.   Alignment: Within normal limits.   The odontoid process and craniocervical junction are intact.   The cervical vertebral body heights are intact and the disc spaces are preserved. Mild degenerative changes including osteophyte formation, predominantly involving lower cervical vertebrae.   The prevertebral and paraspinal soft tissues are unremarkable.     CT THORACIC SPINE:   Alignment: Within normal limits.   Vertebrae/Intervertebral Discs: The thoracic vertebral body heights are intact. There are mild degenerative changes including endplate degeneration, anterior osteophyte formation and mild disc space narrowing, predominantly involving upper thoracic vertebrae. There is no significant central canal stenosis.   Paraspinous Soft Tissues: Within normal limits.       1. No evidence of acute cortical infarct or intracranial hemorrhage.   2. No evidence of intracranial hemorrhage or displaced skull fracture.   3. No evidence for an acute fracture or subluxation of the cervical and thoracic spine. Mild degenerative changes noted in lower cervical and upper thoracic spine levels.   4. The right frontal calvarium is expanded and demonstrates irregular contours. While indeterminate, this is concerning for a neoplastic process. Would recommend follow-up with a bone scan to evaluate for metabolic activity.   I personally reviewed the images/study and I agree with the findings as stated by Resident Nikkie Martin. This study was interpreted at [place], [place].   MACRO: None     Signed by: Abhi Patel 4/21/2025 6:54 PM Dictation workstation:   [id number]         Assessment/Plan     h/o osteoporosis, p/w fall, LBP, 4/21 CT LS healing sacral insufficiency fracture with angulation, grade 1 L4-5 spondy, CTH thin RF chronic SDH    PLAN  No acute neurosurgical intervention  Recommend admission to medicine for pain control, PTOT  Can follow up with Dr. King for  lumbar spine spondylosis  rCTH in 6 hours for SDH  Trauma recs  Hold DVT ppx                   [1]   Family History  Problem Relation Name Age of Onset    Lung cancer Mother      Coronary artery disease Father      Hypertension Father      Other (THYROID SURGERY) Sister      Glaucoma Other GRANDMOTHER

## 2025-04-22 ENCOUNTER — PHARMACY VISIT (OUTPATIENT)
Dept: PHARMACY | Facility: CLINIC | Age: 60
End: 2025-04-22
Payer: COMMERCIAL

## 2025-04-22 ENCOUNTER — APPOINTMENT (OUTPATIENT)
Dept: PAIN MEDICINE | Facility: CLINIC | Age: 60
End: 2025-04-22
Payer: COMMERCIAL

## 2025-04-22 VITALS
DIASTOLIC BLOOD PRESSURE: 72 MMHG | SYSTOLIC BLOOD PRESSURE: 147 MMHG | OXYGEN SATURATION: 100 % | HEIGHT: 62 IN | RESPIRATION RATE: 14 BRPM | BODY MASS INDEX: 20.06 KG/M2 | HEART RATE: 65 BPM | TEMPERATURE: 97.6 F | WEIGHT: 109 LBS

## 2025-04-22 DIAGNOSIS — S06.5XAA SDH (SUBDURAL HEMATOMA) (MULTI): Primary | ICD-10-CM

## 2025-04-22 LAB
ALBUMIN SERPL BCP-MCNC: 3.3 G/DL (ref 3.4–5)
ANION GAP SERPL CALC-SCNC: 15 MMOL/L (ref 10–20)
BACTERIA UR CULT: NO GROWTH
BUN SERPL-MCNC: 14 MG/DL (ref 6–23)
CALCIUM SERPL-MCNC: 8.5 MG/DL (ref 8.6–10.6)
CHLORIDE SERPL-SCNC: 117 MMOL/L (ref 98–107)
CO2 SERPL-SCNC: 12 MMOL/L (ref 21–32)
CREAT SERPL-MCNC: 0.76 MG/DL (ref 0.5–1.05)
EGFRCR SERPLBLD CKD-EPI 2021: 90 ML/MIN/1.73M*2
ERYTHROCYTE [DISTWIDTH] IN BLOOD BY AUTOMATED COUNT: 13.6 % (ref 11.5–14.5)
GLUCOSE SERPL-MCNC: 79 MG/DL (ref 74–99)
HCT VFR BLD AUTO: 32.1 % (ref 36–46)
HGB BLD-MCNC: 10.7 G/DL (ref 12–16)
HOLD SPECIMEN: NORMAL
HOLD SPECIMEN: NORMAL
MCH RBC QN AUTO: 31.6 PG (ref 26–34)
MCHC RBC AUTO-ENTMCNC: 33.3 G/DL (ref 32–36)
MCV RBC AUTO: 95 FL (ref 80–100)
NRBC BLD-RTO: 0 /100 WBCS (ref 0–0)
PHOSPHATE SERPL-MCNC: 3.8 MG/DL (ref 2.5–4.9)
PLATELET # BLD AUTO: 350 X10*3/UL (ref 150–450)
POTASSIUM SERPL-SCNC: 3.2 MMOL/L (ref 3.5–5.3)
RBC # BLD AUTO: 3.39 X10*6/UL (ref 4–5.2)
SODIUM SERPL-SCNC: 141 MMOL/L (ref 136–145)
WBC # BLD AUTO: 6.9 X10*3/UL (ref 4.4–11.3)

## 2025-04-22 PROCEDURE — 2500000002 HC RX 250 W HCPCS SELF ADMINISTERED DRUGS (ALT 637 FOR MEDICARE OP, ALT 636 FOR OP/ED)

## 2025-04-22 PROCEDURE — RXMED WILLOW AMBULATORY MEDICATION CHARGE

## 2025-04-22 PROCEDURE — 2500000001 HC RX 250 WO HCPCS SELF ADMINISTERED DRUGS (ALT 637 FOR MEDICARE OP)

## 2025-04-22 PROCEDURE — 36415 COLL VENOUS BLD VENIPUNCTURE: CPT | Performed by: STUDENT IN AN ORGANIZED HEALTH CARE EDUCATION/TRAINING PROGRAM

## 2025-04-22 PROCEDURE — 36415 COLL VENOUS BLD VENIPUNCTURE: CPT

## 2025-04-22 PROCEDURE — 97161 PT EVAL LOW COMPLEX 20 MIN: CPT | Mod: GP

## 2025-04-22 PROCEDURE — 85027 COMPLETE CBC AUTOMATED: CPT | Performed by: STUDENT IN AN ORGANIZED HEALTH CARE EDUCATION/TRAINING PROGRAM

## 2025-04-22 PROCEDURE — 80069 RENAL FUNCTION PANEL: CPT

## 2025-04-22 PROCEDURE — 97530 THERAPEUTIC ACTIVITIES: CPT | Mod: GP

## 2025-04-22 PROCEDURE — 97165 OT EVAL LOW COMPLEX 30 MIN: CPT | Mod: GO

## 2025-04-22 PROCEDURE — 2500000001 HC RX 250 WO HCPCS SELF ADMINISTERED DRUGS (ALT 637 FOR MEDICARE OP): Performed by: SURGERY

## 2025-04-22 PROCEDURE — 99238 HOSP IP/OBS DSCHRG MGMT 30/<: CPT | Performed by: PHYSICIAN ASSISTANT

## 2025-04-22 PROCEDURE — 2500000004 HC RX 250 GENERAL PHARMACY W/ HCPCS (ALT 636 FOR OP/ED): Mod: JZ | Performed by: PHYSICIAN ASSISTANT

## 2025-04-22 PROCEDURE — 2500000004 HC RX 250 GENERAL PHARMACY W/ HCPCS (ALT 636 FOR OP/ED): Mod: JZ

## 2025-04-22 PROCEDURE — 2500000002 HC RX 250 W HCPCS SELF ADMINISTERED DRUGS (ALT 637 FOR MEDICARE OP, ALT 636 FOR OP/ED): Performed by: PHYSICIAN ASSISTANT

## 2025-04-22 PROCEDURE — 2500000001 HC RX 250 WO HCPCS SELF ADMINISTERED DRUGS (ALT 637 FOR MEDICARE OP): Performed by: PHYSICIAN ASSISTANT

## 2025-04-22 PROCEDURE — 97530 THERAPEUTIC ACTIVITIES: CPT | Mod: GO

## 2025-04-22 RX ORDER — POLYETHYLENE GLYCOL 3350 17 G/17G
17 POWDER, FOR SOLUTION ORAL DAILY PRN
Qty: 5 PACKET | Refills: 0 | Status: SHIPPED | OUTPATIENT
Start: 2025-04-22 | End: 2025-04-27

## 2025-04-22 RX ORDER — CHOLECALCIFEROL (VITAMIN D3) 25 MCG
50 TABLET ORAL DAILY
Status: DISCONTINUED | OUTPATIENT
Start: 2025-04-22 | End: 2025-04-22 | Stop reason: HOSPADM

## 2025-04-22 RX ORDER — POTASSIUM CHLORIDE 20 MEQ/1
20 TABLET, EXTENDED RELEASE ORAL DAILY
Status: DISCONTINUED | OUTPATIENT
Start: 2025-04-22 | End: 2025-04-22 | Stop reason: HOSPADM

## 2025-04-22 RX ORDER — OXYCODONE HYDROCHLORIDE 5 MG/1
5 TABLET ORAL EVERY 6 HOURS PRN
Qty: 20 TABLET | Refills: 0 | Status: SHIPPED | OUTPATIENT
Start: 2025-04-22 | End: 2025-04-25 | Stop reason: ALTCHOICE

## 2025-04-22 RX ORDER — OXYCODONE HYDROCHLORIDE 5 MG/1
10 TABLET ORAL EVERY 4 HOURS PRN
Refills: 0 | Status: DISCONTINUED | OUTPATIENT
Start: 2025-04-22 | End: 2025-04-22 | Stop reason: HOSPADM

## 2025-04-22 RX ORDER — OXYCODONE HYDROCHLORIDE 5 MG/1
5 TABLET ORAL EVERY 6 HOURS PRN
Status: DISCONTINUED | OUTPATIENT
Start: 2025-04-22 | End: 2025-04-22

## 2025-04-22 RX ORDER — KETOROLAC TROMETHAMINE 15 MG/ML
15 INJECTION, SOLUTION INTRAMUSCULAR; INTRAVENOUS EVERY 6 HOURS
Status: COMPLETED | OUTPATIENT
Start: 2025-04-22 | End: 2025-04-22

## 2025-04-22 RX ORDER — ACETAMINOPHEN 325 MG/1
975 TABLET ORAL EVERY 8 HOURS SCHEDULED
Status: DISCONTINUED | OUTPATIENT
Start: 2025-04-22 | End: 2025-04-22 | Stop reason: HOSPADM

## 2025-04-22 RX ORDER — GABAPENTIN 100 MG/1
100 CAPSULE ORAL 3 TIMES DAILY
Status: DISCONTINUED | OUTPATIENT
Start: 2025-04-22 | End: 2025-04-22

## 2025-04-22 RX ORDER — HYDROCODONE BITARTRATE AND ACETAMINOPHEN 7.5; 325 MG/1; MG/1
1 TABLET ORAL EVERY 6 HOURS PRN
Status: DISCONTINUED | OUTPATIENT
Start: 2025-04-22 | End: 2025-04-22

## 2025-04-22 RX ORDER — POTASSIUM CHLORIDE 20 MEQ/1
20 TABLET, EXTENDED RELEASE ORAL ONCE
Status: COMPLETED | OUTPATIENT
Start: 2025-04-22 | End: 2025-04-22

## 2025-04-22 RX ORDER — ALENDRONATE SODIUM 70 MG/1
70 TABLET ORAL
Status: DISCONTINUED | OUTPATIENT
Start: 2025-04-22 | End: 2025-04-22

## 2025-04-22 RX ORDER — ACETAMINOPHEN 325 MG/1
975 TABLET ORAL EVERY 6 HOURS SCHEDULED
Status: DISCONTINUED | OUTPATIENT
Start: 2025-04-22 | End: 2025-04-22

## 2025-04-22 RX ORDER — ENOXAPARIN SODIUM 100 MG/ML
30 INJECTION SUBCUTANEOUS 2 TIMES DAILY
Status: DISCONTINUED | OUTPATIENT
Start: 2025-04-22 | End: 2025-04-22 | Stop reason: HOSPADM

## 2025-04-22 RX ORDER — OXYCODONE HYDROCHLORIDE 5 MG/1
10 TABLET ORAL EVERY 6 HOURS PRN
Refills: 0 | Status: DISCONTINUED | OUTPATIENT
Start: 2025-04-22 | End: 2025-04-22

## 2025-04-22 RX ORDER — PANTOPRAZOLE SODIUM 40 MG/1
40 TABLET, DELAYED RELEASE ORAL
Status: DISCONTINUED | OUTPATIENT
Start: 2025-04-23 | End: 2025-04-22 | Stop reason: HOSPADM

## 2025-04-22 RX ORDER — OXYCODONE HYDROCHLORIDE 5 MG/1
5 TABLET ORAL EVERY 4 HOURS PRN
Refills: 0 | Status: DISCONTINUED | OUTPATIENT
Start: 2025-04-22 | End: 2025-04-22 | Stop reason: HOSPADM

## 2025-04-22 RX ORDER — POTASSIUM CHLORIDE 14.9 MG/ML
20 INJECTION INTRAVENOUS ONCE
Status: COMPLETED | OUTPATIENT
Start: 2025-04-22 | End: 2025-04-22

## 2025-04-22 RX ORDER — METHOCARBAMOL 500 MG/1
1000 TABLET, FILM COATED ORAL EVERY 8 HOURS PRN
Status: DISCONTINUED | OUTPATIENT
Start: 2025-04-22 | End: 2025-04-22 | Stop reason: HOSPADM

## 2025-04-22 RX ORDER — GABAPENTIN 100 MG/1
200 CAPSULE ORAL 3 TIMES DAILY
Status: DISCONTINUED | OUTPATIENT
Start: 2025-04-22 | End: 2025-04-22 | Stop reason: HOSPADM

## 2025-04-22 RX ORDER — POLYETHYLENE GLYCOL 3350 17 G/17G
17 POWDER, FOR SOLUTION ORAL DAILY
Status: DISCONTINUED | OUTPATIENT
Start: 2025-04-22 | End: 2025-04-22 | Stop reason: HOSPADM

## 2025-04-22 RX ORDER — GABAPENTIN 300 MG/1
300 CAPSULE ORAL 3 TIMES DAILY
Status: DISCONTINUED | OUTPATIENT
Start: 2025-04-22 | End: 2025-04-22

## 2025-04-22 RX ADMIN — KETOROLAC TROMETHAMINE 15 MG: 15 INJECTION, SOLUTION INTRAMUSCULAR; INTRAVENOUS at 06:40

## 2025-04-22 RX ADMIN — GABAPENTIN 200 MG: 100 CAPSULE ORAL at 14:10

## 2025-04-22 RX ADMIN — POTASSIUM CHLORIDE 20 MEQ: 1500 TABLET, EXTENDED RELEASE ORAL at 09:25

## 2025-04-22 RX ADMIN — GABAPENTIN 100 MG: 100 CAPSULE ORAL at 08:06

## 2025-04-22 RX ADMIN — OXYCODONE 5 MG: 5 TABLET ORAL at 02:00

## 2025-04-22 RX ADMIN — OXYCODONE 10 MG: 5 TABLET ORAL at 08:05

## 2025-04-22 RX ADMIN — POTASSIUM CHLORIDE 20 MEQ: 1500 TABLET, EXTENDED RELEASE ORAL at 08:06

## 2025-04-22 RX ADMIN — METHOCARBAMOL 1000 MG: 500 TABLET ORAL at 02:00

## 2025-04-22 RX ADMIN — OXYCODONE HYDROCHLORIDE 10 MG: 5 TABLET ORAL at 12:25

## 2025-04-22 RX ADMIN — ACETAMINOPHEN 975 MG: 325 TABLET ORAL at 02:00

## 2025-04-22 RX ADMIN — Medication 50 MCG: at 08:06

## 2025-04-22 RX ADMIN — POTASSIUM CHLORIDE 20 MEQ: 14.9 INJECTION, SOLUTION INTRAVENOUS at 03:35

## 2025-04-22 RX ADMIN — ACETAMINOPHEN 975 MG: 325 TABLET, FILM COATED ORAL at 14:10

## 2025-04-22 RX ADMIN — POTASSIUM CHLORIDE 20 MEQ: 1500 TABLET, EXTENDED RELEASE ORAL at 03:34

## 2025-04-22 ASSESSMENT — COGNITIVE AND FUNCTIONAL STATUS - GENERAL
CLIMB 3 TO 5 STEPS WITH RAILING: A LITTLE
MOVING FROM LYING ON BACK TO SITTING ON SIDE OF FLAT BED WITH BEDRAILS: A LITTLE
MOVING TO AND FROM BED TO CHAIR: A LITTLE
DRESSING REGULAR LOWER BODY CLOTHING: A LITTLE
STANDING UP FROM CHAIR USING ARMS: A LITTLE
DAILY ACTIVITIY SCORE: 22
HELP NEEDED FOR BATHING: A LITTLE
TURNING FROM BACK TO SIDE WHILE IN FLAT BAD: A LITTLE
MOBILITY SCORE: 18
WALKING IN HOSPITAL ROOM: A LITTLE

## 2025-04-22 ASSESSMENT — PAIN SCALES - GENERAL
PAINLEVEL_OUTOF10: 9
PAINLEVEL_OUTOF10: 9
PAINLEVEL_OUTOF10: 5 - MODERATE PAIN
PAINLEVEL_OUTOF10: 8
PAINLEVEL_OUTOF10: 10 - WORST POSSIBLE PAIN
PAINLEVEL_OUTOF10: 10 - WORST POSSIBLE PAIN

## 2025-04-22 ASSESSMENT — PAIN DESCRIPTION - PAIN TYPE: TYPE: ACUTE PAIN

## 2025-04-22 ASSESSMENT — PAIN - FUNCTIONAL ASSESSMENT
PAIN_FUNCTIONAL_ASSESSMENT: 0-10

## 2025-04-22 ASSESSMENT — ACTIVITIES OF DAILY LIVING (ADL)
BATHING_ASSISTANCE: STAND BY
ADL_ASSISTANCE: INDEPENDENT
LACK_OF_TRANSPORTATION: NO
ADL_ASSISTANCE: INDEPENDENT

## 2025-04-22 NOTE — PROGRESS NOTES
"[facility]  TRAUMA SERVICE - Progress Note    Patient Name: Caityln Atkins  MRN: [id number]  Admit Date: 421  : 1965  AGE: 59 y.o.   GENDER: female  ==============================================================================  MECHANISM OF INJURY / CHIEF COMPLAINT:  59-yo female w/ a pmhx of r. Total hip replacement, multiple sclerosis, osteoporosis, and lumbar radiculopathy presents with worsening low back pain following two recent falls. Pt sts that three weeks ago she tripped over the walking boot she was wearing on her right foot--placed for a right distal tibial stress fracture--and landed on her tailbone. She experienced another ground level fall x3 days PTA, landing on her left side after tripping over her left leg, which she reports has \"been longer than [her] right since [her] right hip replacement in 2023. She reports difficulty with ambulation due to pain with bearing weight on both extremities.     She reports constant, diffuse pain throughout her pelvis since the second fall on Friday that is unrelieved by pain medications. She denies fever, chest pain, shortness of breath, or abdominal pain.     She initially delayed seeking care, as she was focused on caring for her  who has cancer and was recently admitted to this hospital.      LOC (yes/no?): no  Anticoagulant / Anti-platelet Rx? (for what dx?): no   Referring Facility Name (N/A for scene EMR run): n/a      INJURIES:   Mildly displaced sacral fracture.   Age-indeterminate sacral fractures w sacral insufficiency fractures. New angulation at S2/3 compared to 3/2025 XR  L4 on L5 anterolisthesis.      OTHER MEDICAL PROBLEMS:  Hypokalemia   Falls   Hx of Healing Osteoporosis, Multiple sclerosis, Sciatic nerve pain, R. Distal tibial stress fx, Lumbar; radiculopathy; lumbar stenosis; degenerative spondylolisthesis .    INCIDENTAL FINDINGS:  Thin chronic SDH   concern for calvarium neoplastic " process    ==============================================================================  ADMISSION PLAN OF CARE:  Received patient in signout from [facility], pending disposition and final scans.    Disposition: Trauma Floor for pain control and PT/OT evaluation    #Mildly displaced sacral fx  -PT/OT when appropriate  -multimodal pain control  -Orthopedic surgery consulted: no acute intervention     #Falls  -CT c spine, CT head, CT Thoracic spine ordered  -orthostatic vitals     #L4 on L5 anterolisthesis  -Neurosurgery consulted:   -No acute neurosurgical intervention  -Recommend admission to medicine for pain control, PTOT  - Can follow up with Dr. King for lumbar spine spondylosis  - no repeat scans for chronic SDH  - Hold DVT ppx, can start on      #Hypokalemia   -Repleted in the ED  -Re-check PM RFP      #FEN/GI/  -regular diet  -Bowel regimen as indicated        ==============================================================================  PAST MEDICAL HISTORY:   PMH: h/o osteoporosis, MS, sciatic nerve pain, healing r. Distal tibial stress fx,   Last menstrual period: postmenopausal  PSH:   : R. Hip replacement  : Hysterectomy  2000: ORIF of left arm  1984:      FH:   Diabetes Maternal Grandmother   Breast Cancer Maternal Grandfather   Cancer Mother   other (high blood pressure) Father   other (heart disease/stroke) Maternal Grandfather      SOCIAL HISTORY:    Smokin PPD     Alcohol: Occasional    Drug use: Denies    MEDICATIONS:   Prior to Admission medications    Medication Sig Start Date End Date Taking? Authorizing Provider   b complex 0.4 mg tablet Take 1 tablet by mouth once daily.   Yes Historical Provider, MD   calcium carbonate (CALCIUM 500 ORAL) Take 1 tablet by mouth once daily.   Yes Historical Provider, MD   cholecalciferol (Vitamin D-3) 50 MCG (2000 UT) tablet Take 1 tablet (2,000 Units) by mouth once daily. 18  Yes Historical Provider, MD   diroximel  fumarate (Vumerity) 231 mg capsule,delayed release(DR/EC) Take 2 capsules by mouth 2 times a day. 12/28/23  Yes RITCHIE Mckeon   gabapentin (Neurontin) 100 mg capsule Take 1 capsule (100 mg) by mouth 3 times a day. 2/25/25 5/26/25 Yes Viet Maher MD   HYDROcodone-acetaminophen (Norco) 7.5-325 mg tablet Take 1 tablet by mouth every 6 hours if needed for severe pain (7 - 10) for up to 28 days. Do not fill before March 27, 2025. 3/27/25 4/24/25 Yes Viet Maher MD   ibuprofen 600 mg tablet Take 1 tablet (600 mg) by mouth every 6 hours if needed (PAIN).   Yes Historical Provider, MD   omeprazole OTC (PriLOSEC OTC) 20 mg EC tablet Take 1 tablet (20 mg) by mouth once daily in the morning.   Yes Historical Provider, MD   tiZANidine (Zanaflex) 2 mg tablet Take 1 tablet (2 mg) by mouth once daily as needed for muscle spasms for up to 28 days. 2/25/25 4/22/25 Yes Viet Maher MD   acetaminophen (Tylenol) 325 mg tablet Take 2 tablets (650 mg) by mouth every 4 hours if needed.    Historical Provider, MD   alendronate (Fosamax) 70 mg tablet Take 1 tablet (70 mg) by mouth every 7 days. Take in the morning with a full glass of water, on an empty stomach, and do not take anything else by mouth or lie down for the next 30 min.  Patient not taking: Reported on 4/21/2025 4/3/25   Zane Ang PA-C   metoprolol succinate XL (Toprol-XL) 25 mg 24 hr tablet Take 1 tablet (25 mg) by mouth once daily. Do not crush or chew.  Patient not taking: Reported on 4/21/2025 1/10/24 1/9/25  RITCHIE Banks   naloxone (Narcan) 4 mg/0.1 mL nasal spray Administer 1 spray (4 mg) into affected nostril(s) if needed for opioid reversal. May repeat every 2-3 minutes if needed, alternating nostrils, until medical assistance becomes available.    Historical Provider, MD DUMONT    REVIEW OF SYSTEMS:  Review of Systems   Constitutional:  Negative for activity change, chills, fatigue, fever and unexpected weight change.    HENT: Negative.     Eyes:  Negative for photophobia and visual disturbance.   Respiratory:  Negative for cough, chest tightness and shortness of breath.    Cardiovascular:  Negative for chest pain, palpitations and leg swelling.   Gastrointestinal:  Negative for abdominal pain, nausea, rectal pain and vomiting.   Musculoskeletal:  Positive for back pain and myalgias.   Skin:  Negative for color change.   Neurological:  Negative for dizziness, tremors, syncope, speech difficulty, light-headedness and numbness.   Psychiatric/Behavioral:  Negative for agitation, sleep disturbance and suicidal ideas. The patient is not nervous/anxious.          Physical Exam  Constitutional:       Appearance: Normal appearance.   HENT:      Head: Normocephalic and atraumatic.      Nose: Nose normal.   Eyes:      Extraocular Movements: Extraocular movements intact.      Pupils: Pupils are equal, round, and reactive to light.   Cardiovascular:      Rate and Rhythm: Normal rate and regular rhythm.      Pulses: Normal pulses.      Heart sounds: Normal heart sounds.   Pulmonary:      Effort: Pulmonary effort is normal.      Breath sounds: Normal breath sounds.   Abdominal:      General: Abdomen is flat.      Palpations: Abdomen is soft.   Musculoskeletal:         General: Tenderness (R. ankle & pelvis) present. No swelling or deformity.      Right shoulder: Normal.      Left shoulder: Normal.      Right upper arm: Normal.      Left upper arm: Normal.      Right elbow: Normal.      Left elbow: Normal.      Right forearm: Normal.      Left forearm: Normal.      Right wrist: Normal.      Left wrist: Normal.      Right hand: Normal.      Left hand: Normal.      Cervical back: Normal and neck supple. No swelling, deformity, rigidity or tenderness. Normal range of motion.      Thoracic back: No edema, deformity or signs of trauma. Normal range of motion.      Lumbar back: No deformity. Normal range of motion.      Right lower leg: No edema.       Left lower leg: No edema.      Comments: 5/5 strength in both feet. Full sensation intact bilaterally.    Skin:     General: Skin is warm and dry.   Neurological:      General: No focal deficit present.      Mental Status: She is alert and oriented to person, place, and time.   Psychiatric:         Mood and Affect: Mood normal.         Behavior: Behavior normal.         IMAGING SUMMARY:   CT Lumbar spine: Age indeterminate fractures are present within the sacrum  Unchanged anterolisthesis L4 on L5 compared to prior lumbar  radiograph.  CT Chest/Abd/Pelvis: Mildly displaced sacral fracture. L4 on L5 anterolisthesis.   CT cervical spine: no acute process  CT Head: Thin chronic SDH  CT Thoracic spine: no acute process    LABS:  Results from last 7 days   Lab Units 04/21/25  1044   WBC AUTO x10*3/uL 7.6   HEMOGLOBIN g/dL 11.9*   HEMATOCRIT % 34.5*   PLATELETS AUTO x10*3/uL 408   NEUTROS PCT AUTO % 70.3   LYMPHS PCT AUTO % 11.5   MONOS PCT AUTO % 11.9   EOS PCT AUTO % 4.6         Results from last 7 days   Lab Units 04/22/25  0209 04/21/25 2010 04/21/25  1044   SODIUM mmol/L 141 141 139   POTASSIUM mmol/L 3.2* 3.1* 2.6*   CHLORIDE mmol/L 117* 116* 107   CO2 mmol/L 12* 16* 21   BUN mg/dL 14 14 13   CREATININE mg/dL 0.76 0.85 0.93   CALCIUM mg/dL 8.5* 8.6 8.9   PROTEIN TOTAL g/dL  --   --  7.3   BILIRUBIN TOTAL mg/dL  --   --  0.4   ALK PHOS U/L  --   --  209*   ALT U/L  --   --  13   AST U/L  --   --  19   GLUCOSE mg/dL 79 90 119*     Results from last 7 days   Lab Units 04/21/25  1044   BILIRUBIN TOTAL mg/dL 0.4           I have reviewed all laboratory and imaging results ordered/pertinent for this encounter.

## 2025-04-22 NOTE — DISCHARGE SUMMARY
Discharge Diagnosis  Sacral fracture  Tiny SDH  Issues Requiring Follow-Up  Clinical check  CTH    Test Results Pending At Discharge  Pending Labs       No current pending labs.            Hospital Course  59-year-old female with a history of right total hip replacement, multiple sclerosis, osteoporosis, and lumbar radiculopathy presented with worsening low back pain after two recent falls. Imaging revealed a mildly displaced sacral fracture, age-indeterminate sacral insufficiency fractures, and new angulation at S2/S3 compared to prior imaging from March 2025. Additional findings included L4 on L5 anterolisthesis. Spine rec outpt fu with Dr. King (home spine doc)    Admitted to the trauma surgery service for pain management and physical/occupational therapy evaluation. Neurosurgery was consulted due to imaging findings concerning for a thin chronic subdural hematoma. No acute neurosurgical intervention was recommended, and no additional neuroimaging was deemed necessary during the current admission. Neurosurgery signed off with plans for outpatient follow-up in 2 weeks with repeat CT head; appointment arranged prior to discharge. GCS 15, grossly NVI.    Patient remains on the trauma floor with focus on multimodal pain control, early mobilization, and fall prevention. PT/OT evaluations ongoing to determine safe discharge disposition. Continues to demonstrate stable neurologic status without focal deficits. Rec home care with walker from therapy. Opted for outpt therapy, provided walker.     Pertinent Physical Exam At Time of Discharge  Physical Exam    Physical Exam:   GEN: No acute distress  SKIN: Warm and dry  CARDIO: Rate controlled rhythm  RESP: Nml resp rate RA without resp distress  GI: Soft, NT, ND  : deferred  MSK: WANG  EXTREM: No pitting edema  NEURO: Alert and oriented with GCS 15, SILTx4, 5/5 distal strength x4  PSYCH: Nml affect    Home Medications     Medication List      START taking these  medications     alendronate 70 mg tablet; Commonly known as: Fosamax; Take 1 tablet (70   mg) by mouth every 7 days. Take in the morning with a full glass of water,   on an empty stomach, and do not take anything else by mouth or lie down   for the next 30 min.   oxyCODONE 5 mg immediate release tablet; Commonly known as: Roxicodone;   Take 1 tablet (5 mg) by mouth every 6 hours if needed for moderate pain (4   - 6) or severe pain (7 - 10) for up to 5 days.   polyethylene glycol 17 gram packet; Commonly known as: Glycolax,   Miralax; Take 17 g by mouth once daily as needed (constipation) for up to   5 days.     CONTINUE taking these medications     acetaminophen 325 mg tablet; Commonly known as: Tylenol   b complex 0.4 mg tablet   CALCIUM 500 ORAL   cholecalciferol 50 mcg (2,000 units) tablet; Commonly known as: Vitamin   D-3   gabapentin 100 mg capsule; Commonly known as: Neurontin; Take 1 capsule   (100 mg) by mouth 3 times a day.   HYDROcodone-acetaminophen 7.5-325 mg tablet; Commonly known as: Norco;   Take 1 tablet by mouth every 6 hours if needed for severe pain (7 - 10)   for up to 28 days. Do not fill before March 27, 2025.   naloxone 4 mg/0.1 mL nasal spray; Commonly known as: Narcan   omeprazole OTC 20 mg EC tablet; Commonly known as: PriLOSEC OTC   tiZANidine 2 mg tablet; Commonly known as: Zanaflex; Take 1 tablet (2   mg) by mouth once daily as needed for muscle spasms for up to 28 days.   Vumerity 231 mg capsule,delayed release(DR/EC); Generic drug: diroximel   fumarate; Take 2 capsules by mouth 2 times a day.     STOP taking these medications     ibuprofen 600 mg tablet   metoprolol succinate XL 25 mg 24 hr tablet; Commonly known as: Toprol-XL       Outpatient Follow-Up  Future Appointments   Date Time Provider Department Center   5/5/2025 10:45 AM POR [id number] CT [id number] [facility]   5/5/2025 11:15 AM POR [id number] DXA [id number] [facility]   5/15/2025  9:30 AM Claudia Kirby, APRN-CNP  [id number] [facility]   9/23/2025  2:45 PM Daniel Freitas MD [id number] [facility]       Brock Powers PA-C

## 2025-04-22 NOTE — PROGRESS NOTES
Pharmacy Medication History Review    Caitlyn Atkins is a 59 y.o. female admitted for Lower back pain. Pharmacy reviewed the patient's [id number] medications and allergies for accuracy.    The list below reflects the updated PTA list.   Prior to Admission Medications   Prescriptions  Patient / Chart / Pharmacy Reported?   HYDROcodone-acetaminophen (Norco) 7.5-325 mg tablet  Yes   Sig: Take 1 tablet by mouth every 6 hours if needed for   severe pain (7 - 10) for up to 28 days.   --> Last Fill 3/27/25, #100 / 25 day   acetaminophen (Tylenol) 325 mg tablet  Yes   Sig: Take 2 tablets (650 mg) by mouth every 4 hours if needed.   alendronate (Fosamax) 70 mg tablet  Yes   Sig: Take 1 tablet (70 mg) by mouth every 7 days.   No recent fill history found(?).   Pt does not recognize on interview(?).   Patient not taking: Reported on 4/21/2025   b complex 0.4 mg tablet  Yes   Sig: Take 1 tablet by mouth once daily.   calcium carbonate (CALCIUM 500 ORAL)  Yes   Sig: Take 1 tablet by mouth once daily.   cholecalciferol (Vitamin D-3) 50 MCG (2000 UT) tablet  Yes   Sig: Take 1 tablet (2,000 Units) by mouth once daily.   diroximel fumarate (Vumerity) 231 mg capsule,delayed release(DR/EC)  Yes   Sig: Take 2 capsules by mouth 2 times a day.  --> No fill history populating in Dispense Report; pt states current.   gabapentin (Neurontin) 100 mg capsule  Yes   Sig: Take 1 capsule (100 mg) by mouth 3 times a day.  --> Last Fill 3/12/25, #270 / 90 day   ibuprofen 600 mg tablet  Yes   Sig: Take 1 tablet (600 mg) by mouth every 6 hours if needed (PAIN).   metoprolol succinate XL (Toprol-XL) 25 mg 24 hr tablet  Yes   Sig: Take 1 tablet (25 mg) by mouth once daily.   No recent fill history found(?).   Pt does not recognize on interview(?).   Patient not taking: Reported on 4/21/2025   naloxone (Narcan) 4 mg/0.1 mL nasal spray  Yes   Sig: Administer 1 spray (4 mg) into affected nostril(s) if   needed for opioid reversal. May repeat  every 2-3 minutes   if needed, alternating nostrils, until medical assistance   becomes available.   omeprazole OTC (PriLOSEC OTC) 20 mg EC tablet  Yes   Sig: Take 1 tablet (20 mg) by mouth early in the morning..   tiZANidine (Zanaflex) 2 mg tablet  Yes   Sig: Take 1 tablet (2 mg) by mouth once daily as needed for   muscle spasms for up to 28 days.  --> Last Fill 3/27/25, #28 / 28 day              The list below reflects the updated allergy list. Please review each documented allergy for additional clarification and justification.  Allergies  Reviewed by Aysha Grijalva RN on 4/21/2025   No Known Allergies       Meds 2 Bed marked as accepted for now.  Reassess on discharge preferred choice if needed.  Local Pharmacy per Dispense Report:  [id number]   [address] AT [facility] [address]   P: [phone]     Sources used to complete the med history include:  Patient Interview (was sleeping but awoken;  able to recognize/confirm/clarify meds with name prompts.)  Current [facility] Ambulatory/PTA Medication List  Epic Dispense Report (Pharmacy Fill History)  Chart Review; Recent/Past Encounters; Office Visits  OARRS (gabapentin, hydrocodone/APAP; LF's in table above.)    Below are additional concerns with the patient's PTA list:  See additional comments/notes/last fill dates in PTA Table above.  Fosamax and Metoprolol no recent fill history(?); pt did not recognize(?);  Otherwise; no other changes made to PTA Med List.     ----------------------------------  Valdemar Martin, Valent[place], [facility]  Transitions of Care Pharmacist  Medication reconciliation complete  Please reach out via Epic Secure Chat ([id number]) for questions,   or if no response call [phone].

## 2025-04-22 NOTE — HOSPITAL COURSE
59-year-old female with a history of right total hip replacement, multiple sclerosis, osteoporosis, and lumbar radiculopathy presented with worsening low back pain after two recent falls. Imaging revealed a mildly displaced sacral fracture, age-indeterminate sacral insufficiency fractures, and new angulation at S2/S3 compared to prior imaging from March 2025. Additional findings included L4 on L5 anterolisthesis. Spine rec outpt fu with Dr. King (home spine doc)    Admitted to the trauma surgery service for pain management and physical/occupational therapy evaluation. Neurosurgery was consulted due to imaging findings concerning for a thin chronic subdural hematoma. No acute neurosurgical intervention was recommended, and no additional neuroimaging was deemed necessary during the current admission. Neurosurgery signed off with plans for outpatient follow-up in 2 weeks with repeat CT head; appointment arranged prior to discharge. GCS 15, grossly NVI.    Patient remains on the trauma floor with focus on multimodal pain control, early mobilization, and fall prevention. PT/OT evaluations ongoing to determine safe discharge disposition. Continues to demonstrate stable neurologic status without focal deficits. Rec home care with walker from therapy. Opted for outpt therapy, provided walker.

## 2025-04-22 NOTE — PROGRESS NOTES
Caitlyn Atkins is a 59 y.o. female on day 1 of admission presenting with Lower back pain.       04/22/25 1113   Discharge Planning   Living Arrangements Spouse/significant other   Support Systems Spouse/significant other;Children   Assistance Needed None   Type of Residence Private residence   Number of Stairs to Enter Residence 0   Number of Stairs Within Residence 0   Do you have animals or pets at home? Yes   Type of Animals or Pets Dog   Who is requesting discharge planning? Provider   Expected Discharge Disposition Home   Does the patient need discharge transport arranged? No   Financial Resource Strain   How hard is it for you to pay for the very basics like food, housing, medical care, and heating? Somewhat   Housing Stability   In the last 12 months, was there a time when you were not able to pay the mortgage or rent on time? N   In the past 12 months, how many times have you moved where you were living? 0   At any time in the past 12 months, were you homeless or living in a shelter (including now)? N   Transportation Needs   In the past 12 months, has lack of transportation kept you from medical appointments or from getting medications? no   In the past 12 months, has lack of transportation kept you from meetings, work, or from getting things needed for daily living? No   Stroke Family Assessment   Stroke Family Assessment Needed No   Intensity of Service   Intensity of Service 0-30 min     TCC introduced self and role in care to patient. Patient lives with spouse. Pt is independent in all ADL's.  She does have a Rolling walker. She works full-time in a factory. She drives (her car is in [facility] parking lot).  She reports her  is currently Inpatient in [facility]. Patient has 2 daughters for support. Patient reports one of her daughters is taking care of their dog at home. Patient's PCP is Dr. Zane Ang and insurance is [facility]. Pt does report having some financial concerns since her  has cancer and  has not worked since January 8th; although they have been able to pay their bills. Pt is frustrated because she reports she has been admitted for 2 days and still does not have a bed (she is currently in an ED halllway bed). Also she was told here that she does not need surgery, but patient reporting that her orthopedic MD at [facility] told her she needed surgery (for her sciatica), and so pt does not think PT/OT will help her. TCC will continue to follow for dc planning.    Irene Arreguin TCC

## 2025-04-22 NOTE — PROGRESS NOTES
Occupational Therapy    Evaluation and Treatment    Patient Name: Caitlyn Atkins  MRN: [id number]  Today's Date: 4/22/2025  [place]: [place] [address] [address]  Time Calculation  Start Time: 1421  Stop Time: 1449  Time Calculation (min): 28 min    Assessment  IP OT Assessment  OT Assessment: Pt presents with impaired ADLs, IADLs, balance, and activity tolerance 2/2 pain limitations. Pt has safe home setup, limited access to DME/AE, good social support, and is functioning below baseline. Pt tolerated session well despite limiting pain during mobility and engaged in coping skills exploration. Pt is likely to benefit from skilled OT services at a LOW intensity.  Prognosis: Good  Barriers to Discharge Home: No anticipated barriers  Evaluation/Treatment Tolerance: Patient limited by pain  Medical Staff Made Aware: Yes  End of Session Communication: Bedside nurse  End of Session Patient Position: Bed, 1 rail up, On cart  Plan:  Inpatient Plan  Treatment Interventions: ADL retraining, Compensatory technique education (Coping)  OT Frequency: 2 times per week  OT Discharge Recommendations: Low intensity level of continued care  Equipment Recommended upon Discharge: Wheeled walker  OT Recommended Transfer Status: Assist of 1, Stand by assist  OT - OK to Discharge: Yes  OT Assessment  OT Assessment Results: Decreased ADL status, Decreased functional mobility, Decreased IADLs  Prognosis: Good  Evaluation/Treatment Tolerance: Patient limited by pain  Medical Staff Made Aware: Yes  Strengths: Ability to acquire knowledge, Attitude of self, Capable of completing ADLs semi/independent, Insight into problems, Premorbid level of function, Support of Caregivers  Barriers to Participation: Comorbidities (pain)    Subjective   Current Problem:  1. Acute exacerbation of chronic low back pain        2. Multiple falls        3. Hypokalemia        4. Closed fracture of sacrum, unspecified portion of sacrum, initial encounter (Multi)  Referral to Home  Care    Walker rolling    Referral to Occupational Therapy    Referral to Physical Therapy    Follow Up In Orthopaedic Surgery    oxyCODONE (Roxicodone) 5 mg immediate release tablet    polyethylene glycol (Glycolax, Miralax) 17 gram packet        General:  Reason for Referral: 59 year old female admitted with frequent falls and low back pain. Found to have Age-indeterminate sacral fractures w sacral insufficiency fractures. New angulation at S2/3 compared to 3/2025 XR, L4 on L5 anterolisthesis and incidential finding of Thin chronic SDH.  Past Medical History Relevant to Rehab: MS and osteoporosis, R Total hip replacement  Prior to Session Communication: Bedside nurse  Patient Position Received: On cart, Alarm off, not on at start of session  Family/Caregiver Present: No  General Comment: Pt sitting EOB upon arrival. Agreeable to OT eval and tx. Pt tearful at times but reports healthy coping strategies.   Precautions:  LE Weight Bearing Status: Weight Bearing as Tolerated (WBAT BLE, confirmed with trauma)  Medical Precautions: Fall precautions  Vital Signs:   Date/Time Vitals Session Patient Position Pulse Resp SpO2 BP MAP (mmHg)    04/22/25 15:34:08 --  --  65  14  100 %  147/72  --           Pain:  Pain Assessment  Pain Assessment: 0-10  0-10 (Numeric) Pain Score: 5 - Moderate pain  Pain Type: Acute pain  Pain Location: Sacrum  Pain Interventions: Repositioned, Ambulation/increased activity, Relaxation technique, Rest  Response to Interventions: Increase in pain (stable at rest but increases to 9/10 during moblity especially during transfers per pt report)      Objective   Cognition:  Overall Cognitive Status: Within Functional Limits  Orientation Level: Oriented X4           Home Living:  Type of Home: House  Lives With: Spouse, Other (Comment) (1 small dog)  Home Adaptive Equipment: Cane, Crutches, Reacher, Sock aid  Home Layout: One level, Laundry main level, Full bath main level, Able to live on main level  with bedroom/bathroom  Home Access: Other (Comment) (Threshold step)  Bathroom Shower/Tub: Tub/shower unit  Bathroom Toilet: Standard  Bathroom Equipment: Raised toilet seat without rails   Prior Function:  Level of [place]: Independent with ADLs and functional transfers, Independent with homemaking with ambulation  Receives Help From: Family (2 daughters supportive and able to assist PRN pre report)  ADL Assistance: Independent  Homemaking Assistance: Independent  Ambulatory Assistance: Independent  Hand Dominance: Right  Prior Function Comments: IND and active at baseline  IADL History:  Homemaking Responsibilities: Yes  Homemaking Comments: All tasks shared at baseline but pt sometimes primary  Current License: Yes  Mode of Transportation: Car  Occupation: Full time employment  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)  Leisure and Hobbies: Gardening, nancy  ADL:  Eating Assistance: Independent (Anticipated)  Grooming Assistance: Independent (Anticipated)  Bathing Assistance: Stand by (Anticipated)  UE Dressing Assistance: Independent (Anticipated)  LE Dressing Assistance: Stand by (Anticipated)  Toileting Assistance with Device: Independent (As demo'd on toilet)  Activity Tolerance:  Endurance: Decreased tolerance for upright activites  Activity Tolerance Comments: pain limited  Balance:  Dynamic Sitting Balance  Dynamic Sitting-Balance Support: Feet supported  Dynamic Sitting-Level of Assistance: Close supervision  Dynamic Sitting-Balance: Forward lean, Lateral lean, Reaching for objects (figure four technique)  Dynamic Sitting-Comments: SBA and incrased time for dynamic balance activities due to pain limitations  Dynamic Standing Balance  Dynamic Standing-Balance Support: Bilateral upper extremity supported  Dynamic Standing-Level of Assistance: Contact guard  Dynamic Standing-Balance: Turning, Reaching for objects, Forward lean, Lateral lean  Dynamic Standing-Comments: FWW and CGA for  safety  Static Sitting Balance  Static Sitting-Balance Support: Feet supported, Feet unsupported  Static Sitting-Level of Assistance: Close supervision  Static Sitting-Comment/Number of Minutes: SBA while sitting EOB without feet supported; SBA while seated in chair with feet supported; good balance  Static Standing Balance  Static Standing-Balance Support: Bilateral upper extremity supported  Static Standing-Level of Assistance: Close supervision  Static Standing-Comment/Number of Minutes: FWW and SBA for safety  Bed Mobility/Transfers: Bed Mobility/Transfers: Bed Mobility  Bed Mobility: Yes  Bed Mobility 1  Bed Mobility 1: Sitting to supine  Level of Assistance 1: Distant supervision  Bed Mobility Comments 1: HOB partially elevated  Functional Mobility  Functional Mobility Performed: Yes  Functional Mobility 1  Comments 1: Pt ambulated MIN household distance from bedside to bathroom and back using FWW and CGA for safety   and Transfers  Transfer: Yes  Transfer 1  Transfer From 1: Bed to, Stand to  Transfer to 1: Stand, Bed  Technique 1: Sit to stand, Stand to sit  Transfer Device 1: Walker (none)  Transfer Level of Assistance 1: Close supervision  Trials/Comments 1: x2 trials, #1 without AD, #2 with FWW  Transfers 2  Transfer From 2: Stand to, Chair with arms to  Transfer to 2: Chair with arms, Stand  Technique 2: Stand to sit, Sit to stand  Transfer Device 2: Walker  Transfer Level of Assistance 2: Contact guard  Trials/Comments 2: CGA for safety  Transfers 3  Transfer From 3: Stand to, Toilet to  Transfer to 3: Toilet, Stand  Technique 3: Stand to sit, Sit to stand  Transfer Device 3: Walker  Transfer Level of Assistance 3: Close supervision  Trials/Comments 3: SBA for safety; pain limited from low surface  IADL's:   Homemaking Responsibilities: Yes  Homemaking Comments: All tasks shared at baseline but pt sometimes primary  Current License: Yes  Mode of Transportation: Car  Occupation: Full time  employment  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)  Leisure and Hobbies: Gardening, nancy  Vision: Vision - Basic Assessment  Current Vision: Does not wear glasses  Visual History: Corrective eye surgery (Reports Lasik eye surgery)   and Vision - Complex Assessment  Ocular Range of Motion: Within Functional Limits  Tracking: WFL  Sensation:  Light Touch: No apparent deficits (Denies N/T in BUE)  Strength:  Strength Comments: BUE WFL  Perception:  Inattention/Neglect: Appears intact  Initiation: Appears intact  Motor Planning: Appears intact  Perseveration: Not present  Coordination:  Movements are Fluid and Coordinated: Yes  Coordination Comment: opposition intact   Hand Function:  Hand Function  Gross Grasp: Functional  Coordination: Functional  Extremities:   RUE   RUE : Within Functional Limits, LUE   LUE: Within Functional Limits,  , and        Outcome Measures: [facility] Daily Activity  Putting on and taking off regular lower body clothing: A little  Bathing (including washing, rinsing, drying): A little  Putting on and taking off regular upper body clothing: None  Toileting, which includes using toilet, bedpan or urinal: None  Taking care of personal grooming such as brushing teeth: None  Eating Meals: None  Daily Activity - Total Score: 22         ,     OT Adult Other Outcome Measures  4AT: -    Education Documentation  Body Mechanics, taught by Davion Zuluaga OT at 4/22/2025  4:02 PM.  Learner: Patient  Readiness: Acceptance  Method: Explanation, Demonstration  Response: Verbalizes Understanding, Demonstrated Understanding  Comment: coping skills, compensatory strategies for pain reduction during LB ADLs/IADLs    Precautions, taught by Davion Zuluaga OT at 4/22/2025  4:02 PM.  Learner: Patient  Readiness: Acceptance  Method: Explanation, Demonstration  Response: Verbalizes Understanding, Demonstrated Understanding  Comment: coping skills, compensatory strategies for pain reduction during  LB ADLs/IADLs    ADL Training, taught by Davion Zuluaga OT at 4/22/2025  4:02 PM.  Learner: Patient  Readiness: Acceptance  Method: Explanation, Demonstration  Response: Verbalizes Understanding, Demonstrated Understanding  Comment: coping skills, compensatory strategies for pain reduction during LB ADLs/IADLs    Education Comments  No comments found.        Goals:   Encounter Problems       Encounter Problems (Active)       ADLs       Pt will complete UB /LB bathing tasks with IND level of assistance while standing.        Start:  04/22/25    Expected End:  05/06/25            Pt will complete LB dressing with modified independence while seated and/or standing and AE as needed.        Start:  04/22/25    Expected End:  05/06/25               BALANCE       Pt will maintain dynamic standing balance during ADL task with modified independent level of assistance in order to demonstrate decreased risk of falling and improved postural control.       Start:  04/22/25    Expected End:  05/06/25               COGNITION/SAFETY        Pt will identify and utilize at least 2 positive coping strategies with min A in order to maximize engagement in ADLs, IADLs, and functional mobility         Start:  04/22/25    Expected End:  05/06/25               MOBILITY       Patient will perform Functional mobility mod  Household distances/Community Distances with modified independent level of assistance and least restrictive device in order to improve safety and functional mobility.       Start:  04/22/25    Expected End:  05/06/25                   Treatment Completed on Evaluation    Therapy/Activity:     Therapeutic Activity  Therapeutic Activity Performed: Yes  Therapeutic Activity 1: Functional mobility and transfer training as noted requiring skilled assistance and cueing for safety and training  Therapeutic Activity 2: Coping skills exploration and discussions on positive vs negative coping strategies  Therapeutic Activity 3:  Education on compensatory strategies for pain reduction during LB ADLs/IADLs    04/22/25 at 4:04 PM   Davion Zuluaga, OT   Rehab Office: [phone]

## 2025-04-22 NOTE — SIGNIFICANT EVENT
No acute neurosurgical intervention or additional neuroimaging needed at this time. Prophylactic anticoagulation allowed on post bleed day 2.     Patient needs 2 weeks follow up with neurosurgery w repeat CT head, we will arrange. Thank you for allowing us to participate in the care of this patient.     Will sign off at this time. Please page [phone] with any questions or concerns.    Alexandrea Acosta MD  PGY-2 Neurosurgery  8:13 AM

## 2025-04-22 NOTE — PROGRESS NOTES
Caitlyn Atkins is a 59 y.o. female on day 1 of admission presenting with Lower back pain.       PT recommendation is for walker and Low intensity therapy. TCC spoke with patient. Patient clarified that their rolling walker is her 's walker. Provider to order and TCC will supply one through ED supply. Low intensity options reviewed with patient. Pt's preference is Outpatient Therapy in [facility]. Provider notified. TCC will continue to follow for dc planning.    Pt discharged with paper scripts for OP PT/OT. TCC took patient via wheelchair to [facility] where she picked up her prescriptions. Then to [facility]'s room @ [facility] per pt request.    Pt was asking for contact info for someone that would assist with short term disability and FMLA paperwork. TCC checked with SW's regarding request. TCC informed patient she would need to have PCP office assist with that. (Also, Provider here said patient is to follow up with her Ortho Spine MD in a week at which time they may make determination regarding ability to work). [facility] also offered Medical Records phone number to patient, but she declined.    Irene Arreguin [facility]

## 2025-04-22 NOTE — PROGRESS NOTES
Physical Therapy    Physical Therapy Evaluation & Treatment    Patient Name: Caitlyn Atkins  MRN: [id number]  Department:   Room: [place] [address]/[id number]  Today's Date: 4/22/2025   Time Calculation  Start Time: 1055  Stop Time: 1125  Time Calculation (min): 30 min    Assessment/Plan   PT Assessment  PT Assessment Results: Decreased endurance, Impaired balance, Decreased mobility, Decreased strength, Pain  Rehab Prognosis: Good  Barriers to Discharge Home: Physical needs  Physical Needs: Other (Comment) (severe pain with ambulation limiting functional mobility)  Evaluation/Treatment Tolerance: Patient limited by pain  Medical Staff Made Aware: Yes  Strengths: Attitude of self  Barriers to Participation: Comorbidities  End of Session Communication: Bedside nurse, Physician (trauma team in person at bedside)  Assessment Comment: 59 year old female admitted with frequent falls and low back pain. Found to have Age-indeterminate sacral fractures w sacral insufficiency fractures. New angulation at S2/3 compared to 3/2025 XR, L4 on L5 anterolisthesis and incidential finding of Thin chronic SDH. Pt presents with decreased strength, endurance and balance impacting functional mobility. Pt would benefit from continued PT while in hospital to improve functional mobility and return to PLOF. Pt completing all functional mobility grossly with close superivsion and fww, but was limited 2/2 to severe pain, with pt in tears during ambulation. Predict pt will progress to LOW with improvement in pain levels.  End of Session Patient Position: Bed, 1 rail up, On cart   IP OR SWING BED PT PLAN  Inpatient or Swing Bed: Inpatient  PT Plan  Treatment/Interventions: Bed mobility, Transfer training, Gait training, Stair training, Balance training, Neuromuscular re-education, Strengthening, Endurance training, Therapeutic exercise, Therapeutic activity, Home exercise program, Positioning, Postural re-education  PT Plan: Ongoing PT  PT Frequency: 3  times per week  PT Discharge Recommendations: Other (comment) (LOW inensity with improvement of pain)  Equipment Recommended upon Discharge: Wheeled walker  PT Recommended Transfer Status: Assist x1, Assistive device  PT - OK to Discharge: Yes    Subjective     General Visit Information:  General  Reason for Referral: 59 year old female admitted with frequent falls and low back pain. Found to have Age-indeterminate sacral fractures w sacral insufficiency fractures. New angulation at S2/3 compared to 3/2025 XR, L4 on L5 anterolisthesis and incidential finding of Thin chronic SDH.  Past Medical History Relevant to Rehab: MS and osteoporosis, R Total hip replacement  Prior to Session Communication: Bedside nurse  Patient Position Received: On cart, Alarm off, not on at start of session  General Comment: Pt supine in bed upon entry to room. Pt cooperative and willing to work with PT.  Home Living:  Home Living  Type of Home: House  Lives With: Spouse (1 dog)  Home Adaptive Equipment:  (fww but it is husbands)  Home Layout: One level  Home Access:  (threshold step to enter)  Bathroom Shower/Tub: Tub/shower unit  Prior Level of Function:  Prior Function Per Pt/Caregiver Report  Level of [place]: Independent with ADLs and functional transfers, Independent with homemaking with ambulation  Receives Help From: Family (2 daughters can help if needed)  ADL Assistance: Independent  Homemaking Assistance: Independent  Ambulatory Assistance: Independent  Vocational: Full time employment ()  Prior Function Comments: pt states she has had 2 falls over the last 6 months  Precautions:  Precautions  LE Weight Bearing Status:  (WBAT BLE, confirmed with trauma)  Medical Precautions: Fall precautions    Objective   Pain:  Pain Assessment  Pain Assessment: 0-10  0-10 (Numeric) Pain Score:  (0/10 at rest; 10/10 with movement)  Pain Location: Hip  Pain Orientation: Right  Cognition:  Cognition  Overall Cognitive Status:  Within Functional Limits  Orientation Level: Oriented X4    General Assessments:  Activity Tolerance  Endurance: Decreased tolerance for upright activites    Sensation  Light Touch:  (NT in R foot)    Static Sitting Balance  Static Sitting-Level of Assistance: Distant supervision  Dynamic Sitting Balance  Dynamic Sitting-Level of Assistance: Distant supervision    Static Standing Balance  Static Standing-Level of Assistance: Close supervision  Dynamic Standing Balance  Dynamic Standing-Level of Assistance: Close supervision  Functional Assessments:  Bed Mobility  Bed Mobility: Yes  Bed Mobility 1  Bed Mobility 1: Supine to sitting, Sitting to supine  Level of Assistance 1: Distant supervision, Minimal verbal cues  Bed Mobility Comments 1: HOB partially elevated    Transfers  Transfer: Yes  Transfer 1  Technique 1: Sit to stand, Stand to sit  Transfer Device 1: Walker  Transfer Level of Assistance 1: Distant supervision  Trials/Comments 1: pt completing 2 STS transfers for each stand attempt, with pt in severe pain and sitting down immediately.    Ambulation/Gait Training  Ambulation/Gait Training Performed: Yes  Ambulation/Gait Training 1  Surface 1: Level tile  Device 1: Rolling walker  Assistance 1: Close supervision, Minimal verbal cues  Quality of Gait 1: Antalgic (decreased marcie, step to gait pattern, decreased stance time RLE)  Comments/Distance (ft) 1: 20ft  Extremity/Trunk Assessments:  RLE   RLE :  (at least 4/5 based on functional observation, overall limited by pain)  LLE   LLE :  (at least 4/5 based on functional observation, overall limited by pain)  Treatments:  Therapeutic Activity  Therapeutic Activity Performed: Yes  Therapeutic Activity 1: Pt completing 2 additional STS transfers with fww and distant supervision, with pt standing and then immediately needing to sit down 2/2 to pain. Pt ambulating an additional 20ft with fww and close supervision. addtitional time was spent on PT plan of care  education and importance of functional mobility.  Outcome Measures:  [facility] Basic Mobility  Turning from your back to your side while in a flat bed without using bedrails: A little  Moving from lying on your back to sitting on the side of a flat bed without using bedrails: A little  Moving to and from bed to chair (including a wheelchair): A little  Standing up from a chair using your arms (e.g. wheelchair or bedside chair): A little  To walk in hospital room: A little  Climbing 3-5 steps with railing: A little  Basic Mobility - Total Score: 18    Encounter Problems       Encounter Problems (Active)       Mobility       Pt will perform bed mobility with independently.       Start:  04/22/25    Expected End:  05/06/25            Pt will ambulate >100ft with LRAD and Betty Assist with no LOB and VSS.         Start:  04/22/25    Expected End:  05/06/25            Pt will perform all functional transfers with LRAD and Betty assist.         Start:  04/22/25    Expected End:  05/06/25                 Education Documentation  Precautions, taught by Danay Fabian PT at 4/22/2025  1:08 PM.  Learner: Patient  Readiness: Acceptance  Method: Explanation  Response: Verbalizes Understanding, Needs Reinforcement  Comment: importance of functional mobility, fall precautions    Mobility Training, taught by Danay Fabian PT at 4/22/2025  1:08 PM.  Learner: Patient  Readiness: Acceptance  Method: Explanation  Response: Verbalizes Understanding, Needs Reinforcement  Comment: importance of functional mobility, fall precautions    Education Comments  No comments found.    Danay Fabian PT

## 2025-04-22 NOTE — DISCHARGE INSTRUCTIONS
If you have not been contacted within 3 days of discharge from the hospital, please call [phone] to schedule your follow up appointment with orthopedics Dr. King.

## 2025-04-23 ENCOUNTER — PATIENT OUTREACH (OUTPATIENT)
Dept: PRIMARY CARE | Facility: CLINIC | Age: 60
End: 2025-04-23
Payer: COMMERCIAL

## 2025-04-23 ENCOUNTER — TELEPHONE (OUTPATIENT)
Dept: PRIMARY CARE | Facility: CLINIC | Age: 60
End: 2025-04-23
Payer: COMMERCIAL

## 2025-04-23 NOTE — TELEPHONE ENCOUNTER
Patient was at hospital for frequent falls. First available 40 min spot is may 6th. Patient does not want to wait this long since she has issues walking. Is it okay to take a 20 min for this? Please advise

## 2025-04-24 ENCOUNTER — PATIENT OUTREACH (OUTPATIENT)
Dept: PRIMARY CARE | Facility: CLINIC | Age: 60
End: 2025-04-24
Payer: COMMERCIAL

## 2025-04-24 DIAGNOSIS — G89.29 ACUTE EXACERBATION OF CHRONIC LOW BACK PAIN: Primary | ICD-10-CM

## 2025-04-24 DIAGNOSIS — M54.50 ACUTE EXACERBATION OF CHRONIC LOW BACK PAIN: Primary | ICD-10-CM

## 2025-04-24 DIAGNOSIS — S32.10XA SACRUM AND COCCYX FRACTURE, CLOSED, INITIAL ENCOUNTER (MULTI): ICD-10-CM

## 2025-04-24 DIAGNOSIS — S32.2XXA SACRUM AND COCCYX FRACTURE, CLOSED, INITIAL ENCOUNTER (MULTI): ICD-10-CM

## 2025-04-24 NOTE — TELEPHONE ENCOUNTER
Pt is scheduled with [facility] on 4/25  Pt states was only in ER and was not admitted

## 2025-04-24 NOTE — PROGRESS NOTES
Discharge Facility: [facility]  Discharge Diagnosis: Acute exacerbation of chronic low back pain   Admission Date: 21 Apr 25  Discharge Date: 22 Apr 25    PCP Appointment Date: Tasked to office  Specialist Appointment Date: 28 Apr 25 (Pain Med, [place])  Hospital Encounter and Summary Linked: Yes  ED to Hosp-Admission (Discharged) with Bereket Sharp MD; Austin Klein MD; Gregor Trent MD (04/21/2025)     See discharge assessment below for further details     PCP messaged in regards to Home Health Referral on patient and  HH request    Wrap Up  Wrap Up Additional Comments: Pt stating she is back to baseline since her discharge from the hospital. pt was able to obtain all new medications without difficulty. pt has no questions regarding medications or discharge instructions. pt stating that she does want  HH instead of following with physical therapy in office. caller sees that referrals for HH, physical and occupational therapy are all pending. will message PCP to ask that a referral be placed by him as well just to cover all bases. pt wishing to be seen sooner than what available times i had open to me to schedule. office to be messaged regarding scheduling. pt ok with contact in 2 wks (4/24/2025  8:55 AM)  Call End Time: 0855 (4/24/2025  8:55 AM)    Engagement  Call Start Time: 0845 (Spoke with patient) (4/24/2025  8:55 AM)    Medications  Medications reviewed with patient/caregiver?: Yes (4/24/2025  8:55 AM)  Is the patient having any side effects they believe may be caused by any medication additions or changes?: No (4/24/2025  8:55 AM)  Does the patient have all medications ordered at discharge?: Yes (4/24/2025  8:55 AM)  Prescription Comments: pt able to obtain all medications without difficulty (4/24/2025  8:55 AM)  Is the patient taking all medications as directed (includes completed medication regime)?: Yes (4/24/2025  8:55 AM)  Medication Comments: pt has no questions regarding mediations at this  time. (4/24/2025  8:55 AM)    Appointments  Does the patient have a primary care provider?: Yes (tasked to office) (4/24/2025  8:55 AM)  Has the patient kept scheduled appointments due by today?: Yes (4/24/2025  8:55 AM)    Self Management  What is the home health agency?: [facility]  (4/24/2025  8:55 AM)  Has home health visited the patient within 72 hours of discharge?: Call prior to 72 hours (4/24/2025  8:55 AM)  What Durable Medical Equipment (DME) was ordered?: None (4/24/2025  8:55 AM)    Patient Teaching  Does the patient have access to their discharge instructions?: Yes (4/24/2025  8:55 AM)  Care Management Interventions: Reviewed instructions with patient (4/24/2025  8:55 AM)  What is the patient's perception of their health status since discharge?: Same (4/24/2025  8:55 AM)  Is the patient/caregiver able to teach back the hierarchy of who to call/visit for symptoms/problems? PCP, Specialist, Home Health nurse, Urgent Care, ED, 911: Yes (4/24/2025  8:55 AM)  Patient/Caregiver Education Comments: None (4/24/2025  8:55 AM)

## 2025-04-25 ENCOUNTER — OFFICE VISIT (OUTPATIENT)
Dept: PRIMARY CARE | Facility: CLINIC | Age: 60
End: 2025-04-25
Payer: COMMERCIAL

## 2025-04-25 VITALS
HEART RATE: 85 BPM | DIASTOLIC BLOOD PRESSURE: 80 MMHG | WEIGHT: 106 LBS | BODY MASS INDEX: 19.39 KG/M2 | OXYGEN SATURATION: 99 % | TEMPERATURE: 97.9 F | SYSTOLIC BLOOD PRESSURE: 124 MMHG

## 2025-04-25 DIAGNOSIS — G89.29 ACUTE EXACERBATION OF CHRONIC LOW BACK PAIN: Primary | ICD-10-CM

## 2025-04-25 DIAGNOSIS — S32.2XXA SACRUM AND COCCYX FRACTURE, CLOSED, INITIAL ENCOUNTER (MULTI): ICD-10-CM

## 2025-04-25 DIAGNOSIS — S32.10XA SACRUM AND COCCYX FRACTURE, CLOSED, INITIAL ENCOUNTER (MULTI): ICD-10-CM

## 2025-04-25 DIAGNOSIS — M54.50 ACUTE EXACERBATION OF CHRONIC LOW BACK PAIN: Primary | ICD-10-CM

## 2025-04-25 PROCEDURE — 3079F DIAST BP 80-89 MM HG: CPT | Performed by: PHYSICIAN ASSISTANT

## 2025-04-25 PROCEDURE — 3074F SYST BP LT 130 MM HG: CPT | Performed by: PHYSICIAN ASSISTANT

## 2025-04-25 PROCEDURE — 99495 TRANSJ CARE MGMT MOD F2F 14D: CPT | Performed by: PHYSICIAN ASSISTANT

## 2025-04-25 NOTE — PROGRESS NOTES
Subjective   Patient ID: Caitlyn Atkins is a 59 y.o. female who presents for ER Follow-up ( ER follow up 4/21 fell walking with walker--discuss FMLA).    HPI     Patient presents for hospital follow up.    Admission date: 4/21/25  Discharge date:  4/22/25  Discharge diagnosis: Acute exacerbation of chronic low back pain   Sacral fracture  Tiny SDH (chronic)?    Telephone outreach with patient after discharge: 4/24/25  Face-to-Face visit date: 4/25/25  Medical complexity decision-making: moderate  Reviewed discharge summary, lab/test results, and hospital records.  Reconciled med list.   Specialist follow-ups? 4/28/26 with pain mgmt  5/6/25 with Dr King.  5/15/25 with neurosurgery Claudiamichael Kirby PEDRO    Fell on 4/18/25 landing on her left hip.  Went to ER 4/21/25 since pain was worsening.  Had another fall a few weeks prior.   Getting paresthesias and pain radiating into legs bilaterally.   Hurts sitting and standing.     Pain mgmt has him on hydrocodone (was on oxycodone briefly from the hospital).   Is on gabapentin and tizanidine     CT lumbar:   Age indeterminate fractures are present within the sacrum in a  pattern that can be seen with sacral insufficiency fractures. The  angulation at S2-S3 is new when compared to the x-ray of March 10,  2025.  2. Unchanged anterolisthesis L4 on L5 compared to prior lumbar  radiograph. There is likely mild spinal canal and neural foraminal  stenosis at this level due to disc uncovering, better evaluated with  MR lumbar spine if there is ongoing clinical concern.    Walking with walker. Difficulty in any one position for extended period of time -- has to keep shifting wt while sitting. Standing for < 1 minute at a time.      in hospital.   Hard to walk to do any outpatient therapy. Has to rely on others for help and transportation, so needs home healthcare referral for OT and PT.     Needs order for home PT/OT.       Review of Systems    Objective   /80    Pulse 85   Temp 36.6 °C (97.9 °F)   Wt 48.1 kg (106 lb)   SpO2 99%   BMI 19.39 kg/m²     Physical Exam    Assessment/Plan   There are no diagnoses linked to this encounter.        the specialist that she will be seeing in the coming weeks.  Follow-up as needed

## 2025-04-26 ENCOUNTER — TELEPHONE (OUTPATIENT)
Dept: HOME HEALTH SERVICES | Facility: HOME HEALTH | Age: 60
End: 2025-04-26
Payer: COMMERCIAL

## 2025-04-26 NOTE — TELEPHONE ENCOUNTER
Hello, [facility] received your referral for Caitlyn Atkins, In order for us to move forward with processing the referral, please include a review of systems or physical exam to visit note 04/25 as required by CMS.      Thank you ,  Intake department

## 2025-04-28 ENCOUNTER — HOME HEALTH ADMISSION (OUTPATIENT)
Dept: HOME HEALTH SERVICES | Facility: HOME HEALTH | Age: 60
End: 2025-04-28
Payer: COMMERCIAL

## 2025-04-28 ENCOUNTER — APPOINTMENT (OUTPATIENT)
Dept: PAIN MEDICINE | Facility: CLINIC | Age: 60
End: 2025-04-28
Payer: COMMERCIAL

## 2025-04-28 ENCOUNTER — DOCUMENTATION (OUTPATIENT)
Dept: HOME HEALTH SERVICES | Facility: HOME HEALTH | Age: 60
End: 2025-04-28

## 2025-04-28 DIAGNOSIS — M54.31 BILATERAL SCIATICA: ICD-10-CM

## 2025-04-28 DIAGNOSIS — M54.2 NECK PAIN: ICD-10-CM

## 2025-04-28 DIAGNOSIS — M54.32 BILATERAL SCIATICA: ICD-10-CM

## 2025-04-28 DIAGNOSIS — M62.838 CERVICAL PARASPINAL MUSCLE SPASM: ICD-10-CM

## 2025-04-28 PROCEDURE — 99214 OFFICE O/P EST MOD 30 MIN: CPT | Performed by: PHYSICAL MEDICINE & REHABILITATION

## 2025-04-28 RX ORDER — HYDROCODONE BITARTRATE AND ACETAMINOPHEN 7.5; 325 MG/1; MG/1
1 TABLET ORAL EVERY 6 HOURS PRN
Qty: 100 TABLET | Refills: 0 | Status: SHIPPED | OUTPATIENT
Start: 2025-04-28 | End: 2025-05-26

## 2025-04-28 RX ORDER — HYDROCODONE BITARTRATE AND ACETAMINOPHEN 7.5; 325 MG/1; MG/1
1 TABLET ORAL EVERY 6 HOURS PRN
Qty: 112 TABLET | Refills: 0 | Status: SHIPPED | OUTPATIENT
Start: 2025-05-26 | End: 2025-06-23

## 2025-04-28 RX ORDER — GABAPENTIN 100 MG/1
100 CAPSULE ORAL 3 TIMES DAILY
Qty: 270 CAPSULE | Refills: 1 | Status: SHIPPED | OUTPATIENT
Start: 2025-04-28 | End: 2025-07-27

## 2025-04-28 RX ORDER — TIZANIDINE 2 MG/1
2 TABLET ORAL EVERY 12 HOURS PRN
Qty: 56 TABLET | Refills: 2 | Status: SHIPPED | OUTPATIENT
Start: 2025-04-28 | End: 2025-05-26

## 2025-04-28 ASSESSMENT — ENCOUNTER SYMPTOMS
SHORTNESS OF BREATH: 0
ABDOMINAL PAIN: 0

## 2025-04-28 NOTE — PROGRESS NOTES
Chief complaint  Back and R leg pain      History  Caitlyn Atkins is back for pain management office visit  Caitlyn Atkins was at home in [place].  Could not physically come to the office today .  I was at the office.  I used secure audiovisual communication provided by the Epic system. Caitlyn Atkins  agreed on this form of communication and consented to the visit via A/V method.  The patient also understood that this will be billed as office visit.   Was in the hospital last week after a fall and having increased pain she went to hospital and had imaging was diagnosed with fracture in tail bone, going to follow up with spine surgery  She was discharged with oxycodone 5 mg 20 tabs    The pain is now in the back and lower limbs  The low back pain is mechanical the pain down the lower limbs is neuropathic.  Discussed with her about considering epidural injection but she is going to follow-up with spine surgeon and she will decide on that.  Her consultation with a spine surgeon is coming up.  She mentions that we decreased her hydrocodone and she is not tolerating that she wanted her to go back to 4 times a day.  We will do that until the healing process take place and then we will try to cut back again with her.      we discussed about emerging data about tapering opioid therapy for chronic nonmalignant pain.  These are showing increasing evidence of improving the chronic pain itself, anxiety and depression, with the tapering of opioid therapy for chronic nonmalignant pain.  These are additional benefits to the above that we have been discussing.  As long as the cut back is done progressively and safely which we are doing.     Pain level without medication is 8/10 , with the medication pain level between 2 and 3/10.     Pain disability index (PDI) improvement   across different functional categories, with the pain control with the meds.  Went over the questionnaires during the AV visit today. Will fill the forms  "at the next in person visit.  The pain meds are helping control the pain and improving Activities of Daily living and quality of life and quality of sleep.    opioids treatment agreement January 2025    Pill count reported on scheduled with the medication we will perform pill count at the next visit  Oarrs pulled and reviewed, no concerns  last urine toxicology testing was compliant this was done on : November 2024  Xray updated spine  ORT (opioid risk tool) score is 0  Pain pathology and pain generators spine  Modalities tried injection, surgery, physical therapy, TENS unit, nonsteroidal anti-inflammatory medication injections    Review of Systems :  Denied any fever or chills. No weight loss and no night sweats. No cough or sputum production. No diarrhea   The constipation has been responding to fibers and over the counter medications.     No bladder and bowel incontinence and no other changes in bladder and bowel. No skin changes.  Reports tiredness and fatigability only if the pain is not controlled.     I further Asked about symptoms or changes affecting the vision, hearing, breathing, digestive system, urinary symptoms, skin, other musculoskeletal condition, neurological conditions these are negative except as detailed in the history and physical examination above    Denied opioids diversion and abuse and denies alcoholism. Denies overuse of the pain medications.  No reported euphoria sensation or getting a \"high\" on the pain medications.    The control of the pain with the pain medications is helping the control of the symptoms and allowing the function and activities of daily living, enjoyment of life, improving the quality of life and sleep with less interruption by the pain. The goal is symptomatic control of the nonmalignant chronic pain and not to repair the permanent damage in the tissues inducing the chronic pain conditions. We are aiming to shift the focus from the nonmalignant chronic pain to other " aspects of life by symptomatically treating this chronic pain. If this pain is not treated it will lead to major morbidity and it is also associated with increased risks of mortality. The patient understands those very clearly and also understand high risks of morbidity and mortality if not strictly adherent to the treatment recommendations and reporting any associated side effects. Also patient understand the full responsibility associated with these medications to avoid abuse or overuse or any use of these medications for anything besides treating the patient's own chronic pain and nothing else under any circumstances.        Physical examination  Awake, alert and oriented for time place and persons   Pupils are equal and reactive to light and accommodation    This is a secure A/V visit    Diagnosis  Problem List Items Addressed This Visit       Bilateral sciatica    Relevant Medications    gabapentin (Neurontin) 100 mg capsule    HYDROcodone-acetaminophen (Norco) 7.5-325 mg tablet    HYDROcodone-acetaminophen (Norco) 7.5-325 mg tablet (Start on 5/26/2025)     Other Visit Diagnoses         Neck pain        Relevant Medications    tiZANidine (Zanaflex) 2 mg tablet    HYDROcodone-acetaminophen (Norco) 7.5-325 mg tablet    HYDROcodone-acetaminophen (Norco) 7.5-325 mg tablet (Start on 5/26/2025)      Cervical paraspinal muscle spasm        Relevant Medications    tiZANidine (Zanaflex) 2 mg tablet    HYDROcodone-acetaminophen (Norco) 7.5-325 mg tablet    HYDROcodone-acetaminophen (Norco) 7.5-325 mg tablet (Start on 5/26/2025)             Plan  Reviewed the pain generators.  Went over the types of pain with neuropathic and nociceptive and different pathologies and therapeutic modalities. Discussed the mechanism of action of interventions from acupuncture, physical therapy , regular exercises, injections, botox, spinal cord stimulation, and role of surgery     Went over pathology of the intervertebral disc displacement  and the anatomical relation to the Nerve roots and relation to the radicular symptoms. Went over treatment modalities with conservative treatment including acupuncture   and epidural steroid injection with fluoroscopy guidance and last resort of surgery    Based on the above findings and the clinical response to the opioids medications and improvement of the activities of daily living, sleep, and work performance. We made this complex decision to continue the opioids therapy in light of the evidence of the patient's responsibility in using the pain medications as prescribed for the nonmalignant chronic pain condition. We discussed about the use of the pain medications to treat the symptoms of chronic nonmalignant pain and we are not trying the repair the permanent damage in the tissues, rather we are trying to control the symptoms induced by the permanent damage to the tissues inducing the chronic pain condition and resulting disability. I explained the difference and discussed it with the patient and stressed the importance of knowing the difference especially because of the potential side effects and the potential addicting effect and habit forming nature of the dangerous drugs we are using to treat the symptoms of the chronic pain.      We discussed that we are prescribing the medications on good rambo and legitimate medical reason within the scope of professional medical practice.     We reviewed the side effects and precautions of opioids prescriptions as discussed in the opioids treatment agreement.    realizes the interaction between the therapeutic classes including the respiratory depression and potential death     Random drug testing   we will submit     At this time I agreed with her to increase the hydrocodone 7.5 mg 4 times a day this is an MEDD of 30.  I discussed with her that we are temporarily increasing the medication to 4 a day until we figure out about the fractures and her sacrum and what she  wants to do.  Once this is stable we will try to cut back again decrease the amount of tolerance developing with opioid therapy    Discussed about NSAIDS and I explained about the opioids sparing effect to allow keeping the opioids dose at minimal effective dose.   I went over the potential side effects of the NSAIDS on the gastrointestinal, renal and cardiovascular systems.      I detailed the side effects from the acetaminophen in the medication and made aware of those. I also explained about the cumulative effects on the organs and mainly the liver.     Given the opioids therapy , we discussed about the risk for accidental over dose on the pain medications, either for patient or other household. I went over the mechanism of action and mode of use of the Naloxone according to the  recommendations. I will provide a prescription for a kit.     Follow-up 8 weeks or earlier if needed     The level of clinical decision making in this office visit,  is high, given the high risks of complications with the morbidity and mortality due to the fact that acute and chronic pain may pose a threat to life and bodily function, if under treated, poorly treated, or with failure to maintain adequate treatment and timely medical follow up. Additionally over treatment has its own set of complications including overdosing on the pain medications and also the habit forming potentials with the use of the medications used to treat chronic painful conditions including therapeutic classes classified as dangerous medications. Given the serious and fluctuating nature of pain level and instensity with extensive consideration for whenever pain changes, there is always the risk of prolonged functional impairment requiring close patient monitoring with regular assessments and reassessments and high level medical decision making at every office visit. The amount and complexity of data reviewed is high given the patient clinical  presentation, labs,  data, radiology reports, and other tests as discussed during office visits. Pertinent data whether positive or negative were taken in consideration in the process of making this high level medical decision.

## 2025-04-28 NOTE — HH CARE COORDINATION
Home Care received a Referral for Nursing, Physical Therapy, and Occupational Therapy. We have processed the referral for a Start of Care on 4/29-4/30/25  .     If you have any questions or concerns, please feel free to contact us at [phone]. Follow the prompts, enter your five digit zip code, and you will be directed to your care team on [facility].

## 2025-04-29 ENCOUNTER — HOME CARE VISIT (OUTPATIENT)
Dept: HOME HEALTH SERVICES | Facility: HOME HEALTH | Age: 60
End: 2025-04-29
Payer: COMMERCIAL

## 2025-04-29 VITALS
SYSTOLIC BLOOD PRESSURE: 128 MMHG | BODY MASS INDEX: 19.32 KG/M2 | HEART RATE: 102 BPM | HEIGHT: 62 IN | TEMPERATURE: 97.2 F | OXYGEN SATURATION: 97 % | WEIGHT: 105 LBS | DIASTOLIC BLOOD PRESSURE: 68 MMHG | RESPIRATION RATE: 12 BRPM

## 2025-04-29 PROCEDURE — G0299 HHS/HOSPICE OF RN EA 15 MIN: HCPCS

## 2025-04-29 ASSESSMENT — ENCOUNTER SYMPTOMS
SHORTNESS OF BREATH: 1
PAIN LOCATION - PAIN QUALITY: ACHING THROBBING
PAIN LOCATION: BACK
PAIN LOCATION - PAIN DURATION: VARIES
PAIN LOCATION - PAIN SEVERITY: 6/10
MUSCLE WEAKNESS: 1
LOWEST PAIN SEVERITY IN PAST 24 HOURS: 3/10
APPETITE LEVEL: GOOD
DYSPNEA ACTIVITY LEVEL: AFTER AMBULATING MORE THAN 20 FT
PERSON REPORTING PAIN: PATIENT
PAIN LOCATION - PAIN FREQUENCY: INTERMITTENT
PAIN LOCATION - RELIEVING FACTORS: MEDICATION, REST
PAIN: 1
PAIN SEVERITY GOAL: 1/10
FATIGUES EASILY: 1
HIGHEST PAIN SEVERITY IN PAST 24 HOURS: 7/10
PAIN LOCATION - EXACERBATING FACTORS: FX
CHANGE IN APPETITE: UNCHANGED
FATIGUE: 1

## 2025-04-29 ASSESSMENT — ACTIVITIES OF DAILY LIVING (ADL)
AMBULATION ASSISTANCE: STAND BY ASSIST
AMBULATION ASSISTANCE: ONE PERSON
ENTERING_EXITING_HOME: MODERATE ASSIST
AMBULATION ASSISTANCE: 1
OASIS_M1830: 03

## 2025-04-29 NOTE — DOCUMENTATION CLARIFICATION NOTE
"    PATIENT:               JOSE IBARRA  ACCT #:                  [id number]  MRN:                       [id number]  :                       1965  ADMIT DATE:       2025 9:58 AM  DISCH DATE:        2025 4:18 PM  RESPONDING PROVIDER #:        [id number]          PROVIDER RESPONSE TEXT:    Sacral Insufficiency Fracture secondary to osteoporosis and falls    CDI QUERY TEXT:    Clarification      Instruction:    Based on your assessment of the patient and the clinical information, please provide the requested documentation by clicking on the appropriate radio button and enter any additional information if prompted.    Question: Please further specify the type of fracture and/or site as    When answering this query, please exercise your independent professional judgment. The fact that a question is being asked, does not imply that any particular answer is desired or expected.    The patient's clinical indicators include:  Clinical Information: 60y/o female presenting with low back pain and admitted with sacral fracture    Clinical Indicators:    -  ED: \"CT...ordered given fall with osteoporosis and history of fractures\"    -  NSGY CS:  \"59 y.o. female presenting with h/o osteoporosis, p/w fall, LBP,  CT LS healing sacral insufficiency fracture with angulation\"    -  Discharge Diagnosis: \"Sacral fracture\"    -  DCS:  \"Imaging revealed a mildly displaced sacral fracture, age-indeterminate sacral insufficiency fractures\"    Treatment: OT/PT Eval, Pain Management    Risk Factors: MS, History of falls  Options provided:  -- Nontraumatic Sacral Insufficiency Fracture due to Osteoporosis  -- Sacral Insufficiency Fracture due to Traumatic Fall, Please specify additional information below  -- Sacral Insufficiency Fracture secondary to osteoporosis and falls  -- Other - I will add my own diagnosis  -- Refer to Clinical Documentation Reviewer    Query created by: Tata Her on 2025 " 11:17 AM      Electronically signed by:  AARON MARTIN PA-C 4/29/2025 3:07 PM

## 2025-04-30 ENCOUNTER — HOME CARE VISIT (OUTPATIENT)
Dept: HOME HEALTH SERVICES | Facility: HOME HEALTH | Age: 60
End: 2025-04-30
Payer: COMMERCIAL

## 2025-04-30 VITALS — RESPIRATION RATE: 16 BRPM

## 2025-04-30 PROCEDURE — G0151 HHCP-SERV OF PT,EA 15 MIN: HCPCS

## 2025-04-30 SDOH — HEALTH STABILITY: PHYSICAL HEALTH: EXERCISE TYPE: NA TODAY DUE TO EVAL

## 2025-04-30 ASSESSMENT — BALANCE ASSESSMENTS
EYES CLOSED AT MAXIMUM POSITION NUDGED: 1 - STEADY
TURNING 360 DEGREES STEPS: 1 - CONTINUOUS STEPS
NUDGED SCORE: 2
NUDGED: 2 - STEADY
STANDING BALANCE: 1 - STEADY BUT WIDE STANCE AND USES CANE OR OTHER SUPPORT
SITTING BALANCE: 1 - STEADY, SAFE
SITTING DOWN: 1 - USES ARMS OR NOT SMOOTH MOTION
IMMEDIATE STANDING BALANCE FIRST 5 SECONDS: 1 - STEADY BUT USES WALKER OR OTHER SUPPORT
BALANCE SCORE: 12
ARISING SCORE: 1
ARISES: 1 - ABLE, USES ARMS TO HELP
ATTEMPTS TO ARISE: 2 - ABLE TO RISE, ONE ATTEMPT

## 2025-04-30 ASSESSMENT — ACTIVITIES OF DAILY LIVING (ADL)
AMBULATION ASSISTANCE: 1
AMBULATION ASSISTANCE ON FLAT SURFACES: 1
AMBULATION ASSISTANCE: SUPERVISION
AMBULATION_DISTANCE/DURATION_TOLERATED: 50 FEET

## 2025-04-30 ASSESSMENT — ENCOUNTER SYMPTOMS
PAIN LOCATION - PAIN SEVERITY: 7/10
MUSCLE WEAKNESS: 1
HIGHEST PAIN SEVERITY IN PAST 24 HOURS: 7/10
PERSON REPORTING PAIN: PATIENT
PAIN LOCATION: BACK
SUBJECTIVE PAIN PROGRESSION: GRADUALLY WORSENING
PAIN: 1
PAIN LOCATION - PAIN FREQUENCY: FREQUENT
LOWEST PAIN SEVERITY IN PAST 24 HOURS: 7/10
PAIN LOCATION - PAIN DURATION: VARIES
PAIN LOCATION - PAIN QUALITY: ACHES
PAIN LOCATION - EXACERBATING FACTORS: MVT
PAIN SEVERITY GOAL: 0/10
PAIN LOCATION - RELIEVING FACTORS: REST, MEDS

## 2025-04-30 ASSESSMENT — GAIT ASSESSMENTS
STEP SYMMETRY: 1 - RIGHT AND LEFT STEP LENGTH APPEAR EQUAL
TRUNK SCORE: 0
STEP CONTINUITY: 1 - STEPS APPEAR CONTINUOUS
WALKING STANCE: 0 - HEELS APART
PATH SCORE: 1
INITIATION OF GAIT IMMEDIATELY AFTER GO: 1 - NO HESITANCY
BALANCE AND GAIT SCORE: 20
GAIT SCORE: 8
TRUNK: 0 - MARKED SWAY OR USES WALKING AID
PATH: 1 - MILD/MODERATE DEVIATION OR USES WALKING AID

## 2025-04-30 NOTE — CASE COMMUNICATION
Pt home with her .  He has lung CA and undergoing chemo. Pt had right THR 2023 with subsequent shorter right LE.  Pt has had several falls which may have effected right hip.  Pt has spinal stenosis and slippage, h.o MS.  Pt is wanting back surgery.  Pt has fu next week.  Pt had a shoe insert at outpt but has not used recently. Pt does not have right foot drop as mentioned  Subjective: My right leg spasms and my bladder has been a ffected  Primary Reason for Home Care: weakness, falls, back and LE pain  Skilled Needs: Skilled PT to instruct pt on gentle stretches, possible right shoe insert, gait training, pain control, safe mobility  Precautions:none  Instruction provided: Gentle knee to chest stretches, wh walker lowered  pt response to instruction: receptive  Pt  instructed on plan of care and visit frequency. 1w2, 2w2  Pt in agreement with plan of care and vi sit frequency.    Discipline Communication: PTA  Plan for next visit: f.u after surgeon appt next Tuesday, gentle stretching

## 2025-05-05 ENCOUNTER — APPOINTMENT (OUTPATIENT)
Dept: RADIOLOGY | Facility: CLINIC | Age: 60
End: 2025-05-05
Payer: COMMERCIAL

## 2025-05-05 ENCOUNTER — HOSPITAL ENCOUNTER (OUTPATIENT)
Dept: RADIOLOGY | Facility: CLINIC | Age: 60
Discharge: HOME | End: 2025-05-05
Payer: COMMERCIAL

## 2025-05-05 DIAGNOSIS — S06.5XAA SDH (SUBDURAL HEMATOMA) (MULTI): ICD-10-CM

## 2025-05-05 PROCEDURE — 70450 CT HEAD/BRAIN W/O DYE: CPT

## 2025-05-05 PROCEDURE — 70450 CT HEAD/BRAIN W/O DYE: CPT | Performed by: RADIOLOGY

## 2025-05-05 NOTE — DOCUMENTATION CLARIFICATION NOTE
"    PATIENT:               JOSE IBARRA  ACCT #:                  [id number]  MRN:                       [id number]  :                       1965  ADMIT DATE:       2025 9:58 AM  DISCH DATE:        2025 4:18 PM  RESPONDING PROVIDER #:        [id number]          PROVIDER RESPONSE TEXT:    Unable to Determine    CDI QUERY TEXT:    Clarification        Instruction:    Based on your assessment of the patient and the clinical information, please provide the requested documentation by clicking on the appropriate radio button and enter any additional information if prompted.    Question: Is there a diagnosis indicative of the clinical information    When answering this query, please exercise your independent professional judgment. The fact that a question is being asked, does not imply that any particular answer is desired or expected.    The patient's clinical indicators include:  Clinical Information: 60y/o female presents to ED for c/o frequent falls and low back pain    Clinical Indicators:    -  CTH: No evidence of acute cortical infarct or intracranial hemorrhage    -  Neurosurgery CS: \"p/w fall... CTH thin RF chronic SDH\"    -  Trauma PN: \"INCIDENTAL FINDINGS: Thin chronic SDH\" and  \"no repeat scans for chronic SDH\"    -  DCS: \"Neurosurgery was consulted due to imaging findings concerning for a thin chronic subdural hematoma. No acute neurosurgical intervention was recommended, and no additional neuroimaging was deemed necessary during the current admission\"    Treatment: Neurosurgery consult, CTH , DVT PPX held on     Risk Factors: Recurrent falls, MS  Options provided:  -- Traumatic Chronic SDH  -- Non-traumatic Chronic SDH  -- Unable to Determine  -- SDH incidental finding not requiring treatment or monitoring  -- Other - I will add my own diagnosis  -- Refer to Clinical Documentation Reviewer    Query created by: Tata Her on 2025 10:40 AM      Electronically " signed by:  IFRAH HOUSE MD 5/5/2025 10:20 AM

## 2025-05-06 ENCOUNTER — OFFICE VISIT (OUTPATIENT)
Dept: ORTHOPEDIC SURGERY | Facility: CLINIC | Age: 60
End: 2025-05-06
Payer: COMMERCIAL

## 2025-05-06 ENCOUNTER — PATIENT OUTREACH (OUTPATIENT)
Dept: PRIMARY CARE | Facility: CLINIC | Age: 60
End: 2025-05-06
Payer: COMMERCIAL

## 2025-05-06 DIAGNOSIS — M43.10 ACQUIRED SPONDYLOLISTHESIS: ICD-10-CM

## 2025-05-06 DIAGNOSIS — M48.062 LUMBAR STENOSIS WITH NEUROGENIC CLAUDICATION: Primary | ICD-10-CM

## 2025-05-06 DIAGNOSIS — S32.10XA CLOSED FRACTURE OF SACRUM, UNSPECIFIED PORTION OF SACRUM, INITIAL ENCOUNTER (MULTI): ICD-10-CM

## 2025-05-06 PROCEDURE — 99215 OFFICE O/P EST HI 40 MIN: CPT | Performed by: ORTHOPAEDIC SURGERY

## 2025-05-06 NOTE — PROGRESS NOTES
Unable to reach patient for follow up call after recent PCP appt.   Left voicemail with call back number for patient to call if needed   If no voicemail available call attempts x 2 were made to contact the patient to assist with any questions or concerns patient may have.

## 2025-05-06 NOTE — PROGRESS NOTES
HPI:Caitlyn Atkins is a 59-year-old woman with a history of known spinal stenosis at L4-5.  She was recently admitted to the hospital for increasing pain in her low back after a fall.  Subsequent to the fall she has had bladder incontinence.  Every time she stands up, she has loss of control of her bladder.  She was discharged from the hospital with home physical therapy and has been getting therapy with home health.  Patient reports bilateral buttock pain and right leg pain.  She cannot stand and walk without use of a walker.      ROS:  Reviewed on EMR and patient intake sheet.    PMH/SH:  Reviewed on EMR and patient intake sheet.    Exam:  Physical Exam    Constitutional: Well appearing; no acute distress  Eyes: pupils are equal and round  Psych: normal affect  Respiratory: non-labored breathing  Cardiovascular: regular rate and rhythm  GI: non-distended abdomen  Musculoskeletal: no pain with range of motion of the hips bilaterally  Neurologic: [4 -]/5 strength in the lower extremities bilaterally]; [negative] straight leg raise; numbness in the right inner thigh    Radiology:     CT scan was personally reviewed.  It demonstrates a fracture through the S2-S3 vertebral body with significant focal angulation and resulting canal stenosis.  She has the aforementioned severe spinal stenosis at L4-5 both evident on the CT scan as well as the MRI in 2023 with the associated spondylolisthesis.    Diagnosis:    Sacral fracture; lumbar stenosis; degenerative spondylolisthesis; neurogenic bladder    Assessment and Plan:   59-year-old woman with worsening back pain and a neurogenic bladder after recent sacral fracture producing significant angulation through the sacrum as well as severe stenosis.  She has pre-existing severe stenosis at L4-5.  As a result of her recent fracture, the patient is having bladder incontinence and inability to ambulate.  Because of her neurologic deterioration, surgical intervention will be  required to prevent further worsening of her neurologic function as well as to clinically position the patient for neurologic improvement with future physical therapy.  The patient does have a follow-up with neurosurgery to go over her brain bleed that she had after her recent fall.  If she is cleared neurosurgically, then we can proceed with the general anesthesia needed for upcoming lumbar spine surgery.  We discussed that surgery today with her daughter in attendance.    We discussed surgery today at length, including the specifics of the actual proposed procedure, the projected hospital course and post-operative recovery or rehabilitation, and the expected results of this surgery.  The potential benefits and risks of the proposed surgery include, but are not limited to those of infection, spinal fluid leaks, nerve injury or paralysis, whether that be complete or partial paralysis, foot drop, instrumentation failure, non-union or latent instability, future adjacent segment disease, epidural hematoma, wound dehiscence, reherniation, persistent pain or paresthesias, and the general risks of anaesthesia including, but not limited to those of stroke, heart attack, respiratory difficulties and death.  The patient understands that there are no guarantees in regard to outcome or potential complications associated with the proposed procedure.  After this discussion, the patient articulated understanding of the topics covered and felt that all questions had been covered and answered satisfactorily.    Patient will be in contact with me, when she has received clearance from neurosurgery.    The patient was in agreement with the plan. At the end of the visit today, the patient felt that all questions had been answered satisfactorily.  The patient was pleased with the visit and very appreciative for the care rendered.     Thank you very much for the kind referral.  It is a privilege, and a pleasure, to partner with you in  the care of your patients.  I would be delighted to assist you with any further consultations as needed.          Michele King MD    Chief of Spine Surgery, [facility]  Director of Spine Service, [facility]  , Department of Orthopaedics  [facility] [employer]  [address] [place] [address]  P: [phone]  [facility]    This note was dictated with voice recognition software.  It has not been proofread for grammatical errors, typographical mistakes or other semantic inconsistencies.

## 2025-05-06 NOTE — LETTER
May 6, 2025     Zane Ang PA-C  [address]  [address] [address]    Patient: Caitlyn Atkins   YOB: 1965   Date of Visit: 5/6/2025       Dear Dr. Zane Ang PA-C:    Thank you for referring Caitlyn Atkins to me for evaluation. Below are my notes for this consultation.  If you have questions, please do not hesitate to call me. I look forward to following your patient along with you.       Sincerely,     Michele King MD      CC: Brock Powers PA-C  ______________________________________________________________________________________    HPI:Caitlyn Atkins is a 59-year-old woman with a history of known spinal stenosis at L4-5.  She was recently admitted to the hospital for increasing pain in her low back after a fall.  Subsequent to the fall she has had bladder incontinence.  Every time she stands up, she has loss of control of her bladder.  She was discharged from the hospital with home physical therapy and has been getting therapy with home health.  Patient reports bilateral buttock pain and right leg pain.  She cannot stand and walk without use of a walker.      ROS:  Reviewed on EMR and patient intake sheet.    PMH/SH:  Reviewed on EMR and patient intake sheet.    Exam:  Physical Exam    Constitutional: Well appearing; no acute distress  Eyes: pupils are equal and round  Psych: normal affect  Respiratory: non-labored breathing  Cardiovascular: regular rate and rhythm  GI: non-distended abdomen  Musculoskeletal: no pain with range of motion of the hips bilaterally  Neurologic: [4 -]/5 strength in the lower extremities bilaterally]; [negative] straight leg raise; numbness in the right inner thigh    Radiology:     CT scan was personally reviewed.  It demonstrates a fracture through the S2-S3 vertebral body with significant focal angulation and resulting canal stenosis.  She has the aforementioned severe spinal stenosis at L4-5 both evident on the CT scan as well as  the MRI in 2023 with the associated spondylolisthesis.    Diagnosis:    Sacral fracture; lumbar stenosis; degenerative spondylolisthesis; neurogenic bladder    Assessment and Plan:   59-year-old woman with worsening back pain and a neurogenic bladder after recent sacral fracture producing significant angulation through the sacrum as well as severe stenosis.  She has pre-existing severe stenosis at L4-5.  As a result of her recent fracture, the patient is having bladder incontinence and inability to ambulate.  Because of her neurologic deterioration, surgical intervention will be required to prevent further worsening of her neurologic function as well as to clinically position the patient for neurologic improvement with future physical therapy.  The patient does have a follow-up with neurosurgery to go over her brain bleed that she had after her recent fall.  If she is cleared neurosurgically, then we can proceed with the general anesthesia needed for upcoming lumbar spine surgery.  We discussed that surgery today with her daughter in attendance.    We discussed surgery today at length, including the specifics of the actual proposed procedure, the projected hospital course and post-operative recovery or rehabilitation, and the expected results of this surgery.  The potential benefits and risks of the proposed surgery include, but are not limited to those of infection, spinal fluid leaks, nerve injury or paralysis, whether that be complete or partial paralysis, foot drop, instrumentation failure, non-union or latent instability, future adjacent segment disease, epidural hematoma, wound dehiscence, reherniation, persistent pain or paresthesias, and the general risks of anaesthesia including, but not limited to those of stroke, heart attack, respiratory difficulties and death.  The patient understands that there are no guarantees in regard to outcome or potential complications associated with the proposed procedure.   After this discussion, the patient articulated understanding of the topics covered and felt that all questions had been covered and answered satisfactorily.    Patient will be in contact with me, when she has received clearance from neurosurgery.    The patient was in agreement with the plan. At the end of the visit today, the patient felt that all questions had been answered satisfactorily.  The patient was pleased with the visit and very appreciative for the care rendered.     Thank you very much for the kind referral.  It is a privilege, and a pleasure, to partner with you in the care of your patients.  I would be delighted to assist you with any further consultations as needed.          Michele King MD    Chief of Spine Surgery, [facility]  Director of Spine Service, [facility]  , Department of Orthopaedics  [employer]  [address] [place] [address]  P: [phone]  [facility] [place]."[employer], Mobile Secretary"    This note was dictated with voice recognition software.  It has not been proofread for grammatical errors, typographical mistakes or other semantic inconsistencies.

## 2025-05-07 ENCOUNTER — HOME CARE VISIT (OUTPATIENT)
Dept: HOME HEALTH SERVICES | Facility: HOME HEALTH | Age: 60
End: 2025-05-07
Payer: COMMERCIAL

## 2025-05-07 VITALS
SYSTOLIC BLOOD PRESSURE: 140 MMHG | RESPIRATION RATE: 14 BRPM | HEART RATE: 52 BPM | TEMPERATURE: 97 F | DIASTOLIC BLOOD PRESSURE: 78 MMHG | OXYGEN SATURATION: 99 %

## 2025-05-07 PROCEDURE — G0157 HHC PT ASSISTANT EA 15: HCPCS | Mod: CQ

## 2025-05-07 PROCEDURE — G0299 HHS/HOSPICE OF RN EA 15 MIN: HCPCS

## 2025-05-07 SDOH — ECONOMIC STABILITY: GENERAL

## 2025-05-07 ASSESSMENT — ENCOUNTER SYMPTOMS
PAIN LOCATION - PAIN QUALITY: ACHING THROBBING
CONSTIPATION: 1
PAIN LOCATION - PAIN DURATION: VARIES
APPETITE LEVEL: FAIR
PAIN: 1
SHORTNESS OF BREATH: 1
PAIN LOCATION - PAIN FREQUENCY: INTERMITTENT
STOOL FREQUENCY: LESS THAN DAILY
PAIN LOCATION - PAIN SEVERITY: 8/10
PAIN SEVERITY GOAL: 1/10
HIGHEST PAIN SEVERITY IN PAST 24 HOURS: 8/10
PERSON REPORTING PAIN: PATIENT
LAST BOWEL MOVEMENT: 67332
PAIN LOCATION: BACK
PAIN LOCATION - RELIEVING FACTORS: MEDICATION, REST
SUBJECTIVE PAIN PROGRESSION: UNCHANGED
CHANGE IN APPETITE: UNCHANGED
DYSPNEA ACTIVITY LEVEL: AFTER AMBULATING MORE THAN 20 FT
FATIGUES EASILY: 1
PAIN LOCATION - EXACERBATING FACTORS: ARTHRITIS
MUSCLE WEAKNESS: 1
LOWEST PAIN SEVERITY IN PAST 24 HOURS: 4/10
FATIGUE: 1

## 2025-05-07 ASSESSMENT — ACTIVITIES OF DAILY LIVING (ADL)
AMBULATION ASSISTANCE: STAND BY ASSIST
AMBULATION ASSISTANCE: 1
MONEY MANAGEMENT (EXPENSES/BILLS): INDEPENDENT

## 2025-05-08 ASSESSMENT — ENCOUNTER SYMPTOMS
PERSON REPORTING PAIN: PATIENT
PAIN: 1
SUBJECTIVE PAIN PROGRESSION: WAXING AND WANING
PAIN LOCATION - PAIN QUALITY: DULL ACHE
PAIN LOCATION - PAIN SEVERITY: 7/10
PAIN LOCATION: BACK

## 2025-05-13 ENCOUNTER — HOME CARE VISIT (OUTPATIENT)
Dept: HOME HEALTH SERVICES | Facility: HOME HEALTH | Age: 60
End: 2025-05-13
Payer: COMMERCIAL

## 2025-05-13 VITALS
DIASTOLIC BLOOD PRESSURE: 62 MMHG | SYSTOLIC BLOOD PRESSURE: 110 MMHG | RESPIRATION RATE: 12 BRPM | OXYGEN SATURATION: 97 % | HEART RATE: 80 BPM | TEMPERATURE: 97.7 F

## 2025-05-13 PROCEDURE — G0299 HHS/HOSPICE OF RN EA 15 MIN: HCPCS

## 2025-05-13 PROCEDURE — G0157 HHC PT ASSISTANT EA 15: HCPCS | Mod: CQ

## 2025-05-13 ASSESSMENT — ENCOUNTER SYMPTOMS
LOWEST PAIN SEVERITY IN PAST 24 HOURS: 4/10
PAIN LOCATION - RELIEVING FACTORS: MEDICATION, REST
PAIN LOCATION - PAIN QUALITY: ACHING THROBBING
PERSON REPORTING PAIN: PATIENT
SUBJECTIVE PAIN PROGRESSION: UNCHANGED
PAIN LOCATION - PAIN SEVERITY: 6/10
HIGHEST PAIN SEVERITY IN PAST 24 HOURS: 8/10
HEADACHES: 1
PAIN SEVERITY GOAL: 2/10
PAIN LOCATION - PAIN FREQUENCY: INTERMITTENT
PAIN LOCATION: BACK
FATIGUES EASILY: 1
PAIN: 1
FATIGUE: 1
PAIN LOCATION - EXACERBATING FACTORS: ARTHRITIS
PAIN LOCATION - PAIN DURATION: VARIES

## 2025-05-14 ASSESSMENT — ENCOUNTER SYMPTOMS
PAIN LOCATION - PAIN FREQUENCY: FREQUENT
PAIN: 1
PAIN LOCATION - PAIN SEVERITY: 7/10
PAIN LOCATION: BACK
PERSON REPORTING PAIN: PATIENT

## 2025-05-15 ENCOUNTER — OFFICE VISIT (OUTPATIENT)
Dept: NEUROSURGERY | Facility: CLINIC | Age: 60
End: 2025-05-15
Payer: COMMERCIAL

## 2025-05-15 VITALS
WEIGHT: 105 LBS | HEART RATE: 70 BPM | RESPIRATION RATE: 18 BRPM | SYSTOLIC BLOOD PRESSURE: 100 MMHG | HEIGHT: 62 IN | DIASTOLIC BLOOD PRESSURE: 65 MMHG | BODY MASS INDEX: 19.32 KG/M2

## 2025-05-15 DIAGNOSIS — G93.9 BRAIN LESION: ICD-10-CM

## 2025-05-15 PROCEDURE — 99202 OFFICE O/P NEW SF 15 MIN: CPT | Performed by: NURSE PRACTITIONER

## 2025-05-15 PROCEDURE — 99212 OFFICE O/P EST SF 10 MIN: CPT | Performed by: NURSE PRACTITIONER

## 2025-05-15 PROCEDURE — 3074F SYST BP LT 130 MM HG: CPT | Performed by: NURSE PRACTITIONER

## 2025-05-15 PROCEDURE — 3008F BODY MASS INDEX DOCD: CPT | Performed by: NURSE PRACTITIONER

## 2025-05-15 PROCEDURE — 3078F DIAST BP <80 MM HG: CPT | Performed by: NURSE PRACTITIONER

## 2025-05-15 ASSESSMENT — PAIN SCALES - GENERAL: PAINLEVEL_OUTOF10: 7

## 2025-05-16 ENCOUNTER — HOME CARE VISIT (OUTPATIENT)
Dept: HOME HEALTH SERVICES | Facility: HOME HEALTH | Age: 60
End: 2025-05-16
Payer: COMMERCIAL

## 2025-05-16 SDOH — ECONOMIC STABILITY: GENERAL

## 2025-05-16 ASSESSMENT — ACTIVITIES OF DAILY LIVING (ADL)
AMBULATION ASSISTANCE: 1
AMBULATION ASSISTANCE: STAND BY ASSIST
MONEY MANAGEMENT (EXPENSES/BILLS): INDEPENDENT

## 2025-05-16 ASSESSMENT — ENCOUNTER SYMPTOMS
MUSCLE WEAKNESS: 1
CHANGE IN APPETITE: UNCHANGED
APPETITE LEVEL: GOOD

## 2025-05-20 ENCOUNTER — HOME CARE VISIT (OUTPATIENT)
Dept: HOME HEALTH SERVICES | Facility: HOME HEALTH | Age: 60
End: 2025-05-20
Payer: COMMERCIAL

## 2025-05-20 PROCEDURE — G0157 HHC PT ASSISTANT EA 15: HCPCS | Mod: CQ

## 2025-05-20 ASSESSMENT — ENCOUNTER SYMPTOMS
PAIN LOCATION - PAIN QUALITY: ACHE
PAIN LOCATION: BACK
PERSON REPORTING PAIN: PATIENT
PAIN: 1
PAIN LOCATION - PAIN FREQUENCY: FREQUENT
PAIN LOCATION - PAIN SEVERITY: 5/10

## 2025-05-21 ENCOUNTER — HOSPITAL ENCOUNTER (OUTPATIENT)
Dept: RADIOLOGY | Facility: HOSPITAL | Age: 60
Discharge: HOME | End: 2025-05-21
Payer: COMMERCIAL

## 2025-05-21 ENCOUNTER — PATIENT OUTREACH (OUTPATIENT)
Dept: PRIMARY CARE | Facility: CLINIC | Age: 60
End: 2025-05-21
Payer: COMMERCIAL

## 2025-05-21 DIAGNOSIS — G93.9 BRAIN LESION: ICD-10-CM

## 2025-05-21 PROCEDURE — A9575 INJ GADOTERATE MEGLUMI 0.1ML: HCPCS | Performed by: NURSE PRACTITIONER

## 2025-05-21 PROCEDURE — 2550000001 HC RX 255 CONTRASTS: Performed by: NURSE PRACTITIONER

## 2025-05-21 PROCEDURE — 70553 MRI BRAIN STEM W/O & W/DYE: CPT

## 2025-05-21 RX ORDER — GADOTERATE MEGLUMINE 376.9 MG/ML
9 INJECTION INTRAVENOUS
Status: COMPLETED | OUTPATIENT
Start: 2025-05-21 | End: 2025-05-21

## 2025-05-21 RX ADMIN — GADOTERATE MEGLUMINE 9 ML: 376.9 INJECTION INTRAVENOUS at 09:27

## 2025-05-22 ENCOUNTER — HOME CARE VISIT (OUTPATIENT)
Dept: HOME HEALTH SERVICES | Facility: HOME HEALTH | Age: 60
End: 2025-05-22
Payer: COMMERCIAL

## 2025-05-22 ENCOUNTER — APPOINTMENT (OUTPATIENT)
Dept: RADIOLOGY | Facility: HOSPITAL | Age: 60
End: 2025-05-22
Payer: COMMERCIAL

## 2025-05-22 VITALS
TEMPERATURE: 97.2 F | RESPIRATION RATE: 12 BRPM | SYSTOLIC BLOOD PRESSURE: 110 MMHG | OXYGEN SATURATION: 99 % | DIASTOLIC BLOOD PRESSURE: 64 MMHG | HEART RATE: 96 BPM

## 2025-05-22 PROCEDURE — G0299 HHS/HOSPICE OF RN EA 15 MIN: HCPCS

## 2025-05-22 ASSESSMENT — ENCOUNTER SYMPTOMS
PAIN SEVERITY GOAL: 2/10
SUBJECTIVE PAIN PROGRESSION: UNCHANGED
HIGHEST PAIN SEVERITY IN PAST 24 HOURS: 8/10
FATIGUE: 1
PAIN LOCATION: BACK
LOWEST PAIN SEVERITY IN PAST 24 HOURS: 4/10
APPETITE LEVEL: GOOD
PAIN LOCATION - RELIEVING FACTORS: MEDICATION, REST
PAIN LOCATION - PAIN DURATION: VARIES
PAIN LOCATION - PAIN QUALITY: ACHING THROBBING
PERSON REPORTING PAIN: PATIENT
FATIGUES EASILY: 1
PAIN LOCATION - EXACERBATING FACTORS: ARTHRITIS
PAIN LOCATION - PAIN FREQUENCY: INTERMITTENT
CHANGE IN APPETITE: UNCHANGED
MUSCLE WEAKNESS: 1
PAIN LOCATION - PAIN SEVERITY: 8/10
PAIN: 1
DIZZINESS: 1

## 2025-05-22 ASSESSMENT — ACTIVITIES OF DAILY LIVING (ADL)
AMBULATION ASSISTANCE: STAND BY ASSIST
AMBULATION ASSISTANCE: 1

## 2025-05-23 ENCOUNTER — HOME CARE VISIT (OUTPATIENT)
Dept: HOME HEALTH SERVICES | Facility: HOME HEALTH | Age: 60
End: 2025-05-23
Payer: COMMERCIAL

## 2025-05-23 VITALS
DIASTOLIC BLOOD PRESSURE: 58 MMHG | SYSTOLIC BLOOD PRESSURE: 98 MMHG | HEART RATE: 80 BPM | RESPIRATION RATE: 16 BRPM | TEMPERATURE: 97.5 F

## 2025-05-23 PROCEDURE — G0151 HHCP-SERV OF PT,EA 15 MIN: HCPCS

## 2025-05-23 ASSESSMENT — GAIT ASSESSMENTS
STEP SYMMETRY: 1 - RIGHT AND LEFT STEP LENGTH APPEAR EQUAL
STEP CONTINUITY: 1 - STEPS APPEAR CONTINUOUS
PATH SCORE: 1
WALKING STANCE: 1 - HEELS ALMOST TOUCHING WHILE WALKING
INITIATION OF GAIT IMMEDIATELY AFTER GO: 1 - NO HESITANCY
TRUNK SCORE: 2
TRUNK: 2 - NO SWAY, NO FLEXION, NO USE OF ARMS, NO WALKING AID
GAIT SCORE: 11
BALANCE AND GAIT SCORE: 24
PATH: 1 - MILD/MODERATE DEVIATION OR USES WALKING AID

## 2025-05-23 ASSESSMENT — BALANCE ASSESSMENTS
SITTING BALANCE: 1 - STEADY, SAFE
STANDING BALANCE: 1 - STEADY BUT WIDE STANCE AND USES CANE OR OTHER SUPPORT
SITTING DOWN: 1 - USES ARMS OR NOT SMOOTH MOTION
EYES CLOSED AT MAXIMUM POSITION NUDGED: 1 - STEADY
BALANCE SCORE: 13
IMMEDIATE STANDING BALANCE FIRST 5 SECONDS: 2 - STEADY WITHOUT WALKER OR OTHER SUPPORT
ARISING SCORE: 1
ARISES: 1 - ABLE, USES ARMS TO HELP
ATTEMPTS TO ARISE: 2 - ABLE TO RISE, ONE ATTEMPT
NUDGED: 2 - STEADY
TURNING 360 DEGREES STEPS: 1 - CONTINUOUS STEPS
NUDGED SCORE: 2

## 2025-05-23 ASSESSMENT — ENCOUNTER SYMPTOMS
PAIN: 1
PERSON REPORTING PAIN: PATIENT
PAIN LOCATION: BACK
SUBJECTIVE PAIN PROGRESSION: WAXING AND WANING
PAIN LOCATION - EXACERBATING FACTORS: MVT, STANDING
LOWEST PAIN SEVERITY IN PAST 24 HOURS: 4/10
PAIN LOCATION - PAIN DURATION: VARIES
HIGHEST PAIN SEVERITY IN PAST 24 HOURS: 6/10
PAIN LOCATION - RELIEVING FACTORS: REST, MEDS
PAIN SEVERITY GOAL: 0/10
PAIN LOCATION - PAIN QUALITY: SORENESS
PAIN LOCATION - PAIN FREQUENCY: FREQUENT
MUSCLE WEAKNESS: 1
PAIN LOCATION - PAIN SEVERITY: 6/10

## 2025-05-23 ASSESSMENT — ACTIVITIES OF DAILY LIVING (ADL)
AMBULATION ASSISTANCE: INDEPENDENT
OASIS_M1830: 05
PHYSICAL TRANSFERS ASSESSED: 1
HOME_HEALTH_OASIS: 01
CURRENT_FUNCTION: INDEPENDENT
AMBULATION ASSISTANCE: 1

## 2025-05-28 ENCOUNTER — APPOINTMENT (OUTPATIENT)
Dept: HEMATOLOGY/ONCOLOGY | Facility: HOSPITAL | Age: 60
End: 2025-05-28
Payer: COMMERCIAL

## 2025-05-28 DIAGNOSIS — M89.9 SKULL LESION: ICD-10-CM

## 2025-05-28 DIAGNOSIS — D43.2 NEOPLASM OF UNCERTAIN BEHAVIOR OF BRAIN AND SPINAL CORD (MULTI): Primary | ICD-10-CM

## 2025-05-28 DIAGNOSIS — D43.4 NEOPLASM OF UNCERTAIN BEHAVIOR OF BRAIN AND SPINAL CORD (MULTI): Primary | ICD-10-CM

## 2025-05-28 NOTE — TUMOR BOARD NOTE
CNS Tumor Board Recommendations       Patient was presented by Claudia Kirby CNP  at our CNS Tumor Board on 05/28/2025 which included representatives from Radiation oncology, Surgical oncology, Neuro-oncology, Pathology, Radiology, Research, Neurosurgery, Social Work (Neurosurgery).     Current patient presents with history of the following treatment history: PMH significant for MS, osteoporosis, and lumbar radiculopathy, presented with worsening low back pain and recent fall x 2. Workup was significant for mildly displaced sacral fracture, sacral insufficiency fractures (age indeterminate), and S2/S3 angulation. CTH with concerns for thin SDH and extracranial mass.     Differential: Intraosseous meningioma vs paget's disease (less likely based on CT bone scan)     The CNS Tumor Board tumor board considered available treatment options and made the following recommendations: Dotate PET scan and Bone Scan for further workup.     Clinical Trial Status: N/A     National site-specific guidelines were discussed with respect to the case.

## 2025-05-30 ENCOUNTER — TRANSCRIBE ORDERS (OUTPATIENT)
Dept: ORTHOPEDIC SURGERY | Facility: HOSPITAL | Age: 60
End: 2025-05-30
Payer: COMMERCIAL

## 2025-05-30 ENCOUNTER — HOSPITAL ENCOUNTER (OUTPATIENT)
Facility: HOSPITAL | Age: 60
Setting detail: SURGERY ADMIT
End: 2025-05-30
Attending: ORTHOPAEDIC SURGERY | Admitting: ORTHOPAEDIC SURGERY
Payer: COMMERCIAL

## 2025-05-30 DIAGNOSIS — S34.139A: ICD-10-CM

## 2025-05-30 DIAGNOSIS — M54.16 LUMBAR RADICULOPATHY: ICD-10-CM

## 2025-05-30 DIAGNOSIS — M48.062 PSEUDOCLAUDICATION SYNDROME: ICD-10-CM

## 2025-05-30 DIAGNOSIS — S32.10XA: ICD-10-CM

## 2025-05-30 DIAGNOSIS — M43.16 SPONDYLOLISTHESIS OF LUMBAR REGION: ICD-10-CM

## 2025-06-02 NOTE — PROGRESS NOTES
"[facility]  Neurosurgery    History of Present Illness      Caitlyn Atkins is a 60-year-old female with a PMH significant for MS, osteoporosis, and lumbar radiculopathy, who presented to the ED with worsening low back pain and recent fall x 2. Workup was significant for mildly displaced sacral fracture, sacral insufficiency fractures (age indeterminate), and S2/S3 angulation. CTH with concerns for thin SDH and extracranial mass. Neurosurgery was consulted and patient was recommended for repeat CT imaging which was stable. No acute neurosurgical intervention was required and patient was recommended for a 2 week follow up with repeat imaging.  Patient presents to clinic for NPV.     Complaints today with lower back pain that radiates down her waist to her groin. Following with neurology for a history of MS. Does endorse to chronically hitting her head. 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 Denies any blurred or double vision, headache, dizziness, or history of seizure.     Seen by orthopedics and recommended for surgical intervention for severe spinal stenosis at L4-5.     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 No evidence of hemorrhage               Assessment & Plan      Diagnosis:  Caitlyn was seen today for follow-up.  Diagnoses and all orders for this visit:  Brain lesion  -     MR brain w and wo IV contrast; Future  -     Tumor Board Request; Future          Provider Impression:     Patient is a 60-year-old female presenting for hospital follow up for trace SDH found upon workup of multiple falls. She is following with Dr. King " and due to severe spinal stenosis she will require surgery. CTH without acute hemorrhage but there is an intraosseous and dural lesion.     - MRI brain w/wo for further definition of lesion     Once MRI is completed will review and call patient with updated plan of care and provide surgical clearance. All questions answered.       Medical History     Medical History[1]  Surgical History[2]  Social History[3]  Family History[4]  Allergies[5]  Current Outpatient Medications   Medication Instructions    acetaminophen (Tylenol) 325 mg tablet 2 tablets, oral, Every 4 hours PRN    alendronate (FOSAMAX) 70 mg, oral, Every 7 days, Take in the morning with a full glass of water, on an empty stomach, and do not take anything else by mouth or lie down for the next 30 min.    b complex 0.4 mg tablet 1 tablet, Daily    calcium carbonate (CALCIUM 500 ORAL) 1 tablet, Daily    cholecalciferol (VITAMIN D-3) 2,000 Units, Daily    diroximel fumarate (Vumerity) 231 mg capsule,delayed release(DR/EC) 2 capsules, oral, 2 times daily    gabapentin (NEURONTIN) 100 mg, oral, 3 times daily    HYDROcodone-acetaminophen (Norco) 7.5-325 mg tablet 1 tablet, oral, Every 6 hours PRN    naloxone (NARCAN) 4 mg, As needed    omeprazole OTC (PRILOSEC OTC) 20 mg, Every morning    tiZANidine (ZANAFLEX) 2 mg, oral, Every 12 hours PRN                              [1]   Past Medical History:  Diagnosis Date    Anesthesia of skin     Numbness    DDD (degenerative disc disease), lumbar     Multiple sclerosis (Multi)     Multiple sclerosis    Osteoporosis     Other conditions influencing health status     Cervical Cancer   [2]   Past Surgical History:  Procedure Laterality Date    CT ANGIO CORONARY ART WITH HEARTFLOW IF SCORE >30%  11/16/2023    CT ANGIO CORONARY ART WITH HEARTFLOW IF SCORE >30% 11/16/2023 [facility] CT    HIP ARTHROPLASTY Right 08/16/2023    HYSTERECTOMY  04/15/2013    Hysterectomy    OTHER SURGICAL HISTORY  04/21/2021    Arm surgery   [3]   Social  History  Tobacco Use    Smoking status: Former     Current packs/day: 1.00     Types: Cigarettes    Smokeless tobacco: Never   Substance Use Topics    Alcohol use: Not Currently    Drug use: Never   [4]   Family History  Problem Relation Name Age of Onset    Lung cancer Mother      Coronary artery disease Father      Hypertension Father      Other (THYROID SURGERY) Sister      Glaucoma Other GRANDMOTHER    [5] No Known Allergies

## 2025-06-10 ENCOUNTER — HOSPITAL ENCOUNTER (OUTPATIENT)
Dept: RADIOLOGY | Facility: CLINIC | Age: 60
Discharge: HOME | End: 2025-06-10
Payer: COMMERCIAL

## 2025-06-10 DIAGNOSIS — M80.00XA POSTMENOPAUSAL OSTEOPOROSIS WITH PATHOLOGICAL FRACTURE: ICD-10-CM

## 2025-06-10 PROCEDURE — 77080 DXA BONE DENSITY AXIAL: CPT | Performed by: RADIOLOGY

## 2025-06-10 PROCEDURE — 77080 DXA BONE DENSITY AXIAL: CPT

## 2025-06-17 ENCOUNTER — APPOINTMENT (OUTPATIENT)
Dept: PAIN MEDICINE | Facility: CLINIC | Age: 60
End: 2025-06-17
Payer: COMMERCIAL

## 2025-06-17 VITALS
OXYGEN SATURATION: 95 % | DIASTOLIC BLOOD PRESSURE: 74 MMHG | WEIGHT: 89 LBS | HEART RATE: 68 BPM | SYSTOLIC BLOOD PRESSURE: 115 MMHG | HEIGHT: 62 IN | BODY MASS INDEX: 16.38 KG/M2

## 2025-06-17 DIAGNOSIS — S34.139A: ICD-10-CM

## 2025-06-17 DIAGNOSIS — M54.2 NECK PAIN: ICD-10-CM

## 2025-06-17 DIAGNOSIS — M47.816 LUMBAR SPONDYLOSIS: Primary | ICD-10-CM

## 2025-06-17 DIAGNOSIS — M54.16 LUMBAR RADICULOPATHY: ICD-10-CM

## 2025-06-17 DIAGNOSIS — M54.32 BILATERAL SCIATICA: ICD-10-CM

## 2025-06-17 DIAGNOSIS — S32.10XA: ICD-10-CM

## 2025-06-17 DIAGNOSIS — M62.838 CERVICAL PARASPINAL MUSCLE SPASM: ICD-10-CM

## 2025-06-17 DIAGNOSIS — M51.362 DEGENERATION OF INTERVERTEBRAL DISC OF LUMBAR REGION WITH DISCOGENIC BACK PAIN AND LOWER EXTREMITY PAIN: ICD-10-CM

## 2025-06-17 DIAGNOSIS — M54.31 BILATERAL SCIATICA: ICD-10-CM

## 2025-06-17 PROCEDURE — 3074F SYST BP LT 130 MM HG: CPT | Performed by: PHYSICAL MEDICINE & REHABILITATION

## 2025-06-17 PROCEDURE — 99214 OFFICE O/P EST MOD 30 MIN: CPT | Performed by: PHYSICAL MEDICINE & REHABILITATION

## 2025-06-17 PROCEDURE — 3008F BODY MASS INDEX DOCD: CPT | Performed by: PHYSICAL MEDICINE & REHABILITATION

## 2025-06-17 PROCEDURE — 3078F DIAST BP <80 MM HG: CPT | Performed by: PHYSICAL MEDICINE & REHABILITATION

## 2025-06-17 RX ORDER — HYDROCODONE BITARTRATE AND ACETAMINOPHEN 7.5; 325 MG/1; MG/1
1 TABLET ORAL EVERY 6 HOURS PRN
Qty: 112 TABLET | Refills: 0 | Status: ON HOLD | OUTPATIENT
Start: 2025-07-21 | End: 2025-08-18

## 2025-06-17 RX ORDER — TIZANIDINE 2 MG/1
2 TABLET ORAL EVERY 12 HOURS PRN
Qty: 56 TABLET | Refills: 2 | Status: ON HOLD | OUTPATIENT
Start: 2025-06-17 | End: 2025-07-15

## 2025-06-17 RX ORDER — HYDROCODONE BITARTRATE AND ACETAMINOPHEN 7.5; 325 MG/1; MG/1
1 TABLET ORAL EVERY 6 HOURS PRN
Qty: 112 TABLET | Refills: 0 | Status: ON HOLD | OUTPATIENT
Start: 2025-06-23 | End: 2025-07-21

## 2025-06-17 NOTE — PROGRESS NOTES
Chief complaint  Back pain   R femur fracture  Foot fracture  Broken sacrum     Jensen E LPN,   was present during the entire history and physical examination    History  Caitlyn Atkins is back for pain management office visit  Continues with pain. Last month she fell and broke sacrum and going to have spine surgery at the sacrum and middle L spine  Will be in the hospital post op and surgery team will help with pain control  Also her  passed away 2 weeks ago and she is stressed about that. She is getting counseling  Her daughters are helping her.  She has not been back to work since the fall.  She is maintaining on the pain medication using the hydrocodone 7.5 mg.  We given her 4 tablets a day to help with the pain from multiple pain generator including the back and the neck.  She is compliant with the treatment plan.  We have discussed about cutting back on the medication once the pain is controlled    Pain level without medication is 8/10 , with the medication pain level between 2 and 3/10.     Pain disability index (PDI) improvement   across different functional categories, with the pain control with the meds. Forms filled by patient are scanned in the chart    The pain meds are helping control the pain and improving Activities of Daily living and quality of life and quality of sleep.    opioids treatment agreement January 2025    Pill count today, using count tray, and in front of patient, Nurse and myself :  27 hydrocodone 7.5 mg   pills , last fill was on 5/26  for 112 hydrocodone 7.5 tabs,  the meds pill count today is correct  Oarrs pulled and reviewed, no concerns  last urine toxicology testing was compliant this was done on : Earlier 2025  Xray updated spine and legs  ORT (opioid risk tool) score is 0  Pain pathology and pain generators spine and legs  Modalities tried injection, surgery, physical therapy, TENS unit, nonsteroidal anti-inflammatory medication multiple injections in the  "past    Review of Systems :  Denied any fever or chills. No weight loss and no night sweats. No cough or sputum production. No diarrhea   The constipation has been responding to fibers and over the counter medications.     No bladder and bowel incontinence and no other changes in bladder and bowel. No skin changes.  Reports tiredness and fatigability only if the pain is not controlled.     I further Asked about symptoms or changes affecting the vision, hearing, breathing, digestive system, urinary symptoms, skin, other musculoskeletal condition, neurological conditions these are negative except as detailed in the history and physical examination above    Denied opioids diversion and abuse and denies alcoholism. Denies overuse of the pain medications.  No reported euphoria sensation or getting a \"high\" on the pain medications.    The control of the pain with the pain medications is helping the control of the symptoms and allowing the function and activities of daily living, enjoyment of life, improving the quality of life and sleep with less interruption by the pain. The goal is symptomatic control of the nonmalignant chronic pain and not to repair the permanent damage in the tissues inducing the chronic pain conditions. We are aiming to shift the focus from the nonmalignant chronic pain to other aspects of life by symptomatically treating this chronic pain. If this pain is not treated it will lead to major morbidity and it is also associated with increased risks of mortality. The patient understands those very clearly and also understand high risks of morbidity and mortality if not strictly adherent to the treatment recommendations and reporting any associated side effects. Also patient understand the full responsibility associated with these medications to avoid abuse or overuse or any use of these medications for anything besides treating the patient's own chronic pain and nothing else under any circumstances.  "       Physical examination  Awake, alert and oriented for time place and persons   Pupils are equal and reactive to light and accommodation    Walking with a limp using a walker.  The pain on palpation of the lower back area I did not assess given the fracture and she is going to have surgery.  No track sign.  She does have good strength in the lower limb but no overt weakness  Plantar cutaneous reflexes are down going    Diagnosis  Problem List Items Addressed This Visit     Bilateral sciatica    Relevant Medications    HYDROcodone-acetaminophen (Norco) 7.5-325 mg tablet (Start on 7/21/2025)    HYDROcodone-acetaminophen (Norco) 7.5-325 mg tablet (Start on 6/23/2025)    Disc degeneration, lumbar    Lumbar radiculopathy    Lumbar spondylosis - Primary    Fx sacrum-closed w/ cord inj, initial encounter (Multi)   Other Visit Diagnoses       Neck pain        Relevant Medications    HYDROcodone-acetaminophen (Norco) 7.5-325 mg tablet (Start on 7/21/2025)    HYDROcodone-acetaminophen (Norco) 7.5-325 mg tablet (Start on 6/23/2025)    tiZANidine (Zanaflex) 2 mg tablet      Cervical paraspinal muscle spasm        Relevant Medications    HYDROcodone-acetaminophen (Norco) 7.5-325 mg tablet (Start on 7/21/2025)    HYDROcodone-acetaminophen (Norco) 7.5-325 mg tablet (Start on 6/23/2025)    tiZANidine (Zanaflex) 2 mg tablet           Plan  Reviewed the pain generators.  Went over the types of pain with neuropathic and nociceptive and different pathologies and therapeutic modalities. Discussed the mechanism of action of interventions from acupuncture, physical therapy , regular exercises, injections, botox, spinal cord stimulation, and role of surgery     Went over pathology of the intervertebral disc displacement and the anatomical relation to the Nerve roots and relation to the radicular symptoms. Went over treatment modalities with conservative treatment including acupuncture   and epidural steroid injection with fluoroscopy  guidance and last resort of surgery    Based on the above findings and the clinical response to the opioids medications and improvement of the activities of daily living, sleep, and work performance. We made this complex decision to continue the opioids therapy in light of the evidence of the patient's responsibility in using the pain medications as prescribed for the nonmalignant chronic pain condition. We discussed about the use of the pain medications to treat the symptoms of chronic nonmalignant pain and we are not trying the repair the permanent damage in the tissues, rather we are trying to control the symptoms induced by the permanent damage to the tissues inducing the chronic pain condition and resulting disability. I explained the difference and discussed it with the patient and stressed the importance of knowing the difference especially because of the potential side effects and the potential addicting effect and habit forming nature of the dangerous drugs we are using to treat the symptoms of the chronic pain.      We discussed that we are prescribing the medications on good rambo and legitimate medical reason within the scope of professional medical practice.     We reviewed the side effects and precautions of opioids prescriptions as discussed in the opioids treatment agreement.    realizes the interaction between the therapeutic classes including the respiratory depression and potential death     Random drug testing   we will submit         Discussed about NSAIDS and I explained about the opioids sparing effect to allow keeping the opioids dose at minimal effective dose.   I went over the potential side effects of the NSAIDS on the gastrointestinal, renal and cardiovascular systems.      I detailed the side effects from the acetaminophen in the medication and made aware of those. I also explained about the cumulative effects on the organs and mainly the liver.     Given the opioids therapy , we  discussed about the risk for accidental over dose on the pain medications, either for patient or other household. I went over the mechanism of action and mode of use of the Naloxone according to the  recommendations. I will provide a prescription for a kit.     Focus of Today's Visit :  Keep appointment for surgery.  Will continue to control her pain as needed.  Will try cutting back on the medication once she is done with surgery and rehabilitation.  It is essential to keep the pain in control to allow her to function and also to participate in therapy.  Once that is done we will reevaluate to see the possibility of cutting back on the medication      Follow-up 8 weeks or earlier if needed     The level of clinical decision making in this office visit,  is high, given the high risks of complications with the morbidity and mortality due to the fact that acute and chronic pain may pose a threat to life and bodily function, if under treated, poorly treated, or with failure to maintain adequate treatment and timely medical follow up. Additionally over treatment has its own set of complications including overdosing on the pain medications and also the habit forming potentials with the use of the medications used to treat chronic painful conditions including therapeutic classes classified as dangerous medications. Given the serious and fluctuating nature of pain level and instensity with extensive consideration for whenever pain changes, there is always the risk of prolonged functional impairment requiring close patient monitoring with regular assessments and reassessments and high level medical decision making at every office visit. The amount and complexity of data reviewed is high given the patient clinical presentation, labs,  data, radiology reports, and other tests as discussed during office visits. Pertinent data whether positive or negative were taken in consideration in the process of making this  high level medical decision.            Patient was identified as a fall risk. Risk prevention instructions provided.

## 2025-06-18 ENCOUNTER — HOSPITAL ENCOUNTER (OUTPATIENT)
Dept: RADIOLOGY | Facility: HOSPITAL | Age: 60
Discharge: HOME | End: 2025-06-18
Payer: COMMERCIAL

## 2025-06-18 ENCOUNTER — APPOINTMENT (OUTPATIENT)
Dept: RADIOLOGY | Facility: HOSPITAL | Age: 60
End: 2025-06-18
Payer: COMMERCIAL

## 2025-06-18 ENCOUNTER — CLINICAL SUPPORT (OUTPATIENT)
Dept: PREADMISSION TESTING | Facility: HOSPITAL | Age: 60
DRG: 640 | End: 2025-06-18
Payer: COMMERCIAL

## 2025-06-18 DIAGNOSIS — M89.9 SKULL LESION: ICD-10-CM

## 2025-06-18 DIAGNOSIS — D43.4 NEOPLASM OF UNCERTAIN BEHAVIOR OF BRAIN AND SPINAL CORD (MULTI): ICD-10-CM

## 2025-06-18 DIAGNOSIS — D43.2 NEOPLASM OF UNCERTAIN BEHAVIOR OF BRAIN AND SPINAL CORD (MULTI): ICD-10-CM

## 2025-06-18 PROCEDURE — 78306 BONE IMAGING WHOLE BODY: CPT

## 2025-06-18 PROCEDURE — 78815 PET IMAGE W/CT SKULL-THIGH: CPT | Mod: PI

## 2025-06-18 PROCEDURE — 3430000001 HC RX 343 DIAGNOSTIC RADIOPHARMACEUTICALS: Performed by: NURSE PRACTITIONER

## 2025-06-18 PROCEDURE — 78306 BONE IMAGING WHOLE BODY: CPT | Performed by: INTERNAL MEDICINE

## 2025-06-18 PROCEDURE — A9503 TC99M MEDRONATE: HCPCS | Performed by: NURSE PRACTITIONER

## 2025-06-18 PROCEDURE — 78814 PET IMAGE W/CT LMTD: CPT | Mod: PET TUMOR INIT TX STRAT | Performed by: INTERNAL MEDICINE

## 2025-06-18 RX ADMIN — TECHNETIUM TC 99M MEDRONATE 24.3 MILLICURIE: 25 INJECTION, POWDER, FOR SOLUTION INTRAVENOUS at 07:50

## 2025-06-18 NOTE — CPM/PAT NURSE NOTE
CPM/PAT Nurse Note      Name: Caitlyn Atkins (Caitlyn Atkins)  /Age: 1965/60 y.o.       Medical History[1]    Surgical History[2]    Patient Sexual activity questions deferred to the physician.    Family History[3]    Allergies[4]    Prior to Admission medications    Medication Sig Start Date End Date Taking? Authorizing Provider   alendronate (Fosamax) 70 mg tablet Take 1 tablet (70 mg) by mouth every 7 days. Take in the morning with a full glass of water, on an empty stomach, and do not take anything else by mouth or lie down for the next 30 min. 4/3/25  Yes Zane Ang PA-C   acetaminophen (Tylenol) 325 mg tablet Take 2 tablets by mouth every 4 hours if needed for moderate pain (4 - 6).    Historical Provider, MD   b complex 0.4 mg tablet Take 1 tablet by mouth once daily.    Historical Provider, MD   calcium carbonate (CALCIUM 500 ORAL) Take 1 tablet by mouth once daily.    Historical Provider, MD   cholecalciferol (Vitamin D-3) 50 MCG (2000 UT) tablet Take 1 tablet (2,000 Units) by mouth once daily. 18   Historical Provider, MD   diroximel fumarate (Vumerity) 231 mg capsule,delayed release(DR/EC) Take 2 capsules by mouth 2 times a day. 23   GUILLERMO Mckeon-CNP   gabapentin (Neurontin) 100 mg capsule Take 1 capsule (100 mg) by mouth 3 times a day. 25  Viet Maher MD   HYDROcodone-acetaminophen (Norco) 7.5-325 mg tablet Take 1 tablet by mouth every 6 hours if needed for severe pain (7 - 10) for up to 28 days. Do not fill before 2025. 25  Viet Maher MD   HYDROcodone-acetaminophen (Norco) 7.5-325 mg tablet Take 1 tablet by mouth every 6 hours if needed for severe pain (7 - 10) for up to 28 days. Do not fill before 2025. 25  Viet Maher MD   naloxone (Narcan) 4 mg/0.1 mL nasal spray Administer 1 spray (4 mg) into affected nostril(s) if needed for opioid reversal. May repeat every 2-3 minutes if needed,  alternating nostrils, until medical assistance becomes available.    Historical Provider, MD   omeprazole OTC (PriLOSEC OTC) 20 mg EC tablet Take 1 tablet (20 mg) by mouth once daily in the morning.    Historical Provider, MD   tiZANidine (Zanaflex) 2 mg tablet Take 1 tablet (2 mg) by mouth every 12 hours if needed for muscle spasms for up to 28 days. 6/17/25 7/15/25  Viet Maher MD   HYDROcodone-acetaminophen (Norco) 7.5-325 mg tablet Take 1 tablet by mouth every 6 hours if needed for severe pain (7 - 10) for up to 28 days. Do not fill before May 26, 2025. 5/26/25 6/17/25  Viet Maher MD   tiZANidine (Zanaflex) 2 mg tablet Take 1 tablet (2 mg) by mouth every 12 hours if needed for muscle spasms for up to 28 days. 4/28/25 6/17/25  Viet Maher MD        PAT ROS     DASI Risk Score    No data to display       Caprini DVT Assessment      Flowsheet Row ED to Hosp-Admission (Discharged) from 4/21/2025 in [facility] Emergency Medicine with Bereket Sharp MD, Austin Klein MD and Gregor Trent MD   DVT Score (IF A SCORE IS NOT CALCULATING, MUST SELECT A BMI TO COMPLETE) 8 filed at 04/21/2025 2131   Surgical Factors New trauma admission filed at 04/21/2025 2131   BMI (BMI MUST BE CHOSEN) 30 or less filed at 04/21/2025 2131          Modified Frailty Index    No data to display       [id number] Stroke Risk Points  Current as of just now        N/A 0 to 9 Points:      Last Change: N/A          The [id number] risk score (Lip DARIEN, et al. 2009. © 2010 [employer]) quantifies the risk of stroke for a patient with atrial fibrillation. For patients without atrial fibrillation or under the age of 18 this score appears as N/A. Higher score values generally indicate higher risk of stroke.        This score is not applicable to this patient. Components are not calculated.          Revised Cardiac Risk Index    No data to display       Apfel Simplified Score    No data to  display       Risk Analysis Index Results This Encounter    No data found in the last 10 encounters.       Prodigy: High Risk  Total Score: 11              Prodigy Age Score      Prodigy Previous Opioid Use Score           ARISCAT Score for Postoperative Pulmonary Complications    No data to display       Dimitrios Perioperative Risk for Myocardial Infarction or Cardiac Arrest (JAMAL)    No data to display         Nurse Plan of Action:     RN screening call complete.  Reviewed allergies, medications and pharmacy, medical, surgical and social history with patient.  Chart updated.            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, followed by neurosurgery- Clearance in note on 5/21/25    Lower extremity weakness     Lumbar radiculopathy     Lumbar spondylosis     Multiple falls     Multiple sclerosis (Multi)     Multiple sclerosis, follows with neuro, per patient well managed on Vumerity, no flare up since 2014    Osteoporosis     Palpitations     saw cardiology-wore holter-SVT was on Metoprolol but per patient did not help, never followed up with cardiology because palpitations stopped after quitting her stressful job    Pseudoclaudication syndrome     Sacral fracture (Multi)     Sacroiliitis     SDH (subdural hematoma) (Multi)     Spinal stenosis     Spondylolisthesis     Urinary incontinence     Vitamin D deficiency    [2]   Past Surgical History:  Procedure Laterality Date    CT ANGIO CORONARY ART WITH HEARTFLOW IF SCORE >30%  11/16/2023    CT ANGIO CORONARY ART WITH HEARTFLOW IF SCORE >30% 11/16/2023 [facility]    HIP ARTHROPLASTY Right 08/16/2023     HYSTERECTOMY  04/15/2013    Hysterectomy- cervical Cancer    OTHER SURGICAL HISTORY Left 04/21/2021    Arm surgery-fracture repair    REFRACTIVE SURGERY     [3]   Family History  Problem Relation Name Age of Onset    Lung cancer Mother      Coronary artery disease Father      Hypertension Father      Other (THYROID SURGERY) Sister      Glaucoma Other GRANDMOTHER    [4] No Known Allergies

## 2025-06-19 ENCOUNTER — APPOINTMENT (OUTPATIENT)
Dept: PREADMISSION TESTING | Facility: HOSPITAL | Age: 60
DRG: 640 | End: 2025-06-19
Payer: COMMERCIAL

## 2025-06-19 ENCOUNTER — HOSPITAL ENCOUNTER (OUTPATIENT)
Dept: RADIOLOGY | Facility: HOSPITAL | Age: 60
Discharge: HOME | End: 2025-06-19
Payer: COMMERCIAL

## 2025-06-19 ENCOUNTER — HOSPITAL ENCOUNTER (OUTPATIENT)
Dept: CARDIOLOGY | Facility: HOSPITAL | Age: 60
Discharge: HOME | DRG: 640 | End: 2025-06-19
Payer: COMMERCIAL

## 2025-06-19 ENCOUNTER — HOSPITAL ENCOUNTER (INPATIENT)
Facility: HOSPITAL | Age: 60
DRG: 640 | End: 2025-06-19
Attending: GENERAL PRACTICE | Admitting: HOSPITALIST
Payer: COMMERCIAL

## 2025-06-19 ENCOUNTER — APPOINTMENT (OUTPATIENT)
Dept: RADIOLOGY | Facility: HOSPITAL | Age: 60
DRG: 640 | End: 2025-06-19
Payer: COMMERCIAL

## 2025-06-19 ENCOUNTER — APPOINTMENT (OUTPATIENT)
Dept: CARDIOLOGY | Facility: HOSPITAL | Age: 60
DRG: 640 | End: 2025-06-19
Payer: COMMERCIAL

## 2025-06-19 ENCOUNTER — LAB (OUTPATIENT)
Dept: LAB | Facility: HOSPITAL | Age: 60
DRG: 640 | End: 2025-06-19
Payer: COMMERCIAL

## 2025-06-19 VITALS
TEMPERATURE: 96.9 F | RESPIRATION RATE: 12 BRPM | DIASTOLIC BLOOD PRESSURE: 70 MMHG | BODY MASS INDEX: 17.27 KG/M2 | OXYGEN SATURATION: 99 % | WEIGHT: 87.96 LBS | SYSTOLIC BLOOD PRESSURE: 121 MMHG | HEIGHT: 60 IN | HEART RATE: 70 BPM

## 2025-06-19 DIAGNOSIS — Z01.818 ENCOUNTER FOR OTHER PREPROCEDURAL EXAMINATION: ICD-10-CM

## 2025-06-19 DIAGNOSIS — O22.30 DVT (DEEP VEIN THROMBOSIS) IN PREGNANCY (HHS-HCC): ICD-10-CM

## 2025-06-19 DIAGNOSIS — I82.4Y9 ACUTE DEEP VEIN THROMBOSIS (DVT) OF PROXIMAL VEIN OF LOWER EXTREMITY, UNSPECIFIED LATERALITY: Primary | ICD-10-CM

## 2025-06-19 DIAGNOSIS — R05.9 COUGH, UNSPECIFIED TYPE: ICD-10-CM

## 2025-06-19 DIAGNOSIS — I10 BENIGN ESSENTIAL HYPERTENSION: Primary | ICD-10-CM

## 2025-06-19 DIAGNOSIS — D50.9 IRON DEFICIENCY ANEMIA, UNSPECIFIED: Primary | ICD-10-CM

## 2025-06-19 DIAGNOSIS — Z01.818 PREPROCEDURAL EXAMINATION: ICD-10-CM

## 2025-06-19 DIAGNOSIS — R33.9 URINARY RETENTION: ICD-10-CM

## 2025-06-19 DIAGNOSIS — I25.10 CORONARY ARTERY DISEASE INVOLVING NATIVE HEART, UNSPECIFIED VESSEL OR LESION TYPE, UNSPECIFIED WHETHER ANGINA PRESENT: ICD-10-CM

## 2025-06-19 DIAGNOSIS — I10 ESSENTIAL (PRIMARY) HYPERTENSION: ICD-10-CM

## 2025-06-19 DIAGNOSIS — D50.9 IRON DEFICIENCY ANEMIA, UNSPECIFIED IRON DEFICIENCY ANEMIA TYPE: ICD-10-CM

## 2025-06-19 DIAGNOSIS — R94.31 ABNORMAL EKG: ICD-10-CM

## 2025-06-19 DIAGNOSIS — R60.0 BILATERAL LEG EDEMA: ICD-10-CM

## 2025-06-19 DIAGNOSIS — F17.200 NICOTINE DEPENDENCE, UNCOMPLICATED, UNSPECIFIED NICOTINE PRODUCT TYPE: ICD-10-CM

## 2025-06-19 DIAGNOSIS — E87.6 HYPOKALEMIA: ICD-10-CM

## 2025-06-19 DIAGNOSIS — N25.89 RTA (RENAL TUBULAR ACIDOSIS): ICD-10-CM

## 2025-06-19 PROBLEM — F31.10 BIPOLAR DISORDER, CURRENT EPISODE MANIC WITHOUT PSYCHOTIC FEATURES: Status: RESOLVED | Noted: 2023-05-23 | Resolved: 2025-06-19

## 2025-06-19 LAB
ABO GROUP (TYPE) IN BLOOD: NORMAL
ALBUMIN SERPL BCP-MCNC: 3.3 G/DL (ref 3.4–5)
ALP SERPL-CCNC: 140 U/L (ref 33–136)
ALT SERPL W P-5'-P-CCNC: 44 U/L (ref 7–45)
ANION GAP SERPL CALC-SCNC: 14 MMOL/L (ref 10–20)
ANION GAP SERPL CALC-SCNC: 15 MMOL/L (ref 10–20)
ANTIBODY SCREEN: NORMAL
AST SERPL W P-5'-P-CCNC: 63 U/L (ref 9–39)
BASOPHILS # BLD AUTO: 0.06 X10*3/UL (ref 0–0.1)
BASOPHILS # BLD AUTO: 0.06 X10*3/UL (ref 0–0.1)
BASOPHILS NFR BLD AUTO: 0.9 %
BASOPHILS NFR BLD AUTO: 1 %
BILIRUB SERPL-MCNC: 0.4 MG/DL (ref 0–1.2)
BUN SERPL-MCNC: 23 MG/DL (ref 6–23)
BUN SERPL-MCNC: 23 MG/DL (ref 6–23)
CA-I BLD-SCNC: 1.72 MMOL/L (ref 1.1–1.33)
CALCIUM SERPL-MCNC: 12.1 MG/DL (ref 8.6–10.3)
CALCIUM SERPL-MCNC: 12.6 MG/DL (ref 8.6–10.3)
CHLORIDE SERPL-SCNC: 101 MMOL/L (ref 98–107)
CHLORIDE SERPL-SCNC: 102 MMOL/L (ref 98–107)
CO2 SERPL-SCNC: 18 MMOL/L (ref 21–32)
CO2 SERPL-SCNC: 19 MMOL/L (ref 21–32)
CREAT SERPL-MCNC: 1.27 MG/DL (ref 0.5–1.05)
CREAT SERPL-MCNC: 1.33 MG/DL (ref 0.5–1.05)
EGFRCR SERPLBLD CKD-EPI 2021: 46 ML/MIN/1.73M*2
EGFRCR SERPLBLD CKD-EPI 2021: 48 ML/MIN/1.73M*2
EOSINOPHIL # BLD AUTO: 0.13 X10*3/UL (ref 0–0.7)
EOSINOPHIL # BLD AUTO: 0.16 X10*3/UL (ref 0–0.7)
EOSINOPHIL NFR BLD AUTO: 2.1 %
EOSINOPHIL NFR BLD AUTO: 2.3 %
ERYTHROCYTE [DISTWIDTH] IN BLOOD BY AUTOMATED COUNT: 14.5 % (ref 11.5–14.5)
ERYTHROCYTE [DISTWIDTH] IN BLOOD BY AUTOMATED COUNT: 14.7 % (ref 11.5–14.5)
EST. AVERAGE GLUCOSE BLD GHB EST-MCNC: 111 MG/DL
ETHANOL SERPL-MCNC: <10 MG/DL
GLUCOSE SERPL-MCNC: 107 MG/DL (ref 74–99)
GLUCOSE SERPL-MCNC: 140 MG/DL (ref 74–99)
HBA1C MFR BLD: 5.5 % (ref ?–5.7)
HCT VFR BLD AUTO: 30.8 % (ref 36–46)
HCT VFR BLD AUTO: 31.7 % (ref 36–46)
HGB BLD-MCNC: 10.7 G/DL (ref 12–16)
HGB BLD-MCNC: 11 G/DL (ref 12–16)
IMM GRANULOCYTES # BLD AUTO: 0.02 X10*3/UL (ref 0–0.7)
IMM GRANULOCYTES # BLD AUTO: 0.03 X10*3/UL (ref 0–0.7)
IMM GRANULOCYTES NFR BLD AUTO: 0.3 % (ref 0–0.9)
IMM GRANULOCYTES NFR BLD AUTO: 0.4 % (ref 0–0.9)
LYMPHOCYTES # BLD AUTO: 1.09 X10*3/UL (ref 1.2–4.8)
LYMPHOCYTES # BLD AUTO: 1.34 X10*3/UL (ref 1.2–4.8)
LYMPHOCYTES NFR BLD AUTO: 17.4 %
LYMPHOCYTES NFR BLD AUTO: 19.1 %
MAGNESIUM SERPL-MCNC: 1.84 MG/DL (ref 1.6–2.4)
MCH RBC QN AUTO: 31 PG (ref 26–34)
MCH RBC QN AUTO: 31.6 PG (ref 26–34)
MCHC RBC AUTO-ENTMCNC: 34.7 G/DL (ref 32–36)
MCHC RBC AUTO-ENTMCNC: 34.7 G/DL (ref 32–36)
MCV RBC AUTO: 89 FL (ref 80–100)
MCV RBC AUTO: 91 FL (ref 80–100)
MONOCYTES # BLD AUTO: 0.68 X10*3/UL (ref 0.1–1)
MONOCYTES # BLD AUTO: 0.91 X10*3/UL (ref 0.1–1)
MONOCYTES NFR BLD AUTO: 10.9 %
MONOCYTES NFR BLD AUTO: 13 %
NEUTROPHILS # BLD AUTO: 4.27 X10*3/UL (ref 1.2–7.7)
NEUTROPHILS # BLD AUTO: 4.52 X10*3/UL (ref 1.2–7.7)
NEUTROPHILS NFR BLD AUTO: 64.3 %
NEUTROPHILS NFR BLD AUTO: 68.3 %
NRBC BLD-RTO: 0 /100 WBCS (ref 0–0)
NRBC BLD-RTO: 0 /100 WBCS (ref 0–0)
PLATELET # BLD AUTO: 479 X10*3/UL (ref 150–450)
PLATELET # BLD AUTO: 506 X10*3/UL (ref 150–450)
POTASSIUM SERPL-SCNC: 2 MMOL/L (ref 3.5–5.3)
POTASSIUM SERPL-SCNC: 2 MMOL/L (ref 3.5–5.3)
POTASSIUM SERPL-SCNC: 2.2 MMOL/L (ref 3.5–5.3)
PROT SERPL-MCNC: 5.9 G/DL (ref 6.4–8.2)
RBC # BLD AUTO: 3.45 X10*6/UL (ref 4–5.2)
RBC # BLD AUTO: 3.48 X10*6/UL (ref 4–5.2)
RH FACTOR (ANTIGEN D): NORMAL
SODIUM SERPL-SCNC: 131 MMOL/L (ref 136–145)
SODIUM SERPL-SCNC: 134 MMOL/L (ref 136–145)
WBC # BLD AUTO: 6.3 X10*3/UL (ref 4.4–11.3)
WBC # BLD AUTO: 7 X10*3/UL (ref 4.4–11.3)

## 2025-06-19 PROCEDURE — 99291 CRITICAL CARE FIRST HOUR: CPT | Performed by: GENERAL PRACTICE

## 2025-06-19 PROCEDURE — 80053 COMPREHEN METABOLIC PANEL: CPT

## 2025-06-19 PROCEDURE — 80048 BASIC METABOLIC PNL TOTAL CA: CPT | Performed by: GENERAL PRACTICE

## 2025-06-19 PROCEDURE — 93005 ELECTROCARDIOGRAM TRACING: CPT

## 2025-06-19 PROCEDURE — 86900 BLOOD TYPING SEROLOGIC ABO: CPT

## 2025-06-19 PROCEDURE — 96365 THER/PROPH/DIAG IV INF INIT: CPT

## 2025-06-19 PROCEDURE — 2500000001 HC RX 250 WO HCPCS SELF ADMINISTERED DRUGS (ALT 637 FOR MEDICARE OP): Performed by: GENERAL PRACTICE

## 2025-06-19 PROCEDURE — 71046 X-RAY EXAM CHEST 2 VIEWS: CPT | Performed by: RADIOLOGY

## 2025-06-19 PROCEDURE — 96366 THER/PROPH/DIAG IV INF ADDON: CPT

## 2025-06-19 PROCEDURE — 93970 EXTREMITY STUDY: CPT

## 2025-06-19 PROCEDURE — 93010 ELECTROCARDIOGRAM REPORT: CPT | Performed by: INTERNAL MEDICINE

## 2025-06-19 PROCEDURE — 82077 ASSAY SPEC XCP UR&BREATH IA: CPT | Performed by: HOSPITALIST

## 2025-06-19 PROCEDURE — 36415 COLL VENOUS BLD VENIPUNCTURE: CPT

## 2025-06-19 PROCEDURE — 86901 BLOOD TYPING SEROLOGIC RH(D): CPT

## 2025-06-19 PROCEDURE — 82330 ASSAY OF CALCIUM: CPT | Performed by: HOSPITALIST

## 2025-06-19 PROCEDURE — 85025 COMPLETE CBC W/AUTO DIFF WBC: CPT

## 2025-06-19 PROCEDURE — 87081 CULTURE SCREEN ONLY: CPT | Mod: AHULAB | Performed by: NURSE PRACTITIONER

## 2025-06-19 PROCEDURE — 84132 ASSAY OF SERUM POTASSIUM: CPT | Performed by: HOSPITALIST

## 2025-06-19 PROCEDURE — 83735 ASSAY OF MAGNESIUM: CPT | Performed by: GENERAL PRACTICE

## 2025-06-19 PROCEDURE — 71046 X-RAY EXAM CHEST 2 VIEWS: CPT

## 2025-06-19 PROCEDURE — 99214 OFFICE O/P EST MOD 30 MIN: CPT | Performed by: NURSE PRACTITIONER

## 2025-06-19 PROCEDURE — 86850 RBC ANTIBODY SCREEN: CPT

## 2025-06-19 PROCEDURE — 93971 EXTREMITY STUDY: CPT | Performed by: RADIOLOGY

## 2025-06-19 PROCEDURE — 83036 HEMOGLOBIN GLYCOSYLATED A1C: CPT

## 2025-06-19 PROCEDURE — 85025 COMPLETE CBC W/AUTO DIFF WBC: CPT | Performed by: GENERAL PRACTICE

## 2025-06-19 PROCEDURE — 2500000001 HC RX 250 WO HCPCS SELF ADMINISTERED DRUGS (ALT 637 FOR MEDICARE OP): Performed by: HOSPITALIST

## 2025-06-19 PROCEDURE — 99223 1ST HOSP IP/OBS HIGH 75: CPT | Performed by: HOSPITALIST

## 2025-06-19 PROCEDURE — 2500000004 HC RX 250 GENERAL PHARMACY W/ HCPCS (ALT 636 FOR OP/ED): Performed by: GENERAL PRACTICE

## 2025-06-19 PROCEDURE — 99285 EMERGENCY DEPT VISIT HI MDM: CPT | Mod: 25 | Performed by: GENERAL PRACTICE

## 2025-06-19 PROCEDURE — 1200000002 HC GENERAL ROOM WITH TELEMETRY DAILY

## 2025-06-19 PROCEDURE — 2500000004 HC RX 250 GENERAL PHARMACY W/ HCPCS (ALT 636 FOR OP/ED): Performed by: HOSPITALIST

## 2025-06-19 RX ORDER — CHLORHEXIDINE GLUCONATE ORAL RINSE 1.2 MG/ML
15 SOLUTION DENTAL DAILY
Status: DISCONTINUED | OUTPATIENT
Start: 2025-06-20 | End: 2025-06-23 | Stop reason: HOSPADM

## 2025-06-19 RX ORDER — HEPARIN SODIUM 5000 [USP'U]/ML
5000 INJECTION, SOLUTION INTRAVENOUS; SUBCUTANEOUS EVERY 8 HOURS SCHEDULED
Status: DISCONTINUED | OUTPATIENT
Start: 2025-06-19 | End: 2025-06-20

## 2025-06-19 RX ORDER — OXYCODONE HYDROCHLORIDE 5 MG/1
5 TABLET ORAL EVERY 6 HOURS PRN
Refills: 0 | Status: DISCONTINUED | OUTPATIENT
Start: 2025-06-19 | End: 2025-06-20

## 2025-06-19 RX ORDER — GABAPENTIN 100 MG/1
100 CAPSULE ORAL 3 TIMES DAILY
Status: DISCONTINUED | OUTPATIENT
Start: 2025-06-19 | End: 2025-06-23 | Stop reason: HOSPADM

## 2025-06-19 RX ORDER — IBUPROFEN 200 MG
TABLET ORAL AS NEEDED
Status: ON HOLD | COMMUNITY

## 2025-06-19 RX ORDER — OXYCODONE HYDROCHLORIDE 5 MG/1
10 TABLET ORAL EVERY 6 HOURS PRN
Refills: 0 | Status: DISCONTINUED | OUTPATIENT
Start: 2025-06-19 | End: 2025-06-20

## 2025-06-19 RX ORDER — ONDANSETRON 4 MG/1
4 TABLET, ORALLY DISINTEGRATING ORAL EVERY 8 HOURS PRN
Status: DISCONTINUED | OUTPATIENT
Start: 2025-06-19 | End: 2025-06-23 | Stop reason: HOSPADM

## 2025-06-19 RX ORDER — PANTOPRAZOLE SODIUM 40 MG/1
40 TABLET, DELAYED RELEASE ORAL
Status: DISCONTINUED | OUTPATIENT
Start: 2025-06-20 | End: 2025-06-23 | Stop reason: HOSPADM

## 2025-06-19 RX ORDER — CHLORHEXIDINE GLUCONATE ORAL RINSE 1.2 MG/ML
15 SOLUTION DENTAL DAILY
Qty: 30 ML | Refills: 0 | Status: SHIPPED | OUTPATIENT
Start: 2025-06-19 | End: 2025-06-23 | Stop reason: HOSPADM

## 2025-06-19 RX ORDER — POTASSIUM CHLORIDE 14.9 MG/ML
20 INJECTION INTRAVENOUS ONCE
Status: COMPLETED | OUTPATIENT
Start: 2025-06-19 | End: 2025-06-19

## 2025-06-19 RX ORDER — TIZANIDINE 4 MG/1
2 TABLET ORAL EVERY 12 HOURS PRN
Status: DISCONTINUED | OUTPATIENT
Start: 2025-06-19 | End: 2025-06-23 | Stop reason: HOSPADM

## 2025-06-19 RX ORDER — ONDANSETRON HYDROCHLORIDE 2 MG/ML
4 INJECTION, SOLUTION INTRAVENOUS EVERY 8 HOURS PRN
Status: DISCONTINUED | OUTPATIENT
Start: 2025-06-19 | End: 2025-06-23 | Stop reason: HOSPADM

## 2025-06-19 RX ORDER — SODIUM CHLORIDE 9 MG/ML
100 INJECTION, SOLUTION INTRAVENOUS CONTINUOUS
Status: DISCONTINUED | OUTPATIENT
Start: 2025-06-19 | End: 2025-06-20

## 2025-06-19 RX ORDER — CHLORHEXIDINE GLUCONATE ORAL RINSE 1.2 MG/ML
15 SOLUTION DENTAL DAILY
Qty: 30 ML | Refills: 0 | Status: SHIPPED | OUTPATIENT
Start: 2025-06-19 | End: 2025-06-19 | Stop reason: SDUPTHER

## 2025-06-19 RX ORDER — POTASSIUM CHLORIDE 20 MEQ/1
40 TABLET, EXTENDED RELEASE ORAL ONCE
Status: COMPLETED | OUTPATIENT
Start: 2025-06-20 | End: 2025-06-20

## 2025-06-19 RX ORDER — POTASSIUM CHLORIDE 14.9 MG/ML
20 INJECTION INTRAVENOUS ONCE
Status: COMPLETED | OUTPATIENT
Start: 2025-06-20 | End: 2025-06-20

## 2025-06-19 RX ORDER — POTASSIUM CHLORIDE 1.5 G/1.58G
40 POWDER, FOR SOLUTION ORAL ONCE
Status: COMPLETED | OUTPATIENT
Start: 2025-06-19 | End: 2025-06-19

## 2025-06-19 RX ORDER — SENNOSIDES 8.6 MG/1
2 TABLET ORAL 2 TIMES DAILY
Status: DISCONTINUED | OUTPATIENT
Start: 2025-06-19 | End: 2025-06-23 | Stop reason: HOSPADM

## 2025-06-19 RX ADMIN — SODIUM CHLORIDE 100 ML/HR: 0.9 INJECTION, SOLUTION INTRAVENOUS at 22:24

## 2025-06-19 RX ADMIN — OXYCODONE HYDROCHLORIDE 10 MG: 5 TABLET ORAL at 22:17

## 2025-06-19 RX ADMIN — SENNOSIDES 17.2 MG: 8.6 TABLET, FILM COATED ORAL at 22:18

## 2025-06-19 RX ADMIN — POTASSIUM CHLORIDE 40 MEQ: 1.5 POWDER, FOR SOLUTION ORAL at 18:28

## 2025-06-19 RX ADMIN — HEPARIN SODIUM 5000 UNITS: 5000 INJECTION, SOLUTION INTRAVENOUS; SUBCUTANEOUS at 22:17

## 2025-06-19 RX ADMIN — SODIUM CHLORIDE 1000 ML: 0.9 INJECTION, SOLUTION INTRAVENOUS at 18:32

## 2025-06-19 RX ADMIN — GABAPENTIN 100 MG: 100 CAPSULE ORAL at 22:17

## 2025-06-19 RX ADMIN — POTASSIUM CHLORIDE 20 MEQ: 14.9 INJECTION, SOLUTION INTRAVENOUS at 18:29

## 2025-06-19 ASSESSMENT — ENCOUNTER SYMPTOMS
CARDIOVASCULAR NEGATIVE: 1
RESPIRATORY NEGATIVE: 1
CONSTITUTIONAL NEGATIVE: 1
NECK NEGATIVE: 1
ARTHRALGIAS: 1
GASTROINTESTINAL NEGATIVE: 1

## 2025-06-19 ASSESSMENT — PAIN - FUNCTIONAL ASSESSMENT
PAIN_FUNCTIONAL_ASSESSMENT: 0-10
PAIN_FUNCTIONAL_ASSESSMENT: 0-10

## 2025-06-19 ASSESSMENT — PAIN SCALES - GENERAL
PAINLEVEL_OUTOF10: 0 - NO PAIN
PAINLEVEL_OUTOF10: 7
PAINLEVEL_OUTOF10: 0 - NO PAIN

## 2025-06-19 ASSESSMENT — PAIN DESCRIPTION - DESCRIPTORS: DESCRIPTORS: ACHING

## 2025-06-19 NOTE — ED TRIAGE NOTES
Pt states she had pre testing completed today for surgery. Pt states she was called and told to come into ED for low potassium

## 2025-06-19 NOTE — CPM/PAT H&P
CPM/PAT Evaluation       Name: Caitlyn Atkins (Caitlyn Atkins)  /Age: 1965/60 y.o.       SURGEON :  Surgery, Date, and Length:  L4-S3 Lumbar Spine Laminectomy; L4-L5; L5-S1 Transforaminal Lumbar Interbody Fusion; L4-Ilium Fusion with Instrumentation; Possible Cement 25    HPI:  This a 60y.o. fe-male who presents for presurgical evaluation for for above mentioned procedure.Pt states low back pain . Reports multiple falls    . After discussion of the risks and benefits with Dr. King the patient elects to proceed with the planned procedure.     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, followed by neurosurgery- Clearance in note on 25    Lower extremity weakness     Lumbar radiculopathy     Lumbar spondylosis     Multiple falls     Multiple sclerosis (Multi)     Multiple sclerosis, follows with neuro, per patient well managed on Vumerity, no flare up since     Osteoporosis     Palpitations     saw cardiology-wore holter-SVT was on Metoprolol but per patient did not help, never followed up with cardiology because palpitations stopped after quitting her stressful job    Pseudoclaudication syndrome     Sacral fracture (Multi)     Sacroiliitis     SDH (subdural hematoma) (Multi)     Spinal stenosis     Spondylolisthesis     Urinary incontinence     Vitamin D deficiency        Past Surgical History:   Procedure Laterality Date    CT ANGIO CORONARY ART WITH HEARTFLOW IF SCORE >30%  2023    CT ANGIO CORONARY ART WITH HEARTFLOW IF SCORE >30% 2023 U CT    HIP ARTHROPLASTY Right 2023    HYSTERECTOMY  04/15/2013     Hysterectomy- cervical Cancer    OTHER SURGICAL HISTORY Left 04/21/2021    Arm surgery-fracture repair    REFRACTIVE SURGERY       Anesthesia History  Pt denies any past history of anesthetic complications such as PONV, awareness, prolonged sedation, dental damage, aspiration, cardiac arrest, difficult intubation, difficult I.V. access or unexpected hospital admissions.  NO malignant hyperthermia and or pseudo cholinesterase deficiency.    The patient is not  a Gnosticist and will accept blood and blood products if medically indicated.   No history of blood transfusions .Type and screen sent.     Social History  Social History     Substance and Sexual Activity   Drug Use Never      Social History     Substance and Sexual Activity   Alcohol Use Not Currently      Social History     Tobacco Use   Smoking Status Former    Current packs/day: 1.00    Types: Cigarettes   Smokeless Tobacco Never   Tobacco Comments    Quit 1 month ago, smoked 1PPD for about 40 years          Family History  Problem Relation Name Age of Onset    Lung cancer Mother      Coronary artery disease Father      Hypertension Father      Other (THYROID SURGERY) Sister      Glaucoma Other GRANDMOTHER        No Known Allergies    Prior to Admission medications    Medication Sig Start Date End Date Taking? Authorizing Provider   acetaminophen (Tylenol) 325 mg tablet Take 2 tablets (650 mg) by mouth every 4 hours if needed for moderate pain (4 - 6).   Yes Historical Provider, MD   alendronate (Fosamax) 70 mg tablet Take 1 tablet (70 mg) by mouth every 7 days. Take in the morning with a full glass of water, on an empty stomach, and do not take anything else by mouth or lie down for the next 30 min. 4/3/25  Yes Zane Ang PA-C   b complex 0.4 mg tablet Take 1 tablet by mouth once daily.   Yes Historical Provider, MD   calcium carbonate (CALCIUM 500 ORAL) Take 1 tablet by mouth once daily.   Yes Historical Provider, MD   cholecalciferol  (Vitamin D-3) 50 MCG (2000 UT) tablet Take 1 tablet (2,000 Units) by mouth once daily. 1/7/18  Yes Historical Provider, MD   gabapentin (Neurontin) 100 mg capsule Take 1 capsule (100 mg) by mouth 3 times a day. 4/28/25 7/27/25 Yes Viet Maher MD   HYDROcodone-acetaminophen (Norco) 7.5-325 mg tablet Take 1 tablet by mouth every 6 hours if needed for severe pain (7 - 10) for up to 28 days. Do not fill before July 21, 2025. 7/21/25 8/18/25 Yes Viet Maher MD   omeprazole OTC (PriLOSEC OTC) 20 mg EC tablet Take 1 tablet (20 mg) by mouth once daily in the morning.   Yes Historical Provider, MD   tiZANidine (Zanaflex) 2 mg tablet Take 1 tablet (2 mg) by mouth every 12 hours if needed for muscle spasms for up to 28 days. 6/17/25 7/15/25 Yes Viet Maher MD   chlorhexidine (Peridex) 0.12 % solution Use 15 mL in the mouth or throat once daily for 2 days. 6/19/25 6/21/25  RITCHIE Lopez   diroximel fumarate (Vumerity) 231 mg capsule,delayed release(DR/EC) Take 2 capsules by mouth 2 times a day.  Patient not taking: Reported on 6/19/2025 12/28/23   RITCHIE Mckeon   HYDROcodone-acetaminophen (Norco) 7.5-325 mg tablet Take 1 tablet by mouth every 6 hours if needed for severe pain (7 - 10) for up to 28 days. Do not fill before June 23, 2025. 6/23/25 7/21/25  Viet Maher MD   naloxone (Narcan) 4 mg/0.1 mL nasal spray Administer 1 spray (4 mg) into affected nostril(s) if needed for opioid reversal. May repeat every 2-3 minutes if needed, alternating nostrils, until medical assistance becomes available.    Historical Provider, MD   chlorhexidine (Peridex) 0.12 % solution Use 15 mL in the mouth or throat once daily for 2 days. 6/19/25 6/19/25  Ranjana Stover, APRN-PEDRO   HYDROcodone-acetaminophen (Norco) 7.5-325 mg tablet Take 1 tablet by mouth every 6 hours if needed for severe pain (7 - 10) for up to 28 days. Do not fill before May 26, 2025. 5/26/25 6/17/25  Viet Maher MD    tiZANidine (Zanaflex) 2 mg tablet Take 1 tablet (2 mg) by mouth every 12 hours if needed for muscle spasms for up to 28 days. 4/28/25 6/17/25  Viet Maher MD        PAT ROS:   Constitutional:   neg    Neuro/Psych:   Eyes:   Ears:   Nose:   neg    Mouth:   neg    Throat:   neg    Neck:    Cervical kyphosis   neg    Cardio:   neg    Respiratory:   neg    Endocrine:   GI:   neg    :   neg    Musculoskeletal:    arthralgias (ANKLES)  Hematologic:   neg    Skin:  neg        Physical Exam  Constitutional:       Appearance: She is ill-appearing.   HENT:      Head: Normocephalic.      Mouth/Throat:      Mouth: Mucous membranes are dry.   Eyes:      Extraocular Movements: Extraocular movements intact.      Pupils: Pupils are equal, round, and reactive to light.   Neck:      Comments: Cervical kyphosis   Cardiovascular:      Rate and Rhythm: Normal rate and regular rhythm.      Pulses: Normal pulses.      Heart sounds: Normal heart sounds.      Comments: Unable to appreciate murmur   Pulmonary:      Effort: Pulmonary effort is normal.      Comments: Diminished breath sounds all lung fields   Musculoskeletal:      Cervical back: Rigidity present.      Right lower leg: Edema (1+) present.      Left lower leg: Edema (1+) present.   Skin:     General: Skin is warm and dry.   Neurological:      Mental Status: She is alert and oriented to person, place, and time.   Psychiatric:         Mood and Affect: Mood normal.         Behavior: Behavior normal.          PAT AIRWAY:   Airway:     Mallampati::  II   upper dentures         Testing/Diagnostic:   EKG IN [facility]       ECHO 8/2023  CONCLUSIONS:  1. Left ventricular systolic function is normal.  2. Spectral Doppler shows an impaired relaxation pattern of left ventricular diastolic filling.  3. Mild aortic valve regurgitation.    Lab Results   Component Value Date    WBC 7.0 06/19/2025    HGB 11.0 (L) 06/19/2025    HCT 31.7 (L) 06/19/2025    MCV 91 06/19/2025     (H)  "06/19/2025     Results from last 7 days   Lab Units 06/19/25  1700 06/19/25  1352   SODIUM mmol/L 131* 134*   POTASSIUM mmol/L 2.0* 2.2*   CHLORIDE mmol/L 101 102   CO2 mmol/L 18* 19*   BUN mg/dL 23 23   CREATININE mg/dL 1.33* 1.27*   CALCIUM mg/dL 12.1* 12.6*   PROTEIN TOTAL g/dL  --  5.9*   BILIRUBIN TOTAL mg/dL  --  0.4   ALK PHOS U/L  --  140*   ALT U/L  --  44   AST U/L  --  63*   GLUCOSE mg/dL 140* 107*       A1C PENDING    MRSA PENDING     Patient Specialist/PCP:   /70   Pulse 70   Temp 36.1 °C (96.9 °F) (Tympanic)   Resp 12   Ht 1.52 m (4' 11.84\")   Wt (!) 39.9 kg (87 lb 15.4 oz)   SpO2 99%   BMI 17.27 kg/m²       ASSESSMENT/PLAN    Patient is a 60 year-old  scheduled for L4-S3 Lumbar Spine Laminectomy; L4-L5; L5-S1 Transforaminal Lumbar Interbody Fusion; L4-Ilium Fusion with Instrumentation; Possible Cement  with Dr. King   on  6/25/25 .  CARDIOVASCULAR:  RCRI score / Risk: The patients score is 0 based on history . Per ACC/AHA guidelines this places her  at  3.9%  risk for MACE undergoing a intermediate  risk procedure . The patient has the following risk factors:  denies   Functional Capacity: The patients exercise tolerance is  4  METS. This is based on the patient limited only due to back pain and MS. Uses a rollator . Patient denies  active cardiac symptoms or anginal equivalents .      PULMONARY:  The patient has the following factors that place them at increased risk of perioperative pulmonary complications; than 65/BMI less than 18 greater than 27/decrease functional status/HX HEAVY SMOKER greater than 2.5 hour procedure.  Postoperatively the patient would benefit from early pulmonary toilet/incentive spirometry q 1-2 hours while awake/pulse oximetry/cautious use of respiratory depressant medications such as opioids/elevate the HOB/oral hygiene.    ANEMIA:  The patient has a history of anemia.  The patient’s anemia appears stable when compared to prior labs.  Type and screen " obtained.    HYPOKALEMIA :  The patient has a history of hypokalemia .  The patient’s potassium is 2.2 mg /dl . Pt contacted instructed to go to ER near her home ([place]) for further evaluation . Pt in agreement . Contacted patients daughter Jordan for transportation . [place] [facility] contacted , spoke with Valerie NP report given regarding patient presenting with hypokalemia .  Dr King , Dr Dupree and PCP Tosin ALBERTO notified .  Patient presented to [facility] ER.    DARIUS:  Pt labs show new renal insufficiency .creatnine 1.27 GFR 48 . Pt is t present to [place] [facility] for further evaluation .      THROMBOPHILIA :  The patient’s platelet count came back at  506.  This is a new finding when compared to prior lab results.  The patient does not have a history of alcoholic cirrhosis or autoimmune disorders.  The patient denies recent viral infection.  Pt is at increase risk for thrombosis .  Recommend appropriate post operative antiplatelet therapy .    LONG TERM PAIN MANAGEMENT:  Pt is on opiod therapy  .Consideration to be given regarding postoperative pain management.  Alternatives: analgesics, local/regional anesthesia and non pharmaceutical method.  Monitior postoperative pain relief, check respiratory status and LOV, coninous SpO2 monitoring,supplemental O2 therapy to maintain O2 saturation above 92%.    DVT:  CAPRINI SCORE=10  The patient has the following factors that increase he r Risk for thrombus formation ; Virchow's triad , age 60, bmi 16, Surgical procedure >2 hrs  procedure .    Recommendations: DVT prophylaxis  per Dr. King protocol . SCD's, CHIQUIS's, and early ambulation are recommended. Heparin or LMWH is recommended for the very high risk .      Risk assessment complete.  Patient is scheduled for  intermediate  surgical risk procedure.  Patient is considered an increased risk , pending optimization  risk to proceed with the planned procedure.      Preoperative medication instructions were provided and  reviewed with the patient.  Any additional testing or evaluation was explained to the patient.  Nothing by mouth instructions were discussed and patient's questions were answered prior to conclusion to this encounter.  Patient verbalized understanding of preoperative instructions given in preadmission testing; discharge instructions available in EMR.

## 2025-06-19 NOTE — PREPROCEDURE INSTRUCTIONS
Medication List            Accurate as of June 19, 2025  1:03 PM. Always use your most recent med list.                acetaminophen 325 mg tablet  Commonly known as: Tylenol  Medication Adjustments for Surgery: Take/Use as prescribed     alendronate 70 mg tablet  Commonly known as: Fosamax  Take 1 tablet (70 mg) by mouth every 7 days. Take in the morning with a full glass of water, on an empty stomach, and do not take anything else by mouth or lie down for the next 30 min.  Medication Adjustments for Surgery: Do Not take on the morning of surgery     b complex 0.4 mg tablet  Additional Medication Adjustments for Surgery: Take last dose 7 days before surgery     CALCIUM 500 ORAL  Medication Adjustments for Surgery: Do Not take on the morning of surgery     chlorhexidine 0.12 % solution  Commonly known as: Peridex  Use 15 mL in the mouth or throat once daily for 2 days.  Medication Adjustments for Surgery: Take/Use as prescribed     cholecalciferol 50 mcg (2,000 units) tablet  Commonly known as: Vitamin D-3  Medication Adjustments for Surgery: Do Not take on the morning of surgery     gabapentin 100 mg capsule  Commonly known as: Neurontin  Take 1 capsule (100 mg) by mouth 3 times a day.  Medication Adjustments for Surgery: Take on the morning of surgery     glucose 4 gram chewable tablet     * HYDROcodone-acetaminophen 7.5-325 mg tablet  Commonly known as: Norco  Take 1 tablet by mouth every 6 hours if needed for severe pain (7 - 10) for up to 28 days. Do not fill before June 23, 2025.  Start taking on: June 23, 2025  Medication Adjustments for Surgery: Take/Use as prescribed     * HYDROcodone-acetaminophen 7.5-325 mg tablet  Commonly known as: Norco  Take 1 tablet by mouth every 6 hours if needed for severe pain (7 - 10) for up to 28 days. Do not fill before July 21, 2025.  Start taking on: July 21, 2025  Notes to patient: NOT TAKING THIS REFILL AT THIS TIME      naloxone 4 mg/0.1 mL nasal spray  Commonly known  as: Narcan  Medication Adjustments for Surgery: Take/Use as prescribed     omeprazole OTC 20 mg EC tablet  Commonly known as: PriLOSEC OTC  Medication Adjustments for Surgery: Take on the morning of surgery     tiZANidine 2 mg tablet  Commonly known as: Zanaflex  Take 1 tablet (2 mg) by mouth every 12 hours if needed for muscle spasms for up to 28 days.  Medication Adjustments for Surgery: Do Not take on the morning of surgery     Vumerity 231 mg capsule,delayed release(DR/EC)  Generic drug: diroximel fumarate  Take 2 capsules by mouth 2 times a day.  Medication Adjustments for Surgery: Take on the morning of surgery           * This list has 2 medication(s) that are the same as other medications prescribed for you. Read the directions carefully, and ask your doctor or other care provider to review them with you.                     CONTACT SURGEON'S OFFICE IF YOU DEVELOP:  * Fever = 100.4 F   * New respiratory symptoms (e.g. cough, shortness of breath, respiratory distress, sore throat)  * Recent loss of taste or smell  *Flu like symptoms such as headache, fatigue or gastrointestinal symptoms  * You develop any open sores, shingles, burning or painful urination   AND/OR:  * You no longer wish to have the surgery.  * Any other personal circumstances change that may lead to the need to cancel or defer this surgery.  *You were admitted to any hospital within one week of your planned procedure.    SMOKING:  *Quitting smoking can make a huge difference to your health and recovery from surgery.    *If you need help with quitting, call [phone].    THE DAY BEFORE SURGERY:  *Do not eat any food after midnight the night before surgery.   You may have up to 13.5 ounces of clear liquid until TWO hours before your instructed arrival time to the hospital  DIABETICS:  Please check fasting blood sugar  upon waking up.  If fasting sugar is <80 mg/dl, please drink 100ml/3oz of apple juice no later than 2 hours prior to  surgery.      SURGICAL TIME  *You will be contacted between 2 p.m. and 6 p.m. the business day before your surgery with your arrival time.  *If you haven't received a call by 6pm, call [phone].  *Scheduled surgery times may change and you will be notified if this occurs-check your personal voicemail for any updates.    ON THE MORNING OF SURGERY:  *Wear comfortable, loose fitting clothing.   *Do not use moisturizers, creams, lotions or perfume.  *All jewelry and valuables should be left at home.  *Prosthetic devices such as contact lenses, hearing aids, dentures, eyelash extensions, hairpins and body piercing must be removed before surgery.    BRING WITH YOU:  *Photo ID and insurance card  *Current list of medicines and allergies  *Pacemaker/Defibrillator/Heart stent cards  *CPAP machine and mask  *Slings/splints/crutches  *Copy of your complete Advanced Directive/DHPOA-if applicable  *Neurostimulator implant remote    PARKING AND ARRIVAL:  *Check in at the Main Entrance desk and let them know you are here for surgery.  *You will be directed to the 2nd floor surgical waiting area.    AFTER OUTPATIENT SURGERY:  *A responsible adult MUST accompany you at the time of discharge and stay with you for 24 hours after your surgery.  *You may NOT drive yourself home after surgery.  *You may use a taxi or ride sharing service ([employer], Uber) to return home ONLY if you are accompanied by a friend or family member.  *Instructions for resuming your medications will be provided by your surgeon.    Home Preoperative Antibacterial Shower     What is a home preoperative antibacterial shower?  This shower is a way of cleaning the skin with a germ killing soap before surgery.  The soap contains chlorhexidine, commonly known as CHG.  CHG is a soap for your skin with germ killing ability.  Let your doctor know if you are allergic to chlorhexidine.    Why do I need to take a preoperative antibacterial shower?  Skin is not sterile.  It  is best to try to make your skin as free of germs as possible before surgery.  Proper cleansing with a germ killing soap before surgery can lower the number of germs on your skin.  This helps to reduce the risk of infection at the surgical site.  Following the instructions listed below will help you prepare your skin for surgery.      How do I use the CHG skin cleanser?  Steps:  Begin using your CHG soap five days before your scheduled surgery on ________________________.    Days 1-4 Shower before bed:  Wash your face and genitals with your normal soap and rinse.           2.    Apply the CHG soap to a clean wet washcloth.  Turn the water off or move away                From the water spray to avoid premature rinsing of the CHG soap as you are applying.     3.   Lather your entire body from the neck down.  Do not use on your face or genitals.  4. Pay special attention to the area(s) where your incision(s) will be located unless they are on your face.  Avoid scrubbing your skin too hard.  The important point is to have the CHG soap sit on your skin for 3 minutes.    When the 3 minutes are up, turn on the water and rinse the CHG soap off your body completely.   Pat yourself dry with a clean, freshly-laundered towel.  Dress in clean, freshly laundered night clothes.    Be sure to change bed sheets and blankets at least on the first night of CHG body wash use. May change linens every night of the above protocol for maximum benefit.   Day 5:  Last shower is the morning of surgery: Follow above Instructions.    NOTE:        *Keep CHG soap out of eyes and ear canals   *DO NOT wash with regular soap on your body after you have used the CHG soap solution  *DO NOT apply powders, lotions, or perfume.  *Deodorant may be used days 1-4, BUT NOT the day of surgery    Who should I contact if I have any questions regarding the use of CHG soap?  Call the [facility], Preadmission Testing at [phone]  if you have any questions.              Patient Information: Pre-Operative Infection Prevention Measures     Why did I have my nose, under my arms and groin swabbed?  The purpose of the swab is to identify Staphylococcus aureus inside your nose or on your skin.  The swab was sent to the laboratory for culture.  A positive swab/culture for Staphylococcus aureus is called colonization or carriage.      What is Staphylococcus aureus?  Staphylococcus aureus, also known as “staph”, is a germ found on the skin or in the nose of healthy people.  Sometimes Staphylococcus aureus can get into the body and cause an infection.  This can be minor (such as pimples, boils or other skin problems).  It might also be serious (such as blood infection, pneumonia or a surgical site infection).    What is Staphylococcus aureus colonization or carriage?  Colonization or carriage means that a person has the germ but is not sick from it.  These bacteria can be spread on the hands or when breathing or sneezing.    How is Staphylococcus aureus spread?  It is most often spread by close contact with a person or item that carries it.    What happens if my culture is positive for Staphylococcus aureus?  Your doctor/medical team will use this information to guide any antibiotic treatment which may be necessary.  Regardless of the culture results, we will clean the inside of your nose with a betadine swab just before you have your surgery.      Will I get an infection if I have Staphylococcus aureus in my nose or on my skin?  Anyone can get an infection with Staphylococcus aureus.  However, the best way to reduce your risk of infection is to follow the instructions provided to you for the use of your CHG soap and dental rinse.        Who should I contact if I have any questions?  Call the [facility], Preadmission Testing at [phone] if you have any questions.          Patient Information: Oral/Dental Rinse  **This  is a prescription; pick it up at your preferred local pharmacy **  What is oral/dental rinse?   It is a mouthwash. It is a way of cleaning the mouth with a germ killing solution before your surgery.  The solution contains chlorhexidine, commonly known as CHG.   It is used inside the mouth to kill a bacteria known as Staphylococcus aureus.  Let your doctor know if you are allergic to Chlorhexidine.    Why do I need to use CHG oral/dental rinse?  The CHG oral/dental rinse helps to kill a bacteria in your mouth known a Staphylococcus aureus.     This reduces the risk of infection at the surgical site.      Using your CHG oral/dental rinse  STEPS:  Use your CHG oral/dental rinse after you brush your teeth the night before (at bedtime) and the morning of your surgery.  Follow all directions on your prescription label.    Use the cap on the container to measure 15ml (fill cap to fill line)  Swish (gargle if you can) the mouthwash in your mouth for at least 30 seconds, (do not to swallow) spit out  After you use your CHG rinse, do not rinse your mouth with water, drink or eat.  Please refer to prescription label for the appropriate time to resume oral intake  Dental rinse comes in one size bottle: 473ml ~16oz.  You will have leftover    rinse, discard after this use.    What side effects might I have using the CHG oral/dental rinse?  CHG rinse will stick to plaque on the teeth.  Brush and floss just before use.  Teeth brushing will help avoid staining of plaque during use.    Who should I contact if I have questions about the CHG oral/dental rinse?  Please call [facility], Preadmission Testing at [phone] if you have any questions

## 2025-06-19 NOTE — ED PROVIDER NOTES
HPI   Chief Complaint   Patient presents with    Weakness, Gen       HPI: 60-year-old female with a history of chronic spine issues scheduled to undergo spinal surgery next week presents for severe hypokalemia.  She underwent outpatient testing today for clearance for surgery and was found to have a potassium of 2.2.  She reports that her  passed away last week and she has not been eating very much.  She denies fever, chills, chest pain and shortness of breath      Limitations to history: None  Independent Historians: Patient  External Records Reviewed: JAV[facility], outpatient notes, inpatient notes  ------------------------------------------------------------------------------------------------------------------------------------------  ROS: a ten point review of systems was performed and was negative except as per HPI.  ------------------------------------------------------------------------------------------------------------------------------------------  PMH / PSH: as per HPI, otherwise reviewed in EMR  MEDS: as per HPI, otherwise reviewed in EMR  ALLERGIES: as per HPI, otherwise reviewed in EMR  SocH:  as per HPI, otherwise reviewed in EMR  FH:  as per HPI, otherwise reviewed in EMR  ------------------------------------------------------------------------------------------------------------------------------------------  Physical Exam:  VS: As documented in the triage note and EMR flowsheet from this visit was reviewed  General: Well appearing. No acute distress.   Eyes:  Extraocular movements grossly intact. No scleral icterus. No discharge  HEENT:  Normocephalic.  Atraumatic  Neck: Moves neck freely. No gross masses  CV: Regular rhythm. No murmurs, rubs or gallops   Resp: Clear to auscultation bilaterally. No respiratory distress.    GI: Soft, no masses, nontender. No rebound tenderness or guarding  MSK: Symmetric muscle bulk. No deformities. No lower extremity edema.    Skin: Warm, dry, intact.   Neuro: No  focal deficits.  A&O x3.   Psych: Appropriate for situation  ------------------------------------------------------------------------------------------------------------------------------------------  Hospital Course / Medical Decision Making:  Independent Interpretations: N/A  EKG as interpreted by me: Normal sinus rhythm at 75 bpm with normal axis, no bundle branch block and no signs of acute ischemia    MDM: 60-year-old female presents for severe hypokalemia.  Her potassium is found to be decreased at 2.0.  She is hypercalcemic also at 12.1.  She was given IV fluids and her potassium was repleted orally and peripherally.  It will take a great deal of time to adequately replete her potassium and therefore she was admitted to the hospitalist service for further management and repletion of her potassium    Discussion of Management with Other Providers:   I discussed the patient/results with: Emergency medicine team    Final diagnosis and disposition as below.    Results for orders placed or performed during the hospital encounter of 06/19/25  -CBC and Auto Differential:   Collection Time: 06/19/25  5:00 PM       Result                      Value             Ref Range           WBC                         7.0               4.4 - 11.3 x*       nRBC                        0.0               0.0 - 0.0 /1*       RBC                         3.48 (L)          4.00 - 5.20 *       Hemoglobin                  11.0 (L)          12.0 - 16.0 *       Hematocrit                  31.7 (L)          36.0 - 46.0 %       MCV                         91                80 - 100 fL         MCH                         31.6              26.0 - 34.0 *       MCHC                        34.7              32.0 - 36.0 *       RDW                         14.7 (H)          11.5 - 14.5 %       Platelets                   479 (H)           150 - 450 x1*       Neutrophils %               64.3              40.0 - 80.0 %       Immature Granulocytes *      0.4               0.0 - 0.9 %         Lymphocytes %               19.1              13.0 - 44.0 %       Monocytes %                 13.0              2.0 - 10.0 %        Eosinophils %               2.3               0.0 - 6.0 %         Basophils %                 0.9               0.0 - 2.0 %         Neutrophils Absolute        4.52              1.20 - 7.70 *       Immature Granulocytes *     0.03              0.00 - 0.70 *       Lymphocytes Absolute        1.34              1.20 - 4.80 *       Monocytes Absolute          0.91              0.10 - 1.00 *       Eosinophils Absolute        0.16              0.00 - 0.70 *       Basophils Absolute          0.06              0.00 - 0.10 *  -Basic metabolic panel:   Collection Time: 06/19/25  5:00 PM       Result                      Value             Ref Range           Glucose                     140 (H)           74 - 99 mg/dL       Sodium                      131 (L)           136 - 145 mm*       Potassium                   2.0 (LL)          3.5 - 5.3 mm*       Chloride                    101               98 - 107 mmo*       Bicarbonate                 18 (L)            21 - 32 mmol*       Anion Gap                   14                10 - 20 mmol*       Urea Nitrogen               23                6 - 23 mg/dL        Creatinine                  1.33 (H)          0.50 - 1.05 *       eGFR                        46 (L)            >60 mL/min/1*       Calcium                     12.1 (H)          8.6 - 10.3 m*  -Magnesium:   Collection Time: 06/19/25  5:00 PM       Result                      Value             Ref Range           Magnesium                   1.84              1.60 - 2.40 *  No orders to display                Patient History   Medical History[1]  Surgical History[2]  Family History[3]  Social History[4]    Physical Exam   ED Triage Vitals [06/19/25 1642]   Temperature Heart Rate Respirations BP   36.9 °C (98.4 °F) 84 16 116/83      Pulse Ox Temp Source  Heart Rate Source Patient Position   99 % Temporal -- --      BP Location FiO2 (%)     -- --       Physical Exam      ED Course & MDM   Diagnoses as of 06/20/25 0134   Hypokalemia                 No data recorded     Randy Coma Scale Score: 15 (06/19/25 1706 : Rina Balderrama, RN)                           Medical Decision Making      Procedure  Critical Care    Performed by: Rahul Ferro DO  Authorized by: Rahul Ferro DO    Critical care provider statement:     Critical care time (minutes):  35    Critical care time was exclusive of:  Separately billable procedures and treating other patients    Critical care was necessary to treat or prevent imminent or life-threatening deterioration of the following conditions: Severe hypokalemia.    Critical care was time spent personally by me on the following activities:  Development of treatment plan with patient or surrogate, examination of patient and ordering and review of laboratory studies    Care discussed with: admitting provider           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, followed by neurosurgery- Clearance in note on 5/21/25    Lower extremity weakness     Lumbar radiculopathy     Lumbar spondylosis     Multiple falls     Multiple sclerosis (Multi)     Multiple sclerosis, follows with neuro, per patient well managed on Vumerity, no flare up since 2014    Osteoporosis     Palpitations     saw cardiology-wore holter-SVT was on Metoprolol but per patient did not help, never followed up with cardiology because palpitations stopped after quitting her stressful job    Pseudoclaudication syndrome      Sacral fracture (Multi)     Sacroiliitis     SDH (subdural hematoma) (Multi)     Spinal stenosis     Spondylolisthesis     Urinary incontinence     Vitamin D deficiency    [2]   Past Surgical History:  Procedure Laterality Date    CT ANGIO CORONARY ART WITH HEARTFLOW IF SCORE >30%  11/16/2023    CT ANGIO CORONARY ART WITH HEARTFLOW IF SCORE >30% 11/16/2023 [facility] CT    HIP ARTHROPLASTY Right 08/16/2023    HYSTERECTOMY  04/15/2013    Hysterectomy- cervical Cancer    OTHER SURGICAL HISTORY Left 04/21/2021    Arm surgery-fracture repair    REFRACTIVE SURGERY     [3]   Family History  Problem Relation Name Age of Onset    Lung cancer Mother      Coronary artery disease Father      Hypertension Father      Other (THYROID SURGERY) Sister      Glaucoma Other GRANDMOTHER    [4]   Social History  Tobacco Use    Smoking status: Former     Current packs/day: 1.00     Types: Cigarettes    Smokeless tobacco: Never    Tobacco comments:     Quit 1 month ago, smoked 1PPD for about 40 years   Vaping Use    Vaping status: Never Used   Substance Use Topics    Alcohol use: Not Currently    Drug use: Never        Rahul Ferro,   06/20/25 0137

## 2025-06-19 NOTE — CPM/PAT NURSE NOTE
CPM/PAT Nurse Note      Name: Caitlyn Atkins (Caitlyn Atkins)  /Age: 1965/60 y.o.       Medical History[1]    Surgical History[2]    Patient Sexual activity questions deferred to the physician.    Family History[3]    Allergies[4]    Prior to Admission medications    Medication Sig Start Date End Date Taking? Authorizing Provider   acetaminophen (Tylenol) 325 mg tablet Take 2 tablets (650 mg) by mouth every 4 hours if needed for moderate pain (4 - 6).   Yes Historical Provider, MD   alendronate (Fosamax) 70 mg tablet Take 1 tablet (70 mg) by mouth every 7 days. Take in the morning with a full glass of water, on an empty stomach, and do not take anything else by mouth or lie down for the next 30 min. 4/3/25  Yes Zane Ang PA-C   b complex 0.4 mg tablet Take 1 tablet by mouth once daily.   Yes Historical Provider, MD   calcium carbonate (CALCIUM 500 ORAL) Take 1 tablet by mouth once daily.   Yes Historical Provider, MD   cholecalciferol (Vitamin D-3) 50 MCG (2000 UT) tablet Take 1 tablet (2,000 Units) by mouth once daily. 18  Yes Historical Provider, MD   gabapentin (Neurontin) 100 mg capsule Take 1 capsule (100 mg) by mouth 3 times a day. 25 Yes Viet Maher MD   HYDROcodone-acetaminophen (Norco) 7.5-325 mg tablet Take 1 tablet by mouth every 6 hours if needed for severe pain (7 - 10) for up to 28 days. Do not fill before 2025. 25 Yes Viet Maher MD   omeprazole OTC (PriLOSEC OTC) 20 mg EC tablet Take 1 tablet (20 mg) by mouth once daily in the morning.   Yes Historical Provider, MD   tiZANidine (Zanaflex) 2 mg tablet Take 1 tablet (2 mg) by mouth every 12 hours if needed for muscle spasms for up to 28 days. 6/17/25 7/15/25 Yes Viet Maher MD   chlorhexidine (Peridex) 0.12 % solution Use 15 mL in the mouth or throat once daily for 2 days. 25  Ranjana Stover, APRN-CNP   diroximel fumarate (Vumerity) 231 mg capsule,delayed  release(DR/EC) Take 2 capsules by mouth 2 times a day.  Patient not taking: Reported on 6/19/2025 12/28/23   GUILLERMO Mckeon-CNP   HYDROcodone-acetaminophen (Norco) 7.5-325 mg tablet Take 1 tablet by mouth every 6 hours if needed for severe pain (7 - 10) for up to 28 days. Do not fill before June 23, 2025. 6/23/25 7/21/25  Viet Maher MD   naloxone (Narcan) 4 mg/0.1 mL nasal spray Administer 1 spray (4 mg) into affected nostril(s) if needed for opioid reversal. May repeat every 2-3 minutes if needed, alternating nostrils, until medical assistance becomes available.    Historical Provider, MD   chlorhexidine (Peridex) 0.12 % solution Use 15 mL in the mouth or throat once daily for 2 days. 6/19/25 6/19/25  GUILLERMO Lopez-PEDRO   HYDROcodone-acetaminophen (Norco) 7.5-325 mg tablet Take 1 tablet by mouth every 6 hours if needed for severe pain (7 - 10) for up to 28 days. Do not fill before May 26, 2025. 5/26/25 6/17/25  Viet Maher MD   tiZANidine (Zanaflex) 2 mg tablet Take 1 tablet (2 mg) by mouth every 12 hours if needed for muscle spasms for up to 28 days. 4/28/25 6/17/25  Viet Maher MD        PAT AMRIT     DASI Risk Score    No data to display       Caprini DVT Assessment      Flowsheet Row ED to Hosp-Admission (Discharged) from 4/21/2025 in [facility] Emergency Medicine with Bereket Sharp MD, Austin Klein MD and Gregor Trent MD   DVT Score (IF A SCORE IS NOT CALCULATING, MUST SELECT A BMI TO COMPLETE) 8 filed at 04/21/2025 2131   Surgical Factors New trauma admission filed at 04/21/2025 2131   BMI (BMI MUST BE CHOSEN) 30 or less filed at 04/21/2025 2131          Modified Frailty Index    No data to display       [id number] Stroke Risk Points  Current as of 13 minutes ago        N/A 0 to 9 Points:      Last Change: N/A          The [id number] risk score (Martínez LEDEZMA, et al. 2009. © 2010 [employer]) quantifies the risk of stroke for a  patient with atrial fibrillation. For patients without atrial fibrillation or under the age of 18 this score appears as N/A. Higher score values generally indicate higher risk of stroke.        This score is not applicable to this patient. Components are not calculated.          Revised Cardiac Risk Index    No data to display       Apfel Simplified Score    No data to display       Risk Analysis Index Results This Encounter    No data found in the last 10 encounters.       Prodigy: High Risk  Total Score: 11              Prodigy Age Score      Prodigy Previous Opioid Use Score           ARISCAT Score for Postoperative Pulmonary Complications    No data to display       Plunkett Perioperative Risk for Myocardial Infarction or Cardiac Arrest (JAMAL)    No data to display         After Visit Summary (AVS) reviewed and patient verbalized good understanding of medications and NPO instructions.  Pre-op infection prevention measures:  CHG showers and mouthwash reviewed, understanding voiced.  CHG soap given and patient verbalized need to pick CHG mouthwash at their preferred local pharmacy.                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, followed by neurosurgery- Clearance in note on 5/21/25    Lower extremity weakness     Lumbar radiculopathy     Lumbar spondylosis     Multiple falls     Multiple sclerosis (Multi)     Multiple sclerosis, follows with neuro, per patient well managed on Vumerity, no flare up since 2014    Osteoporosis     Palpitations     saw cardiology-wore holter-SVT was on Metoprolol but per patient did not help, never followed up with  cardiology because palpitations stopped after quitting her stressful job    Pseudoclaudication syndrome     Sacral fracture (Multi)     Sacroiliitis     SDH (subdural hematoma) (Multi)     Spinal stenosis     Spondylolisthesis     Urinary incontinence     Vitamin D deficiency    [2]   Past Surgical History:  Procedure Laterality Date    CT ANGIO CORONARY ART WITH HEARTFLOW IF SCORE >30%  11/16/2023    CT ANGIO CORONARY ART WITH HEARTFLOW IF SCORE >30% 11/16/2023 [facility] CT    HIP ARTHROPLASTY Right 08/16/2023    HYSTERECTOMY  04/15/2013    Hysterectomy- cervical Cancer    OTHER SURGICAL HISTORY Left 04/21/2021    Arm surgery-fracture repair    REFRACTIVE SURGERY     [3]   Family History  Problem Relation Name Age of Onset    Lung cancer Mother      Coronary artery disease Father      Hypertension Father      Other (THYROID SURGERY) Sister      Glaucoma Other GRANDMOTHER    [4] No Known Allergies

## 2025-06-20 ENCOUNTER — HOSPITAL ENCOUNTER (INPATIENT)
Dept: CARDIOLOGY | Facility: HOSPITAL | Age: 60
Discharge: HOME | DRG: 640 | End: 2025-06-20
Payer: COMMERCIAL

## 2025-06-20 ENCOUNTER — APPOINTMENT (OUTPATIENT)
Dept: RADIOLOGY | Facility: HOSPITAL | Age: 60
DRG: 640 | End: 2025-06-20
Payer: COMMERCIAL

## 2025-06-20 ENCOUNTER — APPOINTMENT (OUTPATIENT)
Dept: CARDIOLOGY | Facility: HOSPITAL | Age: 60
DRG: 640 | End: 2025-06-20
Payer: COMMERCIAL

## 2025-06-20 DIAGNOSIS — I10 BENIGN ESSENTIAL HYPERTENSION: ICD-10-CM

## 2025-06-20 LAB
AMPHETAMINES UR QL SCN: ABNORMAL
ANION GAP SERPL CALC-SCNC: 9 MMOL/L (ref 10–20)
AORTIC VALVE MEAN GRADIENT: 4 MMHG
AORTIC VALVE PEAK VELOCITY: 1.42 M/S
APPEARANCE UR: CLEAR
APTT PPP: 37 SECONDS (ref 26–36)
AV PEAK GRADIENT: 8 MMHG
AVA (PEAK VEL): 2.49 CM2
AVA (VTI): 2.59 CM2
BACTERIA #/AREA URNS AUTO: ABNORMAL /HPF
BARBITURATES UR QL SCN: ABNORMAL
BENZODIAZ UR QL SCN: ABNORMAL
BILIRUB UR STRIP.AUTO-MCNC: NEGATIVE MG/DL
BUN SERPL-MCNC: 19 MG/DL (ref 6–23)
BZE UR QL SCN: ABNORMAL
CALCIUM SERPL-MCNC: 10.8 MG/DL (ref 8.6–10.3)
CANNABINOIDS UR QL SCN: ABNORMAL
CHLORIDE SERPL-SCNC: 111 MMOL/L (ref 98–107)
CHLORIDE UR-SCNC: 24 MMOL/L
CHLORIDE/CREATININE (MMOL/G) IN URINE: 37 MMOL/G CREAT (ref 38–318)
CO2 SERPL-SCNC: 17 MMOL/L (ref 21–32)
COLOR UR: ABNORMAL
CREAT SERPL-MCNC: 1.13 MG/DL (ref 0.5–1.05)
CREAT UR-MCNC: 63.2 MG/DL (ref 20–320)
CREAT UR-MCNC: 65.1 MG/DL (ref 20–320)
EGFRCR SERPLBLD CKD-EPI 2021: 55 ML/MIN/1.73M*2
EJECTION FRACTION APICAL 4 CHAMBER: 64.6
EJECTION FRACTION: 68 %
ERYTHROCYTE [DISTWIDTH] IN BLOOD BY AUTOMATED COUNT: 14.7 % (ref 11.5–14.5)
FENTANYL+NORFENTANYL UR QL SCN: ABNORMAL
GLUCOSE SERPL-MCNC: 84 MG/DL (ref 74–99)
GLUCOSE UR STRIP.AUTO-MCNC: NORMAL MG/DL
HCT VFR BLD AUTO: 25.2 % (ref 36–46)
HGB BLD-MCNC: 8.9 G/DL (ref 12–16)
HOLD SPECIMEN: NORMAL
HOLD SPECIMEN: NORMAL
KETONES UR STRIP.AUTO-MCNC: NEGATIVE MG/DL
LEFT ATRIUM VOLUME AREA LENGTH INDEX BSA: 24.2 ML/M2
LEFT VENTRICLE INTERNAL DIMENSION DIASTOLE: 3.63 CM (ref 3.5–6)
LEFT VENTRICULAR OUTFLOW TRACT DIAMETER: 1.87 CM
LEUKOCYTE ESTERASE UR QL STRIP.AUTO: NEGATIVE
MAGNESIUM SERPL-MCNC: 1.61 MG/DL (ref 1.6–2.4)
MCH RBC QN AUTO: 31.8 PG (ref 26–34)
MCHC RBC AUTO-ENTMCNC: 35.3 G/DL (ref 32–36)
MCV RBC AUTO: 90 FL (ref 80–100)
METHADONE UR QL SCN: ABNORMAL
MITRAL VALVE E/A RATIO: 1.06
MUCOUS THREADS #/AREA URNS AUTO: ABNORMAL /LPF
NITRITE UR QL STRIP.AUTO: NEGATIVE
NRBC BLD-RTO: 0 /100 WBCS (ref 0–0)
OPIATES UR QL SCN: ABNORMAL
OXYCODONE+OXYMORPHONE UR QL SCN: ABNORMAL
PCP UR QL SCN: ABNORMAL
PH UR STRIP.AUTO: 6.5 [PH]
PLATELET # BLD AUTO: 370 X10*3/UL (ref 150–450)
POTASSIUM SERPL-SCNC: 2.2 MMOL/L (ref 3.5–5.3)
POTASSIUM UR-SCNC: 14 MMOL/L
POTASSIUM UR-SCNC: 19 MMOL/L
POTASSIUM/CREAT UR-RTO: 22 MMOL/G CREAT
POTASSIUM/CREAT UR-RTO: 29 MMOL/G CREAT
PROT UR STRIP.AUTO-MCNC: ABNORMAL MG/DL
RBC # BLD AUTO: 2.8 X10*6/UL (ref 4–5.2)
RBC # UR STRIP.AUTO: ABNORMAL MG/DL
RBC #/AREA URNS AUTO: ABNORMAL /HPF
RIGHT VENTRICLE FREE WALL PEAK S': 16 CM/S
RIGHT VENTRICLE PEAK SYSTOLIC PRESSURE: 26 MMHG
SODIUM SERPL-SCNC: 135 MMOL/L (ref 136–145)
SODIUM UR-SCNC: 18 MMOL/L
SODIUM/CREAT UR-RTO: 28 MMOL/G CREAT
SP GR UR STRIP.AUTO: 1.01
TRICUSPID ANNULAR PLANE SYSTOLIC EXCURSION: 2 CM
UFH PPP CHRO-ACNC: 0.2 IU/ML (ref ?–1.1)
UFH PPP CHRO-ACNC: 0.2 IU/ML (ref ?–1.1)
UFH PPP CHRO-ACNC: 0.3 IU/ML (ref ?–1.1)
UFH PPP CHRO-ACNC: 0.5 IU/ML (ref ?–1.1)
UROBILINOGEN UR STRIP.AUTO-MCNC: NORMAL MG/DL
WBC # BLD AUTO: 5.6 X10*3/UL (ref 4.4–11.3)
WBC #/AREA URNS AUTO: ABNORMAL /HPF

## 2025-06-20 PROCEDURE — 70450 CT HEAD/BRAIN W/O DYE: CPT

## 2025-06-20 PROCEDURE — 71250 CT THORAX DX C-: CPT | Performed by: RADIOLOGY

## 2025-06-20 PROCEDURE — 80307 DRUG TEST PRSMV CHEM ANLYZR: CPT | Performed by: HOSPITALIST

## 2025-06-20 PROCEDURE — 85730 THROMBOPLASTIN TIME PARTIAL: CPT | Performed by: HOSPITALIST

## 2025-06-20 PROCEDURE — 84300 ASSAY OF URINE SODIUM: CPT | Performed by: INTERNAL MEDICINE

## 2025-06-20 PROCEDURE — 99222 1ST HOSP IP/OBS MODERATE 55: CPT | Performed by: NURSE PRACTITIONER

## 2025-06-20 PROCEDURE — 85027 COMPLETE CBC AUTOMATED: CPT | Performed by: HOSPITALIST

## 2025-06-20 PROCEDURE — 2500000004 HC RX 250 GENERAL PHARMACY W/ HCPCS (ALT 636 FOR OP/ED)

## 2025-06-20 PROCEDURE — 76770 US EXAM ABDO BACK WALL COMP: CPT

## 2025-06-20 PROCEDURE — 70450 CT HEAD/BRAIN W/O DYE: CPT | Performed by: RADIOLOGY

## 2025-06-20 PROCEDURE — 83735 ASSAY OF MAGNESIUM: CPT | Performed by: HOSPITALIST

## 2025-06-20 PROCEDURE — 36415 COLL VENOUS BLD VENIPUNCTURE: CPT | Performed by: HOSPITALIST

## 2025-06-20 PROCEDURE — 2500000001 HC RX 250 WO HCPCS SELF ADMINISTERED DRUGS (ALT 637 FOR MEDICARE OP): Performed by: INTERNAL MEDICINE

## 2025-06-20 PROCEDURE — 82570 ASSAY OF URINE CREATININE: CPT | Performed by: INTERNAL MEDICINE

## 2025-06-20 PROCEDURE — 85520 HEPARIN ASSAY: CPT | Performed by: INTERNAL MEDICINE

## 2025-06-20 PROCEDURE — 85520 HEPARIN ASSAY: CPT | Performed by: HOSPITALIST

## 2025-06-20 PROCEDURE — 2500000002 HC RX 250 W HCPCS SELF ADMINISTERED DRUGS (ALT 637 FOR MEDICARE OP, ALT 636 FOR OP/ED): Performed by: INTERNAL MEDICINE

## 2025-06-20 PROCEDURE — 99223 1ST HOSP IP/OBS HIGH 75: CPT | Performed by: INTERNAL MEDICINE

## 2025-06-20 PROCEDURE — 2500000004 HC RX 250 GENERAL PHARMACY W/ HCPCS (ALT 636 FOR OP/ED): Performed by: HOSPITALIST

## 2025-06-20 PROCEDURE — 2500000002 HC RX 250 W HCPCS SELF ADMINISTERED DRUGS (ALT 637 FOR MEDICARE OP, ALT 636 FOR OP/ED): Performed by: HOSPITALIST

## 2025-06-20 PROCEDURE — 99233 SBSQ HOSP IP/OBS HIGH 50: CPT | Performed by: INTERNAL MEDICINE

## 2025-06-20 PROCEDURE — 1200000002 HC GENERAL ROOM WITH TELEMETRY DAILY

## 2025-06-20 PROCEDURE — 76705 ECHO EXAM OF ABDOMEN: CPT

## 2025-06-20 PROCEDURE — 36415 COLL VENOUS BLD VENIPUNCTURE: CPT | Performed by: INTERNAL MEDICINE

## 2025-06-20 PROCEDURE — 2500000001 HC RX 250 WO HCPCS SELF ADMINISTERED DRUGS (ALT 637 FOR MEDICARE OP)

## 2025-06-20 PROCEDURE — 93306 TTE W/DOPPLER COMPLETE: CPT | Performed by: STUDENT IN AN ORGANIZED HEALTH CARE EDUCATION/TRAINING PROGRAM

## 2025-06-20 PROCEDURE — 74176 CT ABD & PELVIS W/O CONTRAST: CPT

## 2025-06-20 PROCEDURE — 76770 US EXAM ABDO BACK WALL COMP: CPT | Performed by: RADIOLOGY

## 2025-06-20 PROCEDURE — 81001 URINALYSIS AUTO W/SCOPE: CPT | Performed by: HOSPITALIST

## 2025-06-20 PROCEDURE — 82570 ASSAY OF URINE CREATININE: CPT | Performed by: HOSPITALIST

## 2025-06-20 PROCEDURE — 76705 ECHO EXAM OF ABDOMEN: CPT | Performed by: RADIOLOGY

## 2025-06-20 PROCEDURE — 2500000001 HC RX 250 WO HCPCS SELF ADMINISTERED DRUGS (ALT 637 FOR MEDICARE OP): Performed by: HOSPITALIST

## 2025-06-20 PROCEDURE — 93005 ELECTROCARDIOGRAM TRACING: CPT

## 2025-06-20 PROCEDURE — 71250 CT THORAX DX C-: CPT

## 2025-06-20 PROCEDURE — 84133 ASSAY OF URINE POTASSIUM: CPT | Performed by: INTERNAL MEDICINE

## 2025-06-20 PROCEDURE — 2500000002 HC RX 250 W HCPCS SELF ADMINISTERED DRUGS (ALT 637 FOR MEDICARE OP, ALT 636 FOR OP/ED)

## 2025-06-20 PROCEDURE — 82436 ASSAY OF URINE CHLORIDE: CPT | Performed by: INTERNAL MEDICINE

## 2025-06-20 PROCEDURE — 74176 CT ABD & PELVIS W/O CONTRAST: CPT | Performed by: RADIOLOGY

## 2025-06-20 PROCEDURE — 93306 TTE W/DOPPLER COMPLETE: CPT

## 2025-06-20 PROCEDURE — 80048 BASIC METABOLIC PNL TOTAL CA: CPT | Performed by: HOSPITALIST

## 2025-06-20 RX ORDER — POTASSIUM CHLORIDE 20 MEQ/1
40 TABLET, EXTENDED RELEASE ORAL 2 TIMES DAILY
Status: COMPLETED | OUTPATIENT
Start: 2025-06-20 | End: 2025-06-20

## 2025-06-20 RX ORDER — POTASSIUM CHLORIDE 20 MEQ/1
40 TABLET, EXTENDED RELEASE ORAL ONCE
Status: COMPLETED | OUTPATIENT
Start: 2025-06-20 | End: 2025-06-20

## 2025-06-20 RX ORDER — SODIUM CHLORIDE AND POTASSIUM CHLORIDE 300; 900 MG/100ML; MG/100ML
100 INJECTION, SOLUTION INTRAVENOUS CONTINUOUS
Status: DISCONTINUED | OUTPATIENT
Start: 2025-06-20 | End: 2025-06-21

## 2025-06-20 RX ORDER — HYDROCODONE BITARTRATE AND ACETAMINOPHEN 5; 325 MG/1; MG/1
1 TABLET ORAL EVERY 4 HOURS PRN
Status: DISCONTINUED | OUTPATIENT
Start: 2025-06-20 | End: 2025-06-23 | Stop reason: HOSPADM

## 2025-06-20 RX ORDER — POTASSIUM CHLORIDE 20 MEQ/1
20 TABLET, EXTENDED RELEASE ORAL ONCE
Status: COMPLETED | OUTPATIENT
Start: 2025-06-20 | End: 2025-06-20

## 2025-06-20 RX ORDER — POTASSIUM CHLORIDE 14.9 MG/ML
20 INJECTION INTRAVENOUS
Status: COMPLETED | OUTPATIENT
Start: 2025-06-20 | End: 2025-06-20

## 2025-06-20 RX ORDER — TAMSULOSIN HYDROCHLORIDE 0.4 MG/1
0.4 CAPSULE ORAL NIGHTLY
Status: DISCONTINUED | OUTPATIENT
Start: 2025-06-20 | End: 2025-06-23 | Stop reason: HOSPADM

## 2025-06-20 RX ORDER — HEPARIN SODIUM 10000 [USP'U]/100ML
0-4000 INJECTION, SOLUTION INTRAVENOUS CONTINUOUS
Status: DISCONTINUED | OUTPATIENT
Start: 2025-06-20 | End: 2025-06-21

## 2025-06-20 RX ORDER — HYDROCODONE BITARTRATE AND ACETAMINOPHEN 5; 325 MG/1; MG/1
1 TABLET ORAL EVERY 6 HOURS PRN
Refills: 0 | Status: DISCONTINUED | OUTPATIENT
Start: 2025-06-20 | End: 2025-06-20

## 2025-06-20 RX ORDER — MAGNESIUM SULFATE HEPTAHYDRATE 40 MG/ML
2 INJECTION, SOLUTION INTRAVENOUS ONCE
Status: COMPLETED | OUTPATIENT
Start: 2025-06-20 | End: 2025-06-20

## 2025-06-20 RX ADMIN — GABAPENTIN 100 MG: 100 CAPSULE ORAL at 14:32

## 2025-06-20 RX ADMIN — MAGNESIUM SULFATE HEPTAHYDRATE 2 G: 40 INJECTION, SOLUTION INTRAVENOUS at 10:08

## 2025-06-20 RX ADMIN — POTASSIUM CHLORIDE 40 MEQ: 1500 TABLET, EXTENDED RELEASE ORAL at 21:46

## 2025-06-20 RX ADMIN — PANTOPRAZOLE SODIUM 40 MG: 40 TABLET, DELAYED RELEASE ORAL at 06:17

## 2025-06-20 RX ADMIN — POTASSIUM CHLORIDE 20 MEQ: 14.9 INJECTION, SOLUTION INTRAVENOUS at 13:22

## 2025-06-20 RX ADMIN — POTASSIUM CHLORIDE 20 MEQ: 14.9 INJECTION, SOLUTION INTRAVENOUS at 00:28

## 2025-06-20 RX ADMIN — POTASSIUM CHLORIDE 40 MEQ: 1500 TABLET, EXTENDED RELEASE ORAL at 00:30

## 2025-06-20 RX ADMIN — POTASSIUM CHLORIDE 40 MEQ: 1500 TABLET, EXTENDED RELEASE ORAL at 16:34

## 2025-06-20 RX ADMIN — OXYCODONE HYDROCHLORIDE 10 MG: 5 TABLET ORAL at 05:40

## 2025-06-20 RX ADMIN — GABAPENTIN 100 MG: 100 CAPSULE ORAL at 10:35

## 2025-06-20 RX ADMIN — HYDROCODONE BITARTRATE AND ACETAMINOPHEN 1 TABLET: 5; 325 TABLET ORAL at 17:28

## 2025-06-20 RX ADMIN — POTASSIUM CHLORIDE 40 MEQ: 1500 TABLET, EXTENDED RELEASE ORAL at 10:35

## 2025-06-20 RX ADMIN — HYDROCODONE BITARTRATE AND ACETAMINOPHEN 1 TABLET: 5; 325 TABLET ORAL at 21:46

## 2025-06-20 RX ADMIN — HEPARIN SODIUM 600 UNITS/HR: 10000 INJECTION, SOLUTION INTRAVENOUS at 13:07

## 2025-06-20 RX ADMIN — HYDROCODONE BITARTRATE AND ACETAMINOPHEN 1 TABLET: 5; 325 TABLET ORAL at 12:12

## 2025-06-20 RX ADMIN — POTASSIUM CHLORIDE 20 MEQ: 1500 TABLET, EXTENDED RELEASE ORAL at 13:45

## 2025-06-20 RX ADMIN — SENNOSIDES 17.2 MG: 8.6 TABLET, FILM COATED ORAL at 10:35

## 2025-06-20 RX ADMIN — POTASSIUM CHLORIDE AND SODIUM CHLORIDE 100 ML/HR: 900; 300 INJECTION, SOLUTION INTRAVENOUS at 13:13

## 2025-06-20 RX ADMIN — POTASSIUM CHLORIDE AND SODIUM CHLORIDE 100 ML/HR: 900; 300 INJECTION, SOLUTION INTRAVENOUS at 07:05

## 2025-06-20 RX ADMIN — HEPARIN SODIUM 500 UNITS/HR: 10000 INJECTION, SOLUTION INTRAVENOUS at 07:59

## 2025-06-20 RX ADMIN — GABAPENTIN 100 MG: 100 CAPSULE ORAL at 21:46

## 2025-06-20 RX ADMIN — TAMSULOSIN HYDROCHLORIDE 0.4 MG: 0.4 CAPSULE ORAL at 21:47

## 2025-06-20 RX ADMIN — POTASSIUM CHLORIDE 20 MEQ: 14.9 INJECTION, SOLUTION INTRAVENOUS at 15:44

## 2025-06-20 RX ADMIN — POTASSIUM CHLORIDE AND SODIUM CHLORIDE 100 ML/HR: 900; 300 INJECTION, SOLUTION INTRAVENOUS at 17:24

## 2025-06-20 ASSESSMENT — COGNITIVE AND FUNCTIONAL STATUS - GENERAL
STANDING UP FROM CHAIR USING ARMS: A LOT
WALKING IN HOSPITAL ROOM: A LOT
MOBILITY SCORE: 15
DAILY ACTIVITIY SCORE: 18
HELP NEEDED FOR BATHING: A LITTLE
PERSONAL GROOMING: A LITTLE
DRESSING REGULAR LOWER BODY CLOTHING: A LOT
CLIMB 3 TO 5 STEPS WITH RAILING: A LOT
DRESSING REGULAR UPPER BODY CLOTHING: A LITTLE
WALKING IN HOSPITAL ROOM: A LOT
STANDING UP FROM CHAIR USING ARMS: A LOT
DRESSING REGULAR UPPER BODY CLOTHING: A LITTLE
MOVING FROM LYING ON BACK TO SITTING ON SIDE OF FLAT BED WITH BEDRAILS: A LITTLE
DAILY ACTIVITIY SCORE: 18
MOVING TO AND FROM BED TO CHAIR: A LOT
TURNING FROM BACK TO SIDE WHILE IN FLAT BAD: A LITTLE
TURNING FROM BACK TO SIDE WHILE IN FLAT BAD: A LITTLE
DRESSING REGULAR LOWER BODY CLOTHING: A LOT
TOILETING: A LITTLE
CLIMB 3 TO 5 STEPS WITH RAILING: A LOT
TOILETING: A LITTLE
MOVING TO AND FROM BED TO CHAIR: A LITTLE
MOBILITY SCORE: 14
HELP NEEDED FOR BATHING: A LITTLE
MOVING FROM LYING ON BACK TO SITTING ON SIDE OF FLAT BED WITH BEDRAILS: A LITTLE
PERSONAL GROOMING: A LITTLE

## 2025-06-20 ASSESSMENT — ENCOUNTER SYMPTOMS
ALLERGIC/IMMUNOLOGIC NEGATIVE: 1
PSYCHIATRIC NEGATIVE: 1
RESPIRATORY NEGATIVE: 1
ARTHRALGIAS: 1
CONSTITUTIONAL NEGATIVE: 1
EYES NEGATIVE: 1
VOMITING: 1
HEMATOLOGIC/LYMPHATIC NEGATIVE: 1
NAUSEA: 1
WEAKNESS: 1
CARDIOVASCULAR NEGATIVE: 1
BACK PAIN: 1

## 2025-06-20 ASSESSMENT — ACTIVITIES OF DAILY LIVING (ADL): LACK_OF_TRANSPORTATION: NO

## 2025-06-20 ASSESSMENT — PAIN - FUNCTIONAL ASSESSMENT
PAIN_FUNCTIONAL_ASSESSMENT: 0-10

## 2025-06-20 ASSESSMENT — PAIN DESCRIPTION - ORIENTATION: ORIENTATION: MID;INNER

## 2025-06-20 ASSESSMENT — PAIN DESCRIPTION - DESCRIPTORS: DESCRIPTORS: ACHING

## 2025-06-20 ASSESSMENT — PAIN SCALES - GENERAL
PAINLEVEL_OUTOF10: 7
PAINLEVEL_OUTOF10: 8
PAINLEVEL_OUTOF10: 8

## 2025-06-20 ASSESSMENT — PAIN DESCRIPTION - LOCATION: LOCATION: BACK

## 2025-06-20 NOTE — ASSESSMENT & PLAN NOTE
- pt reports no appetite and very little po intake earlier this month, however, since 6/14 has been eating relatively normally. Mag only slightly low. Denies diuretic use  - will check urine potassium to r/o kidney loss  - Received 60mEq in the ED, repeat K still 2.0  - will give an additional 60mEq, recheck in am.   - if still low, consult nephrology for presumed kidney loss    Cachexia, hypercalcemia  - pt appears chronically ill, she is taking calcium supplements, however, given her appearance would like to r/o malignancy.   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

## 2025-06-20 NOTE — PROGRESS NOTES
Caitlyn Atkins is a 60 y.o. female on day 1 of admission presenting with Hypokalemia.      Subjective   On room air, eating breakfast, afebrile, feels better after tracey placement, no diarrhea, no n/v, no overnight events reported.        Objective     Last Recorded Vitals  BP 88/59 (BP Location: Right arm, Patient Position: Lying)   Pulse 69   Temp 36.8 °C (98.3 °F) (Temporal)   Resp 19   Wt (!) 39.5 kg (87 lb)   SpO2 97%   Intake/Output last 3 Shifts:    Intake/Output Summary (Last 24 hours) at 6/20/2025 1057  Last data filed at 6/20/2025 0419  Gross per 24 hour   Intake 571.66 ml   Output 900 ml   Net -328.34 ml       Admission Weight  Weight: (!) 39.5 kg (87 lb) (06/19/25 1642)    Daily Weight  06/19/25 : (!) 39.5 kg (87 lb)    Image Results  US abdomen limited liver, US renal complete  Narrative: Interpreted By:  Bereket Stein,   STUDY:  US ABDOMEN LIMITED LIVER; US RENAL COMPLETE  6/20/2025 4:04 am      INDICATION:  59 y/o   F with  Signs/Symptoms:elevated LFTs; Signs/Symptoms:[place].      COMPARISON:  None.      ACCESSION NUMBER(S):  [id number]; [id number]      ORDERING CLINICIAN:  NAEEM ADAN      TECHNIQUE:  Routine ultrasound of the right upper quadrant was performed using  grayscale imaging, color Doppler, and spectral Doppler. Imaging of  the kidneys and urinary bladder was also performed.      FINDINGS:  LIVER:  Craniocaudal length: 13.6 cm.  This is  within normal limits.  Echogenicity:  Diffusely increased.  Mass:  None      GALLBLADDER:  There is mild cholelithiasis. There is no gallbladder wall  thickening. There was an adherent nonshadowing echogenic nodule  toward the posterior fundus measuring 3 mm. Borderline gallbladder  dilatation with the gallbladder measuring 49 mm in transverse  diameter.   no sonographic Krishnamurthy sign. No adjacent fluid.      BILE DUCTS:  No intrahepatic biliary ductal dilatation.  Common bile duct measured 0.9 cm    in diameter. This is abnormally  enlarged.       PANCREAS:  Pancreatic head and body were well seen and were sonographically  normal for size and echogenicity. The pancreatic tail was obscured by  bowel gas.      RIGHT KIDNEY:  Craniocaudal length:   10.8 cm , Within normal limits of size for age.  Echogenicity was normal.  No hydronephrosis, shadowing stone, or perinephric edema.  No gross right renal mass.          LEFT KIDNEY:  Craniocaudal length: 10.5 cm  , Within normal limits of size for age.  Echogenicity was normal.  No hydronephrosis, shadowing stone, or perinephric edema.  No gross left renal mass.      PERITONEAL FLUID:  Trace perihepatic ascites.      URINARY BLADDER:  Aguirre catheter within an otherwise empty urinary bladder.          Impression: Aguirre catheter within an otherwise empty urinary bladder.      No shadowing stone or hydronephrosis in either kidney.      Cholelithiasis with borderline gallbladder dilatation. However, no  specific sonographic evidence of acute cholecystitis.      Nonspecific common bile duct dilatation. Laboratory correlation  needed. Consider nuclear medicine HIDA scan in follow-up.      Nonspecific increased echogenicity throughout the liver, most likely  fatty infiltration.      3 mm adherent echogenic nodule in the posterior gallbladder fundus,  most likely small polyp. Recommend a follow-up ultrasound in 6-12  months.      MACRO:  None      Signed by: Bereket Stein 6/20/2025 8:35 AM  Dictation workstation:   [id number]  CT chest abdomen pelvis wo IV contrast  Narrative: Interpreted By:  Yulissa Escalante,   STUDY:  CT CHEST ABDOMEN PELVIS WO CONTRAST;  6/20/2025 3:09 am      INDICATION:  Signs/Symptoms:nausea and vomiting.      COMPARISON:  CT abdomen pelvis 04/21/2025, limited coronary CT 11/16/2023      ACCESSION NUMBER(S):  [id number]      ORDERING CLINICIAN:  NAEEM ADAN      TECHNIQUE:  Axial noncontrast CT images of the chest, abdomen and pelvis with  coronal and sagittal reconstructed images.       FINDINGS:  Lack of intravenous contrast limits evaluation of vessel, solid  organs and bowel.      CHEST:      VESSELS: Ectasia of ascending aorta measuring up to 3.8 cm.  HEART: Normal size. No significant coronary artery calcifications.  The no pericardial effusion. MEDIASTINUM AND ROBIN: No pathologically  enlarged thoracic lymph nodes. LUNG, PLEURA, LARGE AIRWAYS: No  pleural effusion or pneumothorax. 1.5 cm ground-glass opacity in the  right lower lobe, not seen previously. Mild bibasilar-dependent  atelectasis. CHEST WALL AND LOWER NECK: Within normal limits. No  acute osseous abnormality.      ABDOMEN:      BONES: No acute osseous abnormality. Chronic sacral insufficiency  fractures. Chronic right pubic rami fractures. Right total hip  arthroplasty. ABDOMINAL WALL: Within normal limits.      LIVER: Within normal limits.  BILE DUCTS: No biliary dilatation.  GALLBLADDER: Cholelithiasis. No pericholecystic inflammatory changes.  PANCREAS: No peripancreatic inflammatory stranding or duct dilatation.  SPLEEN: Within normal limits.  ADRENALS: Within normal limits.  KIDNEYS: No hydronephrosis or perinephric fluid collection.  Nonobstructing left renal calculi measuring up to 4 mm. No  hydronephrosis or calculus along the course of the ureters.      VESSELS: No aortic aneurysm.  RETROPERITONEUM: No pathologically enlarged lymph nodes.      PELVIS:      REPRODUCTIVE ORGANS: Status post hysterectomy.  BLADDER: Aguirre catheter in the nondistended urinary bladder.      BOWEL: No dilated bowel.  Normal appendix.  PERITONEUM: No ascites or free air, no fluid collection.      Impression: 1.5 cm ground-glass opacity in the right lower lobe may be infectious  or inflammatory infiltrate. Follow-up chest CT in 3-6 months is  recommended to assess for resolution.      No acute abdominal or pelvic abnormality.      MACRO:  None      Signed by: Yulissa Escalante 6/20/2025 3:58 AM  Dictation workstation:   [id number]  CT head wo IV  contrast  Narrative: Interpreted By:  Yulissa Escalante,   STUDY:  CT HEAD WO IV CONTRAST;  6/20/2025 3:09 am      INDICATION:  Signs/Symptoms:pt with meningioma, possible Paget's diseas of the  calvarium.      COMPARISON:  05/05/2025      ACCESSION NUMBER(S):  [id number]      ORDERING CLINICIAN:  NAEEM ADAN      TECHNIQUE:  Axial noncontrast CT images of the head.      FINDINGS:  BRAIN PARENCHYMA:  deep and periventricular white matter  hypodensities are nonspecific, but favored to represent chronic small  vessel ischemic changes.  Gray-white matter interfaces are preserved.  No mass effect or midline shift.      HEMORRHAGE: No acute intracranial hemorrhage.  VENTRICLES and EXTRA-AXIAL SPACES: The ventricles and sulci are  within normal limits in size for brain volume. No abnormal extraaxial  fluid collection. EXTRACRANIAL SOFT TISSUES: Within normal limits.  PARANASAL SINUSES/MASTOIDS:  The visualized paranasal sinuses and  mastoid air cells are aerated. CALVARIUM: No depressed skull  fracture. Stable appearance of sclerosis and periosteal bone  formation involving with the right frontal bone.      OTHER FINDINGS: None.      Impression: No acute intracranial abnormality.      Stable indeterminate lesion of the right frontal bone. Please refer  to PET CT dated 06/18/2025 for characterization.      MACRO:  None      Signed by: Yulissa Escalante 6/20/2025 3:49 AM  Dictation workstation:   [id number]  Lower extremity venous duplex bilateral  Narrative: Interpreted By:  Jovana To,   STUDY:  [facility] LOWER EXTREMITY VENOUS DUPLEX BILATERAL;  6/19/2025 11:11 pm      INDICATION:  Signs/Symptoms:eval for DVT; bilateral LE edema, sedentary pt.      ,R60.0 Localized edema      COMPARISON:  None.      ACCESSION NUMBER(S):  [id number]      ORDERING CLINICIAN:  NAEEM ADAN      TECHNIQUE:  Vascular ultrasound of the bilateral lower extremities was performed.  Real-time compression views as well as Gray scale, color  Doppler and  spectral Doppler waveform analysis was performed.      FINDINGS:  Evaluation of the visualized portions of the bilateral common femoral  vein, proximal, mid, and distal femoral vein, and popliteal vein were  performed.  Evaluation of the visualized portions of the  posterior  tibial and peroneal veins were also performed.          RIGHT LOWER EXTREMITY:  The evaluated veins demonstrate normal compressibility. There is  intact venous flow demonstrating normal respiratory variability and  normal augmentation of flow with calf compression. Therefore, there  is no ultrasonographic evidence for deep vein thrombosis within the  evaluated veins.      LEFT LOWER EXTREMITY:  Occlusive thrombus is noted at the proximal left peroneal vein.  The remainder of the evaluated veins demonstrates normal  compressibility with intact flow with normal respiratory variability  and normal augmentation of flow with calf compression.      Impression: 1. Acute deep vein thrombosis at the proximal left peroneal vein.  2. No sonographic evidence for deep vein thrombosis within the  evaluated veins of the right lower extremity.      MACRO:  Critical Finding:  See findings. Notification was initiated on  6/20/2025 at 12:54 am by  Jovana To.  (**-OCF-**) Instructions:      Signed by: Jovana To 6/20/2025 12:56 AM  Dictation workstation:   [id number]      Physical Exam  Constitutional:       Appearance: Normal appearance. She is ill-appearing (chronically ill appearing).      Comments: Very thin   Cardiovascular:      Rate and Rhythm: Normal rate and regular rhythm.   Pulmonary:      Effort: Pulmonary effort is normal.      Breath sounds: Normal breath sounds.   Abdominal:      General: Abdomen is flat. Bowel sounds are normal.      Palpations: Abdomen is soft.   Musculoskeletal:      Cervical back: Normal range of motion.   Skin:     General: Skin is warm.   Neurological:      General: No focal deficit present.       Mental Status: She is alert and oriented to person, place, and time. Mental status is at baseline.   Psychiatric:         Mood and Affect: Mood normal.         Behavior: Behavior normal.         Thought Content: Thought content normal.         Judgment: Judgment normal.         Relevant Results      This patient has a urinary catheter   Reason for the urinary catheter remaining today? urinary retention/bladder outlet obstruction, acute or chronic          Assessment & Plan  Hypokalemia            6/20:  HypoK, hypoMg.... sounds like it's nutrition related, probably related to chronic retention also... replace both today, monitor, f/up renal.     HyperCa, slight darius... taking supplements at home, likely dehydrated... cont ivf and monitor.     Acute urinary retention... sounds like this has been brewing for weeks as she reports very little voiding with difficulty controlling it... tracey, flomax, f/up .     Acute LLE DVT... hep gtt, transition to eliquis soon.     Hx sacral fx, spinal stenosis.... chronic narcotics dependence, has pain mgmt team OP she reports... cont regimen. She has ortho surgery next week.     ? Pagets/meningioma... looks like she was being workup for this OP with neuroSx.     Home regimen for chronic conditions.   Dvt px with hep gtt.     Pt lives at home solo, uses walker baseline.           6/19:  - pt reports no appetite and very little po intake earlier this month, however, since 6/14 has been eating relatively normally. Mag only slightly low. Denies diuretic use  - will check urine potassium to r/o kidney loss  - Received 60mEq in the ED, repeat K still 2.0  - will give an additional 60mEq, recheck in am.   - if still low, consult nephrology for presumed kidney loss    Cachexia, hypercalcemia  - pt appears chronically ill, she is taking calcium supplements, however, given her appearance would like to r/o malignancy.   Ordered CT C/A/P without contrast due to DARIUS to evaluate for  malignancy    Meningioma and possible Paget's disease of scalp  - currently being worked up by Neurosurgery and tumor board    Elevated LFTs  - new  - US liver  - denies ETOH use currently; used to drink nightly    DARIUS  - normal po intake lately  - found to have acute urinary retention that may explain it  - US kidney  - re-check Cr in am   - IVF    Acute urinary retention  - pt has had urinary incontinence since the fall and sacral fracture  - pt was not producing much urine on the floor, bladder scan showed 999ml.   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

## 2025-06-20 NOTE — H&P
History Of Present Illness  Caitlyn Atkins is a 60 y.o. female with a PMH of MS, osteoporosis, lumbar radiculopathy, spinal stenosis, right total hip, extra-axial, calvarium marrow and scalp lesions of unknown etiology or diagnosis seen on MRI 5/12/25, and previous episode of hypokalemia in April presenting with hypokalemia seen on labs for pre-op testing.    Ms Atkins is to undergo spinal surgery for sacral fractures, presented for pre-op testing today. She was found to have a potassium of 2.2, therefore was instructed to come to the ED.   Ms Atkins has had generalized weakness of her legs since her fall and sacral fractures, however, has had increased weakness for the last few weeks.   Earlier this month she had nausea and vomiting as well a decreased appetite. However, since June 14 has been eating normally, no NV.   Has had loose stools for the past week, after having taken laxatives for constipation. No longer on laxatives.   Denies diuretic use.   Also reports increased LE edema for the past month. Has been fairly sedentary after her fractures secondary to pain and LE weakness.     In the ED, repeat K is 2.0. Cr 1.27, prior Cr 0.7-0.9.   AST and ALT elevated at 63 and 44, respectively   Mag 1.84  Calcium 12.6, however pt taking supplements, OTC, 600mg twice daily.      Past Medical History  Medical History[1]    Surgical History  Surgical History[2]     Social History  She reports that she has quit smoking. Her smoking use included cigarettes. She has never used smokeless tobacco. She reports that she does not currently use alcohol. She reports that she does not use drugs.    Family History  Family History[3]     Allergies  Patient has no known allergies.    Review of Systems   Constitutional: Negative.    HENT: Negative.     Eyes: Negative.    Respiratory: Negative.     Cardiovascular: Negative.    Gastrointestinal:  Positive for nausea and vomiting.   Genitourinary: Negative.    Musculoskeletal:   "Positive for arthralgias, back pain and gait problem.   Skin: Negative.    Allergic/Immunologic: Negative.    Neurological:  Positive for weakness.   Hematological: Negative.    Psychiatric/Behavioral: Negative.          Physical Exam  Vitals reviewed.   Constitutional:       Comments: Ms Atkins looks chronically and acutely ill. Is cachectic and looks much older than her chronologic age.   She is alert, able to answer questions appropriately.   Daughter is at the bedside in the ED, also provides history   HENT:      Head: Normocephalic and atraumatic.      Mouth/Throat:      Mouth: Mucous membranes are moist.   Eyes:      Extraocular Movements: Extraocular movements intact.      Conjunctiva/sclera: Conjunctivae normal.      Pupils: Pupils are equal, round, and reactive to light.   Cardiovascular:      Rate and Rhythm: Normal rate and regular rhythm.      Pulses: Normal pulses.      Heart sounds: Normal heart sounds.   Pulmonary:      Effort: Pulmonary effort is normal.      Breath sounds: Normal breath sounds.   Abdominal:      General: Abdomen is flat. Bowel sounds are normal.      Palpations: Abdomen is soft.   Musculoskeletal:         General: Normal range of motion.      Right lower leg: Edema present.      Left lower leg: Edema present.      Comments: Bilateral LE edema, left worse than right.    Skin:     General: Skin is warm and dry.   Neurological:      General: No focal deficit present.      Mental Status: She is alert and oriented to person, place, and time.   Psychiatric:         Mood and Affect: Mood normal.         Behavior: Behavior normal.         Thought Content: Thought content normal.         Judgment: Judgment normal.          Last Recorded Vitals  Blood pressure 133/76, pulse 72, temperature 36.6 °C (97.8 °F), temperature source Oral, resp. rate 16, height 1.499 m (4' 11\"), weight (!) 39.5 kg (87 lb), SpO2 100%.    Relevant Results        Results for orders placed or performed during the " hospital encounter of 06/19/25 (from the past 24 hours)   CBC and Auto Differential   Result Value Ref Range    WBC 7.0 4.4 - 11.3 x10*3/uL    nRBC 0.0 0.0 - 0.0 /100 WBCs    RBC 3.48 (L) 4.00 - 5.20 x10*6/uL    Hemoglobin 11.0 (L) 12.0 - 16.0 g/dL    Hematocrit 31.7 (L) 36.0 - 46.0 %    MCV 91 80 - 100 fL    MCH 31.6 26.0 - 34.0 pg    MCHC 34.7 32.0 - 36.0 g/dL    RDW 14.7 (H) 11.5 - 14.5 %    Platelets 479 (H) 150 - 450 x10*3/uL    Neutrophils % 64.3 40.0 - 80.0 %    Immature Granulocytes %, Automated 0.4 0.0 - 0.9 %    Lymphocytes % 19.1 13.0 - 44.0 %    Monocytes % 13.0 2.0 - 10.0 %    Eosinophils % 2.3 0.0 - 6.0 %    Basophils % 0.9 0.0 - 2.0 %    Neutrophils Absolute 4.52 1.20 - 7.70 x10*3/uL    Immature Granulocytes Absolute, Automated 0.03 0.00 - 0.70 x10*3/uL    Lymphocytes Absolute 1.34 1.20 - 4.80 x10*3/uL    Monocytes Absolute 0.91 0.10 - 1.00 x10*3/uL    Eosinophils Absolute 0.16 0.00 - 0.70 x10*3/uL    Basophils Absolute 0.06 0.00 - 0.10 x10*3/uL   Basic metabolic panel   Result Value Ref Range    Glucose 140 (H) 74 - 99 mg/dL    Sodium 131 (L) 136 - 145 mmol/L    Potassium 2.0 (LL) 3.5 - 5.3 mmol/L    Chloride 101 98 - 107 mmol/L    Bicarbonate 18 (L) 21 - 32 mmol/L    Anion Gap 14 10 - 20 mmol/L    Urea Nitrogen 23 6 - 23 mg/dL    Creatinine 1.33 (H) 0.50 - 1.05 mg/dL    eGFR 46 (L) >60 mL/min/1.73m*2    Calcium 12.1 (H) 8.6 - 10.3 mg/dL   Magnesium   Result Value Ref Range    Magnesium 1.84 1.60 - 2.40 mg/dL   Ethanol   Result Value Ref Range    Alcohol <10 <=10 mg/dL   Potassium   Result Value Ref Range    Potassium 2.0 (LL) 3.5 - 5.3 mmol/L   Calcium, ionized   Result Value Ref Range    POCT Calcium, Ionized 1.72 (H) 1.1 - 1.33 mmol/L     Lower extremity venous duplex bilateral  Result Date: 6/20/2025  Interpreted By:  Jovana To, STUDY: VAS US LOWER EXTREMITY VENOUS DUPLEX BILATERAL;  6/19/2025 11:11 pm   INDICATION: Signs/Symptoms:eval for DVT; bilateral LE edema, sedentary pt.   ,R60.0  Localized edema   COMPARISON: None.   ACCESSION NUMBER(S): [id number]   ORDERING CLINICIAN: NAEEM ADAN   TECHNIQUE: Vascular ultrasound of the bilateral lower extremities was performed. Real-time compression views as well as Gray scale, color Doppler and spectral Doppler waveform analysis was performed.   FINDINGS: Evaluation of the visualized portions of the bilateral common femoral vein, proximal, mid, and distal femoral vein, and popliteal vein were performed.  Evaluation of the visualized portions of the  posterior tibial and peroneal veins were also performed.     RIGHT LOWER EXTREMITY: The evaluated veins demonstrate normal compressibility. There is intact venous flow demonstrating normal respiratory variability and normal augmentation of flow with calf compression. Therefore, there is no ultrasonographic evidence for deep vein thrombosis within the evaluated veins.   LEFT LOWER EXTREMITY: Occlusive thrombus is noted at the proximal left peroneal vein. The remainder of the evaluated veins demonstrates normal compressibility with intact flow with normal respiratory variability and normal augmentation of flow with calf compression.       1. Acute deep vein thrombosis at the proximal left peroneal vein. 2. No sonographic evidence for deep vein thrombosis within the evaluated veins of the right lower extremity.   MACRO: Critical Finding:  See findings. Notification was initiated on 6/20/2025 at 12:54 am by  Jovana To.  (**-[facility]-**) Instructions:   Signed by: Jovana To 6/20/2025 12:56 AM Dictation workstation:   [id number]    NM PET CT dotatate brain  Result Date: 6/19/2025  Interpreted By:  Anastasiia Michele and Calo Sean-Matthew STUDY: NM PET CT DOTATATE BRAIN;  6/18/2025 1:10 pm   INDICATION: Signs/Symptoms:neoplasm of unspecific behavior. 60-year-old female who for follow-up of calvarial lesion after recent hospitalization for subdural hematoma and sacral fractures. 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 MRI brain 05/21/2025, CT head 05/05/2025.   ACCESSION NUMBER(S): [id number]   ORDERING CLINICIAN: NALLELY WILLARD   TECHNIQUE: DIVISION OF NUCLEAR MEDICINE POSITRON EMISSION TOMOGRAPHY (PET-CT)   The patient received an intravenous dose of 5.5 mCi of Ga-68 DOTATATE. Positron emission tomographic (PET) images of the head were then acquired after a one hour delay. Also acquired was a contemporaneous low dose non-contrast CT scan performed for attenuation correction of PET images and anatomic localization.  The PET and CT images were digitally fused for display.  All images were acquired on a combined PET-CT scanner unit.  Some areas of FDG accumulation may be described in standardized uptake value (SUV) units.   CODING: Initial Treatment Strategy   CALIBRATION: Dose Injection-to-Scan Interval (mins): 73 min   FINDINGS: Note is made of normal somatostatin receptor avidity in the pituitary gland. Somotostatin receptor avidity is noted involving the sclerotic right frontal osseous lesion with extracranial soft tissue component and max SUV of 10.5.       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   I personally reviewed the images/study and I agree with the findings as stated by Keyla Chin MD. This study was interpreted at [place], [place].   MACRO: None   Signed by: Anastasiia Michele 6/19/2025 11:32 AM Dictation workstation:   [id number]    NM bone whole body  Result Date: 6/18/2025  Interpreted By:  Anastasiia Michele and Calo Sean-Matthew STUDY: NM BONE WHOLE BODY;  6/18/2025 11:36 am   INDICATION: Signs/Symptoms:skull lesion; concerning for pagets vs neoplasm.   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 whole-body bone scan 03/12/2018.   ACCESSION NUMBER(S): [id number]   ORDERING CLINICIAN: NALLELY WILLARD   TECHNIQUE: DIVISION OF NUCLEAR MEDICINE BONE SCAN, WHOLE BODY plus REGIONAL VIEWS   The patient received an intravenous dose of  24.3 mCi of Tc-99m MDP. Anterior and posterior images of the skeleton from skull vertex to feet were then acquired.  Additional regional skeletal images were also obtained.   FINDINGS: Heterogeneously increased uptake is noted in the right frontal calvarium, corresponding to the lesion noted on recent CT head 05/05/2025 and MR brain 05/21/2025.   A focus of increased uptake is noted in the anterior aspect of the right 12th rib.   Expected, excreted activity is noted in the kidneys and bladder.   Increased radiotracer uptake is seen in the right ankle similar to prior, most consistent with an arthritic pattern.       1.  Heterogeneously increased uptake in the right frontal calvarium, nonspecific, but in keeping with patient's suspected intraosseous meningioma. 2. Focal increased uptake in the anterior right 12th rib, nonspecific, but may be seen in the setting of trauma. Correlate clinically with physical exam and consider further imaging if warranted.   I personally reviewed the images/study and I agree with the findings as stated by Keyla Chin MD. This study was interpreted at [place], [place].   MACRO: None   Signed by: Anastasiia Michele 6/18/2025 3:52 PM Dictation workstation:   [id number]    XR DEXA bone density  Result Date: 6/10/2025  Interpreted By:  Bautista Soliman, STUDY: DEXA BONE DENSITY6/10/2025 8:25 am   INDICATION: Signs/Symptoms:osteoporosis. The patient is a 59 y/o  year old F.   COMPARISON: 06/29/2023   ACCESSION NUMBER(S): [id number]   ORDERING  CLINICIAN: RICHARD FRYE   TECHNIQUE: DEXA BONE DENSITY   FINDINGS: SPINE L1-L4 Bone Mineral Density: 0.68 T-Score -3.3  Z-Score -1.9 Bone Mineral Density change vs baseline (%):  -0.9 Bone Mineral Density change vs previous (%): -0.9   LEFT FEMUR -TOTAL Bone Mineral Density: 0.689 T-Score -2.1   Z-Score  -1.1 Bone Mineral Density change vs baseline (%): 20.9 Bone Mineral Density change vs previous (%): 20.9   LEFT FEMUR -NECK Bone Mineral Density: 0.461 T-Score -3.5  Z-Score -2.2     World Health Organization (WHO) criteria for post-menopausal,  Women: Normal:         T-score at or above -1 SD Osteopenia:   T-score between -1 and -2.5 SD Osteoporosis: T-score at or below -2.5 SD     10-year Fracture Risk (%): Major Osteoporotic Fracture  25 Hip Fracture                        9.5   Note:  If no FRAX score is reported, it is because: Some T-score for Spine Total or Hip Total or Femoral Neck at or below -2.5         DEXA:  According to World Health Organization criteria, classification is osteoporosis.   Followup recommended in two years or sooner as clinically warranted.   All images and detailed analysis are available on the  Radiology PACS.   MACRO: None   Signed by: Bautista Soliman 6/10/2025 3:52 PM Dictation workstation:   [id number]    MR brain w and wo IV contrast  Result Date: 5/21/2025  Interpreted By:  Oliver Philippe, STUDY: MR BRAIN W AND WO IV CONTRAST; 5/21/2025 9:38 am   INDICATION: Signs/Symptoms:brain lesion;   COMPARISON: CT brain 05/05/2025   ACCESSION NUMBER(S): [id number]   ORDERING CLINICIAN: NALLELY WILLARD   TECHNIQUE: Routine brain MRI protocol without and with contrast including diffusion and gradient echo images. Contrast: 9 mL IV Dotarem   FINDINGS: RESULT:   Acute Change: There is no evidence of restricted diffusion to suggest an acute infarct.   Hemorrhage: No evidence of prior parenchymal hemorrhage on the gradient echo images.   Mass Lesion/ Mass Effect: 11 x 10 mm right  extra-axial T2 isointense and T1 hypointense mildly enhancing lesion ( series 13, image 28), along the right frontal convexity with slight mass effect. There is diffuse right cerebral convexity pachymeningeal enhancement (series 13, image 27) .   Chronic Change: Moderate T2/FLAIR hyperintense periventricular, subcortical and juxtacortical nonenhancing white matter lesions, suggestive of underlying demyelination.   Parenchyma: No significant volume loss for age. The brain parenchyma is otherwise within normal limits of signal intensity and morphology.   Ventricles: Normal caliber and morphology.   Skull Base: Hypothalamic and pituitary region are grossly normal. Craniocervical junction is normal. Heterogeneous T1 hypointense slightly enhancing marrow replacement superior right frontal calvarium (series 15, image 65 and series 6, image 26), which correlates with the hyperostosis and sclerosis, noted on the recent CT from 04/21/2025.   Vasculature: Major intracranial arterial structures, and dural venous sinuses show typical flow void, suggesting patency by spin echo criteria.   Other: The visualized paranasal sinuses and mastoid air cells are clear. Orbits are unremarkable. 23 x 11 mm ovoid slightly enhancing soft tissue lesion within the right frontal scalp superficial to the area of marrow replacement described above (series 15, image 62)..       1. 11 x 10 mm right frontal enhancing extra-axial lesion, with adjacent right cerebral convexity pachymeningeal enhancement concerning for metastasis. 2. Slightly enhancing right frontal calvarium marrow replacement as described above, which correlates with the area of sclerosis and hyperostosis noted on the prior CT, also concerning for metastasis. 3. 23 x 11 mm right frontal scalp enhancing lesion, likely represents a neoplastic process, such as melanoma. 4. Extensive T2/FLAIR hyperintensity within the subcortical white matter as described above, concerning for underlying  demyelination, such as multiple sclerosis. However, chronic microvascular ischemic disease can not be excluded.     Signed by: Oliver Philippe 5/21/2025 1:40 PM Dictation workstation:   [id number]         Assessment & Plan  Hypokalemia  - pt reports no appetite and very little po intake earlier this month, however, since 6/14 has been eating relatively normally. Mag only slightly low. Denies diuretic use  - will check urine potassium to r/o kidney loss  - Received 60mEq in the ED, repeat K still 2.0  - will give an additional 60mEq, recheck in am.   - if still low, consult nephrology for presumed kidney loss    Cachexia, hypercalcemia  - pt appears chronically ill, she is taking calcium supplements, however, given her appearance would like to r/o malignancy.   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      Ranjana Xie MD         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, followed by neurosurgery- Clearance in note on 5/21/25    Lower extremity weakness     Lumbar radiculopathy     Lumbar spondylosis     Multiple falls     Multiple sclerosis (Multi)     Multiple sclerosis, follows with neuro, per patient well managed on Vumerity, no flare up since 2014    Osteoporosis     Palpitations     saw cardiology-wore holter-SVT was on Metoprolol but per patient did not help, never followed up with cardiology because palpitations stopped after quitting her stressful job    Pseudoclaudication syndrome     Sacral fracture (Multi)     Sacroiliitis     SDH (subdural hematoma) (Multi)     Spinal stenosis     Spondylolisthesis     Urinary incontinence     Vitamin D deficiency    [2]   Past Surgical History:  Procedure Laterality Date    CT ANGIO CORONARY ART WITH HEARTFLOW IF SCORE >30%  11/16/2023    CT ANGIO CORONARY ART WITH HEARTFLOW IF SCORE >30% 11/16/2023 U CT    HIP ARTHROPLASTY Right 08/16/2023    HYSTERECTOMY  04/15/2013    Hysterectomy- cervical Cancer    OTHER SURGICAL HISTORY Left 04/21/2021    Arm surgery-fracture repair    REFRACTIVE SURGERY     [3]   Family History  Problem Relation Name Age of Onset    Lung cancer Mother      Coronary artery disease Father      Hypertension Father      Other (THYROID SURGERY) Sister      Glaucoma Other GRANDMOTHER

## 2025-06-20 NOTE — CARE PLAN
The patient's goals for the shift include  pain control    The clinical goals for the shift include pt will have pain control throughout shift    Over the shift, the patient did make progress toward the following goals.

## 2025-06-20 NOTE — CONSULTS
Consults    Date of Service:  6/20/2025 Attending Provider:  Ismael Steinberg MD     Reason for Consultation:  Caitlyn Atkins is being seen today for a consult requested by Ismael Steinberg MD for heparin with known extracranial mass.      Subjective   History of Present Illness:  Catilyn is a 60 y.o. female with h/o HTN, HLD, CAD, MS, osteoporosis, RF intraosseous meningioma, p/w hypokalemia found to have LLE DVT.     Patient states she has lesion for sometime and Denies dizziness, confusion, headache, double vision, blurry vision, n/v, numbness, weakness, tingling.     Review of Systems   10 point ROS is obtained and negative except the ones mentioned in the HPI    Objective     Vitals:  Vitals:    06/20/25 1522   BP: 91/60   Pulse:    Resp:    Temp: 37.1 °C (98.8 °F)   SpO2: 96%         Exam:  Constitutional: No acute distress  Resp: breathing comfortably  Cardio: well perfused  GI: nondistended  MSK: full range of motion  Neuro: No acute distress, A&Ox3  Cranial Nerves II-XII: PERRL, EOMI, Face symmetric, Facial SILT, Palate/Tongue midline and symmetric, shoulder shrugs symmetric, hearing intact to finger rubs bilaterally  Motor:   BUE 5/5  BLE 5/5  Sensation: SILT throughout all extremities  DTRS: 2+ Throughout, No Hoffmans or Clonus  Psych: appropriate  Skin: no obvious lesions    Medical History  Medical History[1]    Surgical History  Surgical History[2]     Medications  Current Outpatient Medications   Medication Instructions    acetaminophen (Tylenol) 325 mg tablet 2 tablets, Every 4 hours PRN    alendronate (FOSAMAX) 70 mg, oral, Every 7 days, Take in the morning with a full glass of water, on an empty stomach, and do not take anything else by mouth or lie down for the next 30 min.    b complex 0.4 mg tablet 1 tablet, Daily    calcium carbonate (CALCIUM 500 ORAL) 1 tablet, Daily    chlorhexidine (Peridex) 0.12 % solution 15 mL, Mouth/Throat, Daily    cholecalciferol (VITAMIN D-3) 2,000 Units, Daily     "diroximel fumarate (Vumerity) 231 mg capsule,delayed release(DR/EC) 2 capsules, oral, 2 times daily    gabapentin (NEURONTIN) 100 mg, oral, 3 times daily    glucose 4 gram chewable tablet oral, As needed    [START ON 7/21/2025] HYDROcodone-acetaminophen (Norco) 7.5-325 mg tablet 1 tablet, oral, Every 6 hours PRN    [START ON 6/23/2025] HYDROcodone-acetaminophen (Norco) 7.5-325 mg tablet 1 tablet, oral, Every 6 hours PRN    naloxone (NARCAN) 4 mg, As needed    omeprazole OTC (PRILOSEC OTC) 20 mg, Every morning    tiZANidine (ZANAFLEX) 2 mg, oral, Every 12 hours PRN        Diagnostic Results:    Lab Results   Component Value Date    WBC 5.6 06/20/2025    HGB 8.9 (L) 06/20/2025    HCT 25.2 (L) 06/20/2025    MCV 90 06/20/2025     06/20/2025     Lab Results   Component Value Date    CREATININE 1.13 (H) 06/20/2025    BUN 19 06/20/2025     (L) 06/20/2025    K 2.2 (LL) 06/20/2025     (H) 06/20/2025    CO2 17 (L) 06/20/2025     No results found for: \"INR\", \"PROTIME\"    === 06/19/25 ===    CT HEAD WO IV CONTRAST    - Impression -  Unchanged CT head. Specifically, no acute intracranial abnormality,  including hemorrhage, or mass effect.    This study was interpreted at [facility].    MACRO:  None    Signed by: Wilton Goldstein 6/20/2025 1:09 PM  Dictation workstation:   [id number]  === 05/21/25 ===    MR BRAIN W AND WO CONTRAST    - Impression -  1. 11 x 10 mm right frontal enhancing extra-axial lesion, with  adjacent right cerebral convexity pachymeningeal enhancement  concerning for metastasis.  2. Slightly enhancing right frontal calvarium marrow replacement as  described above, which correlates with the area of sclerosis and  hyperostosis noted on the prior CT, also concerning for metastasis.  3. 23 x 11 mm right frontal scalp enhancing lesion, likely represents  a neoplastic process, such as melanoma.  4. Extensive T2/FLAIR hyperintensity within the subcortical " white  matter as described above, concerning for underlying demyelination,  such as multiple sclerosis. However, chronic microvascular ischemic  disease can not be excluded.      Signed by: Oliver Philippe 5/21/2025 1:40 PM  Dictation workstation:   [id number]      Assessment/Plan   Assessment:  Caitlyn is a 60 y.o. female with h/o HTN, HLD, CAD, MS, osteoporosis, RF intraosseous meningioma, p/w hypokalemia found to have LLE DVT s/p heparin gtt.    Recommendations  CT head obtained s/p heparin gtt w/o acute hemorrhage. No acute neurosurgical intervention or additional neuroimaging needed at this time.  Thank you for allowing us to participate in the care of this patient. Will sign off at this time. Please do not hesitate to reach out to [phone] with any questions or concerns. Thank you.      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, followed by neurosurgery- Clearance in note on 5/21/25    Lower extremity weakness     Lumbar radiculopathy     Lumbar spondylosis     Multiple falls     Multiple sclerosis (Multi)     Multiple sclerosis, follows with neuro, per patient well managed on Vumerity, no flare up since 2014    Osteoporosis     Palpitations     saw cardiology-wore holter-SVT was on Metoprolol but per patient did not help, never followed up with cardiology because palpitations stopped after quitting her stressful job    Pseudoclaudication syndrome     Sacral fracture (Multi)     Sacroiliitis     SDH (subdural hematoma) (Multi)     Spinal stenosis     Spondylolisthesis      Urinary incontinence     Vitamin D deficiency    [2]   Past Surgical History:  Procedure Laterality Date    CT ANGIO CORONARY ART WITH HEARTFLOW IF SCORE >30%  11/16/2023    CT ANGIO CORONARY ART WITH HEARTFLOW IF SCORE >30% 11/16/2023 [facility] CT    HIP ARTHROPLASTY Right 08/16/2023    HYSTERECTOMY  04/15/2013    Hysterectomy- cervical Cancer    OTHER SURGICAL HISTORY Left 04/21/2021    Arm surgery-fracture repair    REFRACTIVE SURGERY

## 2025-06-20 NOTE — CONSULTS
NEPHROLOGY CONSULT NOTE    Reason For Consult  Refractory hypokalemia    History Of Present Illness  Caitlyn Atkins is a 60 y.o. female with a PMH of MS, osteoporosis, lumbar radiculopathy, spinal stenosis, right total hip, extra-axial, calvarium marrow and scalp lesions of unknown etiology or diagnosis seen on MRI 5/12/25, and previous episode of hypokalemia in April presenting with hypokalemia seen on labs for pre-op testing.  Nephrology was consulted because of refractory hypokalemia    Medical History[1]  Surgical History[2]  Prescriptions Prior to Admission[3]  Patient has no known allergies.  Social History[4]  Family History[5]    Scheduled Medications:   Scheduled Medications[6]     Continuous Medications:   Continuous Medications[7]     PRN Medications:   PRN Medications[8]    Review of Systems:  Review of Systems   Constitutional: Negative.    HENT: Negative.     Eyes: Negative.    Respiratory: Negative.     Cardiovascular: Negative.    Gastrointestinal:  Positive for nausea and vomiting.   Genitourinary: Negative.    Musculoskeletal:  Positive for arthralgias, back pain and gait problem.   Skin: Negative.    Allergic/Immunologic: Negative.    Neurological:  Positive for weakness.   Hematological: Negative.    Psychiatric/Behavioral: Negative.       Objective   Vitals:  Most Recent:  Vitals:    06/20/25 1522   BP: 91/60   Pulse:    Resp:    Temp: 37.1 °C (98.8 °F)   SpO2: 96%       24hr Min/Max:  Temp  Min: 36.6 °C (97.8 °F)  Max: 37.1 °C (98.8 °F)  Pulse  Min: 66  Max: 83  BP  Min: 81/54  Max: 133/76  Resp  Min: 15  Max: 19  SpO2  Min: 96 %  Max: 100 %    LDA:   Urethral Catheter Non-latex 16 Fr. (Active)   Placement Date/Time: 06/20/25 0213   Placed by: SHARDA Vargas RN  Hand Hygiene Completed: Yes  Catheter Type: Non-latex  Tube Size (Fr.): 16 Fr.  Catheter Balloon Size: 10 mL  Urine  Returned: Yes   Number of days: 0           Hemodynamic parameters for last 24 hours:         Intake/Output Summary (Last 24 hours) at 6/20/2025 1655  Last data filed at 6/20/2025 1522  Gross per 24 hour   Intake 811.66 ml   Output 1200 ml   Net -388.34 ml           Physical exam:    Physical Exam   Vitals reviewed.   Constitutional:       Comments: Ms Atkins looks chronically and acutely ill. Is cachectic and looks much older than her chronologic age.   She is alert, able to answer questions appropriately.   Daughter is at the bedside in the ED, also provides history   HENT:      Head: Normocephalic and atraumatic.      Mouth/Throat:      Mouth: Mucous membranes are moist.   Eyes:      Extraocular Movements: Extraocular movements intact.      Conjunctiva/sclera: Conjunctivae normal.      Pupils: Pupils are equal, round, and reactive to light.   Cardiovascular:      Rate and Rhythm: Normal rate and regular rhythm.      Pulses: Normal pulses.      Heart sounds: Normal heart sounds.   Pulmonary:      Effort: Pulmonary effort is normal.      Breath sounds: Normal breath sounds.   Abdominal:      General: Abdomen is flat. Bowel sounds are normal.      Palpations: Abdomen is soft.   Musculoskeletal:         General: Normal range of motion.      Right lower leg: Edema present.      Left lower leg: Edema present.      Comments: Bilateral LE edema, left worse than right.    Skin:     General: Skin is warm and dry.   Neurological:      General: No focal deficit present.      Mental Status: She is alert and oriented to person, place, and time.   Psychiatric:         Mood and Affect: Mood normal.         Behavior: Behavior normal.         Thought Content: Thought content normal.         Judgment: Judgment normal.           Lab/Radiology/Diagnostic Review:  Results for orders placed or performed during the hospital encounter of 06/19/25 (from the past 24 hours)   CBC and Auto Differential   Result Value Ref Range    WBC 7.0  4.4 - 11.3 x10*3/uL    nRBC 0.0 0.0 - 0.0 /100 WBCs    RBC 3.48 (L) 4.00 - 5.20 x10*6/uL    Hemoglobin 11.0 (L) 12.0 - 16.0 g/dL    Hematocrit 31.7 (L) 36.0 - 46.0 %    MCV 91 80 - 100 fL    MCH 31.6 26.0 - 34.0 pg    MCHC 34.7 32.0 - 36.0 g/dL    RDW 14.7 (H) 11.5 - 14.5 %    Platelets 479 (H) 150 - 450 x10*3/uL    Neutrophils % 64.3 40.0 - 80.0 %    Immature Granulocytes %, Automated 0.4 0.0 - 0.9 %    Lymphocytes % 19.1 13.0 - 44.0 %    Monocytes % 13.0 2.0 - 10.0 %    Eosinophils % 2.3 0.0 - 6.0 %    Basophils % 0.9 0.0 - 2.0 %    Neutrophils Absolute 4.52 1.20 - 7.70 x10*3/uL    Immature Granulocytes Absolute, Automated 0.03 0.00 - 0.70 x10*3/uL    Lymphocytes Absolute 1.34 1.20 - 4.80 x10*3/uL    Monocytes Absolute 0.91 0.10 - 1.00 x10*3/uL    Eosinophils Absolute 0.16 0.00 - 0.70 x10*3/uL    Basophils Absolute 0.06 0.00 - 0.10 x10*3/uL   Basic metabolic panel   Result Value Ref Range    Glucose 140 (H) 74 - 99 mg/dL    Sodium 131 (L) 136 - 145 mmol/L    Potassium 2.0 (LL) 3.5 - 5.3 mmol/L    Chloride 101 98 - 107 mmol/L    Bicarbonate 18 (L) 21 - 32 mmol/L    Anion Gap 14 10 - 20 mmol/L    Urea Nitrogen 23 6 - 23 mg/dL    Creatinine 1.33 (H) 0.50 - 1.05 mg/dL    eGFR 46 (L) >60 mL/min/1.73m*2    Calcium 12.1 (H) 8.6 - 10.3 mg/dL   Magnesium   Result Value Ref Range    Magnesium 1.84 1.60 - 2.40 mg/dL   Ethanol   Result Value Ref Range    Alcohol <10 <=10 mg/dL   Potassium   Result Value Ref Range    Potassium 2.0 (LL) 3.5 - 5.3 mmol/L   Calcium, ionized   Result Value Ref Range    POCT Calcium, Ionized 1.72 (H) 1.1 - 1.33 mmol/L   Potassium, Urine Random   Result Value Ref Range    Potassium, Urine Random 14 mmol/L    Creatinine, Urine Random 63.2 20.0 - 320.0 mg/dL    Potassium/Creatinine Ratio 22 Not established mmol/g Creat   Drug Screen, Urine   Result Value Ref Range    Amphetamine Screen, Urine Presumptive Negative Presumptive Negative    Barbiturate Screen, Urine Presumptive Negative Presumptive  Negative    Benzodiazepines Screen, Urine Presumptive Negative Presumptive Negative    Cannabinoid Screen, Urine Presumptive Negative Presumptive Negative    Cocaine Metabolite Screen, Urine Presumptive Negative Presumptive Negative    Fentanyl Screen, Urine Presumptive Negative Presumptive Negative    Opiate Screen, Urine Presumptive Positive (A) Presumptive Negative    Oxycodone Screen, Urine Presumptive Positive (A) Presumptive Negative    PCP Screen, Urine Presumptive Negative Presumptive Negative    Methadone Screen, Urine Presumptive Negative Presumptive Negative   Basic metabolic panel   Result Value Ref Range    Glucose 84 74 - 99 mg/dL    Sodium 135 (L) 136 - 145 mmol/L    Potassium 2.2 (LL) 3.5 - 5.3 mmol/L    Chloride 111 (H) 98 - 107 mmol/L    Bicarbonate 17 (L) 21 - 32 mmol/L    Anion Gap 9 (L) 10 - 20 mmol/L    Urea Nitrogen 19 6 - 23 mg/dL    Creatinine 1.13 (H) 0.50 - 1.05 mg/dL    eGFR 55 (L) >60 mL/min/1.73m*2    Calcium 10.8 (H) 8.6 - 10.3 mg/dL   Magnesium   Result Value Ref Range    Magnesium 1.61 1.60 - 2.40 mg/dL   CBC   Result Value Ref Range    WBC 5.6 4.4 - 11.3 x10*3/uL    nRBC 0.0 0.0 - 0.0 /100 WBCs    RBC 2.80 (L) 4.00 - 5.20 x10*6/uL    Hemoglobin 8.9 (L) 12.0 - 16.0 g/dL    Hematocrit 25.2 (L) 36.0 - 46.0 %    MCV 90 80 - 100 fL    MCH 31.8 26.0 - 34.0 pg    MCHC 35.3 32.0 - 36.0 g/dL    RDW 14.7 (H) 11.5 - 14.5 %    Platelets 370 150 - 450 x10*3/uL   aPTT - baseline   Result Value Ref Range    aPTT 37 (H) 26 - 36 seconds   Urinalysis with Reflex Microscopic   Result Value Ref Range    Color, Urine Light-Yellow Light-Yellow, Yellow, Dark-Yellow    Appearance, Urine Clear Clear    Specific Gravity, Urine 1.013 1.005 - 1.035    pH, Urine 6.5 5.0, 5.5, 6.0, 6.5, 7.0, 7.5, 8.0    Protein, Urine 30 (1+) (A) NEGATIVE, 10 (TRACE), 20 (TRACE) mg/dL    Glucose, Urine Normal Normal mg/dL    Blood, Urine 0.5 (2+) (A) NEGATIVE mg/dL    Ketones, Urine NEGATIVE NEGATIVE mg/dL    Bilirubin, Urine  NEGATIVE NEGATIVE mg/dL    Urobilinogen, Urine Normal Normal mg/dL    Nitrite, Urine NEGATIVE NEGATIVE    Leukocyte Esterase, Urine NEGATIVE NEGATIVE   Microscopic Only, Urine   Result Value Ref Range    WBC, Urine 1-5 1-5, NONE /HPF    RBC, Urine 1-2 NONE, 1-2, 3-5 /HPF    Bacteria, Urine 1+ (A) NONE SEEN /HPF    Mucus, Urine FEW Reference range not established. /LPF   Sodium, Urine Random   Result Value Ref Range    Sodium, Urine Random 18 mmol/L    Creatinine, Urine Random 65.1 20.0 - 320.0 mg/dL    Sodium/Creatinine Ratio 28 Not established. mmol/g Creat   Chloride, Urine Random   Result Value Ref Range    Chloride, Urine Random 24 mmol/L    Creatinine, Urine Random 65.1 20.0 - 320.0 mg/dL    Chloride/Creatinine Ratio 37 (L) 38 - 318 mmol/g creat   Potassium, Urine Random   Result Value Ref Range    Potassium, Urine Random 19 mmol/L    Creatinine, Urine Random 65.1 20.0 - 320.0 mg/dL    Potassium/Creatinine Ratio 29 Not established mmol/g Creat   Creatinine, Urine Random   Result Value Ref Range    Creatinine, Urine Random 65.1 20.0 - 320.0 mg/dL   Transthoracic Echo Complete   Result Value Ref Range    AV pk jeffery 1.42 m/s    AV mn grad 4 mmHg    LVOT diam 1.87 cm    MV E/A ratio 1.06     LA vol index A/L 24.2 ml/m2    Tricuspid annular plane systolic excursion 2.0 cm    LV EF 68 %    RV free wall pk S' 16.00 cm/s    LVIDd 3.63 cm    RVSP 26 mmHg    Aortic Valve Area by Continuity of VTI 2.59 cm2    Aortic Valve Area by Continuity of Peak Velocity 2.49 cm2    AV pk grad 8 mmHg    LV A4C EF 64.6    Heparin Assay, UFH   Result Value Ref Range    Heparin Unfractionated 0.2 See Comment Below for Therapeutic Ranges IU/mL   Heparin Assay, UFH   Result Value Ref Range    Heparin Unfractionated 0.5 See Comment Below for Therapeutic Ranges IU/mL     Imaging  CT head wo IV contrast  Result Date: 6/20/2025  Unchanged CT head. Specifically, no acute intracranial abnormality, including hemorrhage, or mass effect.   This study  was interpreted at [facility].   MACRO: None   Signed by: Wilton Goldstein 6/20/2025 1:09 PM Dictation workstation:   [id number]    US abdomen limited liver  Result Date: 6/20/2025  Aguirre catheter within an otherwise empty urinary bladder.   No shadowing stone or hydronephrosis in either kidney.   Cholelithiasis with borderline gallbladder dilatation. However, no specific sonographic evidence of acute cholecystitis.   Nonspecific common bile duct dilatation. Laboratory correlation needed. Consider nuclear medicine HIDA scan in follow-up.   Nonspecific increased echogenicity throughout the liver, most likely fatty infiltration.   3 mm adherent echogenic nodule in the posterior gallbladder fundus, most likely small polyp. Recommend a follow-up ultrasound in 6-12 months.   MACRO: None   Signed by: Bereket Stein 6/20/2025 8:35 AM Dictation workstation:   [id number]    US renal complete  Result Date: 6/20/2025  Aguirre catheter within an otherwise empty urinary bladder.   No shadowing stone or hydronephrosis in either kidney.   Cholelithiasis with borderline gallbladder dilatation. However, no specific sonographic evidence of acute cholecystitis.   Nonspecific common bile duct dilatation. Laboratory correlation needed. Consider nuclear medicine HIDA scan in follow-up.   Nonspecific increased echogenicity throughout the liver, most likely fatty infiltration.   3 mm adherent echogenic nodule in the posterior gallbladder fundus, most likely small polyp. Recommend a follow-up ultrasound in 6-12 months.   MACRO: None   Signed by: Bereket Stein 6/20/2025 8:35 AM Dictation workstation:   [id number]    CT chest abdomen pelvis wo IV contrast  Result Date: 6/20/2025  1.5 cm ground-glass opacity in the right lower lobe may be infectious or inflammatory infiltrate. Follow-up chest CT in 3-6 months is recommended to assess for resolution.   No acute abdominal or pelvic abnormality.   MACRO: None    Signed by: Yulissa Escalante 6/20/2025 3:58 AM Dictation workstation:   [id number]    CT head wo IV contrast  Result Date: 6/20/2025  No acute intracranial abnormality.   Stable indeterminate lesion of the right frontal bone. Please refer to PET CT dated 06/18/2025 for characterization.   MACRO: None   Signed by: Yulissa Escalante 6/20/2025 3:49 AM Dictation workstation:   [id number]    Lower extremity venous duplex bilateral  Result Date: 6/20/2025  1. Acute deep vein thrombosis at the proximal left peroneal vein. 2. No sonographic evidence for deep vein thrombosis within the evaluated veins of the right lower extremity.   MACRO: Critical Finding:  See findings. Notification was initiated on 6/20/2025 at 12:54 am by  Jovana To.  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   I personally reviewed the images/study and I agree with the findings as stated by Keyla Chin MD. This study was interpreted at [place], [place].   MACRO: None   Signed by: Anastasiia Michele 6/19/2025 11:32 AM Dictation workstation:   [id number]    NM bone whole body  Result Date: 6/18/2025  1.  Heterogeneously increased uptake in the right frontal calvarium, nonspecific, but in keeping with patient's suspected intraosseous meningioma. 2. Focal increased uptake in the anterior right 12th rib, nonspecific, but may be seen in the setting of trauma. Correlate clinically with physical exam and consider further imaging if warranted.   I personally reviewed the images/study and I agree with the findings as stated by Keyla Chin MD. This study was interpreted at [place], [place].   MACRO: None   Signed by: Anastasiia Michele 6/18/2025  3:52 PM Dictation workstation:   [id number]      Cardiology, Vascular, and Other Imaging  Transthoracic Echo Complete  Result Date: 6/20/2025   [facility], [address] [place] [address]              Tel [phone] and Fax [phone] TRANSTHORACIC ECHOCARDIOGRAM REPORT  Patient Name:       JOSE IBARRA    Reading Physician:    [id number] Adebayo Pratt MD Study Date:         6/20/2025           Ordering Provider:    [id number] NAEEM ADAN MRN/PID:            [id number]            Fellow: Accession#:         [id number]        Nurse: Date of Birth/Age:  1965 / 60      Sonographer:          Kaila Hay RDCS                     years Gender assigned at  F                   Additional Staff: Birth: Height:             149.86 cm           Admit Date:           6/19/2025 Weight:             39.46 kg            Admission Status:     Inpatient -                                                               Routine BSA / BMI:          1.29 m2 / 17.57     Encounter#:           [id number]                     kg/m2 Blood Pressure:     81/54 mmHg          Department Location:  [facility] Non                                                               Invasive Study Type:    TRANSTHORACIC ECHO (TTE) COMPLETE Diagnosis/ICD: Localized edema-R60.0 Indication:    Edema CPT Code:      Echo Complete w Full [id number] Patient History: Pertinent History: HTN, CAD and Chest Pain. Study Detail: The following Echo studies were performed: M-Mode, 2D, Doppler and               color flow.  PHYSICIAN INTERPRETATION: Left Ventricle: Left ventricular ejection fraction is normal calculated by Colvin's biplane at 68%. There are no regional left ventricular wall motion abnormalities. The left ventricular cavity size is normal. There is mildly increased septal and normal posterior left  ventricular wall thickness. There is left ventricular concentric remodeling. Spectral Doppler shows a Grade I (impaired relaxation pattern) of left ventricular diastolic filling with normal left atrial filling pressure. Left Atrium: The left atrial size is normal. Right Ventricle: The right ventricle is normal in size. There is normal right ventricular global systolic function. Right Atrium: The right atrium is normal in size. Aortic Valve: The aortic valve is trileaflet. The aortic valve area by VTI is 2.59 cmï¿½ with a peak velocity of 1.42 m/s. The peak and mean gradients are 7 mmHg and 4 mmHg, respectively with a dimensionless index of 0.94. There is mild aortic valve regurgitation. Mitral Valve: The mitral valve is mildly thickened. There is mild mitral valve regurgitation. The E Vmax is 0.85 m/s. Tricuspid Valve: The tricuspid valve is structurally normal. There is mild tricuspid regurgitation. The Doppler estimated right ventricular systolic pressure (RVSP) is within normal limits at 26 mmHg. Pulmonic Valve: The pulmonic valve is structurally normal. There is no indication of pulmonic valve regurgitation. Pericardium: There is no pericardial effusion noted. Aorta: The aortic root is normal. Systemic Veins: The inferior vena cava appears normal in size, with IVC inspiratory collapse greater than 50%. In comparison to the previous echocardiogram(s): Compared with study dated 8/7/2023, no significant change.  CONCLUSIONS:  1. Left ventricular ejection fraction is normal calculated by Colvin's biplane at 68%.  2. Spectral Doppler shows a Grade I (impaired relaxation pattern) of left ventricular diastolic filling with normal left atrial filling pressure.  3. There is normal right ventricular global systolic function.  4. The Doppler estimated RVSP is within normal limits at 26 mmHg.  5. Mild aortic valve regurgitation.  6. Compared with study dated 8/7/2023, no significant change. QUANTITATIVE DATA SUMMARY:  2D  MEASUREMENTS:          Normal Ranges: Ao Root s:       2.84 cm IVSd:            0.96 cm  (0.6-1.1cm) LVPWd:           0.92 cm  (0.6-1.1cm) LVIDd:           3.63 cm  (3.9-5.9cm) LVIDs:           2.13 cm LV Mass Index:   78 g/m2 LVEDV Index:     39 ml/m2 LV % FS          41.5 %  LEFT ATRIUM:                  Normal Ranges: LA Vol A4C:        32.4 ml    (22+/-6mL/m2) LA Vol A2C:        26.3 ml LA Vol BP:         31.4 ml LA Vol Index A4C:  25.1ml/m2 LA Vol Index A2C:  20.3 ml/m2 LA Vol Index BP:   24.2 ml/m2 LA Area A4C:       12.2 cm2 LA Area A2C:       11.8 cm2 LA Major Axis A4C: 3.9 cm LA Major Axis A2C: 4.5 cm LA Volume Index:   24.2 ml/m2 LA Vol A4C:        29.2 ml LA Vol A2C:        25.3 ml LA Vol Index BSA:  21.1 ml/m2  RIGHT ATRIUM:                 Normal Ranges: RA Vol A4C:        25.3 ml    (8.3-19.5ml) RA Vol Index A4C:  19.6 ml/m2 RA Area A4C:       10.5 cm2 RA Major Axis A4C: 3.7 cm  AORTA MEASUREMENTS:         Normal Ranges: Ao Sinus, d:        2.80 cm (2.1-3.5cm) Ao STJ, d:          2.50 cm (1.7-3.4cm) Asc Ao, d:          3.30 cm (2.1-3.4cm)  LV SYSTOLIC FUNCTION:                      Normal Ranges: EF-A4C View:    65 % (>=55%) EF-A2C View:    72 % EF-Biplane:     68 % LV EF Reported: 68 %  LV DIASTOLIC FUNCTION:             Normal Ranges: MV Peak E:             0.85 m/s    (0.7-1.2 m/s) MV Peak A:             0.80 m/s    (0.42-0.7 m/s) E/A Ratio:             1.06        (1.0-2.2) MV e'                  0.085 m/s   (>8.0) MV lateral e'          0.09 m/s MV medial e'           0.08 m/s MV A Dur:              114.18 msec E/e' Ratio:            9.99        (<8.0) a'                     0.08 m/s PulmV Sys Jack:         68.72 cm/s PulmV Bryson Jack:        48.65 cm/s PulmV S/D Jack:         1.41 PulmV A Revs Jack:      22.82 cm/s PulmV A Revs Dur:      112.27 msec  MITRAL VALVE:          Normal Ranges: MV DT:        202 msec (150-240msec)  AORTIC VALVE:                     Normal Ranges: AoV Vmax:                 1.42 m/s (<=1.7m/s) AoV Peak P.0 mmHg (<20mmHg) AoV Mean P.2 mmHg (1.7-11.5mmHg) LVOT Max Jack:            1.28 m/s (<=1.1m/s) AoV VTI:                 23.04 cm (18-25cm) LVOT VTI:                21.71 cm LVOT Diameter:           1.87 cm  (1.8-2.4cm) AoV Area, VTI:           2.59 cm2 (2.5-5.5cm2) AoV Area,Vmax:           2.49 cm2 (2.5-4.5cm2) AoV Dimensionless Index: 0.94  AORTIC INSUFFICIENCY: AI Vmax:       3.18 m/s AI Half-time:  523 msec AI Decel Time: 1804 msec AI Decel Rate: 176.73 cm/s2  RIGHT VENTRICLE: RV Basal 3.10 cm RV Mid   2.60 cm RV Major 6.3 cm TAPSE:   20.3 mm RV s'    0.16 m/s  TRICUSPID VALVE/RVSP:          Normal Ranges: Peak TR Velocity:     2.41 m/s Est. RA Pressure:     3 RV Syst Pressure:     26       (< 30mmHg) IVC Diam:             1.39 cm  PULMONIC VALVE:          Normal Ranges: PV Accel Time:  120 msec (>120ms) PV Max Jack:     0.9 m/s  (0.6-0.9m/s) PV Max PG:      3.5 mmHg  PULMONARY VEINS: PulmV A Revs Dur: 112.27 msec PulmV A Revs Jack: 22.82 cm/s PulmV Bryson Jack:   48.65 cm/s PulmV S/D Jack:    1.41 PulmV Sys Jack:    68.72 cm/s  AORTA: Asc Ao Diam 3.35 cm  [id number] Adebayo Pratt MD Electronically signed on 2025 at 12:38:48 PM  ** Final **         Assessment/Plan   Assessment & Plan  Hypokalemia    Caitlyn Atkins is a 60 y.o. female with a PMH of MS, osteoporosis, lumbar radiculopathy, spinal stenosis, right total hip, extra-axial, calvarium marrow and scalp lesions of unknown etiology or diagnosis seen on MRI 25, and previous episode of hypokalemia in April presenting with hypokalemia seen on labs for pre-op testing.  Nephrology was consulted because of refractory hypokalemia    Refractory hypokalemia probably secondary to excessive potassium loss in the urine  2025: Urine potassium creatinine ratio is 22  Differential diagnosis of excessive potassium loss in urine or acid-base balance, renal tubular acidosis and mineralocorticoid  excess  Ordered ABG full panel, renin aldosterone, urine potassium, urine sodium, urine chloride and urine creatinine  There is a possibility that the patient potassium may have been decreased even because of nausea vomiting and decreased solute intake  Agrees to replacement of potassium  Patient already has a 80 mEq of daily potassium ordered  Ordered excess 40 mEq of oral potassium and also patient is getting IV potassium replacement    2.  Refractory nausea vomiting  Nausea vomiting improved  Management as per primary team    I spent 80 minutes of cumulative time with the patient.  Greater than 50% of that time was spent in the direct collaboration and or coordination of care of the patient.     Dragon dictation software was used to dictate this note and thus there may be minor errors in translation/transcription including garbled speech or misspellings. Please contact for clarification if needed.  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, followed by neurosurgery- Clearance in note on 5/21/25    Lower extremity weakness     Lumbar radiculopathy     Lumbar spondylosis     Multiple falls     Multiple sclerosis (Multi)     Multiple sclerosis, follows with neuro, per patient well managed on Vumerity, no flare up since 2014    Osteoporosis     Palpitations     saw cardiology-wore holter-SVT was on Metoprolol but per patient did not help, never followed up with cardiology because palpitations stopped after quitting her stressful job    Pseudoclaudication syndrome     Sacral fracture (Multi)     Sacroiliitis     SDH (subdural  hematoma) (Multi)     Spinal stenosis     Spondylolisthesis     Urinary incontinence     Vitamin D deficiency    [2]   Past Surgical History:  Procedure Laterality Date    CT ANGIO CORONARY ART WITH HEARTFLOW IF SCORE >30%  11/16/2023    CT ANGIO CORONARY ART WITH HEARTFLOW IF SCORE >30% 11/16/2023 [facility] CT    HIP ARTHROPLASTY Right 08/16/2023    HYSTERECTOMY  04/15/2013    Hysterectomy- cervical Cancer    OTHER SURGICAL HISTORY Left 04/21/2021    Arm surgery-fracture repair    REFRACTIVE SURGERY     [3]   Medications Prior to Admission   Medication Sig Dispense Refill Last Dose/Taking    alendronate (Fosamax) 70 mg tablet Take 1 tablet (70 mg) by mouth every 7 days. Take in the morning with a full glass of water, on an empty stomach, and do not take anything else by mouth or lie down for the next 30 min. 4 tablet 5 Past Week    b complex 0.4 mg tablet Take 1 tablet by mouth once daily.   6/19/2025    calcium carbonate (CALCIUM 500 ORAL) Take 1 tablet by mouth once daily.   6/19/2025    cholecalciferol (Vitamin D-3) 50 MCG (2000 UT) tablet Take 1 tablet (2,000 Units) by mouth once daily.   6/19/2025    diroximel fumarate (Vumerity) 231 mg capsule,delayed release(DR/EC) Take 2 capsules by mouth 2 times a day. 120 capsule 5 More than a month    gabapentin (Neurontin) 100 mg capsule Take 1 capsule (100 mg) by mouth 3 times a day. 270 capsule 1 6/19/2025    [START ON 7/21/2025] HYDROcodone-acetaminophen (Norco) 7.5-325 mg tablet Take 1 tablet by mouth every 6 hours if needed for severe pain (7 - 10) for up to 28 days. Do not fill before July 21, 2025. 112 tablet 0 6/19/2025    [START ON 6/23/2025] HYDROcodone-acetaminophen (Norco) 7.5-325 mg tablet Take 1 tablet by mouth every 6 hours if needed for severe pain (7 - 10) for up to 28 days. Do not fill before June 23, 2025. 112 tablet 0 6/19/2025    omeprazole OTC (PriLOSEC OTC) 20 mg EC tablet Take 1 tablet (20 mg) by mouth once daily in the morning.   More than a month     tiZANidine (Zanaflex) 2 mg tablet Take 1 tablet (2 mg) by mouth every 12 hours if needed for muscle spasms for up to 28 days. 56 tablet 2 Past Week    acetaminophen (Tylenol) 325 mg tablet Take 2 tablets (650 mg) by mouth every 4 hours if needed for moderate pain (4 - 6).       chlorhexidine (Peridex) 0.12 % solution Use 15 mL in the mouth or throat once daily for 2 days. 30 mL 0     glucose 4 gram chewable tablet Chew if needed for low blood sugar - see comments.       naloxone (Narcan) 4 mg/0.1 mL nasal spray Administer 1 spray (4 mg) into affected nostril(s) if needed for opioid reversal. May repeat every 2-3 minutes if needed, alternating nostrils, until medical assistance becomes available.      [4]   Social History  Tobacco Use    Smoking status: Former     Current packs/day: 1.00     Types: Cigarettes    Smokeless tobacco: Never    Tobacco comments:     Quit 1 month ago, smoked 1PPD for about 40 years   Vaping Use    Vaping status: Never Used   Substance Use Topics    Alcohol use: Not Currently    Drug use: Never   [5]   Family History  Problem Relation Name Age of Onset    Lung cancer Mother      Coronary artery disease Father      Hypertension Father      Other (THYROID SURGERY) Sister      Glaucoma Other GRANDMOTHER    [6] chlorhexidine, 15 mL, Mouth/Throat, Daily  gabapentin, 100 mg, oral, TID  pantoprazole, 40 mg, oral, Daily before breakfast  potassium chloride, 20 mEq, intravenous, q2h  potassium chloride CR, 40 mEq, oral, BID  sennosides, 2 tablet, oral, BID  tamsulosin, 0.4 mg, oral, Nightly  [7] heparin, 0-4,000 Units/hr, Last Rate: 600 Units/hr (06/20/25 1307)  potassium chloride in 0.9%NaCl, 100 mL/hr, Last Rate: 100 mL/hr (06/20/25 1313)  [8] PRN medications: heparin, HYDROcodone-acetaminophen, ondansetron ODT **OR** ondansetron, tiZANidine

## 2025-06-20 NOTE — CONSULTS
Reason For Consult  Acute urinary retention    History Of Present Illness  Caitlyn Atkins is a 60 y.o. female, PMH sacral fracture, osteoporosis, lumbar radiculopathy, spinal stenosis, presenting to ED with hypokalemia. Was having low urine output, subsequently bladder scanned, and found to be retaining 1L of urine. Has been having bladder issues, primarily urinary incontinence, since her sacral fracture. Was feeling comfortable while retaining that much urine.     Past Medical History  She has a past medical history of Anesthesia of skin, Antalgic gait, Bilateral sciatica, Chronic pain, DDD (degenerative disc disease), lumbar, GERD (gastroesophageal reflux disease), Heart murmur, History of echocardiogram (08/07/2023), History of stress test (08/14/2023), Intraosseous carcinoma, Lower extremity weakness, Lumbar radiculopathy, Lumbar spondylosis, Multiple falls, Multiple sclerosis (Multi), Osteoporosis, Palpitations, Pseudoclaudication syndrome, Sacral fracture (Multi), Sacroiliitis, SDH (subdural hematoma) (Multi), Spinal stenosis, Spondylolisthesis, Urinary incontinence, and Vitamin D deficiency.    Surgical History  She has a past surgical history that includes Hysterectomy (04/15/2013); Other surgical history (Left, 04/21/2021); CT angio coronary art with heartflow if score >30% (11/16/2023); Hip Arthroplasty (Right, 08/16/2023); and Refractive surgery.     Social History  She reports that she has quit smoking. Her smoking use included cigarettes. She has never used smokeless tobacco. She reports that she does not currently use alcohol. She reports that she does not use drugs.    Family History  Family History[1]     Allergies  Patient has no known allergies.    Review of Systems  All systems have been reviewed and are negative except for what is mentioned in the HPI.     Physical Exam  Constitutional: NAD  Cardiovascular: Normal rate and regular rhythm. No murmurs  Respiratory/Thorax: CTAB, on  "RA  Genitourinary: Aguirre- clear yellow  Neurological: No focal deficits     Last Recorded Vitals  Blood pressure 81/54, pulse 69, temperature 36.7 °C (98.1 °F), temperature source Temporal, resp. rate 19, height 1.499 m (4' 11\"), weight (!) 39.5 kg (87 lb), SpO2 98%.    Relevant Results  Results for orders placed or performed during the hospital encounter of 06/19/25 (from the past 24 hours)   CBC and Auto Differential   Result Value Ref Range    WBC 7.0 4.4 - 11.3 x10*3/uL    nRBC 0.0 0.0 - 0.0 /100 WBCs    RBC 3.48 (L) 4.00 - 5.20 x10*6/uL    Hemoglobin 11.0 (L) 12.0 - 16.0 g/dL    Hematocrit 31.7 (L) 36.0 - 46.0 %    MCV 91 80 - 100 fL    MCH 31.6 26.0 - 34.0 pg    MCHC 34.7 32.0 - 36.0 g/dL    RDW 14.7 (H) 11.5 - 14.5 %    Platelets 479 (H) 150 - 450 x10*3/uL    Neutrophils % 64.3 40.0 - 80.0 %    Immature Granulocytes %, Automated 0.4 0.0 - 0.9 %    Lymphocytes % 19.1 13.0 - 44.0 %    Monocytes % 13.0 2.0 - 10.0 %    Eosinophils % 2.3 0.0 - 6.0 %    Basophils % 0.9 0.0 - 2.0 %    Neutrophils Absolute 4.52 1.20 - 7.70 x10*3/uL    Immature Granulocytes Absolute, Automated 0.03 0.00 - 0.70 x10*3/uL    Lymphocytes Absolute 1.34 1.20 - 4.80 x10*3/uL    Monocytes Absolute 0.91 0.10 - 1.00 x10*3/uL    Eosinophils Absolute 0.16 0.00 - 0.70 x10*3/uL    Basophils Absolute 0.06 0.00 - 0.10 x10*3/uL   Basic metabolic panel   Result Value Ref Range    Glucose 140 (H) 74 - 99 mg/dL    Sodium 131 (L) 136 - 145 mmol/L    Potassium 2.0 (LL) 3.5 - 5.3 mmol/L    Chloride 101 98 - 107 mmol/L    Bicarbonate 18 (L) 21 - 32 mmol/L    Anion Gap 14 10 - 20 mmol/L    Urea Nitrogen 23 6 - 23 mg/dL    Creatinine 1.33 (H) 0.50 - 1.05 mg/dL    eGFR 46 (L) >60 mL/min/1.73m*2    Calcium 12.1 (H) 8.6 - 10.3 mg/dL   Magnesium   Result Value Ref Range    Magnesium 1.84 1.60 - 2.40 mg/dL   Ethanol   Result Value Ref Range    Alcohol <10 <=10 mg/dL   Potassium   Result Value Ref Range    Potassium 2.0 (LL) 3.5 - 5.3 mmol/L   Calcium, ionized "   Result Value Ref Range    POCT Calcium, Ionized 1.72 (H) 1.1 - 1.33 mmol/L   Potassium, Urine Random   Result Value Ref Range    Potassium, Urine Random 14 mmol/L    Creatinine, Urine Random 63.2 20.0 - 320.0 mg/dL    Potassium/Creatinine Ratio 22 Not established mmol/g Creat   Drug Screen, Urine   Result Value Ref Range    Amphetamine Screen, Urine Presumptive Negative Presumptive Negative    Barbiturate Screen, Urine Presumptive Negative Presumptive Negative    Benzodiazepines Screen, Urine Presumptive Negative Presumptive Negative    Cannabinoid Screen, Urine Presumptive Negative Presumptive Negative    Cocaine Metabolite Screen, Urine Presumptive Negative Presumptive Negative    Fentanyl Screen, Urine Presumptive Negative Presumptive Negative    Opiate Screen, Urine Presumptive Positive (A) Presumptive Negative    Oxycodone Screen, Urine Presumptive Positive (A) Presumptive Negative    PCP Screen, Urine Presumptive Negative Presumptive Negative    Methadone Screen, Urine Presumptive Negative Presumptive Negative     CT head wo IV contrast   Final Result   No acute intracranial abnormality.        Stable indeterminate lesion of the right frontal bone. Please refer   to PET CT dated 06/18/2025 for characterization.        MACRO:   None        Signed by: Yulissa Escalante 6/20/2025 3:49 AM   Dictation workstation:   [id number]      CT chest abdomen pelvis wo IV contrast   Final Result   1.5 cm ground-glass opacity in the right lower lobe may be infectious   or inflammatory infiltrate. Follow-up chest CT in 3-6 months is   recommended to assess for resolution.        No acute abdominal or pelvic abnormality.        MACRO:   None        Signed by: Yulissa Escalante 6/20/2025 3:58 AM   Dictation workstation:   [id number]      Lower extremity venous duplex bilateral   Final Result   1. Acute deep vein thrombosis at the proximal left peroneal vein.   2. No sonographic evidence for deep vein thrombosis within the    evaluated veins of the right lower extremity.        MACRO:   Critical Finding:  See findings. Notification was initiated on   6/20/2025 at 12:54 am by  Jovana To.  (**-[facility]-**) Instructions:        Signed by: Jovana To 6/20/2025 12:56 AM   Dictation workstation:   [id number]      Transthoracic Echo Complete    (Results Pending)   US abdomen limited liver    (Results Pending)   US renal complete    (Results Pending)   CT head wo IV contrast    (Results Pending)      Assessment/Plan     Urinary retention    Appears to have urinary dysfunction s/p sacral fracture    - Maintain tracey  - Follow up outpatient with urology for TOV and workup      I spent 60 minutes in the professional and overall care of this patient.    Hollis Duncan, APRN-CNP       [1]   Family History  Problem Relation Name Age of Onset    Lung cancer Mother      Coronary artery disease Father      Hypertension Father      Other (THYROID SURGERY) Sister      Glaucoma Other GRANDMOTHER

## 2025-06-20 NOTE — PROGRESS NOTES
06/20/25 1304   Discharge Planning   Living Arrangements Alone   Support Systems Children   Type of Residence Private residence   Number of Stairs to Enter Residence 2   Number of Stairs Within Residence 0   Do you have animals or pets at home? Yes   Type of Animals or Pets dog   Expected Discharge Disposition Home   Financial Resource Strain   How hard is it for you to pay for the very basics like food, housing, medical care, and heating? Somewhat   Housing Stability   In the last 12 months, was there a time when you were not able to pay the mortgage or rent on time? N   In the past 12 months, how many times have you moved where you were living? 0   At any time in the past 12 months, were you homeless or living in a shelter (including now)? N   Transportation Needs   In the past 12 months, has lack of transportation kept you from medical appointments or from getting medications? no   In the past 12 months, has lack of transportation kept you from meetings, work, or from getting things needed for daily living? No   Intensity of Service   Intensity of Service 0-30 min     [facility] met with pt and dtr at bedside. Explained my role as discharge planner. Lives alone. Walks with a walker. Pt has back surgery scheduled for Wed. Pt went to preop testing and was admitted for abnormal labs. Pt  recently passed. Pt has concern going home alone after back surgery. PT OT to [facility].

## 2025-06-20 NOTE — CARE PLAN
CT head reviewed post heparinization, no acute hemorrhage.       Will s/o.     Please have patient followup with [facility] and tumor board for further recommendation as outpatient.

## 2025-06-21 LAB
ANION GAP BLDV CALCULATED.4IONS-SCNC: 14 MMOL/L (ref 10–25)
ANION GAP SERPL CALC-SCNC: 8 MMOL/L (ref 10–20)
BASE EXCESS BLDV CALC-SCNC: -13.9 MMOL/L (ref -2–3)
BODY TEMPERATURE: 37 DEGREES CELSIUS
BUN SERPL-MCNC: 13 MG/DL (ref 6–23)
CA-I BLDV-SCNC: 1.78 MMOL/L (ref 1.1–1.33)
CALCIUM SERPL-MCNC: 10.2 MG/DL (ref 8.6–10.3)
CHLORIDE BLDV-SCNC: 114 MMOL/L (ref 98–107)
CHLORIDE SERPL-SCNC: 122 MMOL/L (ref 98–107)
CO2 SERPL-SCNC: 15 MMOL/L (ref 21–32)
CREAT SERPL-MCNC: 0.99 MG/DL (ref 0.5–1.05)
EGFRCR SERPLBLD CKD-EPI 2021: 65 ML/MIN/1.73M*2
ERYTHROCYTE [DISTWIDTH] IN BLOOD BY AUTOMATED COUNT: 15.6 % (ref 11.5–14.5)
FOLATE SERPL-MCNC: 4 NG/ML
GLUCOSE BLDV-MCNC: 125 MG/DL (ref 74–99)
GLUCOSE SERPL-MCNC: 95 MG/DL (ref 74–99)
HCO3 BLDV-SCNC: 13.9 MMOL/L (ref 22–26)
HCT VFR BLD AUTO: 25.8 % (ref 36–46)
HCT VFR BLD EST: 31 % (ref 36–46)
HGB BLD-MCNC: 8.6 G/DL (ref 12–16)
HGB BLDV-MCNC: 10.3 G/DL (ref 12–16)
HOLD SPECIMEN: NORMAL
INHALED O2 CONCENTRATION: 95 %
IRON SATN MFR SERPL: ABNORMAL %
IRON SERPL-MCNC: 101 UG/DL (ref 35–150)
LACTATE BLDV-SCNC: 2.7 MMOL/L (ref 0.4–2)
MAGNESIUM SERPL-MCNC: 1.99 MG/DL (ref 1.6–2.4)
MCH RBC QN AUTO: 31.4 PG (ref 26–34)
MCHC RBC AUTO-ENTMCNC: 33.3 G/DL (ref 32–36)
MCV RBC AUTO: 94 FL (ref 80–100)
NRBC BLD-RTO: 0 /100 WBCS (ref 0–0)
OXYHGB MFR BLDV: 24.4 % (ref 45–75)
PCO2 BLDV: 39 MM HG (ref 41–51)
PH BLDV: 7.16 PH (ref 7.33–7.43)
PLATELET # BLD AUTO: 386 X10*3/UL (ref 150–450)
PO2 BLDV: 17 MM HG (ref 35–45)
POTASSIUM BLDV-SCNC: 4.2 MMOL/L (ref 3.5–5.3)
POTASSIUM SERPL-SCNC: 4.6 MMOL/L (ref 3.5–5.3)
RBC # BLD AUTO: 2.74 X10*6/UL (ref 4–5.2)
SAO2 % BLDV: 25 % (ref 45–75)
SODIUM BLDV-SCNC: 138 MMOL/L (ref 136–145)
SODIUM SERPL-SCNC: 140 MMOL/L (ref 136–145)
STAPHYLOCOCCUS SPEC CULT: NORMAL
TIBC SERPL-MCNC: ABNORMAL UG/DL
UFH PPP CHRO-ACNC: 0.8 IU/ML (ref ?–1.1)
UIBC SERPL-MCNC: <55 UG/DL (ref 110–370)
VIT B12 SERPL-MCNC: 893 PG/ML (ref 211–911)
WBC # BLD AUTO: 7.5 X10*3/UL (ref 4.4–11.3)

## 2025-06-21 PROCEDURE — 82435 ASSAY OF BLOOD CHLORIDE: CPT | Performed by: INTERNAL MEDICINE

## 2025-06-21 PROCEDURE — 2500000001 HC RX 250 WO HCPCS SELF ADMINISTERED DRUGS (ALT 637 FOR MEDICARE OP): Performed by: INTERNAL MEDICINE

## 2025-06-21 PROCEDURE — 82746 ASSAY OF FOLIC ACID SERUM: CPT | Mod: AHULAB | Performed by: INTERNAL MEDICINE

## 2025-06-21 PROCEDURE — 83540 ASSAY OF IRON: CPT | Performed by: INTERNAL MEDICINE

## 2025-06-21 PROCEDURE — 82088 ASSAY OF ALDOSTERONE: CPT | Performed by: INTERNAL MEDICINE

## 2025-06-21 PROCEDURE — 83735 ASSAY OF MAGNESIUM: CPT | Performed by: INTERNAL MEDICINE

## 2025-06-21 PROCEDURE — 80048 BASIC METABOLIC PNL TOTAL CA: CPT | Performed by: INTERNAL MEDICINE

## 2025-06-21 PROCEDURE — 1200000002 HC GENERAL ROOM WITH TELEMETRY DAILY

## 2025-06-21 PROCEDURE — 2500000002 HC RX 250 W HCPCS SELF ADMINISTERED DRUGS (ALT 637 FOR MEDICARE OP, ALT 636 FOR OP/ED): Performed by: HOSPITALIST

## 2025-06-21 PROCEDURE — 2500000001 HC RX 250 WO HCPCS SELF ADMINISTERED DRUGS (ALT 637 FOR MEDICARE OP): Performed by: HOSPITALIST

## 2025-06-21 PROCEDURE — 99233 SBSQ HOSP IP/OBS HIGH 50: CPT | Performed by: INTERNAL MEDICINE

## 2025-06-21 PROCEDURE — 82607 VITAMIN B-12: CPT | Mod: AHULAB | Performed by: INTERNAL MEDICINE

## 2025-06-21 PROCEDURE — 36415 COLL VENOUS BLD VENIPUNCTURE: CPT | Performed by: INTERNAL MEDICINE

## 2025-06-21 PROCEDURE — 36415 COLL VENOUS BLD VENIPUNCTURE: CPT | Performed by: HOSPITALIST

## 2025-06-21 PROCEDURE — 2500000004 HC RX 250 GENERAL PHARMACY W/ HCPCS (ALT 636 FOR OP/ED): Performed by: INTERNAL MEDICINE

## 2025-06-21 PROCEDURE — 85027 COMPLETE CBC AUTOMATED: CPT | Performed by: INTERNAL MEDICINE

## 2025-06-21 PROCEDURE — 84132 ASSAY OF SERUM POTASSIUM: CPT | Performed by: INTERNAL MEDICINE

## 2025-06-21 PROCEDURE — 85520 HEPARIN ASSAY: CPT | Performed by: HOSPITALIST

## 2025-06-21 RX ORDER — POTASSIUM CITRATE 1080 MG/1
10 TABLET, EXTENDED RELEASE ORAL
Status: DISCONTINUED | OUTPATIENT
Start: 2025-06-22 | End: 2025-06-21

## 2025-06-21 RX ORDER — POTASSIUM CITRATE 1080 MG/1
10 TABLET, EXTENDED RELEASE ORAL
Status: DISCONTINUED | OUTPATIENT
Start: 2025-06-21 | End: 2025-06-23 | Stop reason: HOSPADM

## 2025-06-21 RX ORDER — POTASSIUM CITRATE 1080 MG/1
10 TABLET, EXTENDED RELEASE ORAL
Status: DISCONTINUED | OUTPATIENT
Start: 2025-06-21 | End: 2025-06-21

## 2025-06-21 RX ADMIN — HYDROCODONE BITARTRATE AND ACETAMINOPHEN 1 TABLET: 5; 325 TABLET ORAL at 20:54

## 2025-06-21 RX ADMIN — HYDROCODONE BITARTRATE AND ACETAMINOPHEN 1 TABLET: 5; 325 TABLET ORAL at 04:47

## 2025-06-21 RX ADMIN — GABAPENTIN 100 MG: 100 CAPSULE ORAL at 14:41

## 2025-06-21 RX ADMIN — GABAPENTIN 100 MG: 100 CAPSULE ORAL at 20:54

## 2025-06-21 RX ADMIN — HYDROCODONE BITARTRATE AND ACETAMINOPHEN 1 TABLET: 5; 325 TABLET ORAL at 10:00

## 2025-06-21 RX ADMIN — POTASSIUM CITRATE 10 MEQ: 10 TABLET, EXTENDED RELEASE ORAL at 14:41

## 2025-06-21 RX ADMIN — APIXABAN 10 MG: 5 TABLET, FILM COATED ORAL at 09:59

## 2025-06-21 RX ADMIN — HYDROCODONE BITARTRATE AND ACETAMINOPHEN 1 TABLET: 5; 325 TABLET ORAL at 16:47

## 2025-06-21 RX ADMIN — POTASSIUM CITRATE 10 MEQ: 10 TABLET, EXTENDED RELEASE ORAL at 16:45

## 2025-06-21 RX ADMIN — PANTOPRAZOLE SODIUM 40 MG: 40 TABLET, DELAYED RELEASE ORAL at 06:39

## 2025-06-21 RX ADMIN — APIXABAN 10 MG: 5 TABLET, FILM COATED ORAL at 20:54

## 2025-06-21 RX ADMIN — SODIUM BICARBONATE 100 ML/HR: 84 INJECTION, SOLUTION INTRAVENOUS at 15:32

## 2025-06-21 RX ADMIN — TAMSULOSIN HYDROCHLORIDE 0.4 MG: 0.4 CAPSULE ORAL at 20:54

## 2025-06-21 RX ADMIN — SENNOSIDES 17.2 MG: 8.6 TABLET, FILM COATED ORAL at 08:19

## 2025-06-21 RX ADMIN — GABAPENTIN 100 MG: 100 CAPSULE ORAL at 08:19

## 2025-06-21 ASSESSMENT — PAIN SCALES - GENERAL
PAINLEVEL_OUTOF10: 0 - NO PAIN
PAINLEVEL_OUTOF10: 7
PAINLEVEL_OUTOF10: 8
PAINLEVEL_OUTOF10: 8
PAINLEVEL_OUTOF10: 0 - NO PAIN
PAINLEVEL_OUTOF10: 7

## 2025-06-21 ASSESSMENT — COGNITIVE AND FUNCTIONAL STATUS - GENERAL
PERSONAL GROOMING: A LITTLE
DRESSING REGULAR LOWER BODY CLOTHING: A LITTLE
MOVING TO AND FROM BED TO CHAIR: A LITTLE
MOBILITY SCORE: 21
WALKING IN HOSPITAL ROOM: A LITTLE
TOILETING: A LITTLE
DAILY ACTIVITIY SCORE: 21
MOVING TO AND FROM BED TO CHAIR: A LITTLE
TOILETING: A LITTLE
WALKING IN HOSPITAL ROOM: A LITTLE
DRESSING REGULAR LOWER BODY CLOTHING: A LITTLE
CLIMB 3 TO 5 STEPS WITH RAILING: A LITTLE
DAILY ACTIVITIY SCORE: 21
CLIMB 3 TO 5 STEPS WITH RAILING: A LITTLE
MOBILITY SCORE: 21
PERSONAL GROOMING: A LITTLE

## 2025-06-21 ASSESSMENT — PAIN - FUNCTIONAL ASSESSMENT
PAIN_FUNCTIONAL_ASSESSMENT: 0-10

## 2025-06-21 ASSESSMENT — PAIN DESCRIPTION - LOCATION
LOCATION: BACK
LOCATION: BACK

## 2025-06-21 ASSESSMENT — PAIN DESCRIPTION - ORIENTATION: ORIENTATION: LOWER

## 2025-06-21 NOTE — PROGRESS NOTES
Caitlyn Atkins is a 60 y.o. female on day 2 of admission presenting with Hypokalemia.      Subjective   Pt seen this morning, on room air, DTRs reports she's still not eating much. Afebrile. No overnight events reported. 2 DTRs bedside.        Objective     Last Recorded Vitals  BP 81/59 (BP Location: Left arm, Patient Position: Lying)   Pulse 92   Temp 36.8 °C (98.2 °F) (Temporal)   Resp 17   Wt (!) 39.5 kg (87 lb)   SpO2 100%   Intake/Output last 3 Shifts:    Intake/Output Summary (Last 24 hours) at 6/21/2025 1643  Last data filed at 6/21/2025 1415  Gross per 24 hour   Intake 550 ml   Output 350 ml   Net 200 ml       Admission Weight  Weight: (!) 39.5 kg (87 lb) (06/19/25 1642)    Daily Weight  06/19/25 : (!) 39.5 kg (87 lb)    Image Results  XR chest 2 views  Narrative: Interpreted By:  Abi Lawrence,   STUDY:  XR CHEST 2 VIEWS;  6/19/2025 2:29 pm      INDICATION:  Signs/Symptoms:COUGH.      ,R05.9 Cough, unspecified      COMPARISON:  08/17/2023      ACCESSION NUMBER(S):  [id number]      ORDERING CLINICIAN:  NAEEM WALLACE      FINDINGS:  PA and lateral radiographs of the chest were provided.              CARDIOMEDIASTINAL SILHOUETTE:  Cardiomediastinal silhouette is normal in size and configuration.      LUNGS:  Lungs are clear.      ABDOMEN:  No remarkable upper abdominal findings.      BONES:  No acute osseous changes.      Impression: 1.  No evidence of acute cardiopulmonary process.              MACRO:  None      Signed by: Abi Lawrence 6/20/2025 9:53 PM  Dictation workstation:   [id number]  CT head wo IV contrast  Narrative: Interpreted By:  Wilton Goldstein,   STUDY:  CT HEAD WO IV CONTRAST;  6/20/2025 12:52 pm      INDICATION:  Signs/Symptoms:evaluate for any bleed s/p heparinization.          COMPARISON:  CT head obtained earlier today.      ACCESSION NUMBER(S):  [id number]      ORDERING CLINICIAN:  NAEEM ADAN      TECHNIQUE:  Noncontrast axial CT scan of head was performed. Angled  reformats in  brain and bone windows were generated. The images were reviewed in  bone, brain, blood and soft tissue windows.      FINDINGS:  There is no acute intracranial hemorrhage or infarct. Ventricles,  sulci, and basilar cisterns are unchanged in size, shape, and  configuration. There is no abnormal extra-axial fluid collection.      There are multiple areas of hypoattenuation within the cerebral  hemispheric white matter which are probably sequela of chronic small  vessel ischemic changes.      Stable spiculated and sclerotic lesion involving the right frontal  parietal bones. Prior MRI demonstrated adjacent dural enhancement  which is difficult to evaluate on CT. There is overlying subgaleal  soft tissue swelling.      Visualized paranasal sinuses and mastoid air cells are essentially  clear.      Impression: Unchanged CT head. Specifically, no acute intracranial abnormality,  including hemorrhage, or mass effect.      This study was interpreted at [facility].      MACRO:  None      Signed by: Wilton Goldstein 6/20/2025 1:09 PM  Dictation workstation:   [id number]  Transthoracic Echo Complete     [facility], [address] [place] [address]               Tel [phone] and Fax [phone]    TRANSTHORACIC ECHOCARDIOGRAM REPORT       Patient Name:       JOSE CANDACE IBARRA    Reading Physician:    [id number] Adebayo Pratt MD  Study Date:         6/20/2025           Ordering Provider:    [id number] NAEEM ADAN  MRN/PID:            [id number]            Fellow:  Accession#:         [id number]        Nurse:  Date of Birth/Age:  1965 / 60      Sonographer:          Kaila Hay RDCS                      years  Gender assigned at  F                   Additional Staff:  Birth:  Height:             149.86 cm           Admit Date:            6/19/2025  Weight:             39.46 kg            Admission Status:     Inpatient -                                                                Routine  BSA / BMI:          1.29 m2 / 17.57     Encounter#:           [id number]                      kg/m2  Blood Pressure:     81/54 mmHg          Department Location:  [facility] Non                                                                Invasive    Study Type:    TRANSTHORACIC ECHO (TTE) COMPLETE  Diagnosis/ICD: Localized edema-R60.0  Indication:    Edema  CPT Code:      Echo Complete w Full Doppler-[id number]    Patient History:  Pertinent History: HTN, CAD and Chest Pain.    Study Detail: The following Echo studies were performed: M-Mode, 2D, Doppler and                color flow.       PHYSICIAN INTERPRETATION:  Left Ventricle: Left ventricular ejection fraction is normal calculated by Colvin's biplane at 68%. There are no regional left ventricular wall motion abnormalities. The left ventricular cavity size is normal. There is mildly increased septal and normal posterior left ventricular wall thickness. There is left ventricular concentric remodeling. Spectral Doppler shows a Grade I (impaired relaxation pattern) of left ventricular diastolic filling with normal left atrial filling pressure.  Left Atrium: The left atrial size is normal.  Right Ventricle: The right ventricle is normal in size. There is normal right ventricular global systolic function.  Right Atrium: The right atrium is normal in size.  Aortic Valve: The aortic valve is trileaflet. The aortic valve area by VTI is 2.59 cmï¿½ with a peak velocity of 1.42 m/s. The peak and mean gradients are 7 mmHg and 4 mmHg, respectively with a dimensionless index of 0.94. There is mild aortic valve regurgitation.  Mitral Valve: The mitral valve is mildly thickened. There is mild mitral valve regurgitation. The E Vmax is 0.85 m/s.  Tricuspid Valve: The tricuspid valve is structurally normal. There is mild  tricuspid regurgitation. The Doppler estimated right ventricular systolic pressure (RVSP) is within normal limits at 26 mmHg.  Pulmonic Valve: The pulmonic valve is structurally normal. There is no indication of pulmonic valve regurgitation.  Pericardium: There is no pericardial effusion noted.  Aorta: The aortic root is normal.  Systemic Veins: The inferior vena cava appears normal in size, with IVC inspiratory collapse greater than 50%.  In comparison to the previous echocardiogram(s): Compared with study dated 8/7/2023, no significant change.       CONCLUSIONS:   1. Left ventricular ejection fraction is normal calculated by Colvin's biplane at 68%.   2. Spectral Doppler shows a Grade I (impaired relaxation pattern) of left ventricular diastolic filling with normal left atrial filling pressure.   3. There is normal right ventricular global systolic function.   4. The Doppler estimated RVSP is within normal limits at 26 mmHg.   5. Mild aortic valve regurgitation.   6. Compared with study dated 8/7/2023, no significant change.    QUANTITATIVE DATA SUMMARY:     2D MEASUREMENTS:          Normal Ranges:  Ao Root s:       2.84 cm  IVSd:            0.96 cm  (0.6-1.1cm)  LVPWd:           0.92 cm  (0.6-1.1cm)  LVIDd:           3.63 cm  (3.9-5.9cm)  LVIDs:           2.13 cm  LV Mass Index:   78 g/m2  LVEDV Index:     39 ml/m2  LV % FS          41.5 %       LEFT ATRIUM:                  Normal Ranges:  LA Vol A4C:        32.4 ml    (22+/-6mL/m2)  LA Vol A2C:        26.3 ml  LA Vol BP:         31.4 ml  LA Vol Index A4C:  25.1ml/m2  LA Vol Index A2C:  20.3 ml/m2  LA Vol Index BP:   24.2 ml/m2  LA Area A4C:       12.2 cm2  LA Area A2C:       11.8 cm2  LA Major Axis A4C: 3.9 cm  LA Major Axis A2C: 4.5 cm  LA Volume Index:   24.2 ml/m2  LA Vol A4C:        29.2 ml  LA Vol A2C:        25.3 ml  LA Vol Index BSA:  21.1 ml/m2       RIGHT ATRIUM:                 Normal Ranges:  RA Vol A4C:        25.3 ml    (8.3-19.5ml)  RA Vol Index  A4C:  19.6 ml/m2  RA Area A4C:       10.5 cm2  RA Major Axis A4C: 3.7 cm       AORTA MEASUREMENTS:         Normal Ranges:  Ao Sinus, d:        2.80 cm (2.1-3.5cm)  Ao STJ, d:          2.50 cm (1.7-3.4cm)  Asc Ao, d:          3.30 cm (2.1-3.4cm)       LV SYSTOLIC FUNCTION:                       Normal Ranges:  EF-A4C View:    65 % (>=55%)  EF-A2C View:    72 %  EF-Biplane:     68 %  LV EF Reported: 68 %       LV DIASTOLIC FUNCTION:             Normal Ranges:  MV Peak E:             0.85 m/s    (0.7-1.2 m/s)  MV Peak A:             0.80 m/s    (0.42-0.7 m/s)  E/A Ratio:             1.06        (1.0-2.2)  MV e'                  0.085 m/s   (>8.0)  MV lateral e'          0.09 m/s  MV medial e'           0.08 m/s  MV A Dur:              114.18 msec  E/e' Ratio:            9.99        (<8.0)  a'                     0.08 m/s  PulmV Sys Jack:         68.72 cm/s  PulmV Bryson Jack:        48.65 cm/s  PulmV S/D Jack:         1.41  PulmV A Revs Jack:      22.82 cm/s  PulmV A Revs Dur:      112.27 msec       MITRAL VALVE:          Normal Ranges:  MV DT:        202 msec (150-240msec)       AORTIC VALVE:                     Normal Ranges:  AoV Vmax:                1.42 m/s (<=1.7m/s)  AoV Peak P.0 mmHg (<20mmHg)  AoV Mean P.2 mmHg (1.7-11.5mmHg)  LVOT Max Jack:            1.28 m/s (<=1.1m/s)  AoV VTI:                 23.04 cm (18-25cm)  LVOT VTI:                21.71 cm  LVOT Diameter:           1.87 cm  (1.8-2.4cm)  AoV Area, VTI:           2.59 cm2 (2.5-5.5cm2)  AoV Area,Vmax:           2.49 cm2 (2.5-4.5cm2)  AoV Dimensionless Index: 0.94       AORTIC INSUFFICIENCY:  AI Vmax:       3.18 m/s  AI Half-time:  523 msec  AI Decel Time: 1804 msec  AI Decel Rate: 176.73 cm/s2       RIGHT VENTRICLE:  RV Basal 3.10 cm  RV Mid   2.60 cm  RV Major 6.3 cm  TAPSE:   20.3 mm  RV s'    0.16 m/s       TRICUSPID VALVE/RVSP:          Normal Ranges:  Peak TR Velocity:     2.41 m/s  Est. RA Pressure:     3  RV Syst  Pressure:     26       (< 30mmHg)  IVC Diam:             1.39 cm       PULMONIC VALVE:          Normal Ranges:  PV Accel Time:  120 msec (>120ms)  PV Max Jack:     0.9 m/s  (0.6-0.9m/s)  PV Max PG:      3.5 mmHg       PULMONARY VEINS:  PulmV A Revs Dur: 112.27 msec  PulmV A Revs Jack: 22.82 cm/s  PulmV Bryson Jack:   48.65 cm/s  PulmV S/D Jack:    1.41  PulmV Sys Jack:    68.72 cm/s       AORTA:  Asc Ao Diam 3.35 cm       [id number] Adebayo Pratt MD  Electronically signed on 6/20/2025 at 12:38:48 PM       ** Final **  US abdomen limited liver, US renal complete  Narrative: Interpreted By:  Bereket Stein,   STUDY:  US ABDOMEN LIMITED LIVER; US RENAL COMPLETE  6/20/2025 4:04 am      INDICATION:  59 y/o   F with  Signs/Symptoms:elevated LFTs; Signs/Symptoms:[place].      COMPARISON:  None.      ACCESSION NUMBER(S):  [id number]; [id number]      ORDERING CLINICIAN:  NAEEM ADAN      TECHNIQUE:  Routine ultrasound of the right upper quadrant was performed using  grayscale imaging, color Doppler, and spectral Doppler. Imaging of  the kidneys and urinary bladder was also performed.      FINDINGS:  LIVER:  Craniocaudal length: 13.6 cm.  This is  within normal limits.  Echogenicity:  Diffusely increased.  Mass:  None      GALLBLADDER:  There is mild cholelithiasis. There is no gallbladder wall  thickening. There was an adherent nonshadowing echogenic nodule  toward the posterior fundus measuring 3 mm. Borderline gallbladder  dilatation with the gallbladder measuring 49 mm in transverse  diameter.   no sonographic Krishnamurthy sign. No adjacent fluid.      BILE DUCTS:  No intrahepatic biliary ductal dilatation.  Common bile duct measured 0.9 cm    in diameter. This is abnormally  enlarged.      PANCREAS:  Pancreatic head and body were well seen and were sonographically  normal for size and echogenicity. The pancreatic tail was obscured by  bowel gas.      RIGHT KIDNEY:  Craniocaudal length:   10.8 cm , Within normal limits of size for  age.  Echogenicity was normal.  No hydronephrosis, shadowing stone, or perinephric edema.  No gross right renal mass.          LEFT KIDNEY:  Craniocaudal length: 10.5 cm  , Within normal limits of size for age.  Echogenicity was normal.  No hydronephrosis, shadowing stone, or perinephric edema.  No gross left renal mass.      PERITONEAL FLUID:  Trace perihepatic ascites.      URINARY BLADDER:  Aguirre catheter within an otherwise empty urinary bladder.          Impression: Aguirre catheter within an otherwise empty urinary bladder.      No shadowing stone or hydronephrosis in either kidney.      Cholelithiasis with borderline gallbladder dilatation. However, no  specific sonographic evidence of acute cholecystitis.      Nonspecific common bile duct dilatation. Laboratory correlation  needed. Consider nuclear medicine HIDA scan in follow-up.      Nonspecific increased echogenicity throughout the liver, most likely  fatty infiltration.      3 mm adherent echogenic nodule in the posterior gallbladder fundus,  most likely small polyp. Recommend a follow-up ultrasound in 6-12  months.      MACRO:  None      Signed by: Bereket Stein 6/20/2025 8:35 AM  Dictation workstation:   [id number]  CT chest abdomen pelvis wo IV contrast  Narrative: Interpreted By:  Yulissa Escalante,   STUDY:  CT CHEST ABDOMEN PELVIS WO CONTRAST;  6/20/2025 3:09 am      INDICATION:  Signs/Symptoms:nausea and vomiting.      COMPARISON:  CT abdomen pelvis 04/21/2025, limited coronary CT 11/16/2023      ACCESSION NUMBER(S):  [id number]      ORDERING CLINICIAN:  NAEEM ADAN      TECHNIQUE:  Axial noncontrast CT images of the chest, abdomen and pelvis with  coronal and sagittal reconstructed images.      FINDINGS:  Lack of intravenous contrast limits evaluation of vessel, solid  organs and bowel.      CHEST:      VESSELS: Ectasia of ascending aorta measuring up to 3.8 cm.  HEART: Normal size. No significant coronary artery calcifications.  The no pericardial  effusion. MEDIASTINUM AND ROBIN: No pathologically  enlarged thoracic lymph nodes. LUNG, PLEURA, LARGE AIRWAYS: No  pleural effusion or pneumothorax. 1.5 cm ground-glass opacity in the  right lower lobe, not seen previously. Mild bibasilar-dependent  atelectasis. CHEST WALL AND LOWER NECK: Within normal limits. No  acute osseous abnormality.      ABDOMEN:      BONES: No acute osseous abnormality. Chronic sacral insufficiency  fractures. Chronic right pubic rami fractures. Right total hip  arthroplasty. ABDOMINAL WALL: Within normal limits.      LIVER: Within normal limits.  BILE DUCTS: No biliary dilatation.  GALLBLADDER: Cholelithiasis. No pericholecystic inflammatory changes.  PANCREAS: No peripancreatic inflammatory stranding or duct dilatation.  SPLEEN: Within normal limits.  ADRENALS: Within normal limits.  KIDNEYS: No hydronephrosis or perinephric fluid collection.  Nonobstructing left renal calculi measuring up to 4 mm. No  hydronephrosis or calculus along the course of the ureters.      VESSELS: No aortic aneurysm.  RETROPERITONEUM: No pathologically enlarged lymph nodes.      PELVIS:      REPRODUCTIVE ORGANS: Status post hysterectomy.  BLADDER: Aguirre catheter in the nondistended urinary bladder.      BOWEL: No dilated bowel.  Normal appendix.  PERITONEUM: No ascites or free air, no fluid collection.      Impression: 1.5 cm ground-glass opacity in the right lower lobe may be infectious  or inflammatory infiltrate. Follow-up chest CT in 3-6 months is  recommended to assess for resolution.      No acute abdominal or pelvic abnormality.      MACRO:  None      Signed by: Yulissa Escalante 6/20/2025 3:58 AM  Dictation workstation:   [id number]  CT head wo IV contrast  Narrative: Interpreted By:  Yulissa Escalante,   STUDY:  CT HEAD WO IV CONTRAST;  6/20/2025 3:09 am      INDICATION:  Signs/Symptoms:pt with meningioma, possible Paget's diseas of the  calvarium.      COMPARISON:  05/05/2025      ACCESSION  NUMBER(S):  [id number]      ORDERING CLINICIAN:  NAEEM ADAN      TECHNIQUE:  Axial noncontrast CT images of the head.      FINDINGS:  BRAIN PARENCHYMA:  deep and periventricular white matter  hypodensities are nonspecific, but favored to represent chronic small  vessel ischemic changes.  Gray-white matter interfaces are preserved.  No mass effect or midline shift.      HEMORRHAGE: No acute intracranial hemorrhage.  VENTRICLES and EXTRA-AXIAL SPACES: The ventricles and sulci are  within normal limits in size for brain volume. No abnormal extraaxial  fluid collection. EXTRACRANIAL SOFT TISSUES: Within normal limits.  PARANASAL SINUSES/MASTOIDS:  The visualized paranasal sinuses and  mastoid air cells are aerated. CALVARIUM: No depressed skull  fracture. Stable appearance of sclerosis and periosteal bone  formation involving with the right frontal bone.      OTHER FINDINGS: None.      Impression: No acute intracranial abnormality.      Stable indeterminate lesion of the right frontal bone. Please refer  to PET CT dated 06/18/2025 for characterization.      MACRO:  None      Signed by: Yulissa Escalante 6/20/2025 3:49 AM  Dictation workstation:   [id number]  Lower extremity venous duplex bilateral  Narrative: Interpreted By:  Jovana To,   STUDY:  [facility] LOWER EXTREMITY VENOUS DUPLEX BILATERAL;  6/19/2025 11:11 pm      INDICATION:  Signs/Symptoms:eval for DVT; bilateral LE edema, sedentary pt.      ,R60.0 Localized edema      COMPARISON:  None.      ACCESSION NUMBER(S):  [id number]      ORDERING CLINICIAN:  NAEEM ADAN      TECHNIQUE:  Vascular ultrasound of the bilateral lower extremities was performed.  Real-time compression views as well as Gray scale, color Doppler and  spectral Doppler waveform analysis was performed.      FINDINGS:  Evaluation of the visualized portions of the bilateral common femoral  vein, proximal, mid, and distal femoral vein, and popliteal vein were  performed.  Evaluation of the  visualized portions of the  posterior  tibial and peroneal veins were also performed.          RIGHT LOWER EXTREMITY:  The evaluated veins demonstrate normal compressibility. There is  intact venous flow demonstrating normal respiratory variability and  normal augmentation of flow with calf compression. Therefore, there  is no ultrasonographic evidence for deep vein thrombosis within the  evaluated veins.      LEFT LOWER EXTREMITY:  Occlusive thrombus is noted at the proximal left peroneal vein.  The remainder of the evaluated veins demonstrates normal  compressibility with intact flow with normal respiratory variability  and normal augmentation of flow with calf compression.      Impression: 1. Acute deep vein thrombosis at the proximal left peroneal vein.  2. No sonographic evidence for deep vein thrombosis within the  evaluated veins of the right lower extremity.      MACRO:  Critical Finding:  See findings. Notification was initiated on  6/20/2025 at 12:54 am by  Jovana To.  (**-OCF-**) Instructions:      Signed by: Jovana To 6/20/2025 12:56 AM  Dictation workstation:   [id number]      Physical Exam  Constitutional:       Appearance: Normal appearance. She is ill-appearing (chronically ill appearing).      Comments: Very thin   Cardiovascular:      Rate and Rhythm: Normal rate and regular rhythm.   Pulmonary:      Effort: Pulmonary effort is normal.      Breath sounds: Normal breath sounds.   Abdominal:      General: Abdomen is flat. Bowel sounds are normal.      Palpations: Abdomen is soft.   Musculoskeletal:      Cervical back: Normal range of motion.   Skin:     General: Skin is warm.   Neurological:      General: No focal deficit present.      Mental Status: She is alert and oriented to person, place, and time. Mental status is at baseline.   Psychiatric:         Mood and Affect: Mood normal.         Behavior: Behavior normal.         Thought Content: Thought content normal.         Judgment:  Judgment normal.         Relevant Results      This patient has a urinary catheter   Reason for the urinary catheter remaining today? urinary retention/bladder outlet obstruction, acute or chronic          Assessment & Plan  Hypokalemia            6/21:  Cleared by neuroSx to start Eliquis and they will f/up OP.   K is better, pH is 7.16 compatible with MA. Concern is for RTA which sounds like the DTR also has.... cont replacement, bicarb gtt per renal, f/up studies.     Anemia... drop is dilutional... check fe, b9, b12.     LLE DVT... eliquis started.   Retention... tracey, flomax.     Echo with diastolic dysfunction, unchanged from prior.     Cr normalized.   Ca normalized.     F/up dietician and add supplement shakes.   Chronic opiate dependence.    Discussed with Dr King yesterday and pt will likely need to delay her upcoming sacral/spine surgery. Family is very upset about this, doesn't agree it's elective... reached out to ortho team and they will speak with her Monday.     Will add pt/ot        6/20:  HypoK, hypoMg.... sounds like it's nutrition related, probably related to chronic retention also... replace both today, monitor, f/up renal.     HyperCa, slight miles... taking supplements at home, likely dehydrated... cont ivf and monitor.     Acute urinary retention... sounds like this has been brewing for weeks as she reports very little voiding with difficulty controlling it... tracey, flomax, f/up .     Acute LLE DVT... hep gtt, transition to eliquis soon.     Hx sacral fx, spinal stenosis.... chronic narcotics dependence, has pain mgmt team OP she reports... cont regimen. She has ortho surgery next week.     ? Pagets/meningioma... looks like she was being workup for this OP with neuroSx.     Home regimen for chronic conditions.   Dvt px with hep gtt.     Pt lives at home solo, uses walker baseline.           6/19:  - pt reports no appetite and very little po intake earlier this month, however, since 6/14  has been eating relatively normally. Mag only slightly low. Denies diuretic use  - will check urine potassium to r/o kidney loss  - Received 60mEq in the ED, repeat K still 2.0  - will give an additional 60mEq, recheck in am.   - if still low, consult nephrology for presumed kidney loss    Cachexia, hypercalcemia  - pt appears chronically ill, she is taking calcium supplements, however, given her appearance would like to r/o malignancy.   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

## 2025-06-21 NOTE — PROGRESS NOTES
NEPHROLOGY PROGRESS NOTE      Caitlyn Atkins is a 60 y.o. female on day 2 of admission presenting with Hypokalemia.      Subjective   Caitlyn Atkins is a 60 y.o. female with a PMH of MS, osteoporosis, lumbar radiculopathy, spinal stenosis, right total hip, extra-axial, calvarium marrow and scalp lesions of unknown etiology or diagnosis seen on MRI 5/12/25, and previous episode of hypokalemia in April presenting with hypokalemia seen on labs for pre-op testing.  Nephrology was consulted because of refractory hypokalemia     6/21/2025: Patient potassium improved to 4.6.  Can discontinue the potassium containing the normal saline once the current bag runs out.  Urine potassium creatinine ratio 28.  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  Requested patient nurse in person to send the VBG full panel and renin aldosterone which are still pending collection in order to confirm the diagnosis.     Scheduled Medications:   Scheduled Medications[1]     Continuous Medications:   Continuous Medications[2]     PRN Medications:   PRN Medications[3]    Objective   Vitals:  Most Recent:  Vitals:    06/21/25 1219   BP: 107/52   Pulse:    Resp:    Temp: 36.6 °C (97.9 °F)   SpO2: 94%       24hr Min/Max:  Temp  Min: 36.6 °C (97.9 °F)  Max: 37.2 °C (99 °F)  Pulse  Min: 77  Max: 83  BP  Min: 87/56  Max: 107/52  Resp  Min: 16  Max: 17  SpO2  Min: 94 %  Max: 99 %    LDA:  Urethral Catheter Non-latex 16 Fr. (Active)   Placement Date/Time: 06/20/25 0213   Placed by: SHARDA Vargas RN  Hand Hygiene Completed: Yes  Catheter Type: Non-latex  Tube Size (Fr.): 16 Fr.  Catheter Balloon Size: 10 mL  Urine Returned: Yes   Number of days: 1         Hemodynamic parameters for last 24 hours:       I/O:    Intake/Output Summary (Last 24 hours) at 6/21/2025 1302  Last data filed at 6/21/2025 1021  Gross per 24 hour   Intake 550 ml   Output 300 ml   Net 250 ml       Physical Exam:   Physical Exam  Vitals reviewed.   Constitutional:       Comments: Cachectic    HENT:      Head: Normocephalic and atraumatic.   Eyes:      Conjunctiva/sclera: Conjunctivae normal.      Pupils: Pupils are equal, round, and reactive to light.   Cardiovascular:      Rate and Rhythm: Normal rate and regular rhythm.   Pulmonary:      Effort: Pulmonary effort is normal.      Breath sounds: Normal breath sounds.   Abdominal:      General: Abdomen is flat.      Palpations: Abdomen is soft.   Musculoskeletal:         General: Normal range of motion.   Skin:     General: Skin is warm and dry.   Neurological:      General: No focal deficit present.      Mental Status: She is alert and oriented to person, place, and time. Mental status is at baseline.   Psychiatric:         Mood and Affect: Mood normal.         Behavior: Behavior normal.          Lab/Radiology/Diagnostic Review:  Results for orders placed or performed during the hospital encounter of 06/19/25 (from the past 24 hours)   Heparin Assay, UFH   Result Value Ref Range    Heparin Unfractionated 0.5 See Comment Below for Therapeutic Ranges IU/mL   SST TOP   Result Value Ref Range    Extra Tube Hold for add-ons.    Lavender Top   Result Value Ref Range    Extra Tube Hold for add-ons.    Heparin Assay, UFH   Result Value Ref Range    Heparin Unfractionated 0.3 See Comment Below for Therapeutic Ranges IU/mL   Heparin Assay, UFH   Result Value Ref Range    Heparin Unfractionated 0.2 See Comment Below for Therapeutic Ranges IU/mL   Basic Metabolic Panel   Result Value Ref Range    Glucose 95 74 - 99 mg/dL    Sodium 140 136 - 145 mmol/L    Potassium 4.6 3.5 - 5.3 mmol/L    Chloride 122 (H) 98 - 107 mmol/L    Bicarbonate 15 (L) 21 - 32 mmol/L    Anion Gap 8 (L) 10 - 20 mmol/L    Urea Nitrogen 13 6 - 23 mg/dL    Creatinine 0.99 0.50 - 1.05 mg/dL    eGFR 65 >60 mL/min/1.73m*2    Calcium 10.2 8.6 - 10.3 mg/dL   CBC   Result Value Ref Range    WBC 7.5 4.4 - 11.3 x10*3/uL    nRBC 0.0 0.0 - 0.0 /100 WBCs    RBC 2.74 (L) 4.00 - 5.20 x10*6/uL    Hemoglobin  8.6 (L) 12.0 - 16.0 g/dL    Hematocrit 25.8 (L) 36.0 - 46.0 %    MCV 94 80 - 100 fL    MCH 31.4 26.0 - 34.0 pg    MCHC 33.3 32.0 - 36.0 g/dL    RDW 15.6 (H) 11.5 - 14.5 %    Platelets 386 150 - 450 x10*3/uL   Magnesium   Result Value Ref Range    Magnesium 1.99 1.60 - 2.40 mg/dL   Heparin Assay, UFH   Result Value Ref Range    Heparin Unfractionated 0.8 See Comment Below for Therapeutic Ranges IU/mL     Imaging  XR chest 2 views  Result Date: 6/20/2025  1.  No evidence of acute cardiopulmonary process.       MACRO: None   Signed by: Abi Lawrence 6/20/2025 9:53 PM Dictation workstation:   [id number]    CT head wo IV contrast  Result Date: 6/20/2025  Unchanged CT head. Specifically, no acute intracranial abnormality, including hemorrhage, or mass effect.   This study was interpreted at [facility].   MACRO: None   Signed by: Wilton Goldstein 6/20/2025 1:09 PM Dictation workstation:   [id number]    US abdomen limited liver  Result Date: 6/20/2025  Aguirre catheter within an otherwise empty urinary bladder.   No shadowing stone or hydronephrosis in either kidney.   Cholelithiasis with borderline gallbladder dilatation. However, no specific sonographic evidence of acute cholecystitis.   Nonspecific common bile duct dilatation. Laboratory correlation needed. Consider nuclear medicine HIDA scan in follow-up.   Nonspecific increased echogenicity throughout the liver, most likely fatty infiltration.   3 mm adherent echogenic nodule in the posterior gallbladder fundus, most likely small polyp. Recommend a follow-up ultrasound in 6-12 months.   MACRO: None   Signed by: Bereket Stein 6/20/2025 8:35 AM Dictation workstation:   [id number]    US renal complete  Result Date: 6/20/2025  Aguirre catheter within an otherwise empty urinary bladder.   No shadowing stone or hydronephrosis in either kidney.   Cholelithiasis with borderline gallbladder dilatation. However, no specific sonographic evidence of  acute cholecystitis.   Nonspecific common bile duct dilatation. Laboratory correlation needed. Consider nuclear medicine HIDA scan in follow-up.   Nonspecific increased echogenicity throughout the liver, most likely fatty infiltration.   3 mm adherent echogenic nodule in the posterior gallbladder fundus, most likely small polyp. Recommend a follow-up ultrasound in 6-12 months.   MACRO: None   Signed by: Bereket Stein 6/20/2025 8:35 AM Dictation workstation:   [id number]    CT chest abdomen pelvis wo IV contrast  Result Date: 6/20/2025  1.5 cm ground-glass opacity in the right lower lobe may be infectious or inflammatory infiltrate. Follow-up chest CT in 3-6 months is recommended to assess for resolution.   No acute abdominal or pelvic abnormality.   MACRO: None   Signed by: Yulissa Escalante 6/20/2025 3:58 AM Dictation workstation:   [id number]    CT head wo IV contrast  Result Date: 6/20/2025  No acute intracranial abnormality.   Stable indeterminate lesion of the right frontal bone. Please refer to PET CT dated 06/18/2025 for characterization.   MACRO: None   Signed by: Yulissa Escalante 6/20/2025 3:49 AM Dictation workstation:   [id number]    Lower extremity venous duplex bilateral  Result Date: 6/20/2025  1. Acute deep vein thrombosis at the proximal left peroneal vein. 2. No sonographic evidence for deep vein thrombosis within the evaluated veins of the right lower extremity.   MACRO: Critical Finding:  See findings. Notification was initiated on 6/20/2025 at 12:54 am by  Jovana To.  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   I personally reviewed the images/study and I agree with the findings as stated by Keyla Chin MD. This study was interpreted at [facility]  [place], [place].   MACRO: None   Signed by: Anastasiia Michele 6/19/2025 11:32 AM Dictation workstation:   [id number]    NM bone whole body  Result Date: 6/18/2025  1.  Heterogeneously increased uptake in the right frontal calvarium, nonspecific, but in keeping with patient's suspected intraosseous meningioma. 2. Focal increased uptake in the anterior right 12th rib, nonspecific, but may be seen in the setting of trauma. Correlate clinically with physical exam and consider further imaging if warranted.   I personally reviewed the images/study and I agree with the findings as stated by Keyla Chin MD. This study was interpreted at [place], [place].   MACRO: None   Signed by: Anastasiia Michele 6/18/2025 3:52 PM Dictation workstation:   [id number]      Cardiology, Vascular, and Other Imaging  Transthoracic Echo Complete  Result Date: 6/20/2025   [facility], [address] [place] [address]              Tel [phone] and Fax [phone] TRANSTHORACIC ECHOCARDIOGRAM REPORT  Patient Name:       JOSE MARIA FRED    Reading Physician:    [id number] Adebayo Pratt MD Study Date:         6/20/2025           Ordering Provider:    [id number] NAEEM ADAN MRN/PID:            [id number]            Fellow: Accession#:         [id number]        Nurse: Date of Birth/Age:  1965 / 60      Sonographer:          Kaila Hay RDCS                     years Gender assigned at  F                   Additional Staff: Birth: Height:             149.86 cm           Admit Date:           6/19/2025 Weight:             39.46 kg            Admission Status:     Inpatient -                                                               Routine BSA / BMI:          1.29 m2 / 17.57     Encounter#:           [id number]                     kg/m2 Blood  Pressure:     81/54 mmHg          Department Location:  [facility] Non                                                               Invasive Study Type:    TRANSTHORACIC ECHO (TTE) COMPLETE Diagnosis/ICD: Localized edema-R60.0 Indication:    Edema CPT Code:      Echo Complete w Full Doppler-15026 Patient History: Pertinent History: HTN, CAD and Chest Pain. Study Detail: The following Echo studies were performed: M-Mode, 2D, Doppler and               color flow.  PHYSICIAN INTERPRETATION: Left Ventricle: Left ventricular ejection fraction is normal calculated by Colvin's biplane at 68%. There are no regional left ventricular wall motion abnormalities. The left ventricular cavity size is normal. There is mildly increased septal and normal posterior left ventricular wall thickness. There is left ventricular concentric remodeling. Spectral Doppler shows a Grade I (impaired relaxation pattern) of left ventricular diastolic filling with normal left atrial filling pressure. Left Atrium: The left atrial size is normal. Right Ventricle: The right ventricle is normal in size. There is normal right ventricular global systolic function. Right Atrium: The right atrium is normal in size. Aortic Valve: The aortic valve is trileaflet. The aortic valve area by VTI is 2.59 cmï¿½ with a peak velocity of 1.42 m/s. The peak and mean gradients are 7 mmHg and 4 mmHg, respectively with a dimensionless index of 0.94. There is mild aortic valve regurgitation. Mitral Valve: The mitral valve is mildly thickened. There is mild mitral valve regurgitation. The E Vmax is 0.85 m/s. Tricuspid Valve: The tricuspid valve is structurally normal. There is mild tricuspid regurgitation. The Doppler estimated right ventricular systolic pressure (RVSP) is within normal limits at 26 mmHg. Pulmonic Valve: The pulmonic valve is structurally normal. There is no indication of pulmonic valve regurgitation. Pericardium: There is no pericardial effusion noted.  Aorta: The aortic root is normal. Systemic Veins: The inferior vena cava appears normal in size, with IVC inspiratory collapse greater than 50%. In comparison to the previous echocardiogram(s): Compared with study dated 8/7/2023, no significant change.  CONCLUSIONS:  1. Left ventricular ejection fraction is normal calculated by Colvin's biplane at 68%.  2. Spectral Doppler shows a Grade I (impaired relaxation pattern) of left ventricular diastolic filling with normal left atrial filling pressure.  3. There is normal right ventricular global systolic function.  4. The Doppler estimated RVSP is within normal limits at 26 mmHg.  5. Mild aortic valve regurgitation.  6. Compared with study dated 8/7/2023, no significant change. QUANTITATIVE DATA SUMMARY:  2D MEASUREMENTS:          Normal Ranges: Ao Root s:       2.84 cm IVSd:            0.96 cm  (0.6-1.1cm) LVPWd:           0.92 cm  (0.6-1.1cm) LVIDd:           3.63 cm  (3.9-5.9cm) LVIDs:           2.13 cm LV Mass Index:   78 g/m2 LVEDV Index:     39 ml/m2 LV % FS          41.5 %  LEFT ATRIUM:                  Normal Ranges: LA Vol A4C:        32.4 ml    (22+/-6mL/m2) LA Vol A2C:        26.3 ml LA Vol BP:         31.4 ml LA Vol Index A4C:  25.1ml/m2 LA Vol Index A2C:  20.3 ml/m2 LA Vol Index BP:   24.2 ml/m2 LA Area A4C:       12.2 cm2 LA Area A2C:       11.8 cm2 LA Major Axis A4C: 3.9 cm LA Major Axis A2C: 4.5 cm LA Volume Index:   24.2 ml/m2 LA Vol A4C:        29.2 ml LA Vol A2C:        25.3 ml LA Vol Index BSA:  21.1 ml/m2  RIGHT ATRIUM:                 Normal Ranges: RA Vol A4C:        25.3 ml    (8.3-19.5ml) RA Vol Index A4C:  19.6 ml/m2 RA Area A4C:       10.5 cm2 RA Major Axis A4C: 3.7 cm  AORTA MEASUREMENTS:         Normal Ranges: Ao Sinus, d:        2.80 cm (2.1-3.5cm) Ao STJ, d:          2.50 cm (1.7-3.4cm) Asc Ao, d:          3.30 cm (2.1-3.4cm)  LV SYSTOLIC FUNCTION:                      Normal Ranges: EF-A4C View:    65 % (>=55%) EF-A2C View:    72 %  EF-Biplane:     68 % LV EF Reported: 68 %  LV DIASTOLIC FUNCTION:             Normal Ranges: MV Peak E:             0.85 m/s    (0.7-1.2 m/s) MV Peak A:             0.80 m/s    (0.42-0.7 m/s) E/A Ratio:             1.06        (1.0-2.2) MV e'                  0.085 m/s   (>8.0) MV lateral e'          0.09 m/s MV medial e'           0.08 m/s MV A Dur:              114.18 msec E/e' Ratio:            9.99        (<8.0) a'                     0.08 m/s PulmV Sys Jack:         68.72 cm/s PulmV Bryson Jack:        48.65 cm/s PulmV S/D Jack:         1.41 PulmV A Revs Jack:      22.82 cm/s PulmV A Revs Dur:      112.27 msec  MITRAL VALVE:          Normal Ranges: MV DT:        202 msec (150-240msec)  AORTIC VALVE:                     Normal Ranges: AoV Vmax:                1.42 m/s (<=1.7m/s) AoV Peak P.0 mmHg (<20mmHg) AoV Mean P.2 mmHg (1.7-11.5mmHg) LVOT Max Jack:            1.28 m/s (<=1.1m/s) AoV VTI:                 23.04 cm (18-25cm) LVOT VTI:                21.71 cm LVOT Diameter:           1.87 cm  (1.8-2.4cm) AoV Area, VTI:           2.59 cm2 (2.5-5.5cm2) AoV Area,Vmax:           2.49 cm2 (2.5-4.5cm2) AoV Dimensionless Index: 0.94  AORTIC INSUFFICIENCY: AI Vmax:       3.18 m/s AI Half-time:  523 msec AI Decel Time: 1804 msec AI Decel Rate: 176.73 cm/s2  RIGHT VENTRICLE: RV Basal 3.10 cm RV Mid   2.60 cm RV Major 6.3 cm TAPSE:   20.3 mm RV s'    0.16 m/s  TRICUSPID VALVE/RVSP:          Normal Ranges: Peak TR Velocity:     2.41 m/s Est. RA Pressure:     3 RV Syst Pressure:     26       (< 30mmHg) IVC Diam:             1.39 cm  PULMONIC VALVE:          Normal Ranges: PV Accel Time:  120 msec (>120ms) PV Max Jack:     0.9 m/s  (0.6-0.9m/s) PV Max PG:      3.5 mmHg  PULMONARY VEINS: PulmV A Revs Dur: 112.27 msec PulmV A Revs Jack: 22.82 cm/s PulmV Bryson Jack:   48.65 cm/s PulmV S/D Jack:    1.41 PulmV Sys Jack:    68.72 cm/s  AORTA: Asc Ao Diam 3.35 cm  [id number] Adebayo Pratt MD Electronically signed on  6/20/2025 at 12:38:48 PM  ** Final **                        Assessment/Plan   Assessment & Plan  Hypokalemia    Caitlyn Atkins is a 60 y.o. female with a PMH of MS, osteoporosis, lumbar radiculopathy, spinal stenosis, right total hip, extra-axial, calvarium marrow and scalp lesions of unknown etiology or diagnosis seen on MRI 5/12/25, and previous episode of hypokalemia in April presenting with hypokalemia seen on labs for pre-op testing.  Nephrology was consulted because of refractory hypokalemia     Refractory hypokalemia probably secondary to excessive potassium loss in the urine, possibility of renal tubular acidosis type I  6/20/2025: Urine potassium creatinine ratio is 22  Differential diagnosis of excessive potassium loss in urine or acid-base balance, renal tubular acidosis and mineralocorticoid excess  Ordered ABG full panel, renin aldosterone, urine potassium, urine sodium, urine chloride and urine creatinine  There is a possibility that the patient potassium may have been decreased even because of nausea vomiting and decreased solute intake  Agrees to replacement of potassium  Patient already has a 80 mEq of daily potassium ordered  Ordered excess 40 mEq of oral potassium and also patient is getting IV potassium replacement  6/21/2025: Patient potassium improved to 4.6.    Can discontinue the potassium containing the normal saline once the current bag runs out.    Urine potassium creatinine ratio 28.    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    Patient daughter has a renal tubular acidosis type I  Requested patient nurse in person to send the VBG full panel and renin aldosterone which are still pending collection in order to confirm the diagnosis.   Ordered potassium citrate 10 daily, which she can be discharged on based on the final diagnosis     2.  Refractory nausea vomiting  Nausea vomiting improved  Management as per primary team        I spent 50 minutes of cumulative time with the patient.   Greater than 50% of that time was spent in the direct collaboration and or coordination of care of the patient.     Dragon dictation software was used to dictate this note and thus there may be minor errors in translation/transcription including garbled speech or misspellings. Please contact for clarification if needed.          [1] apixaban, 10 mg, oral, BID   Followed by  [START ON 6/28/2025] apixaban, 5 mg, oral, BID  chlorhexidine, 15 mL, Mouth/Throat, Daily  gabapentin, 100 mg, oral, TID  pantoprazole, 40 mg, oral, Daily before breakfast  potassium citrate CR, 10 mEq, oral, Daily with evening meal  sennosides, 2 tablet, oral, BID  tamsulosin, 0.4 mg, oral, Nightly  [2]    [3] PRN medications: HYDROcodone-acetaminophen, ondansetron ODT **OR** ondansetron, tiZANidine

## 2025-06-21 NOTE — CARE PLAN
The clinical goals for the shift include pt safety & hds    Over the shift, the patient is making progress toward the following goals.       Problem: Pain - Adult  Goal: Verbalizes/displays adequate comfort level or baseline comfort level  Outcome: Progressing     Problem: Safety - Adult  Goal: Free from fall injury  Outcome: Progressing     Problem: Discharge Planning  Goal: Discharge to home or other facility with appropriate resources  Outcome: Progressing     Problem: Chronic Conditions and Co-morbidities  Goal: Patient's chronic conditions and co-morbidity symptoms are monitored and maintained or improved  Outcome: Progressing     Problem: Nutrition  Goal: Nutrient intake appropriate for maintaining nutritional needs  Outcome: Progressing

## 2025-06-22 VITALS
OXYGEN SATURATION: 99 % | DIASTOLIC BLOOD PRESSURE: 64 MMHG | HEART RATE: 80 BPM | SYSTOLIC BLOOD PRESSURE: 98 MMHG | WEIGHT: 87 LBS | BODY MASS INDEX: 17.54 KG/M2 | HEIGHT: 59 IN | TEMPERATURE: 98 F | RESPIRATION RATE: 18 BRPM

## 2025-06-22 LAB
ANION GAP BLDV CALCULATED.4IONS-SCNC: 9 MMOL/L (ref 10–25)
ANION GAP SERPL CALC-SCNC: <7 MMOL/L (ref 10–20)
BASE EXCESS BLDV CALC-SCNC: -5.7 MMOL/L (ref -2–3)
BODY TEMPERATURE: 37 DEGREES CELSIUS
BUN SERPL-MCNC: 11 MG/DL (ref 6–23)
CA-I BLDV-SCNC: 1.48 MMOL/L (ref 1.1–1.33)
CALCIUM SERPL-MCNC: 9.3 MG/DL (ref 8.6–10.3)
CHLORIDE BLDV-SCNC: 110 MMOL/L (ref 98–107)
CHLORIDE SERPL-SCNC: 114 MMOL/L (ref 98–107)
CO2 SERPL-SCNC: 21 MMOL/L (ref 21–32)
CREAT SERPL-MCNC: 0.83 MG/DL (ref 0.5–1.05)
EGFRCR SERPLBLD CKD-EPI 2021: 80 ML/MIN/1.73M*2
ERYTHROCYTE [DISTWIDTH] IN BLOOD BY AUTOMATED COUNT: 14.6 % (ref 11.5–14.5)
GLUCOSE BLDV-MCNC: 129 MG/DL (ref 74–99)
GLUCOSE SERPL-MCNC: 92 MG/DL (ref 74–99)
HCO3 BLDV-SCNC: 19.2 MMOL/L (ref 22–26)
HCT VFR BLD AUTO: 23.7 % (ref 36–46)
HCT VFR BLD EST: 23 % (ref 36–46)
HGB BLD-MCNC: 8.1 G/DL (ref 12–16)
HGB BLDV-MCNC: 7.8 G/DL (ref 12–16)
INHALED O2 CONCENTRATION: 0 %
LACTATE BLDV-SCNC: 1.7 MMOL/L (ref 0.4–2)
MCH RBC QN AUTO: 31 PG (ref 26–34)
MCHC RBC AUTO-ENTMCNC: 34.2 G/DL (ref 32–36)
MCV RBC AUTO: 91 FL (ref 80–100)
NRBC BLD-RTO: 0 /100 WBCS (ref 0–0)
OXYHGB MFR BLDV: 94.1 % (ref 45–75)
PCO2 BLDV: 34 MM HG (ref 41–51)
PH BLDV: 7.36 PH (ref 7.33–7.43)
PLATELET # BLD AUTO: 387 X10*3/UL (ref 150–450)
PO2 BLDV: 69 MM HG (ref 35–45)
POTASSIUM BLDV-SCNC: 4.2 MMOL/L (ref 3.5–5.3)
POTASSIUM SERPL-SCNC: 4.3 MMOL/L (ref 3.5–5.3)
RBC # BLD AUTO: 2.61 X10*6/UL (ref 4–5.2)
SAO2 % BLDV: 97 % (ref 45–75)
SODIUM BLDV-SCNC: 134 MMOL/L (ref 136–145)
SODIUM SERPL-SCNC: 137 MMOL/L (ref 136–145)
WBC # BLD AUTO: 7.5 X10*3/UL (ref 4.4–11.3)

## 2025-06-22 PROCEDURE — 2500000001 HC RX 250 WO HCPCS SELF ADMINISTERED DRUGS (ALT 637 FOR MEDICARE OP): Performed by: INTERNAL MEDICINE

## 2025-06-22 PROCEDURE — 99233 SBSQ HOSP IP/OBS HIGH 50: CPT | Performed by: INTERNAL MEDICINE

## 2025-06-22 PROCEDURE — 97161 PT EVAL LOW COMPLEX 20 MIN: CPT | Mod: GP

## 2025-06-22 PROCEDURE — 2500000002 HC RX 250 W HCPCS SELF ADMINISTERED DRUGS (ALT 637 FOR MEDICARE OP, ALT 636 FOR OP/ED): Performed by: HOSPITALIST

## 2025-06-22 PROCEDURE — 36415 COLL VENOUS BLD VENIPUNCTURE: CPT | Performed by: INTERNAL MEDICINE

## 2025-06-22 PROCEDURE — 2500000001 HC RX 250 WO HCPCS SELF ADMINISTERED DRUGS (ALT 637 FOR MEDICARE OP): Performed by: HOSPITALIST

## 2025-06-22 PROCEDURE — 97116 GAIT TRAINING THERAPY: CPT | Mod: GP

## 2025-06-22 PROCEDURE — 82330 ASSAY OF CALCIUM: CPT | Performed by: INTERNAL MEDICINE

## 2025-06-22 PROCEDURE — 80048 BASIC METABOLIC PNL TOTAL CA: CPT | Performed by: INTERNAL MEDICINE

## 2025-06-22 PROCEDURE — 85027 COMPLETE CBC AUTOMATED: CPT | Performed by: HOSPITALIST

## 2025-06-22 PROCEDURE — 1200000002 HC GENERAL ROOM WITH TELEMETRY DAILY

## 2025-06-22 PROCEDURE — 36415 COLL VENOUS BLD VENIPUNCTURE: CPT | Performed by: HOSPITALIST

## 2025-06-22 PROCEDURE — 2500000004 HC RX 250 GENERAL PHARMACY W/ HCPCS (ALT 636 FOR OP/ED): Performed by: INTERNAL MEDICINE

## 2025-06-22 PROCEDURE — 84132 ASSAY OF SERUM POTASSIUM: CPT | Performed by: INTERNAL MEDICINE

## 2025-06-22 RX ORDER — IBUPROFEN 600 MG/1
600 TABLET, FILM COATED ORAL ONCE
Status: COMPLETED | OUTPATIENT
Start: 2025-06-22 | End: 2025-06-22

## 2025-06-22 RX ADMIN — HYDROCODONE BITARTRATE AND ACETAMINOPHEN 1 TABLET: 5; 325 TABLET ORAL at 17:07

## 2025-06-22 RX ADMIN — GABAPENTIN 100 MG: 100 CAPSULE ORAL at 21:24

## 2025-06-22 RX ADMIN — GABAPENTIN 100 MG: 100 CAPSULE ORAL at 14:29

## 2025-06-22 RX ADMIN — HYDROCODONE BITARTRATE AND ACETAMINOPHEN 1 TABLET: 5; 325 TABLET ORAL at 13:06

## 2025-06-22 RX ADMIN — SENNOSIDES 17.2 MG: 8.6 TABLET, FILM COATED ORAL at 08:36

## 2025-06-22 RX ADMIN — HYDROCODONE BITARTRATE AND ACETAMINOPHEN 1 TABLET: 5; 325 TABLET ORAL at 01:05

## 2025-06-22 RX ADMIN — GABAPENTIN 100 MG: 100 CAPSULE ORAL at 08:36

## 2025-06-22 RX ADMIN — CHLORHEXIDINE GLUCONATE 15 ML: 1.2 RINSE ORAL at 08:36

## 2025-06-22 RX ADMIN — HYDROCODONE BITARTRATE AND ACETAMINOPHEN 1 TABLET: 5; 325 TABLET ORAL at 08:41

## 2025-06-22 RX ADMIN — POTASSIUM CITRATE 10 MEQ: 10 TABLET, EXTENDED RELEASE ORAL at 17:07

## 2025-06-22 RX ADMIN — HYDROCODONE BITARTRATE AND ACETAMINOPHEN 1 TABLET: 5; 325 TABLET ORAL at 21:29

## 2025-06-22 RX ADMIN — PANTOPRAZOLE SODIUM 40 MG: 40 TABLET, DELAYED RELEASE ORAL at 08:36

## 2025-06-22 RX ADMIN — FOLIC ACID 1 MG: 5 INJECTION, SOLUTION INTRAMUSCULAR; INTRAVENOUS; SUBCUTANEOUS at 12:30

## 2025-06-22 RX ADMIN — APIXABAN 10 MG: 5 TABLET, FILM COATED ORAL at 08:36

## 2025-06-22 RX ADMIN — SENNOSIDES 17.2 MG: 8.6 TABLET, FILM COATED ORAL at 21:23

## 2025-06-22 RX ADMIN — TAMSULOSIN HYDROCHLORIDE 0.4 MG: 0.4 CAPSULE ORAL at 21:24

## 2025-06-22 RX ADMIN — TIZANIDINE 2 MG: 4 TABLET ORAL at 12:30

## 2025-06-22 RX ADMIN — POTASSIUM CITRATE 10 MEQ: 10 TABLET, EXTENDED RELEASE ORAL at 08:36

## 2025-06-22 RX ADMIN — IBUPROFEN 600 MG: 600 TABLET ORAL at 21:23

## 2025-06-22 RX ADMIN — HYDROCODONE BITARTRATE AND ACETAMINOPHEN 1 TABLET: 5; 325 TABLET ORAL at 04:52

## 2025-06-22 RX ADMIN — APIXABAN 10 MG: 5 TABLET, FILM COATED ORAL at 21:23

## 2025-06-22 SDOH — SOCIAL STABILITY: SOCIAL INSECURITY
WITHIN THE LAST YEAR, HAVE YOU BEEN KICKED, HIT, SLAPPED, OR OTHERWISE PHYSICALLY HURT BY YOUR PARTNER OR EX-PARTNER?: NO

## 2025-06-22 SDOH — ECONOMIC STABILITY: FOOD INSECURITY: WITHIN THE PAST 12 MONTHS, THE FOOD YOU BOUGHT JUST DIDN'T LAST AND YOU DIDN'T HAVE MONEY TO GET MORE.: NEVER TRUE

## 2025-06-22 SDOH — SOCIAL STABILITY: SOCIAL INSECURITY
WITHIN THE LAST YEAR, HAVE YOU BEEN RAPED OR FORCED TO HAVE ANY KIND OF SEXUAL ACTIVITY BY YOUR PARTNER OR EX-PARTNER?: NO

## 2025-06-22 SDOH — SOCIAL STABILITY: SOCIAL INSECURITY: ABUSE: ADULT

## 2025-06-22 SDOH — ECONOMIC STABILITY: INCOME INSECURITY: IN THE PAST 12 MONTHS HAS THE ELECTRIC, GAS, OIL, OR WATER COMPANY THREATENED TO SHUT OFF SERVICES IN YOUR HOME?: NO

## 2025-06-22 SDOH — ECONOMIC STABILITY: HOUSING INSECURITY: IN THE LAST 12 MONTHS, WAS THERE A TIME WHEN YOU WERE NOT ABLE TO PAY THE MORTGAGE OR RENT ON TIME?: NO

## 2025-06-22 SDOH — SOCIAL STABILITY: SOCIAL INSECURITY: DO YOU FEEL ANYONE HAS EXPLOITED OR TAKEN ADVANTAGE OF YOU FINANCIALLY OR OF YOUR PERSONAL PROPERTY?: NO

## 2025-06-22 SDOH — SOCIAL STABILITY: SOCIAL INSECURITY: DO YOU FEEL UNSAFE GOING BACK TO THE PLACE WHERE YOU ARE LIVING?: NO

## 2025-06-22 SDOH — ECONOMIC STABILITY: FOOD INSECURITY: WITHIN THE PAST 12 MONTHS, YOU WORRIED THAT YOUR FOOD WOULD RUN OUT BEFORE YOU GOT THE MONEY TO BUY MORE.: NEVER TRUE

## 2025-06-22 SDOH — SOCIAL STABILITY: SOCIAL INSECURITY: ARE YOU OR HAVE YOU BEEN THREATENED OR ABUSED PHYSICALLY, EMOTIONALLY, OR SEXUALLY BY ANYONE?: NO

## 2025-06-22 SDOH — ECONOMIC STABILITY: HOUSING INSECURITY: AT ANY TIME IN THE PAST 12 MONTHS, WERE YOU HOMELESS OR LIVING IN A SHELTER (INCLUDING NOW)?: NO

## 2025-06-22 SDOH — SOCIAL STABILITY: SOCIAL INSECURITY: WITHIN THE LAST YEAR, HAVE YOU BEEN HUMILIATED OR EMOTIONALLY ABUSED IN OTHER WAYS BY YOUR PARTNER OR EX-PARTNER?: NO

## 2025-06-22 SDOH — ECONOMIC STABILITY: TRANSPORTATION INSECURITY: IN THE PAST 12 MONTHS, HAS LACK OF TRANSPORTATION KEPT YOU FROM MEDICAL APPOINTMENTS OR FROM GETTING MEDICATIONS?: NO

## 2025-06-22 SDOH — SOCIAL STABILITY: SOCIAL INSECURITY: WITHIN THE LAST YEAR, HAVE YOU BEEN AFRAID OF YOUR PARTNER OR EX-PARTNER?: NO

## 2025-06-22 SDOH — ECONOMIC STABILITY: FOOD INSECURITY: HOW HARD IS IT FOR YOU TO PAY FOR THE VERY BASICS LIKE FOOD, HOUSING, MEDICAL CARE, AND HEATING?: SOMEWHAT HARD

## 2025-06-22 SDOH — ECONOMIC STABILITY: HOUSING INSECURITY: IN THE PAST 12 MONTHS, HOW MANY TIMES HAVE YOU MOVED WHERE YOU WERE LIVING?: 0

## 2025-06-22 SDOH — SOCIAL STABILITY: SOCIAL INSECURITY: ARE THERE ANY APPARENT SIGNS OF INJURIES/BEHAVIORS THAT COULD BE RELATED TO ABUSE/NEGLECT?: NO

## 2025-06-22 SDOH — SOCIAL STABILITY: SOCIAL INSECURITY: HAS ANYONE EVER THREATENED TO HURT YOUR FAMILY OR YOUR PETS?: NO

## 2025-06-22 SDOH — SOCIAL STABILITY: SOCIAL INSECURITY: HAVE YOU HAD THOUGHTS OF HARMING ANYONE ELSE?: NO

## 2025-06-22 SDOH — SOCIAL STABILITY: SOCIAL INSECURITY: WERE YOU ABLE TO COMPLETE ALL THE BEHAVIORAL HEALTH SCREENINGS?: YES

## 2025-06-22 SDOH — SOCIAL STABILITY: SOCIAL INSECURITY: HAVE YOU HAD ANY THOUGHTS OF HARMING ANYONE ELSE?: NO

## 2025-06-22 SDOH — SOCIAL STABILITY: SOCIAL INSECURITY: DOES ANYONE TRY TO KEEP YOU FROM HAVING/CONTACTING OTHER FRIENDS OR DOING THINGS OUTSIDE YOUR HOME?: NO

## 2025-06-22 ASSESSMENT — COGNITIVE AND FUNCTIONAL STATUS - GENERAL
PERSONAL GROOMING: A LITTLE
WALKING IN HOSPITAL ROOM: A LITTLE
PATIENT BASELINE BEDBOUND: NO
MOBILITY SCORE: 21
DAILY ACTIVITIY SCORE: 21
DRESSING REGULAR LOWER BODY CLOTHING: A LITTLE
PERSONAL GROOMING: A LITTLE
TOILETING: A LITTLE
CLIMB 3 TO 5 STEPS WITH RAILING: A LITTLE
TOILETING: A LITTLE
STANDING UP FROM CHAIR USING ARMS: A LITTLE
WALKING IN HOSPITAL ROOM: A LITTLE
CLIMB 3 TO 5 STEPS WITH RAILING: A LITTLE
DAILY ACTIVITIY SCORE: 21
MOVING TO AND FROM BED TO CHAIR: A LITTLE
MOVING TO AND FROM BED TO CHAIR: A LITTLE
DRESSING REGULAR LOWER BODY CLOTHING: A LITTLE
WALKING IN HOSPITAL ROOM: A LITTLE
MOBILITY SCORE: 20
MOVING TO AND FROM BED TO CHAIR: A LITTLE
MOBILITY SCORE: 21
CLIMB 3 TO 5 STEPS WITH RAILING: A LITTLE

## 2025-06-22 ASSESSMENT — LIFESTYLE VARIABLES
HOW OFTEN DO YOU HAVE 6 OR MORE DRINKS ON ONE OCCASION: PATIENT DECLINED
SKIP TO QUESTIONS 9-10: 0
AUDIT-C TOTAL SCORE: -1
HOW OFTEN DO YOU HAVE A DRINK CONTAINING ALCOHOL: PATIENT DECLINED
HOW MANY STANDARD DRINKS CONTAINING ALCOHOL DO YOU HAVE ON A TYPICAL DAY: PATIENT DECLINED
AUDIT-C TOTAL SCORE: -1

## 2025-06-22 ASSESSMENT — PAIN SCALES - GENERAL
PAINLEVEL_OUTOF10: 7
PAINLEVEL_OUTOF10: 2
PAINLEVEL_OUTOF10: 7
PAINLEVEL_OUTOF10: 8
PAINLEVEL_OUTOF10: 7
PAINLEVEL_OUTOF10: 7

## 2025-06-22 ASSESSMENT — ACTIVITIES OF DAILY LIVING (ADL)
TOILETING: NEEDS ASSISTANCE
GROOMING: NEEDS ASSISTANCE
JUDGMENT_ADEQUATE_SAFELY_COMPLETE_DAILY_ACTIVITIES: YES
ADL_ASSISTANCE: INDEPENDENT
LACK_OF_TRANSPORTATION: NO
PATIENT'S MEMORY ADEQUATE TO SAFELY COMPLETE DAILY ACTIVITIES?: YES
HEARING - LEFT EAR: FUNCTIONAL
HEARING - RIGHT EAR: FUNCTIONAL
BATHING: NEEDS ASSISTANCE
ASSISTIVE_DEVICE: WALKER
WALKS IN HOME: INDEPENDENT
ADEQUATE_TO_COMPLETE_ADL: YES
FEEDING YOURSELF: NEEDS ASSISTANCE
DRESSING YOURSELF: NEEDS ASSISTANCE
LACK_OF_TRANSPORTATION: NO

## 2025-06-22 ASSESSMENT — PATIENT HEALTH QUESTIONNAIRE - PHQ9
1. LITTLE INTEREST OR PLEASURE IN DOING THINGS: NOT AT ALL
2. FEELING DOWN, DEPRESSED OR HOPELESS: NOT AT ALL
SUM OF ALL RESPONSES TO PHQ9 QUESTIONS 1 & 2: 0

## 2025-06-22 ASSESSMENT — PAIN DESCRIPTION - LOCATION
LOCATION: BACK
LOCATION: BACK

## 2025-06-22 ASSESSMENT — PAIN - FUNCTIONAL ASSESSMENT
PAIN_FUNCTIONAL_ASSESSMENT: 0-10

## 2025-06-22 ASSESSMENT — PAIN DESCRIPTION - ORIENTATION
ORIENTATION: LOWER
ORIENTATION: LOWER

## 2025-06-22 NOTE — PROGRESS NOTES
Caitlyn Atkins is a 60 y.o. female on day 3 of admission presenting with Hypokalemia.      Subjective   Pt feels better this morning, reports she ate better yesterday. Still waiting to speak with ortho and dietician. Afebrile. No overnight events reported. DTRs bedside.        Objective     Last Recorded Vitals  BP 98/64 (BP Location: Left arm, Patient Position: Lying)   Pulse 80   Temp 36.7 °C (98 °F) (Temporal)   Resp 18   Wt (!) 39.5 kg (87 lb)   SpO2 99%   Intake/Output last 3 Shifts:    Intake/Output Summary (Last 24 hours) at 6/22/2025 0941  Last data filed at 6/21/2025 1532  Gross per 24 hour   Intake 1275 ml   Output 350 ml   Net 925 ml       Admission Weight  Weight: (!) 39.5 kg (87 lb) (06/19/25 1642)    Daily Weight  06/19/25 : (!) 39.5 kg (87 lb)    Image Results  XR chest 2 views  Narrative: Interpreted By:  Abi Lawrence,   STUDY:  XR CHEST 2 VIEWS;  6/19/2025 2:29 pm      INDICATION:  Signs/Symptoms:COUGH.      ,R05.9 Cough, unspecified      COMPARISON:  08/17/2023      ACCESSION NUMBER(S):  [id number]      ORDERING CLINICIAN:  NAEEM WALLACE      FINDINGS:  PA and lateral radiographs of the chest were provided.              CARDIOMEDIASTINAL SILHOUETTE:  Cardiomediastinal silhouette is normal in size and configuration.      LUNGS:  Lungs are clear.      ABDOMEN:  No remarkable upper abdominal findings.      BONES:  No acute osseous changes.      Impression: 1.  No evidence of acute cardiopulmonary process.              MACRO:  None      Signed by: Abi Lawrence 6/20/2025 9:53 PM  Dictation workstation:   [id number]  CT head wo IV contrast  Narrative: Interpreted By:  Wilton Goldstein,   STUDY:  CT HEAD WO IV CONTRAST;  6/20/2025 12:52 pm      INDICATION:  Signs/Symptoms:evaluate for any bleed s/p heparinization.          COMPARISON:  CT head obtained earlier today.      ACCESSION NUMBER(S):  [id number]      ORDERING CLINICIAN:  NAEEM ADAN      TECHNIQUE:  Noncontrast axial CT scan of  head was performed. Angled reformats in  brain and bone windows were generated. The images were reviewed in  bone, brain, blood and soft tissue windows.      FINDINGS:  There is no acute intracranial hemorrhage or infarct. Ventricles,  sulci, and basilar cisterns are unchanged in size, shape, and  configuration. There is no abnormal extra-axial fluid collection.      There are multiple areas of hypoattenuation within the cerebral  hemispheric white matter which are probably sequela of chronic small  vessel ischemic changes.      Stable spiculated and sclerotic lesion involving the right frontal  parietal bones. Prior MRI demonstrated adjacent dural enhancement  which is difficult to evaluate on CT. There is overlying subgaleal  soft tissue swelling.      Visualized paranasal sinuses and mastoid air cells are essentially  clear.      Impression: Unchanged CT head. Specifically, no acute intracranial abnormality,  including hemorrhage, or mass effect.      This study was interpreted at [facility].      MACRO:  None      Signed by: Wilton Goldstein 6/20/2025 1:09 PM  Dictation workstation:   [id number]  Transthoracic Echo [facility], [address] [place] [address]               Tel [phone] and Fax [phone]    TRANSTHORACIC ECHOCARDIOGRAM REPORT       Patient Name:       JOSE Yepez Physician:    [id number] Adebayo Pratt MD  Study Date:         6/20/2025           Ordering Provider:    [id number] NAEEM ADAN  MRN/PID:            [id number]            Fellow:  Accession#:         [id number]        Nurse:  Date of Birth/Age:  1965 / 60      Sonographer:          Kaila Hay RDCS                      years  Gender assigned at  F                   Additional Staff:  Birth:  Height:             149.86 cm            Admit Date:           6/19/2025  Weight:             39.46 kg            Admission Status:     Inpatient -                                                                Routine  BSA / BMI:          1.29 m2 / 17.57     Encounter#:           [id number]                      kg/m2  Blood Pressure:     81/54 mmHg          Department Location:  [facility] Non                                                                Invasive    Study Type:    TRANSTHORACIC ECHO (TTE) COMPLETE  Diagnosis/ICD: Localized edema-R60.0  Indication:    Edema  CPT Code:      Echo Complete w Full Doppler-10745    Patient History:  Pertinent History: HTN, CAD and Chest Pain.    Study Detail: The following Echo studies were performed: M-Mode, 2D, Doppler and                color flow.       PHYSICIAN INTERPRETATION:  Left Ventricle: Left ventricular ejection fraction is normal calculated by Colvin's biplane at 68%. There are no regional left ventricular wall motion abnormalities. The left ventricular cavity size is normal. There is mildly increased septal and normal posterior left ventricular wall thickness. There is left ventricular concentric remodeling. Spectral Doppler shows a Grade I (impaired relaxation pattern) of left ventricular diastolic filling with normal left atrial filling pressure.  Left Atrium: The left atrial size is normal.  Right Ventricle: The right ventricle is normal in size. There is normal right ventricular global systolic function.  Right Atrium: The right atrium is normal in size.  Aortic Valve: The aortic valve is trileaflet. The aortic valve area by VTI is 2.59 cmï¿½ with a peak velocity of 1.42 m/s. The peak and mean gradients are 7 mmHg and 4 mmHg, respectively with a dimensionless index of 0.94. There is mild aortic valve regurgitation.  Mitral Valve: The mitral valve is mildly thickened. There is mild mitral valve regurgitation. The E Vmax is 0.85 m/s.  Tricuspid Valve: The tricuspid valve is structurally  normal. There is mild tricuspid regurgitation. The Doppler estimated right ventricular systolic pressure (RVSP) is within normal limits at 26 mmHg.  Pulmonic Valve: The pulmonic valve is structurally normal. There is no indication of pulmonic valve regurgitation.  Pericardium: There is no pericardial effusion noted.  Aorta: The aortic root is normal.  Systemic Veins: The inferior vena cava appears normal in size, with IVC inspiratory collapse greater than 50%.  In comparison to the previous echocardiogram(s): Compared with study dated 8/7/2023, no significant change.       CONCLUSIONS:   1. Left ventricular ejection fraction is normal calculated by Colvin's biplane at 68%.   2. Spectral Doppler shows a Grade I (impaired relaxation pattern) of left ventricular diastolic filling with normal left atrial filling pressure.   3. There is normal right ventricular global systolic function.   4. The Doppler estimated RVSP is within normal limits at 26 mmHg.   5. Mild aortic valve regurgitation.   6. Compared with study dated 8/7/2023, no significant change.    QUANTITATIVE DATA SUMMARY:     2D MEASUREMENTS:          Normal Ranges:  Ao Root s:       2.84 cm  IVSd:            0.96 cm  (0.6-1.1cm)  LVPWd:           0.92 cm  (0.6-1.1cm)  LVIDd:           3.63 cm  (3.9-5.9cm)  LVIDs:           2.13 cm  LV Mass Index:   78 g/m2  LVEDV Index:     39 ml/m2  LV % FS          41.5 %       LEFT ATRIUM:                  Normal Ranges:  LA Vol A4C:        32.4 ml    (22+/-6mL/m2)  LA Vol A2C:        26.3 ml  LA Vol BP:         31.4 ml  LA Vol Index A4C:  25.1ml/m2  LA Vol Index A2C:  20.3 ml/m2  LA Vol Index BP:   24.2 ml/m2  LA Area A4C:       12.2 cm2  LA Area A2C:       11.8 cm2  LA Major Axis A4C: 3.9 cm  LA Major Axis A2C: 4.5 cm  LA Volume Index:   24.2 ml/m2  LA Vol A4C:        29.2 ml  LA Vol A2C:        25.3 ml  LA Vol Index BSA:  21.1 ml/m2       RIGHT ATRIUM:                 Normal Ranges:  RA Vol A4C:        25.3 ml     (8.3-19.5ml)  RA Vol Index A4C:  19.6 ml/m2  RA Area A4C:       10.5 cm2  RA Major Axis A4C: 3.7 cm       AORTA MEASUREMENTS:         Normal Ranges:  Ao Sinus, d:        2.80 cm (2.1-3.5cm)  Ao STJ, d:          2.50 cm (1.7-3.4cm)  Asc Ao, d:          3.30 cm (2.1-3.4cm)       LV SYSTOLIC FUNCTION:                       Normal Ranges:  EF-A4C View:    65 % (>=55%)  EF-A2C View:    72 %  EF-Biplane:     68 %  LV EF Reported: 68 %       LV DIASTOLIC FUNCTION:             Normal Ranges:  MV Peak E:             0.85 m/s    (0.7-1.2 m/s)  MV Peak A:             0.80 m/s    (0.42-0.7 m/s)  E/A Ratio:             1.06        (1.0-2.2)  MV e'                  0.085 m/s   (>8.0)  MV lateral e'          0.09 m/s  MV medial e'           0.08 m/s  MV A Dur:              114.18 msec  E/e' Ratio:            9.99        (<8.0)  a'                     0.08 m/s  PulmV Sys Jack:         68.72 cm/s  PulmV Bryson Jack:        48.65 cm/s  PulmV S/D Jack:         1.41  PulmV A Revs Jack:      22.82 cm/s  PulmV A Revs Dur:      112.27 msec       MITRAL VALVE:          Normal Ranges:  MV DT:        202 msec (150-240msec)       AORTIC VALVE:                     Normal Ranges:  AoV Vmax:                1.42 m/s (<=1.7m/s)  AoV Peak P.0 mmHg (<20mmHg)  AoV Mean P.2 mmHg (1.7-11.5mmHg)  LVOT Max Jack:            1.28 m/s (<=1.1m/s)  AoV VTI:                 23.04 cm (18-25cm)  LVOT VTI:                21.71 cm  LVOT Diameter:           1.87 cm  (1.8-2.4cm)  AoV Area, VTI:           2.59 cm2 (2.5-5.5cm2)  AoV Area,Vmax:           2.49 cm2 (2.5-4.5cm2)  AoV Dimensionless Index: 0.94       AORTIC INSUFFICIENCY:  AI Vmax:       3.18 m/s  AI Half-time:  523 msec  AI Decel Time: 1804 msec  AI Decel Rate: 176.73 cm/s2       RIGHT VENTRICLE:  RV Basal 3.10 cm  RV Mid   2.60 cm  RV Major 6.3 cm  TAPSE:   20.3 mm  RV s'    0.16 m/s       TRICUSPID VALVE/RVSP:          Normal Ranges:  Peak TR Velocity:     2.41 m/s  Est. RA  Pressure:     3  RV Syst Pressure:     26       (< 30mmHg)  IVC Diam:             1.39 cm       PULMONIC VALVE:          Normal Ranges:  PV Accel Time:  120 msec (>120ms)  PV Max Jack:     0.9 m/s  (0.6-0.9m/s)  PV Max PG:      3.5 mmHg       PULMONARY VEINS:  PulmV A Revs Dur: 112.27 msec  PulmV A Revs Jack: 22.82 cm/s  PulmV Bryson Jack:   48.65 cm/s  PulmV S/D Jack:    1.41  PulmV Sys Jack:    68.72 cm/s       AORTA:  Asc Ao Diam 3.35 cm       [id number] Adebayo Pratt MD  Electronically signed on 6/20/2025 at 12:38:48 PM       ** Final **  US abdomen limited liver, US renal complete  Narrative: Interpreted By:  Bereket Stein,   STUDY:  US ABDOMEN LIMITED LIVER; US RENAL COMPLETE  6/20/2025 4:04 am      INDICATION:  59 y/o   F with  Signs/Symptoms:elevated LFTs; Signs/Symptoms:[place].      COMPARISON:  None.      ACCESSION NUMBER(S):  [id number]; [id number]      ORDERING CLINICIAN:  NAEEM ADAN      TECHNIQUE:  Routine ultrasound of the right upper quadrant was performed using  grayscale imaging, color Doppler, and spectral Doppler. Imaging of  the kidneys and urinary bladder was also performed.      FINDINGS:  LIVER:  Craniocaudal length: 13.6 cm.  This is  within normal limits.  Echogenicity:  Diffusely increased.  Mass:  None      GALLBLADDER:  There is mild cholelithiasis. There is no gallbladder wall  thickening. There was an adherent nonshadowing echogenic nodule  toward the posterior fundus measuring 3 mm. Borderline gallbladder  dilatation with the gallbladder measuring 49 mm in transverse  diameter.   no sonographic Krishnamurthy sign. No adjacent fluid.      BILE DUCTS:  No intrahepatic biliary ductal dilatation.  Common bile duct measured 0.9 cm    in diameter. This is abnormally  enlarged.      PANCREAS:  Pancreatic head and body were well seen and were sonographically  normal for size and echogenicity. The pancreatic tail was obscured by  bowel gas.      RIGHT KIDNEY:  Craniocaudal length:   10.8 cm , Within normal  limits of size for age.  Echogenicity was normal.  No hydronephrosis, shadowing stone, or perinephric edema.  No gross right renal mass.          LEFT KIDNEY:  Craniocaudal length: 10.5 cm  , Within normal limits of size for age.  Echogenicity was normal.  No hydronephrosis, shadowing stone, or perinephric edema.  No gross left renal mass.      PERITONEAL FLUID:  Trace perihepatic ascites.      URINARY BLADDER:  Aguirre catheter within an otherwise empty urinary bladder.          Impression: Aguirre catheter within an otherwise empty urinary bladder.      No shadowing stone or hydronephrosis in either kidney.      Cholelithiasis with borderline gallbladder dilatation. However, no  specific sonographic evidence of acute cholecystitis.      Nonspecific common bile duct dilatation. Laboratory correlation  needed. Consider nuclear medicine HIDA scan in follow-up.      Nonspecific increased echogenicity throughout the liver, most likely  fatty infiltration.      3 mm adherent echogenic nodule in the posterior gallbladder fundus,  most likely small polyp. Recommend a follow-up ultrasound in 6-12  months.      MACRO:  None      Signed by: Bereket Stein 6/20/2025 8:35 AM  Dictation workstation:   [id number]  CT chest abdomen pelvis wo IV contrast  Narrative: Interpreted By:  Yulissa Escalante,   STUDY:  CT CHEST ABDOMEN PELVIS WO CONTRAST;  6/20/2025 3:09 am      INDICATION:  Signs/Symptoms:nausea and vomiting.      COMPARISON:  CT abdomen pelvis 04/21/2025, limited coronary CT 11/16/2023      ACCESSION NUMBER(S):  [id number]      ORDERING CLINICIAN:  NAEEM ADAN      TECHNIQUE:  Axial noncontrast CT images of the chest, abdomen and pelvis with  coronal and sagittal reconstructed images.      FINDINGS:  Lack of intravenous contrast limits evaluation of vessel, solid  organs and bowel.      CHEST:      VESSELS: Ectasia of ascending aorta measuring up to 3.8 cm.  HEART: Normal size. No significant coronary artery  calcifications.  The no pericardial effusion. MEDIASTINUM AND ROBIN: No pathologically  enlarged thoracic lymph nodes. LUNG, PLEURA, LARGE AIRWAYS: No  pleural effusion or pneumothorax. 1.5 cm ground-glass opacity in the  right lower lobe, not seen previously. Mild bibasilar-dependent  atelectasis. CHEST WALL AND LOWER NECK: Within normal limits. No  acute osseous abnormality.      ABDOMEN:      BONES: No acute osseous abnormality. Chronic sacral insufficiency  fractures. Chronic right pubic rami fractures. Right total hip  arthroplasty. ABDOMINAL WALL: Within normal limits.      LIVER: Within normal limits.  BILE DUCTS: No biliary dilatation.  GALLBLADDER: Cholelithiasis. No pericholecystic inflammatory changes.  PANCREAS: No peripancreatic inflammatory stranding or duct dilatation.  SPLEEN: Within normal limits.  ADRENALS: Within normal limits.  KIDNEYS: No hydronephrosis or perinephric fluid collection.  Nonobstructing left renal calculi measuring up to 4 mm. No  hydronephrosis or calculus along the course of the ureters.      VESSELS: No aortic aneurysm.  RETROPERITONEUM: No pathologically enlarged lymph nodes.      PELVIS:      REPRODUCTIVE ORGANS: Status post hysterectomy.  BLADDER: Aguirre catheter in the nondistended urinary bladder.      BOWEL: No dilated bowel.  Normal appendix.  PERITONEUM: No ascites or free air, no fluid collection.      Impression: 1.5 cm ground-glass opacity in the right lower lobe may be infectious  or inflammatory infiltrate. Follow-up chest CT in 3-6 months is  recommended to assess for resolution.      No acute abdominal or pelvic abnormality.      MACRO:  None      Signed by: Yulissa Escalante 6/20/2025 3:58 AM  Dictation workstation:   [id number]  CT head wo IV contrast  Narrative: Interpreted By:  Yulissa Escalante,   STUDY:  CT HEAD WO IV CONTRAST;  6/20/2025 3:09 am      INDICATION:  Signs/Symptoms:pt with meningioma, possible Paget's diseas of the  calvarium.       COMPARISON:  05/05/2025      ACCESSION NUMBER(S):  [id number]      ORDERING CLINICIAN:  NAEEM ADAN      TECHNIQUE:  Axial noncontrast CT images of the head.      FINDINGS:  BRAIN PARENCHYMA:  deep and periventricular white matter  hypodensities are nonspecific, but favored to represent chronic small  vessel ischemic changes.  Gray-white matter interfaces are preserved.  No mass effect or midline shift.      HEMORRHAGE: No acute intracranial hemorrhage.  VENTRICLES and EXTRA-AXIAL SPACES: The ventricles and sulci are  within normal limits in size for brain volume. No abnormal extraaxial  fluid collection. EXTRACRANIAL SOFT TISSUES: Within normal limits.  PARANASAL SINUSES/MASTOIDS:  The visualized paranasal sinuses and  mastoid air cells are aerated. CALVARIUM: No depressed skull  fracture. Stable appearance of sclerosis and periosteal bone  formation involving with the right frontal bone.      OTHER FINDINGS: None.      Impression: No acute intracranial abnormality.      Stable indeterminate lesion of the right frontal bone. Please refer  to PET CT dated 06/18/2025 for characterization.      MACRO:  None      Signed by: Yulissa Escalante 6/20/2025 3:49 AM  Dictation workstation:   [id number]  Lower extremity venous duplex bilateral  Narrative: Interpreted By:  Jovana To,   STUDY:  [facility] LOWER EXTREMITY VENOUS DUPLEX BILATERAL;  6/19/2025 11:11 pm      INDICATION:  Signs/Symptoms:eval for DVT; bilateral LE edema, sedentary pt.      ,R60.0 Localized edema      COMPARISON:  None.      ACCESSION NUMBER(S):  [id number]      ORDERING CLINICIAN:  NAEEM ADAN      TECHNIQUE:  Vascular ultrasound of the bilateral lower extremities was performed.  Real-time compression views as well as Gray scale, color Doppler and  spectral Doppler waveform analysis was performed.      FINDINGS:  Evaluation of the visualized portions of the bilateral common femoral  vein, proximal, mid, and distal femoral vein, and popliteal  vein were  performed.  Evaluation of the visualized portions of the  posterior  tibial and peroneal veins were also performed.          RIGHT LOWER EXTREMITY:  The evaluated veins demonstrate normal compressibility. There is  intact venous flow demonstrating normal respiratory variability and  normal augmentation of flow with calf compression. Therefore, there  is no ultrasonographic evidence for deep vein thrombosis within the  evaluated veins.      LEFT LOWER EXTREMITY:  Occlusive thrombus is noted at the proximal left peroneal vein.  The remainder of the evaluated veins demonstrates normal  compressibility with intact flow with normal respiratory variability  and normal augmentation of flow with calf compression.      Impression: 1. Acute deep vein thrombosis at the proximal left peroneal vein.  2. No sonographic evidence for deep vein thrombosis within the  evaluated veins of the right lower extremity.      MACRO:  Critical Finding:  See findings. Notification was initiated on  6/20/2025 at 12:54 am by  Jovana To.  (**-OCF-**) Instructions:      Signed by: Jovana To 6/20/2025 12:56 AM  Dictation workstation:   [id number]      Physical Exam  Constitutional:       Appearance: Normal appearance. She is ill-appearing (chronically ill appearing).      Comments: Very thin   Cardiovascular:      Rate and Rhythm: Normal rate and regular rhythm.   Pulmonary:      Effort: Pulmonary effort is normal.      Breath sounds: Normal breath sounds.   Abdominal:      General: Abdomen is flat. Bowel sounds are normal.      Palpations: Abdomen is soft.   Musculoskeletal:      Cervical back: Normal range of motion.   Skin:     General: Skin is warm.   Neurological:      General: No focal deficit present.      Mental Status: She is alert and oriented to person, place, and time. Mental status is at baseline.   Psychiatric:         Mood and Affect: Mood normal.         Behavior: Behavior normal.         Thought Content:  Thought content normal.         Judgment: Judgment normal.         Relevant Results      This patient has a urinary catheter   Reason for the urinary catheter remaining today? urinary retention/bladder outlet obstruction, acute or chronic          Assessment & Plan  Hypokalemia                6/22:  LLE DVT... dc with eliquis.   Retention... dc with tracey. Likely ToV once her back OR is done.   Folate def anemia... start replacement. Fe and B12 nl.     RTA... suspect type 1 related to Obs uropathy... on bicarb overnight... f/up renal and likely repeat vbg today.   F/up dietician.     The most important issue to the pt and family right now is her upcoming back surgery and speaking to ortho tomorrow regarding this. Dr Lucero's team did agree to stop by tomorrow.     Dispo home tomorrow if cleared by renal and ortho.         6/21:  Cleared by neuroSx to start Eliquis and they will f/up OP.   K is better, pH is 7.16 compatible with MA. Concern is for RTA which sounds like the DTR also has.... cont replacement, bicarb gtt per renal, f/up studies.     Anemia... drop is dilutional... check fe, b9, b12.     LLE DVT... eliquis started.   Retention... tracey, flomax.     Echo with diastolic dysfunction, unchanged from prior.     Cr normalized.   Ca normalized.     F/up dietician and add supplement shakes.   Chronic opiate dependence.    Discussed with Dr King yesterday and pt will likely need to delay her upcoming sacral/spine surgery. Family is very upset about this, doesn't agree it's elective... reached out to ortho team and they will speak with her Monday.     Will add pt/ot        6/20:  HypoK, hypoMg.... sounds like it's nutrition related, probably related to chronic retention also... replace both today, monitor, f/up renal.     HyperCa, slight miles... taking supplements at home, likely dehydrated... cont ivf and monitor.     Acute urinary retention... sounds like this has been brewing for weeks as she reports very  little voiding with difficulty controlling it... tracey, flomax, f/up .     Acute LLE DVT... hep gtt, transition to eliquis soon.     Hx sacral fx, spinal stenosis.... chronic narcotics dependence, has pain mgmt team OP she reports... cont regimen. She has ortho surgery next week.     ? Pagets/meningioma... looks like she was being workup for this OP with neuroSx.     Home regimen for chronic conditions.   Dvt px with hep gtt.     Pt lives at home solo, uses walker baseline.           6/19:  - pt reports no appetite and very little po intake earlier this month, however, since 6/14 has been eating relatively normally. Mag only slightly low. Denies diuretic use  - will check urine potassium to r/o kidney loss  - Received 60mEq in the ED, repeat K still 2.0  - will give an additional 60mEq, recheck in am.   - if still low, consult nephrology for presumed kidney loss    Cachexia, hypercalcemia  - pt appears chronically ill, she is taking calcium supplements, however, given her appearance would like to r/o malignancy.   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

## 2025-06-22 NOTE — PROGRESS NOTES
Physical Therapy    Physical Therapy Evaluation & Treatment    Patient Name: Caitlyn Atkins  MRN: [id number]  Department: [place] [id number]  Room: [facility]  Today's Date: 6/22/2025   Time Calculation  Start Time: 0941  Stop Time: 1006  Time Calculation (min): 25 min    Assessment/Plan   PT Assessment  PT Assessment Results: Decreased strength, Decreased endurance, Impaired balance, Decreased range of motion, Decreased mobility, Pain  Rehab Prognosis: Good  Barriers to Discharge Home: No anticipated barriers  Evaluation/Treatment Tolerance: Patient limited by fatigue  Medical Staff Made Aware: Yes  Strengths: Capable of completing ADLs semi/independent, Premorbid level of function, Insight into problems  Barriers to Participation: Rehab experience, Other (Comment) (Pain)  End of Session Communication: Bedside nurse; completed all heparin and on elliquis for DVT  Assessment Comment: Patient is 60 year old female presenting to physical therapy assessment with impaired strength, balance and functional mobility. Patient was able to complete functional mobility of bed mobility, transfers and gait with supervision to contact guard with use of FWW device. Noted to have no of loss of balance with mobility completed, unsteadiness noted. Educated on use of FWW with cues to help maintain safety with mobility. At this time, patient presents with functional mobility impairments warranting inpatient PT services. Patient would benefit in order to address impairments and optimize functional independence.     End of Session Patient Position: Up in chair, Alarm on   IP OR SWING BED PT PLAN  Inpatient or Swing Bed: Inpatient  PT Plan  Treatment/Interventions: Bed mobility, Transfer training, Gait training, Stair training, Balance training, Neuromuscular re-education, Strengthening, Endurance training, Range of motion, Therapeutic exercise, Therapeutic activity  PT Plan: Ongoing PT  PT Frequency: 3 times per week  PT Discharge Recommendations:  Low intensity level of continued care  PT Recommended Transfer Status: Stand by assist, Assistive device  PT - OK to Discharge: Yes (Per PT POC)      Subjective     PT Visit Info:  PT Received On: 06/22/25  General Visit Information:  General  Reason for Referral: Impaired mobility  Referred By: Idris  Past Medical History Relevant to Rehab: Past Medical History:  Diagnosis Date    Anesthesia of skin     Numbness    Antalgic gait     Bilateral sciatica     Chronic pain     follows with pain management    DDD (degenerative disc disease), lumbar     GERD (gastroesophageal reflux disease)     Heart murmur     History of echocardiogram 08/07/2023    History of stress test 08/14/2023    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, followed by neurosurgery- Clearance in note on 5/21/25    Lower extremity weakness     Lumbar radiculopathy     Lumbar spondylosis     Multiple falls     Multiple sclerosis (Multi)     Multiple sclerosis, follows with neuro, per patient well managed on Vumerity, no flare up since 2014    Osteoporosis     Palpitations     saw cardiology-wore holter-SVT was on Metoprolol but per patient did not help, never followed up with cardiology because palpitations stopped after quitting her stressful job    Pseudoclaudication syndrome     Sacral fracture (Multi)     Sacroiliitis     SDH (subdural hematoma) (Multi)     Spinal stenosis     Spondylolisthesis     Urinary incontinence     Vitamin D deficiency        Family/Caregiver Present: Yes  Prior to Session Communication: Bedside nurse  Patient Position Received: Bed, 2 rail up, Alarm off, not on at start of session  General Comment: Patient agreeable to PT evaluation  Home Living:  Home Living  Type of Home: House  Lives With: Alone  Home Adaptive Equipment: Walker rolling or standard  Home Layout: One level  Home Access: Stairs to enter without rails  Entrance Stairs-Number of Steps: 1  Prior Level of Function:  Prior Function Per  Pt/Caregiver Report  Level of [place]: Independent with ADLs and functional transfers, Independent with homemaking with ambulation  Receives Help From: Family  ADL Assistance: Independent  Homemaking Assistance: Independent  Ambulatory Assistance: Independent (FWW)  Vocational: Full time employment  Precautions:  Precautions  Medical Precautions: Fall precautions           Objective   Pain:  Pain Assessment  Pain Assessment: 0-10  0-10 (Numeric) Pain Score: 7  Cognition:  Cognition  Overall Cognitive Status: Within Functional Limits  Orientation Level: Oriented X4  Safety/Judgement: Within Functional Limits  Insight: Within function limits  Impulsive: Within functional limits    General Assessments:       Activity Tolerance  Endurance: Tolerates 10 - 20 min exercise with multiple rests      Static Sitting Balance  Static Sitting-Balance Support: Bilateral upper extremity supported, Feet supported  Static Sitting-Level of Assistance: Distant supervision    Static Standing Balance  Static Standing-Balance Support: Bilateral upper extremity supported  Static Standing-Level of Assistance: Close supervision  Functional Assessments:  Bed Mobility  Bed Mobility: Yes  Bed Mobility 1  Bed Mobility 1: Supine to sitting  Level of Assistance 1: Distant supervision    Transfers  Transfer: Yes  Transfer 1  Technique 1: Sit to stand, Stand to sit  Transfer Device 1: Walker  Transfer Level of Assistance 1: Contact guard, Close supervision  Trials/Comments 1: x2    Ambulation/Gait Training  Ambulation/Gait Training Performed: Yes  Ambulation/Gait Training 1  Surface 1: Level tile  Device 1: Rolling walker  Assistance 1: Close supervision  Quality of Gait 1: Antalgic, Forward flexed posture  Comments/Distance (ft) 1: 120ft with FWW; no loss of balance  Extremity/Trunk Assessments:  RLE   RLE : Exceptions to WFL  Strength RLE  RLE Overall Strength: Greater than or equal to 3/5 as evidenced by functional mobility  LLE   LLE :  Exceptions to WFL  Strength LLE  LLE Overall Strength: Greater than or equal to 3/5 as evidenced by functional mobility  Treatments:  Ambulation/Gait Training  Ambulation/Gait Training Performed: Yes  Ambulation/Gait Training 1  Surface 1: Level tile  Device 1: Rolling walker  Assistance 1: Close supervision  Quality of Gait 1: Antalgic, Forward flexed posture  Comments/Distance (ft) 1: 120ft with FWW; no loss of balance  Outcome Measures:  [facility] Basic Mobility  Turning from your back to your side while in a flat bed without using bedrails: None  Moving from lying on your back to sitting on the side of a flat bed without using bedrails: None  Moving to and from bed to chair (including a wheelchair): A little  Standing up from a chair using your arms (e.g. wheelchair or bedside chair): A little  To walk in hospital room: A little  Climbing 3-5 steps with railing: A little  Basic Mobility - Total Score: 20    Encounter Problems       Encounter Problems (Active)       Mobility       STG - Patient will ambulate >250ft with FWW mod I        Start:  06/22/25    Expected End:  07/06/25            STG - Patient will ascend and descend two to four stairs mod I        Start:  06/22/25    Expected End:  07/06/25               PT Transfers       STG - Transfer from bed to chair mod I        Start:  06/22/25    Expected End:  07/06/25            STG - Patient will perform bed mobility independently       Start:  06/22/25    Expected End:  07/06/25            STG - Patient will transfer sit to and from stand mod I       Start:  06/22/25    Expected End:  07/06/25               Pain - Adult              Education Documentation  Body Mechanics, taught by Kim Barraza PT at 6/22/2025  1:37 PM.  Learner: Patient  Readiness: Acceptance  Method: Explanation  Response: Verbalizes Understanding    Precautions, taught by Kim Barraza PT at 6/22/2025  1:37 PM.  Learner: Patient  Readiness: Acceptance  Method: Explanation  Response:  Verbalizes Understanding    Mobility Training, taught by Kim Barraza PT at 6/22/2025  1:37 PM.  Learner: Patient  Readiness: Acceptance  Method: Explanation  Response: Verbalizes Understanding    Education Comments  No comments found.

## 2025-06-22 NOTE — PROGRESS NOTES
NEPHROLOGY PROGRESS NOTE      Caitlyn Atkins is a 60 y.o. female on day 3 of admission presenting with Hypokalemia.      Subjective   Caitlyn Atkins is a 60 y.o. female with a PMH of MS, osteoporosis, lumbar radiculopathy, spinal stenosis, right total hip, extra-axial, calvarium marrow and scalp lesions of unknown etiology or diagnosis seen on MRI 5/12/25, and previous episode of hypokalemia in April presenting with hypokalemia seen on labs for pre-op testing.  Nephrology was consulted because of refractory hypokalemia     6/21/2025: Patient potassium improved to 4.6.  Can discontinue the potassium containing the normal saline once the current bag runs out.  Urine potassium creatinine ratio 28.  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  Requested patient nurse in person to send the VBG full panel and renin aldosterone which are still pending collection in order to confirm the diagnosis.     6/22/2025: Started on bicarbonate drip yesterday because of severe metabolic acidosis with a pH of 7.96 and a bicarbonate of 13.  Today metabolic acidosis improved, pH of 7.36 and bicarb of 19 potassium continued to be normal 4.3 with potassium citrate 10 meq twice a day.     Scheduled Medications:   Scheduled Medications[1]     Continuous Medications:   Continuous Medications[2]     PRN Medications:   PRN Medications[3]    Objective   Vitals:  Most Recent:  Vitals:    06/22/25 0824   BP: 98/64   Pulse: 80   Resp: 18   Temp: 36.7 °C (98 °F)   SpO2: 99%       24hr Min/Max:  Temp  Min: 36.7 °C (98 °F)  Max: 37 °C (98.6 °F)  Pulse  Min: 80  Max: 92  BP  Min: 81/59  Max: 111/69  Resp  Min: 18  Max: 19  SpO2  Min: 95 %  Max: 100 %    LDA:  Urethral Catheter Non-latex 16 Fr. (Active)   Placement Date/Time: 06/20/25 0213   Placed by: SHARDA Vargas RN  Hand Hygiene Completed: Yes  Catheter Type: Non-latex  Tube Size (Fr.): 16 Fr.  Catheter Balloon Size: 10 mL  Urine Returned: Yes   Number of days: 1         Hemodynamic parameters for last  24 hours:       I/O:    Intake/Output Summary (Last 24 hours) at 6/22/2025 1507  Last data filed at 6/22/2025 1020  Gross per 24 hour   Intake 1150 ml   Output 375 ml   Net 775 ml       Physical Exam:   Physical Exam  Vitals reviewed.   Constitutional:       Comments: Cachectic   HENT:      Head: Normocephalic and atraumatic.   Eyes:      Conjunctiva/sclera: Conjunctivae normal.      Pupils: Pupils are equal, round, and reactive to light.   Cardiovascular:      Rate and Rhythm: Normal rate and regular rhythm.   Pulmonary:      Effort: Pulmonary effort is normal.      Breath sounds: Normal breath sounds.   Abdominal:      General: Abdomen is flat.      Palpations: Abdomen is soft.   Musculoskeletal:         General: Normal range of motion.   Skin:     General: Skin is warm and dry.   Neurological:      General: No focal deficit present.      Mental Status: She is alert and oriented to person, place, and time. Mental status is at baseline.   Psychiatric:         Mood and Affect: Mood normal.         Behavior: Behavior normal.          Lab/Radiology/Diagnostic Review:  Results for orders placed or performed during the hospital encounter of 06/19/25 (from the past 24 hours)   CBC   Result Value Ref Range    WBC 7.5 4.4 - 11.3 x10*3/uL    nRBC 0.0 0.0 - 0.0 /100 WBCs    RBC 2.61 (L) 4.00 - 5.20 x10*6/uL    Hemoglobin 8.1 (L) 12.0 - 16.0 g/dL    Hematocrit 23.7 (L) 36.0 - 46.0 %    MCV 91 80 - 100 fL    MCH 31.0 26.0 - 34.0 pg    MCHC 34.2 32.0 - 36.0 g/dL    RDW 14.6 (H) 11.5 - 14.5 %    Platelets 387 150 - 450 x10*3/uL   Basic Metabolic Panel   Result Value Ref Range    Glucose 92 74 - 99 mg/dL    Sodium 137 136 - 145 mmol/L    Potassium 4.3 3.5 - 5.3 mmol/L    Chloride 114 (H) 98 - 107 mmol/L    Bicarbonate 21 21 - 32 mmol/L    Anion Gap <7 (L) 10 - 20 mmol/L    Urea Nitrogen 11 6 - 23 mg/dL    Creatinine 0.83 0.50 - 1.05 mg/dL    eGFR 80 >60 mL/min/1.73m*2    Calcium 9.3 8.6 - 10.3 mg/dL   Blood Gas Venous Full Panel    Result Value Ref Range    POCT pH, Venous 7.36 7.33 - 7.43 pH    POCT pCO2, Venous 34 (L) 41 - 51 mm Hg    POCT pO2, Venous 69 (H) 35 - 45 mm Hg    POCT SO2, Venous 97 (H) 45 - 75 %    POCT Oxy Hemoglobin, Venous 94.1 (H) 45.0 - 75.0 %    POCT Hematocrit Calculated, Venous 23.0 (L) 36.0 - 46.0 %    POCT Sodium, Venous 134 (L) 136 - 145 mmol/L    POCT Potassium, Venous 4.2 3.5 - 5.3 mmol/L    POCT Chloride, Venous 110 (H) 98 - 107 mmol/L    POCT Ionized Calicum, Venous 1.48 (H) 1.10 - 1.33 mmol/L    POCT Glucose, Venous 129 (H) 74 - 99 mg/dL    POCT Lactate, Venous 1.7 0.4 - 2.0 mmol/L    POCT Base Excess, Venous -5.7 (L) -2.0 - 3.0 mmol/L    POCT HCO3 Calculated, Venous 19.2 (L) 22.0 - 26.0 mmol/L    POCT Hemoglobin, Venous 7.8 (L) 12.0 - 16.0 g/dL    POCT Anion Gap, Venous 9.0 (L) 10.0 - 25.0 mmol/L    Patient Temperature 37.0 degrees Celsius    FiO2 0 %     *Note: Due to a large number of results and/or encounters for the requested time period, some results have not been displayed. A complete set of results can be found in Results Review.     Imaging  XR chest 2 views  Result Date: 6/20/2025  1.  No evidence of acute cardiopulmonary process.       MACRO: None   Signed by: Abi Lawrence 6/20/2025 9:53 PM Dictation workstation:   [id number]    CT head wo IV contrast  Result Date: 6/20/2025  Unchanged CT head. Specifically, no acute intracranial abnormality, including hemorrhage, or mass effect.   This study was interpreted at [facility].   MACRO: None   Signed by: Wilton Goldstein 6/20/2025 1:09 PM Dictation workstation:   [id number]    US abdomen limited liver  Result Date: 6/20/2025  Aguirre catheter within an otherwise empty urinary bladder.   No shadowing stone or hydronephrosis in either kidney.   Cholelithiasis with borderline gallbladder dilatation. However, no specific sonographic evidence of acute cholecystitis.   Nonspecific common bile duct dilatation. Laboratory  correlation needed. Consider nuclear medicine HIDA scan in follow-up.   Nonspecific increased echogenicity throughout the liver, most likely fatty infiltration.   3 mm adherent echogenic nodule in the posterior gallbladder fundus, most likely small polyp. Recommend a follow-up ultrasound in 6-12 months.   MACRO: None   Signed by: Bereket Stein 6/20/2025 8:35 AM Dictation workstation:   [id number]    US renal complete  Result Date: 6/20/2025  Aguirre catheter within an otherwise empty urinary bladder.   No shadowing stone or hydronephrosis in either kidney.   Cholelithiasis with borderline gallbladder dilatation. However, no specific sonographic evidence of acute cholecystitis.   Nonspecific common bile duct dilatation. Laboratory correlation needed. Consider nuclear medicine HIDA scan in follow-up.   Nonspecific increased echogenicity throughout the liver, most likely fatty infiltration.   3 mm adherent echogenic nodule in the posterior gallbladder fundus, most likely small polyp. Recommend a follow-up ultrasound in 6-12 months.   MACRO: None   Signed by: Bereket Stein 6/20/2025 8:35 AM Dictation workstation:   [id number]    CT chest abdomen pelvis wo IV contrast  Result Date: 6/20/2025  1.5 cm ground-glass opacity in the right lower lobe may be infectious or inflammatory infiltrate. Follow-up chest CT in 3-6 months is recommended to assess for resolution.   No acute abdominal or pelvic abnormality.   MACRO: None   Signed by: Yulissa Escalante 6/20/2025 3:58 AM Dictation workstation:   [id number]    CT head wo IV contrast  Result Date: 6/20/2025  No acute intracranial abnormality.   Stable indeterminate lesion of the right frontal bone. Please refer to PET CT dated 06/18/2025 for characterization.   MACRO: None   Signed by: Yulissa Escalante 6/20/2025 3:49 AM Dictation workstation:   [id number]    Lower extremity venous duplex bilateral  Result Date: 6/20/2025  1. Acute deep vein thrombosis at the proximal left peroneal  vein. 2. No sonographic evidence for deep vein thrombosis within the evaluated veins of the right lower extremity.   MACRO: Critical Finding:  See findings. Notification was initiated on 6/20/2025 at 12:54 am by  Jovana To.  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   I personally reviewed the images/study and I agree with the findings as stated by Keyla Chin MD. This study was interpreted at [place], [place].   MACRO: None   Signed by: Anastasiia Michele 6/19/2025 11:32 AM Dictation workstation:   [id number]    NM bone whole body  Result Date: 6/18/2025  1.  Heterogeneously increased uptake in the right frontal calvarium, nonspecific, but in keeping with patient's suspected intraosseous meningioma. 2. Focal increased uptake in the anterior right 12th rib, nonspecific, but may be seen in the setting of trauma. Correlate clinically with physical exam and consider further imaging if warranted.   I personally reviewed the images/study and I agree with the findings as stated by Keyla Chin MD. This study was interpreted at [place], [place].   MACRO: None   Signed by: Anastasiia Michele 6/18/2025 3:52 PM Dictation workstation:   [id number]      Cardiology, Vascular, and Other Imaging  Transthoracic Echo Complete  Result Date: 6/20/2025   [facility], [address] [place] [address]              Tel [phone] and Fax [phone] TRANSTHORACIC ECHOCARDIOGRAM REPORT  Patient Name:       JOSE Yepez Physician:    [id number] Adebayo Pratt MD Study Date:         6/20/2025           Ordering Provider:    [id number]  NAEEM ADAN MRN/PID:            [id number]            Fellow: Accession#:         [id number]        Nurse: Date of Birth/Age:  1965 / 60      Sonographer:          Kaila Hay RDCS                     years Gender assigned at  F                   Additional Staff: Birth: Height:             149.86 cm           Admit Date:           6/19/2025 Weight:             39.46 kg            Admission Status:     Inpatient -                                                               Routine BSA / BMI:          1.29 m2 / 17.57     Encounter#:           [id number]                     kg/m2 Blood Pressure:     81/54 mmHg          Department Location:  [facility]                                                               Invasive Study Type:    TRANSTHORACIC ECHO (TTE) COMPLETE Diagnosis/ICD: Localized edema-R60.0 Indication:    Edema CPT Code:      Echo Complete w Full [id number] Patient History: Pertinent History: HTN, CAD and Chest Pain. Study Detail: The following Echo studies were performed: M-Mode, 2D, Doppler and               color flow.  PHYSICIAN INTERPRETATION: Left Ventricle: Left ventricular ejection fraction is normal calculated by Colvin's biplane at 68%. There are no regional left ventricular wall motion abnormalities. The left ventricular cavity size is normal. There is mildly increased septal and normal posterior left ventricular wall thickness. There is left ventricular concentric remodeling. Spectral Doppler shows a Grade I (impaired relaxation pattern) of left ventricular diastolic filling with normal left atrial filling pressure. Left Atrium: The left atrial size is normal. Right Ventricle: The right ventricle is normal in size. There is normal right ventricular global systolic function. Right Atrium: The right atrium is normal in size. Aortic Valve: The aortic valve is trileaflet. The aortic valve area by VTI is 2.59 cmï¿½  with a peak velocity of 1.42 m/s. The peak and mean gradients are 7 mmHg and 4 mmHg, respectively with a dimensionless index of 0.94. There is mild aortic valve regurgitation. Mitral Valve: The mitral valve is mildly thickened. There is mild mitral valve regurgitation. The E Vmax is 0.85 m/s. Tricuspid Valve: The tricuspid valve is structurally normal. There is mild tricuspid regurgitation. The Doppler estimated right ventricular systolic pressure (RVSP) is within normal limits at 26 mmHg. Pulmonic Valve: The pulmonic valve is structurally normal. There is no indication of pulmonic valve regurgitation. Pericardium: There is no pericardial effusion noted. Aorta: The aortic root is normal. Systemic Veins: The inferior vena cava appears normal in size, with IVC inspiratory collapse greater than 50%. In comparison to the previous echocardiogram(s): Compared with study dated 8/7/2023, no significant change.  CONCLUSIONS:  1. Left ventricular ejection fraction is normal calculated by Colvin's biplane at 68%.  2. Spectral Doppler shows a Grade I (impaired relaxation pattern) of left ventricular diastolic filling with normal left atrial filling pressure.  3. There is normal right ventricular global systolic function.  4. The Doppler estimated RVSP is within normal limits at 26 mmHg.  5. Mild aortic valve regurgitation.  6. Compared with study dated 8/7/2023, no significant change. QUANTITATIVE DATA SUMMARY:  2D MEASUREMENTS:          Normal Ranges: Ao Root s:       2.84 cm IVSd:            0.96 cm  (0.6-1.1cm) LVPWd:           0.92 cm  (0.6-1.1cm) LVIDd:           3.63 cm  (3.9-5.9cm) LVIDs:           2.13 cm LV Mass Index:   78 g/m2 LVEDV Index:     39 ml/m2 LV % FS          41.5 %  LEFT ATRIUM:                  Normal Ranges: LA Vol A4C:        32.4 ml    (22+/-6mL/m2) LA Vol A2C:        26.3 ml LA Vol BP:         31.4 ml LA Vol Index A4C:  25.1ml/m2 LA Vol Index A2C:  20.3 ml/m2 LA Vol Index BP:   24.2 ml/m2 LA Area  A4C:       12.2 cm2 LA Area A2C:       11.8 cm2 LA Major Axis A4C: 3.9 cm LA Major Axis A2C: 4.5 cm LA Volume Index:   24.2 ml/m2 LA Vol A4C:        29.2 ml LA Vol A2C:        25.3 ml LA Vol Index BSA:  21.1 ml/m2  RIGHT ATRIUM:                 Normal Ranges: RA Vol A4C:        25.3 ml    (8.3-19.5ml) RA Vol Index A4C:  19.6 ml/m2 RA Area A4C:       10.5 cm2 RA Major Axis A4C: 3.7 cm  AORTA MEASUREMENTS:         Normal Ranges: Ao Sinus, d:        2.80 cm (2.1-3.5cm) Ao STJ, d:          2.50 cm (1.7-3.4cm) Asc Ao, d:          3.30 cm (2.1-3.4cm)  LV SYSTOLIC FUNCTION:                      Normal Ranges: EF-A4C View:    65 % (>=55%) EF-A2C View:    72 % EF-Biplane:     68 % LV EF Reported: 68 %  LV DIASTOLIC FUNCTION:             Normal Ranges: MV Peak E:             0.85 m/s    (0.7-1.2 m/s) MV Peak A:             0.80 m/s    (0.42-0.7 m/s) E/A Ratio:             1.06        (1.0-2.2) MV e'                  0.085 m/s   (>8.0) MV lateral e'          0.09 m/s MV medial e'           0.08 m/s MV A Dur:              114.18 msec E/e' Ratio:            9.99        (<8.0) a'                     0.08 m/s PulmV Sys Jack:         68.72 cm/s PulmV Bryson Jack:        48.65 cm/s PulmV S/D Jack:         1.41 PulmV A Revs Jack:      22.82 cm/s PulmV A Revs Dur:      112.27 msec  MITRAL VALVE:          Normal Ranges: MV DT:        202 msec (150-240msec)  AORTIC VALVE:                     Normal Ranges: AoV Vmax:                1.42 m/s (<=1.7m/s) AoV Peak P.0 mmHg (<20mmHg) AoV Mean P.2 mmHg (1.7-11.5mmHg) LVOT Max Jack:            1.28 m/s (<=1.1m/s) AoV VTI:                 23.04 cm (18-25cm) LVOT VTI:                21.71 cm LVOT Diameter:           1.87 cm  (1.8-2.4cm) AoV Area, VTI:           2.59 cm2 (2.5-5.5cm2) AoV Area,Vmax:           2.49 cm2 (2.5-4.5cm2) AoV Dimensionless Index: 0.94  AORTIC INSUFFICIENCY: AI Vmax:       3.18 m/s AI Half-time:  523 msec AI Decel Time: 1804 msec AI Decel Rate:  176.73 cm/s2  RIGHT VENTRICLE: RV Basal 3.10 cm RV Mid   2.60 cm RV Major 6.3 cm TAPSE:   20.3 mm RV s'    0.16 m/s  TRICUSPID VALVE/RVSP:          Normal Ranges: Peak TR Velocity:     2.41 m/s Est. RA Pressure:     3 RV Syst Pressure:     26       (< 30mmHg) IVC Diam:             1.39 cm  PULMONIC VALVE:          Normal Ranges: PV Accel Time:  120 msec (>120ms) PV Max Jack:     0.9 m/s  (0.6-0.9m/s) PV Max PG:      3.5 mmHg  PULMONARY VEINS: PulmV A Revs Dur: 112.27 msec PulmV A Revs Jack: 22.82 cm/s PulmV Bryson Jack:   48.65 cm/s PulmV S/D Jack:    1.41 PulmV Sys Jack:    68.72 cm/s  AORTA: Asc Ao Diam 3.35 cm  [id number] Adebayo Pratt MD Electronically signed on 6/20/2025 at 12:38:48 PM  ** Final **                        Assessment/Plan   Assessment & Plan  Hypokalemia    Caitlyn Atkins is a 60 y.o. female with a PMH of MS, osteoporosis, lumbar radiculopathy, spinal stenosis, right total hip, extra-axial, calvarium marrow and scalp lesions of unknown etiology or diagnosis seen on MRI 5/12/25, and previous episode of hypokalemia in April presenting with hypokalemia seen on labs for pre-op testing.  Nephrology was consulted because of refractory hypokalemia     Refractory hypokalemia probably secondary to excessive potassium loss in the urine, possibility of renal tubular acidosis type I  6/20/2025: Urine potassium creatinine ratio is 22  Differential diagnosis of excessive potassium loss in urine or acid-base balance, renal tubular acidosis and mineralocorticoid excess  Ordered ABG full panel, renin aldosterone, urine potassium, urine sodium, urine chloride and urine creatinine  There is a possibility that the patient potassium may have been decreased even because of nausea vomiting and decreased solute intake  Agrees to replacement of potassium  Patient already has a 80 mEq of daily potassium ordered  Ordered excess 40 mEq of oral potassium and also patient is getting IV potassium replacement  6/21/2025: Patient  potassium improved to 4.6.    Can discontinue the potassium containing the normal saline once the current bag runs out.    Urine potassium creatinine ratio 28.    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    Patient daughter has a renal tubular acidosis type I  Requested patient nurse in person to send the VBG full panel and renin aldosterone which are still pending collection in order to confirm the diagnosis.   Ordered potassium citrate 10 daily, which she can be discharged on based on the final diagnosis  6/22/2025: Started on bicarbonate drip at 100 mL/h for 10 hours yesterday because of severe metabolic acidosis with a pH of 7.96 and a bicarbonate of 13.    Today metabolic acidosis improved, pH of 7.36 and bicarb of 19 potassium continued to be normal 4.3 with potassium citrate 10 meq twice a day.   Recommended follow-up with outpatient nephrologist within 2 weeks from discharge     2.  Refractory nausea vomiting  Nausea vomiting improved  Management as per primary team    I spent 50 minutes of cumulative time with the patient.  Greater than 50% of that time was spent in the direct collaboration and or coordination of care of the patient.     Dragon dictation software was used to dictate this note and thus there may be minor errors in translation/transcription including garbled speech or misspellings. Please contact for clarification if needed.        [1] apixaban, 10 mg, oral, BID   Followed by  [START ON 6/28/2025] apixaban, 5 mg, oral, BID  chlorhexidine, 15 mL, Mouth/Throat, Daily  folic acid, 1 mg, intravenous, Daily  gabapentin, 100 mg, oral, TID  pantoprazole, 40 mg, oral, Daily before breakfast  potassium citrate CR, 10 mEq, oral, BID  sennosides, 2 tablet, oral, BID  tamsulosin, 0.4 mg, oral, Nightly     [2]    [3] PRN medications: HYDROcodone-acetaminophen, ondansetron ODT **OR** ondansetron, tiZANidine

## 2025-06-22 NOTE — CARE PLAN
The clinical goals for the shift include patient's safety and comfort will be maintained throughout this shift.      Problem: Pain - Adult  Goal: Verbalizes/displays adequate comfort level or baseline comfort level  Outcome: Progressing     Problem: Safety - Adult  Goal: Free from fall injury  Outcome: Progressing     Problem: Discharge Planning  Goal: Discharge to home or other facility with appropriate resources  Outcome: Progressing     Problem: Chronic Conditions and Co-morbidities  Goal: Patient's chronic conditions and co-morbidity symptoms are monitored and maintained or improved  Outcome: Progressing     Problem: Nutrition  Goal: Nutrient intake appropriate for maintaining nutritional needs  Outcome: Progressing     Problem: Fall/Injury  Goal: Not fall by end of shift  Outcome: Progressing  Goal: Be free from injury by end of the shift  Outcome: Progressing  Goal: Verbalize understanding of personal risk factors for fall in the hospital  Outcome: Progressing  Goal: Verbalize understanding of risk factor reduction measures to prevent injury from fall in the home  Outcome: Progressing  Goal: Use assistive devices by end of the shift  Outcome: Progressing  Goal: Pace activities to prevent fatigue by end of the shift  Outcome: Progressing     Problem: Pain  Goal: Takes deep breaths with improved pain control throughout the shift  Outcome: Progressing  Goal: Turns in bed with improved pain control throughout the shift  Outcome: Progressing  Goal: Walks with improved pain control throughout the shift  Outcome: Progressing  Goal: Performs ADL's with improved pain control throughout shift  Outcome: Progressing  Goal: Participates in PT with improved pain control throughout the shift  Outcome: Progressing  Goal: Free from opioid side effects throughout the shift  Outcome: Progressing  Goal: Free from acute confusion related to pain meds throughout the shift  Outcome: Progressing

## 2025-06-22 NOTE — CARE PLAN
The patient's goals for the shift include      The clinical goals for the shift include patient's safety and comfort will be maintained throughout this shift.      Problem: Pain - Adult  Goal: Verbalizes/displays adequate comfort level or baseline comfort level  Outcome: Progressing     Problem: Safety - Adult  Goal: Free from fall injury  Outcome: Progressing     Problem: Discharge Planning  Goal: Discharge to home or other facility with appropriate resources  Outcome: Progressing     Problem: Chronic Conditions and Co-morbidities  Goal: Patient's chronic conditions and co-morbidity symptoms are monitored and maintained or improved  Outcome: Progressing     Problem: Nutrition  Goal: Nutrient intake appropriate for maintaining nutritional needs  Outcome: Progressing     Problem: Pain  Goal: Takes deep breaths with improved pain control throughout the shift  Outcome: Progressing  Goal: Turns in bed with improved pain control throughout the shift  Outcome: Progressing  Goal: Walks with improved pain control throughout the shift  Outcome: Progressing  Goal: Performs ADL's with improved pain control throughout shift  Outcome: Progressing  Goal: Participates in PT with improved pain control throughout the shift  Outcome: Progressing  Goal: Free from opioid side effects throughout the shift  Outcome: Progressing  Goal: Free from acute confusion related to pain meds throughout the shift  Outcome: Progressing

## 2025-06-23 ENCOUNTER — PHARMACY VISIT (OUTPATIENT)
Dept: PHARMACY | Facility: CLINIC | Age: 60
End: 2025-06-23
Payer: COMMERCIAL

## 2025-06-23 VITALS
OXYGEN SATURATION: 99 % | WEIGHT: 103 LBS | BODY MASS INDEX: 20.76 KG/M2 | HEIGHT: 59 IN | HEART RATE: 81 BPM | TEMPERATURE: 97.8 F | SYSTOLIC BLOOD PRESSURE: 99 MMHG | RESPIRATION RATE: 18 BRPM | DIASTOLIC BLOOD PRESSURE: 57 MMHG

## 2025-06-23 LAB
ANION GAP SERPL CALC-SCNC: 8 MMOL/L (ref 10–20)
BUN SERPL-MCNC: 15 MG/DL (ref 6–23)
CALCIUM SERPL-MCNC: 9.2 MG/DL (ref 8.6–10.3)
CHLORIDE SERPL-SCNC: 111 MMOL/L (ref 98–107)
CO2 SERPL-SCNC: 21 MMOL/L (ref 21–32)
CREAT SERPL-MCNC: 0.81 MG/DL (ref 0.5–1.05)
EGFRCR SERPLBLD CKD-EPI 2021: 83 ML/MIN/1.73M*2
ERYTHROCYTE [DISTWIDTH] IN BLOOD BY AUTOMATED COUNT: 14.9 % (ref 11.5–14.5)
GLUCOSE SERPL-MCNC: 74 MG/DL (ref 74–99)
HCT VFR BLD AUTO: 23.9 % (ref 36–46)
HGB BLD-MCNC: 8 G/DL (ref 12–16)
HOLD SPECIMEN: NORMAL
MAGNESIUM SERPL-MCNC: 1.38 MG/DL (ref 1.6–2.4)
MCH RBC QN AUTO: 31 PG (ref 26–34)
MCHC RBC AUTO-ENTMCNC: 33.5 G/DL (ref 32–36)
MCV RBC AUTO: 93 FL (ref 80–100)
NRBC BLD-RTO: 0 /100 WBCS (ref 0–0)
PLATELET # BLD AUTO: 376 X10*3/UL (ref 150–450)
POTASSIUM SERPL-SCNC: 4.7 MMOL/L (ref 3.5–5.3)
RBC # BLD AUTO: 2.58 X10*6/UL (ref 4–5.2)
SODIUM SERPL-SCNC: 135 MMOL/L (ref 136–145)
WBC # BLD AUTO: 6.7 X10*3/UL (ref 4.4–11.3)

## 2025-06-23 PROCEDURE — 85027 COMPLETE CBC AUTOMATED: CPT | Performed by: INTERNAL MEDICINE

## 2025-06-23 PROCEDURE — 82306 VITAMIN D 25 HYDROXY: CPT | Mod: AHULAB | Performed by: FAMILY MEDICINE

## 2025-06-23 PROCEDURE — 83735 ASSAY OF MAGNESIUM: CPT | Performed by: INTERNAL MEDICINE

## 2025-06-23 PROCEDURE — 2500000001 HC RX 250 WO HCPCS SELF ADMINISTERED DRUGS (ALT 637 FOR MEDICARE OP): Performed by: HOSPITALIST

## 2025-06-23 PROCEDURE — 36415 COLL VENOUS BLD VENIPUNCTURE: CPT | Performed by: INTERNAL MEDICINE

## 2025-06-23 PROCEDURE — 2500000004 HC RX 250 GENERAL PHARMACY W/ HCPCS (ALT 636 FOR OP/ED): Performed by: INTERNAL MEDICINE

## 2025-06-23 PROCEDURE — RXMED WILLOW AMBULATORY MEDICATION CHARGE

## 2025-06-23 PROCEDURE — 2500000001 HC RX 250 WO HCPCS SELF ADMINISTERED DRUGS (ALT 637 FOR MEDICARE OP): Performed by: INTERNAL MEDICINE

## 2025-06-23 PROCEDURE — 99239 HOSP IP/OBS DSCHRG MGMT >30: CPT | Performed by: STUDENT IN AN ORGANIZED HEALTH CARE EDUCATION/TRAINING PROGRAM

## 2025-06-23 PROCEDURE — 80048 BASIC METABOLIC PNL TOTAL CA: CPT | Performed by: INTERNAL MEDICINE

## 2025-06-23 PROCEDURE — 2500000004 HC RX 250 GENERAL PHARMACY W/ HCPCS (ALT 636 FOR OP/ED): Performed by: STUDENT IN AN ORGANIZED HEALTH CARE EDUCATION/TRAINING PROGRAM

## 2025-06-23 PROCEDURE — 2500000004 HC RX 250 GENERAL PHARMACY W/ HCPCS (ALT 636 FOR OP/ED): Performed by: HOSPITALIST

## 2025-06-23 RX ORDER — TAMSULOSIN HYDROCHLORIDE 0.4 MG/1
0.4 CAPSULE ORAL NIGHTLY
Qty: 30 CAPSULE | Refills: 0 | Status: SHIPPED | OUTPATIENT
Start: 2025-06-23

## 2025-06-23 RX ADMIN — HYDROCODONE BITARTRATE AND ACETAMINOPHEN 1 TABLET: 5; 325 TABLET ORAL at 12:08

## 2025-06-23 RX ADMIN — PANTOPRAZOLE SODIUM 40 MG: 40 TABLET, DELAYED RELEASE ORAL at 06:02

## 2025-06-23 RX ADMIN — FOLIC ACID 1 MG: 5 INJECTION, SOLUTION INTRAMUSCULAR; INTRAVENOUS; SUBCUTANEOUS at 09:09

## 2025-06-23 RX ADMIN — SODIUM CHLORIDE 500 ML: 0.9 INJECTION, SOLUTION INTRAVENOUS at 03:57

## 2025-06-23 RX ADMIN — HYDROCODONE BITARTRATE AND ACETAMINOPHEN 1 TABLET: 5; 325 TABLET ORAL at 06:02

## 2025-06-23 RX ADMIN — APIXABAN 10 MG: 5 TABLET, FILM COATED ORAL at 09:21

## 2025-06-23 RX ADMIN — POTASSIUM CITRATE 10 MEQ: 10 TABLET, EXTENDED RELEASE ORAL at 09:13

## 2025-06-23 RX ADMIN — GABAPENTIN 100 MG: 100 CAPSULE ORAL at 09:21

## 2025-06-23 RX ADMIN — SODIUM CHLORIDE 500 ML: 0.9 INJECTION, SOLUTION INTRAVENOUS at 11:13

## 2025-06-23 ASSESSMENT — COGNITIVE AND FUNCTIONAL STATUS - GENERAL
PERSONAL GROOMING: A LITTLE
DRESSING REGULAR LOWER BODY CLOTHING: A LITTLE
CLIMB 3 TO 5 STEPS WITH RAILING: A LITTLE
WALKING IN HOSPITAL ROOM: A LITTLE
HELP NEEDED FOR BATHING: A LITTLE
TOILETING: A LITTLE
DAILY ACTIVITIY SCORE: 19
MOBILITY SCORE: 20
DRESSING REGULAR UPPER BODY CLOTHING: A LITTLE
MOVING TO AND FROM BED TO CHAIR: A LITTLE
STANDING UP FROM CHAIR USING ARMS: A LITTLE

## 2025-06-23 ASSESSMENT — PAIN SCALES - GENERAL
PAINLEVEL_OUTOF10: 3
PAINLEVEL_OUTOF10: 8
PAINLEVEL_OUTOF10: 8

## 2025-06-23 ASSESSMENT — PAIN DESCRIPTION - ORIENTATION: ORIENTATION: LOWER

## 2025-06-23 ASSESSMENT — PAIN - FUNCTIONAL ASSESSMENT
PAIN_FUNCTIONAL_ASSESSMENT: 0-10

## 2025-06-23 ASSESSMENT — PAIN DESCRIPTION - LOCATION: LOCATION: BACK

## 2025-06-23 NOTE — PROGRESS NOTES
Occupational Therapy                 Therapy Communication Note    Patient Name: Caitlyn Atkins  MRN: [id number]  Department: [place] [address]  Room: [address] [place] [address]  Today's Date: 6/23/2025     Discipline: Occupational Therapy    Missed Visit:   Screen and DC    Missed Visit Reason: Missed Visit Reason: Other (Comment) (Orders recieved and chart reviewed. Pt reporting no acute or home going OT concerns; stating able to complete all ADL tasks and functional mobility independently. Caregiver present and agreeing with patient. Will discontinue orders. RN aware.)

## 2025-06-23 NOTE — PROGRESS NOTES
"Caitlyn Atkins is a 60 y.o. female on day 4 of admission presenting with Hypokalemia.    Subjective   I met with the patient this morning, as well as her daughters.  Patient continues to complain of coccyx pain.  After further discussion today, she states that she has actually had some small improvement in her bladder control.  She is now able to empty the bladder partially on her own.  She however does continue to have retention.  No neurologic symptoms in the legs at this time.    The patient is currently being treated for her DVT.       Objective     Physical Exam    Last Recorded Vitals  Blood pressure 97/56, pulse 80, temperature 37 °C (98.6 °F), temperature source Temporal, resp. rate 18, height 1.499 m (4' 11\"), weight (!) 39.5 kg (87 lb), SpO2 97%.  Intake/Output last 3 Shifts:  I/O last 3 completed shifts:  In: 50.2 (1.3 mL/kg) [IV Piggyback:50.2]  Out: 375 (9.5 mL/kg) [Urine:375 (0.3 mL/kg/hr)]  Weight: 39.5 kg     Relevant Results      Scheduled medications  Scheduled Medications[1]  Continuous medications  Continuous Medications[2]  PRN medications  PRN Medications[3]  Results for orders placed or performed during the hospital encounter of 06/19/25 (from the past 24 hours)   Blood Gas Venous Full Panel   Result Value Ref Range    POCT pH, Venous 7.36 7.33 - 7.43 pH    POCT pCO2, Venous 34 (L) 41 - 51 mm Hg    POCT pO2, Venous 69 (H) 35 - 45 mm Hg    POCT SO2, Venous 97 (H) 45 - 75 %    POCT Oxy Hemoglobin, Venous 94.1 (H) 45.0 - 75.0 %    POCT Hematocrit Calculated, Venous 23.0 (L) 36.0 - 46.0 %    POCT Sodium, Venous 134 (L) 136 - 145 mmol/L    POCT Potassium, Venous 4.2 3.5 - 5.3 mmol/L    POCT Chloride, Venous 110 (H) 98 - 107 mmol/L    POCT Ionized Calicum, Venous 1.48 (H) 1.10 - 1.33 mmol/L    POCT Glucose, Venous 129 (H) 74 - 99 mg/dL    POCT Lactate, Venous 1.7 0.4 - 2.0 mmol/L    POCT Base Excess, Venous -5.7 (L) -2.0 - 3.0 mmol/L    POCT HCO3 Calculated, Venous 19.2 (L) 22.0 - 26.0 mmol/L    " POCT Hemoglobin, Venous 7.8 (L) 12.0 - 16.0 g/dL    POCT Anion Gap, Venous 9.0 (L) 10.0 - 25.0 mmol/L    Patient Temperature 37.0 degrees Celsius    FiO2 0 %   Basic Metabolic Panel   Result Value Ref Range    Glucose 74 74 - 99 mg/dL    Sodium 135 (L) 136 - 145 mmol/L    Potassium 4.7 3.5 - 5.3 mmol/L    Chloride 111 (H) 98 - 107 mmol/L    Bicarbonate 21 21 - 32 mmol/L    Anion Gap 8 (L) 10 - 20 mmol/L    Urea Nitrogen 15 6 - 23 mg/dL    Creatinine 0.81 0.50 - 1.05 mg/dL    eGFR 83 >60 mL/min/1.73m*2    Calcium 9.2 8.6 - 10.3 mg/dL   CBC   Result Value Ref Range    WBC 6.7 4.4 - 11.3 x10*3/uL    nRBC 0.0 0.0 - 0.0 /100 WBCs    RBC 2.58 (L) 4.00 - 5.20 x10*6/uL    Hemoglobin 8.0 (L) 12.0 - 16.0 g/dL    Hematocrit 23.9 (L) 36.0 - 46.0 %    MCV 93 80 - 100 fL    MCH 31.0 26.0 - 34.0 pg    MCHC 33.5 32.0 - 36.0 g/dL    RDW 14.9 (H) 11.5 - 14.5 %    Platelets 376 150 - 450 x10*3/uL   Magnesium   Result Value Ref Range    Magnesium 1.38 (L) 1.60 - 2.40 mg/dL     *Note: Due to a large number of results and/or encounters for the requested time period, some results have not been displayed. A complete set of results can be found in Results Review.                            Assessment & Plan  Hypokalemia    Patient admitted with a lower extremity DVT and hypokalemia    I had a long discussion this morning with the patient and her 2 daughters, concerning the high risk of proceeding with a major lumbar spine surgery at this time.  She certainly would be at risk for perioperative DVT secondary to the need to withhold anticoagulation in the postoperative period this would put her at risk for pulmonary embolus.  Patient is also cachectic, and would be at high risk for wound problems and/or perioperative infection.  Additionally, the patient has been trying to manage her potassium levels.  For all these reasons, the patient is currently too high risk to recommend proceeding to the operating room at this time.  I have recommended  the patient continue to work on anticoagulation, nutrition, and follow-up with urology for removal of the Aguirre.  Physical therapy should teach the patient Kegel exercises to work on bladder emptying.  I can see the patient back in a couple of months once the anticoagulation period is completed.  I am hopeful, that with time, and the appropriate exercises, bladder function may continue to slowly improve.  In the face of clinical improvement, I have recommended continued nonoperative management given the patient's current extremely high risk for perioperative complications.  At the end of our discussion this morning, both the patient and the daughters appear to recognize the complexity of this clinical situation, and were in agreement with the plan.              Michele King MD           [1] apixaban, 10 mg, oral, BID   Followed by  [START ON 6/28/2025] apixaban, 5 mg, oral, BID  chlorhexidine, 15 mL, Mouth/Throat, Daily  folic acid, 1 mg, intravenous, Daily  gabapentin, 100 mg, oral, TID  pantoprazole, 40 mg, oral, Daily before breakfast  potassium citrate CR, 10 mEq, oral, BID  sennosides, 2 tablet, oral, BID  tamsulosin, 0.4 mg, oral, Nightly    [2]    [3] PRN medications: HYDROcodone-acetaminophen, ondansetron ODT **OR** ondansetron, tiZANidine

## 2025-06-23 NOTE — CONSULTS
Nutrition Initial Assessment Note    Reason for Assessment: Provider consult order    Pt admitted for:  Hypokalemia [E87.6]    Chart reviewed and pt visited, family at bedside.  Per pt eats 1 1/2 meals a day, -105#- happy with normal body weight.  Inquired how much to eat in order to maintain a healthy weight without fluid accumulation.  Discussed recommended calorie and protein needs, strategies for eating enough daily in order to meet nutritional needs.    Medical History[1]    Results for orders placed or performed during the hospital encounter of 06/19/25 (from the past 24 hours)   Blood Gas Venous Full Panel   Result Value Ref Range    POCT pH, Venous 7.36 7.33 - 7.43 pH    POCT pCO2, Venous 34 (L) 41 - 51 mm Hg    POCT pO2, Venous 69 (H) 35 - 45 mm Hg    POCT SO2, Venous 97 (H) 45 - 75 %    POCT Oxy Hemoglobin, Venous 94.1 (H) 45.0 - 75.0 %    POCT Hematocrit Calculated, Venous 23.0 (L) 36.0 - 46.0 %    POCT Sodium, Venous 134 (L) 136 - 145 mmol/L    POCT Potassium, Venous 4.2 3.5 - 5.3 mmol/L    POCT Chloride, Venous 110 (H) 98 - 107 mmol/L    POCT Ionized Calicum, Venous 1.48 (H) 1.10 - 1.33 mmol/L    POCT Glucose, Venous 129 (H) 74 - 99 mg/dL    POCT Lactate, Venous 1.7 0.4 - 2.0 mmol/L    POCT Base Excess, Venous -5.7 (L) -2.0 - 3.0 mmol/L    POCT HCO3 Calculated, Venous 19.2 (L) 22.0 - 26.0 mmol/L    POCT Hemoglobin, Venous 7.8 (L) 12.0 - 16.0 g/dL    POCT Anion Gap, Venous 9.0 (L) 10.0 - 25.0 mmol/L    Patient Temperature 37.0 degrees Celsius    FiO2 0 %   Basic Metabolic Panel   Result Value Ref Range    Glucose 74 74 - 99 mg/dL    Sodium 135 (L) 136 - 145 mmol/L    Potassium 4.7 3.5 - 5.3 mmol/L    Chloride 111 (H) 98 - 107 mmol/L    Bicarbonate 21 21 - 32 mmol/L    Anion Gap 8 (L) 10 - 20 mmol/L    Urea Nitrogen 15 6 - 23 mg/dL    Creatinine 0.81 0.50 - 1.05 mg/dL    eGFR 83 >60 mL/min/1.73m*2    Calcium 9.2 8.6 - 10.3 mg/dL   CBC   Result Value Ref Range    WBC 6.7 4.4 - 11.3 x10*3/uL    nRBC  "0.0 0.0 - 0.0 /100 WBCs    RBC 2.58 (L) 4.00 - 5.20 x10*6/uL    Hemoglobin 8.0 (L) 12.0 - 16.0 g/dL    Hematocrit 23.9 (L) 36.0 - 46.0 %    MCV 93 80 - 100 fL    MCH 31.0 26.0 - 34.0 pg    MCHC 33.5 32.0 - 36.0 g/dL    RDW 14.9 (H) 11.5 - 14.5 %    Platelets 376 150 - 450 x10*3/uL   Magnesium   Result Value Ref Range    Magnesium 1.38 (L) 1.60 - 2.40 mg/dL   Lavender Top   Result Value Ref Range    Extra Tube Hold for add-ons.    SST TOP   Result Value Ref Range    Extra Tube Hold for add-ons.      Scheduled medications  Scheduled Medications[2]  Continuous medications  Continuous Medications[3]  PRN medications  PRN Medications[4]  Dietary Orders (From admission, onward)       Start     Ordered    06/22/25 0158  May Participate in Room Service  ( ROOM SERVICE MAY PARTICIPATE)  Once        Question:  .  Answer:  Yes    06/22/25 0157 06/21/25 0912  Oral nutritional supplements  Until discontinued        Question Answer Comment   Deliver with All meals    Select supplement: Ensure Plus High Protein        06/21/25 0911    06/19/25 2201  Adult diet Regular  Diet effective now        Question:  Diet type  Answer:  Regular    06/19/25 2202                    History:  Energy Intake: Poor < 50 %  Food and Nutrient History: per pt report eats 1 1/2 meals a day as her norm    Anthropometrics:  Height: 149.9 cm (4' 11\")  Weight: 46.7 kg (103 lb) (standing scale per RN)  BMI (Calculated): 20.79    Wt Readings from Last 15 Encounters:   06/23/25 46.7 kg (103 lb)   06/19/25 (!) 39.9 kg (87 lb 15.4 oz)   06/17/25 (!) 40.4 kg (89 lb)   05/15/25 47.6 kg (105 lb)   04/29/25 47.6 kg (105 lb)   04/25/25 48.1 kg (106 lb)   04/21/25 49.4 kg (109 lb)   04/03/25 49.5 kg (109 lb 3.2 oz)   03/28/25 45.4 kg (100 lb)   03/11/25 45.4 kg (100 lb)   02/28/25 45.4 kg (100 lb)   12/31/24 45.4 kg (100 lb)   12/16/24 45.4 kg (100 lb)   08/27/24 45.4 kg (100 lb)   06/26/24 45.4 kg (100 lb)         Significant Weight Loss: No  Significant Weight " Gain: Fluid related    Total Energy Estimated Needs in 24 hours (kCal): 1200 kCal  Energy Estimated Needs per kg Body Weight in 24 hours (kCal/kg): 1400 kCal/kg  Method for Estimating Needs: 30-32    Total Protein Estimated Needs in 24 Hours (g): 40 g  Protein Estimated Needs per kg Body Weight in 24 Hours (g/kg): 50 g/kg  Method for Estimating 24 Hour Protein Needs: 1.0-1.2    Method for Estimating 24 Hour Fluid Needs: 1ml/kcal or per MD    Nutrition Focused Physical Findings:  Orbital Fat Pads: Severe (dark circles, hollowing and loose skin)  Buccal Fat Pads: Mild-Moderate (flat cheeks, minimal bounce)    Temporalis: Severe (hollowed scooping depression)    Edema: +2 mild  Edema Location: RLE, LLE    Skin: Negative     Nutrition Diagnosis   Malnutrition Diagnosis  Patient has Malnutrition Diagnosis: Yes  Diagnosis Status: New  Malnutrition Diagnosis: Severe malnutrition related to chronic disease or condition  Related to: multi factoral  As Evidenced by: prolonged poor intake prior to hospital admit of < 75% of estimated energy needs in > 1 month, moderate to severe subcutaneous fat loss, severe muscle wasting and mild fluid accumulation present       Nutrition Interventions/Recommendations   Nutrition Prescription: Nutrition prescription for oral nutrition  Individualized Nutrition Prescription Provided for : Continue oral diet as ordered. Ensure TID.    Food and/or Nutrient Delivery Interventions  Meals and Snacks: General healthful diet  Goal: > 75% of meals consumed     Medical Food Supplement: Commercial beverage medical food supplement therapy  Goal: Ensure TID for encouraged intake    Collaboration and Referral of Nutrition Care: Collaboration by nutrition professional with other providers  Coordination of Care with Providers: Nursing    Education Documentation  N/A      Nutrition Monitoring and Evaluation   Food and Nutrient Related History  Estimated Energy Intake: Energy intake greater or equal to 75% of  estimated energy needs    Fluid Intake: Estimated fluid intake    Anthropometrics: Body Composition/Growth/Weight History  Body Weight: Body weight - Maintain stable weight, Body weight - Promote weight restoration, Body weight - Weight reduction from fluids, as needed    Biochemical Data, Medical Tests and Procedures  Electrolyte and Renal Panel: Other (Comment)  Criteria: as clinically indicated    Gastrointestinal Profile: Other (Comment)  Criteria: as clinically indicated    Glucose/Endocrine Profile: Other (Comment)  Criteria: as clinically indicated    Nutritional Anemia Profile: Other (Comment)  Criteria: as clinically indicated    Vitamin Profile: Other (Comment)  Criteria: as clinically indicated    Nutrition Focused Physical Findings  Adipose Finding: Loss of subcutaneous fat    Digestive System Finding: Other (Comment)  Criteria: Stool output, Urine volume, Overall appearance    Muscle Finding: Muscle atrophy    Skin Finding: Promote intact skin - Promote skin integrity    Edema Finding: +2 Pitting edema    Time Spent (min): 60 minutes  Last Date of Nutrition Visit: 06/23/25  Nutrition Follow-Up Needed?: Dietitian to reassess per policy  Follow up Comment: NIDIA MCKEON       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, followed by neurosurgery- Clearance in note on 5/21/25    Lower extremity weakness     Lumbar radiculopathy     Lumbar spondylosis     Multiple falls     Multiple sclerosis (Multi)     Multiple sclerosis, follows with neuro, per patient well managed on Vumerity, no flare up since 2014    Osteoporosis     Palpitations     saw  cardiology-wore holter-SVT was on Metoprolol but per patient did not help, never followed up with cardiology because palpitations stopped after quitting her stressful job    Pseudoclaudication syndrome     Sacral fracture (Multi)     Sacroiliitis     SDH (subdural hematoma) (Multi)     Spinal stenosis     Spondylolisthesis     Urinary incontinence     Vitamin D deficiency    [2] apixaban, 10 mg, oral, BID   Followed by  [START ON 6/28/2025] apixaban, 5 mg, oral, BID  chlorhexidine, 15 mL, Mouth/Throat, Daily  folic acid, 1 mg, intravenous, Daily  gabapentin, 100 mg, oral, TID  pantoprazole, 40 mg, oral, Daily before breakfast  potassium citrate CR, 10 mEq, oral, BID  sennosides, 2 tablet, oral, BID  sodium chloride, 500 mL, intravenous, Once  tamsulosin, 0.4 mg, oral, Nightly  [3]    [4] PRN medications: HYDROcodone-acetaminophen, ondansetron ODT **OR** ondansetron, tiZANidine

## 2025-06-23 NOTE — PROGRESS NOTES
06/23/25 1314   Discharge Planning   Home or Post Acute Services In home services   Type of Home Care Services Home nursing visits;Home PT;Home OT   Expected Discharge Disposition Home   Does the patient need discharge transport arranged? No     TCC met with pt and dtr at bedside. Pt has dc order in. Will dc home with new [facility].

## 2025-06-24 ENCOUNTER — TELEPHONE (OUTPATIENT)
Dept: PRIMARY CARE | Facility: CLINIC | Age: 60
End: 2025-06-24
Payer: COMMERCIAL

## 2025-06-24 ENCOUNTER — DOCUMENTATION (OUTPATIENT)
Dept: HOME HEALTH SERVICES | Facility: HOME HEALTH | Age: 60
End: 2025-06-24
Payer: COMMERCIAL

## 2025-06-24 ENCOUNTER — HOME HEALTH ADMISSION (OUTPATIENT)
Dept: HOME HEALTH SERVICES | Facility: HOME HEALTH | Age: 60
End: 2025-06-24
Payer: COMMERCIAL

## 2025-06-24 ENCOUNTER — PATIENT OUTREACH (OUTPATIENT)
Dept: PRIMARY CARE | Facility: CLINIC | Age: 60
End: 2025-06-24
Payer: COMMERCIAL

## 2025-06-24 DIAGNOSIS — M62.838 CERVICAL PARASPINAL MUSCLE SPASM: ICD-10-CM

## 2025-06-24 DIAGNOSIS — M54.2 NECK PAIN: ICD-10-CM

## 2025-06-24 DIAGNOSIS — M54.32 BILATERAL SCIATICA: ICD-10-CM

## 2025-06-24 DIAGNOSIS — M54.31 BILATERAL SCIATICA: ICD-10-CM

## 2025-06-24 LAB
ALDOST SERPL-MCNC: 10.8 NG/DL
ALDOST/RENIN PLAS-RTO: 5.4 RATIO
RENIN PLAS-CCNC: 2 NG/ML/HR

## 2025-06-24 RX ORDER — HYDROCODONE BITARTRATE AND ACETAMINOPHEN 7.5; 325 MG/1; MG/1
1 TABLET ORAL EVERY 6 HOURS PRN
Qty: 112 TABLET | Refills: 0 | Status: SHIPPED | OUTPATIENT
Start: 2025-06-24 | End: 2025-07-22

## 2025-06-24 NOTE — TELEPHONE ENCOUNTER
Patient calling for Hosp follow Up for Blood Clots is asking to see you before your vacation OK to schedule an where?

## 2025-06-24 NOTE — PROGRESS NOTES
"Discharge Facility: [facility]   Discharge Diagnosis:   Acute deep vein thrombosis (DVT) of proximal vein of lower extremity, unspecified laterality  Hypokalemia  Bilateral leg edema  RTA (renal tubular acidosis)  Urinary retention  DVT (deep vein thrombosis) in pregnancy ([facility])  Admission Date:  6/19/25  Discharge Date: 6/23/25    PCP Appointment Date: Message routed to office for scheduling     Specialist Appointment Date:  8/12/25 - Pain managment  Hospital Encounter and Summary Linked: ED to Hosp-Admission (Discharged) with Zaira David MD; Rahul Ferro DO (06/19/2025)   ED Provider Notes by Rahul Ferro DO (06/19/2025 19:04)   See discharge assessment below for further details  Wrap Up  Wrap Up Additional Comments: Pt. Is scheduled with [facility], she canceled OT, but is keeping PT and SN. Pt. Has concerns of how long she will need to be on Eliquis d/t her upcoming back surgery she has had to place on hold, until cleared by medical teams. Pt. Reports she never felt \"bad\". Pt. States she is in need of referrals to urology, education provide that referrals have been placed.  Denies further questions/concerns at this time. This callers contact information for non-emergent questions/concerns. (6/24/2025  9:49 AM)    Engagement  Call Start Time: -- (Call completed with Caitlyn) (6/24/2025  9:49 AM)    Medications  Medications reviewed with patient/caregiver?: Yes (6/24/2025  9:49 AM)  Is the patient having any side effects they believe may be caused by any medication additions or changes?: No (6/24/2025  9:49 AM)  Does the patient have all medications ordered at discharge?: Yes (6/24/2025  9:49 AM)  Care Management Interventions: No intervention needed (6/24/2025  9:49 AM)  Prescription Comments: START taking: apixaban (Eliquis) Start taking on: June 23, 2025 tamsulosin (Flomax) CHANGE how you take: HYDROcodone-acetaminophen (Norco) STOP taking: chlorhexidine 0.12 % solution (Peridex) (6/24/2025  9:49 AM)  Is the " patient taking all medications as directed (includes completed medication regime)?: Yes (6/24/2025  9:49 AM)  Care Management Interventions: Provided patient education (6/24/2025  9:49 AM)  Medication Comments: Pt. denies any new medication questions/concerns (6/24/2025  9:49 AM)    Appointments  Does the patient have a primary care provider?: Yes (6/24/2025  9:49 AM)  Care Management Interventions: Educated patient on importance of making appointment (6/24/2025  9:49 AM)  Has the patient kept scheduled appointments due by today?: Not applicable (6/24/2025  9:49 AM)  Care Management Interventions: Advised to schedule with specialist (6/24/2025  9:49 AM)    Self Management  What is the home health agency?: HC, SN/PT- (6/24/2025  9:49 AM)  Has home health visited the patient within 72 hours of discharge?: Call prior to 72 hours (6/24/2025  9:49 AM)  What Durable Medical Equipment (DME) was ordered?: n/a (6/24/2025  9:49 AM)    Patient Teaching  Does the patient have access to their discharge instructions?: Yes (6/24/2025  9:49 AM)  Care Management Interventions: Reviewed instructions with patient (6/24/2025  9:49 AM)  What is the patient's perception of their health status since discharge?: Same (6/24/2025  9:49 AM)  Is the patient/caregiver able to teach back the hierarchy of who to call/visit for symptoms/problems? PCP, Specialist, Home Health nurse, Urgent Care, ED, 911: Yes (6/24/2025  9:49 AM)  Patient/Caregiver Education Comments: Pt. denies any new concerns post hospital discharge (6/24/2025  9:49 AM)

## 2025-06-24 NOTE — HH CARE COORDINATION
Home Care received a Referral for Nursing, Physical Therapy, and Occupational Therapy. We have processed the referral for a Start of Care on 06/25-06*/26.     If you have any questions or concerns, please feel free to contact us at [phone]. Follow the prompts, enter your five digit zip code, and you will be directed to your care team on [facility].

## 2025-06-24 NOTE — PROGRESS NOTES
Had surgery as planned  No pain meds from surgeon  PDMP checked  Will resend Rx hydrocodone 7.5 mg qid 112 tabs

## 2025-06-24 NOTE — DISCHARGE SUMMARY
Discharge Diagnosis  Hypokalemia    Malnutrition Diagnosis Status: New  Malnutrition Diagnosis: Severe malnutrition related to chronic disease or condition  Related to: multi factoral  As Evidenced by: prolonged poor intake prior to hospital admit of < 75% of estimated energy needs in > 1 month, moderate to severe subcutaneous fat loss, severe muscle wasting and mild fluid accumulation present  I agree with the dietitian's malnutrition diagnosis.        Issues Requiring Follow-Up  Nephrology, urology, ortho follow up     Discharge Meds     Medication List      START taking these medications     Eliquis 5 mg tablet; Generic drug: apixaban; Take 2 tablets (10 mg) by   mouth 2 times a day for 5 days, THEN 1 tablet (5 mg) 2 times a day.; Start   taking on: June 23, 2025   tamsulosin 0.4 mg 24 hr capsule; Commonly known as: Flomax; Take 1   capsule (0.4 mg) by mouth once daily at bedtime. Do not crush, chew, or   split.     CHANGE how you take these medications     HYDROcodone-acetaminophen 7.5-325 mg tablet; Commonly known as: Norco;   Take 1 tablet by mouth every 6 hours if needed for severe pain (7 - 10)   for up to 28 days. Do not fill before July 21, 2025.; Start taking on:   July 21, 2025; What changed: Another medication with the same name was   removed. Continue taking this medication, and follow the directions you   see here.     CONTINUE taking these medications     acetaminophen 325 mg tablet; Commonly known as: Tylenol   alendronate 70 mg tablet; Commonly known as: Fosamax; Take 1 tablet (70   mg) by mouth every 7 days. Take in the morning with a full glass of water,   on an empty stomach, and do not take anything else by mouth or lie down   for the next 30 min.   b complex 0.4 mg tablet   CALCIUM 500 ORAL   cholecalciferol 50 mcg (2,000 units) tablet; Commonly known as: Vitamin   D-3   gabapentin 100 mg capsule; Commonly known as: Neurontin; Take 1 capsule   (100 mg) by mouth 3 times a day.   glucose 4 gram  chewable tablet   naloxone 4 mg/0.1 mL nasal spray; Commonly known as: Narcan   omeprazole OTC 20 mg EC tablet; Commonly known as: PriLOSEC OTC   tiZANidine 2 mg tablet; Commonly known as: Zanaflex; Take 1 tablet (2   mg) by mouth every 12 hours if needed for muscle spasms for up to 28 days.   Vumerity 231 mg capsule,delayed release(DR/EC); Generic drug: diroximel   fumarate; Take 2 capsules by mouth 2 times a day.     STOP taking these medications     chlorhexidine 0.12 % solution; Commonly known as: Peridex       Test Results Pending At Discharge  Pending Labs       Order Current Status    Aldosterone/Renin Activity Ratio In process    Aldosterone/Renin Activity Ratio,Serum In process            Hospital Course   Caitlyn Atkins is a 60 y.o. female with a PMH of MS, osteoporosis, lumbar radiculopathy, spinal stenosis, right total hip, extra-axial, calvarium marrow and scalp lesions of unknown etiology or diagnosis seen on MRI 5/12/25, and previous episode of hypokalemia in April presenting with hypokalemia seen on labs for pre-op testing.     Ms Atkins is to undergo spinal surgery for sacral fractures, presented for pre-op testing today. She was found to have a potassium of 2.2, therefore was instructed to come to the ED.   Ms Atkins has had generalized weakness of her legs since her fall and sacral fractures, however, has had increased weakness for the last few weeks.   Earlier this month she had nausea and vomiting as well a decreased appetite. However, since June 14 has been eating normally, no NV.   Has had loose stools for the past week, after having taken laxatives for constipation. No longer on laxatives.   Denies diuretic use.   Also reports increased LE edema for the past month. Has been fairly sedentary after her fractures secondary to pain and LE weakness.      In the ED, repeat K is 2.0. Cr 1.27, prior Cr 0.7-0.9.   AST and ALT elevated at 63 and 44, respectively   Mag 1.84  Calcium 12.6, however  pt taking supplements, OTC, 600mg twice daily.   On 6/19 patient was found to have Acute deep vein thrombosis at the proximal left peroneal vein. Patient was started on eliquis. Meanwhile patient was seen by nephrology due to concern for RTA 1 in the setting of hypokalemia. Patient was started on bicarbonate ggt for ph 7.96. metabolic acidosis improved afterwards. Plan for nephrology follow up in 2weeks. Appears that it is also possible that hypokalemia is 2/2 to malnutrition. Patient was also seen by Dr. King due to upcoming back surgery. Discussed that patient was not a good surgical candidate at this time and that it should be posponed due to malnutrition, new DVT, and hypokalemia. Patient will have follow up with nephrology, PCP, and ortho     Spent >30 minutes on clinical evaluation of patient on day of discharge     Pertinent Physical Exam At Time of Discharge  Physical Exam  Constitutional:       Appearance: Normal appearance. She is ill-appearing (chronically ill appearing).      Comments: Very thin   Cardiovascular:      Rate and Rhythm: Normal rate and regular rhythm.   Pulmonary:      Effort: Pulmonary effort is normal.      Breath sounds: Normal breath sounds.   Abdominal:      General: Abdomen is flat. Bowel sounds are normal.      Palpations: Abdomen is soft.   Musculoskeletal:      Cervical back: Normal range of motion.   Skin:     General: Skin is warm.   Neurological:      General: No focal deficit present.      Mental Status: She is alert and oriented to person, place, and time. Mental status is at baseline.   Psychiatric:         Mood and Affect: Mood normal.         Behavior: Behavior normal.         Thought Content: Thought content normal.         Judgment: Judgment normal.   Outpatient Follow-Up  Future Appointments   Date Time Provider Department Center   6/25/2025  2:10 PM Neeru Oglesby DO [id number] [facility]   7/15/2025 12:30 PM Neal Mitchell PA-C [id number] [facility]   8/12/2025 11:45 AM  Viet Maher MD [id number] [facility]   9/23/2025  2:45 PM Daniel Freitas MD [id number] [facility]         Zaira David MD

## 2025-06-25 ENCOUNTER — OFFICE VISIT (OUTPATIENT)
Dept: PRIMARY CARE | Facility: CLINIC | Age: 60
End: 2025-06-25
Payer: COMMERCIAL

## 2025-06-25 VITALS
SYSTOLIC BLOOD PRESSURE: 92 MMHG | BODY MASS INDEX: 21.41 KG/M2 | TEMPERATURE: 98.2 F | OXYGEN SATURATION: 97 % | HEART RATE: 81 BPM | DIASTOLIC BLOOD PRESSURE: 56 MMHG | WEIGHT: 106 LBS

## 2025-06-25 DIAGNOSIS — R93.89 ABNORMAL CHEST CT: ICD-10-CM

## 2025-06-25 DIAGNOSIS — R33.9 BLADDER RETENTION OF URINE: ICD-10-CM

## 2025-06-25 DIAGNOSIS — I82.4Y2 ACUTE DEEP VEIN THROMBOSIS (DVT) OF PROXIMAL VEIN OF LEFT LOWER EXTREMITY: Primary | ICD-10-CM

## 2025-06-25 DIAGNOSIS — E83.42 HYPOMAGNESEMIA: ICD-10-CM

## 2025-06-25 DIAGNOSIS — E83.52 HYPERCALCEMIA: ICD-10-CM

## 2025-06-25 DIAGNOSIS — M48.061 SPINAL STENOSIS OF LUMBAR REGION, UNSPECIFIED WHETHER NEUROGENIC CLAUDICATION PRESENT: ICD-10-CM

## 2025-06-25 DIAGNOSIS — E87.6 HYPOKALEMIA: ICD-10-CM

## 2025-06-25 DIAGNOSIS — E55.9 VITAMIN D DEFICIENCY: ICD-10-CM

## 2025-06-25 DIAGNOSIS — K59.00 CONSTIPATION, UNSPECIFIED CONSTIPATION TYPE: ICD-10-CM

## 2025-06-25 DIAGNOSIS — D64.9 ANEMIA, UNSPECIFIED TYPE: ICD-10-CM

## 2025-06-25 DIAGNOSIS — R60.0 LOCALIZED EDEMA: ICD-10-CM

## 2025-06-25 DIAGNOSIS — K80.20 CALCULUS OF GALLBLADDER WITHOUT CHOLECYSTITIS WITHOUT OBSTRUCTION: ICD-10-CM

## 2025-06-25 LAB — 25(OH)D3 SERPL-MCNC: 69 NG/ML (ref 30–100)

## 2025-06-25 PROCEDURE — 99496 TRANSJ CARE MGMT HIGH F2F 7D: CPT | Performed by: FAMILY MEDICINE

## 2025-06-25 PROCEDURE — 3074F SYST BP LT 130 MM HG: CPT | Performed by: FAMILY MEDICINE

## 2025-06-25 PROCEDURE — 3078F DIAST BP <80 MM HG: CPT | Performed by: FAMILY MEDICINE

## 2025-06-25 ASSESSMENT — ENCOUNTER SYMPTOMS
DYSURIA: 0
MYALGIAS: 0
ABDOMINAL DISTENTION: 0
DIZZINESS: 0
DYSPHORIC MOOD: 0
DIARRHEA: 0
BLOOD IN STOOL: 0
CONSTIPATION: 1
POLYPHAGIA: 0
SLEEP DISTURBANCE: 0
VOMITING: 0
ABDOMINAL PAIN: 0
NAUSEA: 0
SHORTNESS OF BREATH: 0
HEADACHES: 0
FATIGUE: 0
POLYDIPSIA: 0
PALPITATIONS: 0

## 2025-06-25 NOTE — PROGRESS NOTES
Subjective   Patient ID: Caitlyn Atkins is a 60 y.o. female who presents for Hospital Follow-up (F/U from [facility] from 6/19-6/23. Re: Hypokalemia, DVT, incomtinence due to back issues ), Leg Swelling (Discuss bilat lower leg swelling that is causing x1 week. ), Constipation (Discuss constipation ), and home health (Discuss orders for home health for blood draws and catheter supplies ) and multiple issues.       TCM outreach has been completed.    Constipation  Associated symptoms include back pain (chronic). Pertinent negatives include no abdominal pain, diarrhea, fever, nausea or vomiting.      DVT: recently in hospital for Low K and also worsening LE edema. Pt +DVT in left leg and admitted. Chest CT neg for PE.     K: critically low during PAT. Pt had not been eating after husbands passing for couple weeks. Has been sedentary since April after sacral fx. Former smoker. No palp    Anemia: low in hospital. No unusual bleeding.     RLL lesion: seen on CT chest. No cough. Former smoker. Needs repeat CT in 3-6 months. Former smoker.     US polyps/gallstones. Seen on US. Denies RUQ pain. Needs repeat in 6-12 mo    K/Mg: low in hospital. Repleted in hospital. Needs rechecked    Spinal stenosis: ortho consulted while pt in hospital who rec to postpone surgery until after pt off anti-coagulation.     Bladder retention: onset for months likely from remote sacral fracture after fall.     Vit D def: due for recheck. Pt was on vit D supplements in past.     Edema: worsening as above and since discharge. No assoc C/SOB/PND/orthopnea. Echo in hospital showed nml EF    Constipation: onset for several months after fall. Pt on chronic opiates. Docolax occ helps.     Review of Systems   Constitutional:  Negative for chills, fatigue and fever.   HENT: Negative.     Eyes:  Negative for visual disturbance.   Respiratory:  Negative for shortness of breath.    Cardiovascular:  Negative for chest pain and palpitations.    Gastrointestinal:  Positive for constipation. Negative for abdominal distention, abdominal pain, blood in stool, diarrhea, nausea and vomiting.   Endocrine: Negative for cold intolerance, heat intolerance, polydipsia, polyphagia and polyuria.   Genitourinary:  Negative for dysuria.   Musculoskeletal:  Positive for back pain (chronic). Negative for myalgias.   Skin:  Negative for rash.   Neurological:  Negative for dizziness and headaches.   Psychiatric/Behavioral:  Negative for dysphoric mood and sleep disturbance.        Objective   BP 92/56   Pulse 81   Temp 36.8 °C (98.2 °F)   Wt 48.1 kg (106 lb)   SpO2 97%   BMI 21.41 kg/m²     Physical Exam  Vitals and nursing note reviewed.   Constitutional:       General: She is not in acute distress.     Appearance: Normal appearance. She is not toxic-appearing.   HENT:      Head: Normocephalic.      Nose: Nose normal.      Mouth/Throat:      Pharynx: Oropharynx is clear.   Eyes:      General: No scleral icterus.     Pupils: Pupils are equal, round, and reactive to light.   Cardiovascular:      Rate and Rhythm: Normal rate and regular rhythm.      Heart sounds: No murmur heard.  Pulmonary:      Effort: Pulmonary effort is normal. No respiratory distress.      Breath sounds: Normal breath sounds.   Abdominal:      Palpations: Abdomen is soft.      Tenderness: There is no abdominal tenderness. There is no guarding or rebound.   Musculoskeletal:         General: No tenderness.      Cervical back: Neck supple.      Right lower leg: Edema (1+) present.      Left lower leg: Edema (1+) present.   Lymphadenopathy:      Cervical: No cervical adenopathy.   Skin:     General: Skin is warm.   Neurological:      General: No focal deficit present.      Mental Status: She is alert.      Cranial Nerves: No cranial nerve deficit.   Psychiatric:         Mood and Affect: Mood normal.         Assessment/Plan   Problem List Items Addressed This Visit           ICD-10-CM    Vitamin D  deficiency E55.9    Relevant Orders    Vitamin D 25-Hydroxy,Total (for eval of Vitamin D levels) (Completed)    Hypokalemia E87.6     Other Visit Diagnoses         Codes      Acute deep vein thrombosis (DVT) of proximal vein of left lower extremity    -  Primary I82.4Y2      Abnormal chest CT     R93.89      Constipation, unspecified constipation type     K59.00      Bladder retention of urine     R33.9    Relevant Orders    Referral to Urology      Hypercalcemia     E83.52    Relevant Orders    Comprehensive Metabolic Panel      Hypomagnesemia     E83.42    Relevant Orders    Magnesium      Anemia, unspecified type     D64.9    Relevant Orders    CBC and Auto Differential    Ferritin      Localized edema     R60.0      Calculus of gallbladder without cholecystitis without obstruction     K80.20      Spinal stenosis of lumbar region, unspecified whether neurogenic claudication present     M48.061        Comprehensive review of patient's hospitalization performed.  Blood work reviewed including CMP, CBC, magnesium.  Imaging reviewed including CT head, renal and abdominal ultrasound, CT chest and abdomen, CT head, venous duplex.  Cardiology echo reviewed.  Consults including orthopedics, nephrology, urology reviewed.  Transition of care documents reviewed.  Discussed follow-up imaging required.  Recommendations given    Time Spent  Prep time on day of patient encounter: 5 minutes  Time spent directly with patient, family or caregiver: 57 minutes  Additional Time Spent on Patient Care Activities: 0 minutes  Documentation Time: 15 minutes  Other Time Spent: 0 minutes  Total: 77 minutes

## 2025-06-25 NOTE — PATIENT INSTRUCTIONS
You were referred for blood work    Follow up with your specialists as scheduled    You were referred to urology    Try miralax and you should be taking colace daily while on chronic opiates.     Go to the ER if any of your symptoms are significantly worsening.     Keep legs elevated when seated, avoid salt.     You will need repeat CT chest in 3-6 months    You will need repeat US gallbladder in 6-12 months    Discuss duration of eliquis with cardiology. Call for blood specialist if they do not manage    Return in 2-3 weeks for recheck, sooner if needed

## 2025-06-26 ENCOUNTER — TELEPHONE (OUTPATIENT)
Dept: PRIMARY CARE | Facility: CLINIC | Age: 60
End: 2025-06-26
Payer: COMMERCIAL

## 2025-06-26 ENCOUNTER — TELEPHONE (OUTPATIENT)
Dept: CARDIOLOGY | Facility: CLINIC | Age: 60
End: 2025-06-26
Payer: COMMERCIAL

## 2025-06-26 ENCOUNTER — HOME CARE VISIT (OUTPATIENT)
Dept: HOME HEALTH SERVICES | Facility: HOME HEALTH | Age: 60
End: 2025-06-26
Payer: COMMERCIAL

## 2025-06-26 VITALS
DIASTOLIC BLOOD PRESSURE: 64 MMHG | SYSTOLIC BLOOD PRESSURE: 110 MMHG | TEMPERATURE: 97.7 F | HEART RATE: 78 BPM | HEIGHT: 61 IN | OXYGEN SATURATION: 99 % | BODY MASS INDEX: 20.01 KG/M2 | WEIGHT: 106 LBS | RESPIRATION RATE: 12 BRPM

## 2025-06-26 PROCEDURE — G0299 HHS/HOSPICE OF RN EA 15 MIN: HCPCS

## 2025-06-26 ASSESSMENT — ENCOUNTER SYMPTOMS
BACK PAIN: 1
HIGHEST PAIN SEVERITY IN PAST 24 HOURS: 9/10
PAIN LOCATION: BACK
PAIN LOCATION - PAIN FREQUENCY: FREQUENT
PAIN LOCATION - PAIN QUALITY: ACHING
CHILLS: 0
PAIN LOCATION - RELIEVING FACTORS: MEDICATION REST
PAIN LOCATION - PAIN DURATION: VARIES
FEVER: 0
PAIN LOCATION - PAIN SEVERITY: 7/10
LOWEST PAIN SEVERITY IN PAST 24 HOURS: 5/10
PERSON REPORTING PAIN: PATIENT
PAIN SEVERITY GOAL: 2/10
PAIN: 1
SUBJECTIVE PAIN PROGRESSION: UNCHANGED

## 2025-06-26 ASSESSMENT — ACTIVITIES OF DAILY LIVING (ADL): ENTERING_EXITING_HOME: MODERATE ASSIST

## 2025-06-26 NOTE — TELEPHONE ENCOUNTER
Pt contacted. Appt scheduled 7/24/25. Pt requesting diuretic. Transferred pt to nurse line to leave a message.

## 2025-06-26 NOTE — TELEPHONE ENCOUNTER
Pt can't get in until August with Nephrology at any location, she's asking if there is anyway IEL who she saw last can expedite this process by changing referral to a stat? Pt is aware that she may need to wait to be seen but she is adamant that IEL make nephrology referral a stat as she did with urology.

## 2025-06-27 ENCOUNTER — HOME CARE VISIT (OUTPATIENT)
Dept: HOME HEALTH SERVICES | Facility: HOME HEALTH | Age: 60
End: 2025-06-27
Payer: COMMERCIAL

## 2025-06-27 VITALS — RESPIRATION RATE: 16 BRPM

## 2025-06-27 LAB
ALBUMIN SERPL-MCNC: 2.8 G/DL (ref 3.6–5.1)
ALP SERPL-CCNC: 127 U/L (ref 37–153)
ALT SERPL-CCNC: 22 U/L (ref 6–29)
ANION GAP SERPL CALCULATED.4IONS-SCNC: 11 MMOL/L (CALC) (ref 7–17)
AST SERPL-CCNC: 18 U/L (ref 10–35)
BASOPHILS # BLD AUTO: 139 CELLS/UL (ref 0–200)
BASOPHILS NFR BLD AUTO: 1.8 %
BILIRUB SERPL-MCNC: 0.2 MG/DL (ref 0.2–1.2)
BUN SERPL-MCNC: 18 MG/DL (ref 7–25)
CALCIUM SERPL-MCNC: 8.3 MG/DL (ref 8.6–10.4)
CHLORIDE SERPL-SCNC: 105 MMOL/L (ref 98–110)
CO2 SERPL-SCNC: 22 MMOL/L (ref 20–32)
CREAT SERPL-MCNC: 0.82 MG/DL (ref 0.5–1.05)
EGFRCR SERPLBLD CKD-EPI 2021: 81 ML/MIN/1.73M2
EOSINOPHIL # BLD AUTO: 208 CELLS/UL (ref 15–500)
EOSINOPHIL NFR BLD AUTO: 2.7 %
ERYTHROCYTE [DISTWIDTH] IN BLOOD BY AUTOMATED COUNT: 13 % (ref 11–15)
FERRITIN SERPL-MCNC: 179 NG/ML (ref 16–288)
GLUCOSE SERPL-MCNC: 114 MG/DL (ref 65–99)
HCT VFR BLD AUTO: 23.8 % (ref 35–45)
HGB BLD-MCNC: 7.4 G/DL (ref 11.7–15.5)
LYMPHOCYTES # BLD AUTO: 1386 CELLS/UL (ref 850–3900)
LYMPHOCYTES NFR BLD AUTO: 18 %
MAGNESIUM SERPL-MCNC: 1.4 MG/DL (ref 1.5–2.5)
MCH RBC QN AUTO: 31.8 PG (ref 27–33)
MCHC RBC AUTO-ENTMCNC: 31.1 G/DL (ref 32–36)
MCV RBC AUTO: 102.1 FL (ref 80–100)
MONOCYTES # BLD AUTO: 1032 CELLS/UL (ref 200–950)
MONOCYTES NFR BLD AUTO: 13.4 %
NEUTROPHILS # BLD AUTO: 4936 CELLS/UL (ref 1500–7800)
NEUTROPHILS NFR BLD AUTO: 64.1 %
PLATELET # BLD AUTO: 525 THOUSAND/UL (ref 140–400)
PMV BLD REES-ECKER: 9.8 FL (ref 7.5–12.5)
POTASSIUM SERPL-SCNC: 4.2 MMOL/L (ref 3.5–5.3)
PROT SERPL-MCNC: 5.2 G/DL (ref 6.1–8.1)
RBC # BLD AUTO: 2.33 MILLION/UL (ref 3.8–5.1)
SODIUM SERPL-SCNC: 138 MMOL/L (ref 135–146)
WBC # BLD AUTO: 7.7 THOUSAND/UL (ref 3.8–10.8)

## 2025-06-27 PROCEDURE — G0151 HHCP-SERV OF PT,EA 15 MIN: HCPCS

## 2025-06-27 SDOH — HEALTH STABILITY: PHYSICAL HEALTH: EXERCISE COMMENTS: BLE AROM INITIATED AND ASSESSED. WFL.

## 2025-06-27 ASSESSMENT — ACTIVITIES OF DAILY LIVING (ADL)
PHYSICAL TRANSFERS ASSESSED: 1
AMBULATION ASSISTANCE: INDEPENDENT
AMBULATION ASSISTANCE: 1
CURRENT_FUNCTION: INDEPENDENT

## 2025-06-27 ASSESSMENT — ENCOUNTER SYMPTOMS
DENIES PAIN: 1
PERSON REPORTING PAIN: PATIENT

## 2025-06-27 NOTE — Clinical Note
PT eval completed.  Pt is modif indep with amb,transfer, bed mob, adl/iadls.  No skilled PT needs at this time. No changes to meds or skin integrity.    Pt reports she was only interested in pelvic floor PT.   This service specialty is not offered with homecare PT and will require an outpt Rx.  MDs, please consider outpt Rx for this specialty service.  VITO homecare PT.

## 2025-06-28 LAB
ATRIAL RATE: 75 BPM
P AXIS: 74 DEGREES
P OFFSET: 184 MS
P ONSET: 128 MS
PR INTERVAL: 182 MS
Q ONSET: 219 MS
QRS COUNT: 12 BEATS
QRS DURATION: 92 MS
QT INTERVAL: 354 MS
QTC CALCULATION(BAZETT): 395 MS
QTC FREDERICIA: 381 MS
R AXIS: 42 DEGREES
T AXIS: 219 DEGREES
T OFFSET: 396 MS
VENTRICULAR RATE: 75 BPM

## 2025-06-29 NOTE — PROGRESS NOTES
[facility]  Outpatient Clinic Note    Patient Name:  Caitlyn Atkins  MRN:  [id number]  :  1965    Referring Provider: Neeru Oglesby DO  Date of Service: 2025   Visit type: New patient visit     problem list/Chief complaint:  Urinary retention likely due to sacral fracture s/p tracey placement 25 - Tamsulosin      HISTORY OF PRESENT ILLNESS:  Caitlyn Atkins is a 60 y.o. female with past medical history of MS, osteoporosis, lumbar radiculopathy, spinal stenosis, right total hip, extra -axial, calvarium marrow, scalp lesions, hypokalemia, DVT, who presents for initial Urology visit. I performed a detailed review of the medical chart records lab testing and imaging. Patient was seen in ER from preadmission testing for hypokalemia, found to have urinary retention likely due to sacral fracture, tracey catheter placed on 25, discharged with outpatient Urology referral.  Patient is accompanied by her daughter. She states that she started to experience urinary incontinence after falling and fracturing her sacrum. She was starting to regain the urge to void before she was hospitalized. When she was in the hospital, she was getting a lot of IV fluids and was not able to empty her bladder so she went into retention. She feels as though she will be able to urinate if catheter is removed and would like TOV today. TOV passed,  ml.    PAST MEDICAL HISTORY:  Medical History[1]    PAST SURGICAL HISTORY:  Surgical History[2]    ALLERGIES:  Allergies[3]    MEDICATIONS:  Current Outpatient Medications   Medication Instructions    acetaminophen (Tylenol) 325 mg tablet 2 tablets, Every 4 hours PRN    alendronate (FOSAMAX) 70 mg, oral, Every 7 days, Take in the morning with a full glass of water, on an empty stomach, and do not take anything else by mouth or lie down for the next 30 min.    apixaban (Eliquis) 5 mg tablet Take 2 tablets (10 mg) by mouth 2 times a day for 5 days, THEN 1 tablet (5  mg) 2 times a day.    b complex 0.4 mg tablet 1 tablet, Daily    calcium carbonate (CALCIUM 500 ORAL) 1 tablet, Daily    cholecalciferol (VITAMIN D-3) 2,000 Units, Daily    diroximel fumarate (Vumerity) 231 mg capsule,delayed release(DR/EC) 2 capsules, oral, 2 times daily    gabapentin (NEURONTIN) 100 mg, oral, 3 times daily    glucose 4 gram chewable tablet As needed    [START ON 7/21/2025] HYDROcodone-acetaminophen (Norco) 7.5-325 mg tablet 1 tablet, oral, Every 6 hours PRN    HYDROcodone-acetaminophen (Norco) 7.5-325 mg tablet 1 tablet, oral, Every 6 hours PRN    naloxone (NARCAN) 4 mg, As needed    omeprazole OTC (PRILOSEC OTC) 20 mg, Every morning    tamsulosin (FLOMAX) 0.4 mg, oral, Nightly, Do not crush, chew, or split.    tiZANidine (ZANAFLEX) 2 mg, oral, Every 12 hours PRN        SOCIAL HISTORY:  Social History[4]     FAMILY HISTORY:  Family History[5]     REVIEW OF SYSTEMS:  10-pt ROS reviewed and negative except as mentioned above.    Vital signs:  There were no vitals taken for this visit.    PHYSICAL EXAMINATION:  General: Appears comfortable and in no apparent distress, weak and frail  Head: Normocephalic, atraumatic  Eyes: Non-injected conjunctiva, sclera clear, no proptosis  Lungs: Breathing is easy, non-labored. Speaking in clear and complete sentences. Normal diaphragmatic movement.  Cardiovascular: no peripheral edema, cyanosis or pallor.   Abdomen: soft, non-distended, non-tender  : Bladder: non tender, not distended  MSK: Ambulatory with walker  Skin: No visible rashes or lesions  Neurologic: Alert, oriented to person, place, and time  Psychiatric: mood and affect appropriate      IMAGING DATA:   === 06/19/25 ===    US RENAL COMPLETE    - Impression -  Aguirre catheter within an otherwise empty urinary bladder.    No shadowing stone or hydronephrosis in either kidney.    Cholelithiasis with borderline gallbladder dilatation. However, no  specific sonographic evidence of acute  cholecystitis.    Nonspecific common bile duct dilatation. Laboratory correlation  needed. Consider nuclear medicine HIDA scan in follow-up.    Nonspecific increased echogenicity throughout the liver, most likely  fatty infiltration.    3 mm adherent echogenic nodule in the posterior gallbladder fundus,  most likely small polyp. Recommend a follow-up ultrasound in 6-12  months.    MACRO:  None    Signed by: Bereket Stein 6/20/2025 8:35 AM  Dictation workstation:   [id number]      CT AP 6/20/25  IMPRESSION:  1.5 cm ground-glass opacity in the right lower lobe may be infectious  or inflammatory infiltrate. Follow-up chest CT in 3-6 months is  recommended to assess for resolution.      No acute abdominal or pelvic abnormality.    LABORATORY DATA:    Office Visit on 06/25/2025   Component Date Value    GLUCOSE 06/26/2025 114 (H)     UREA NITROGEN (BUN) 06/26/2025 18     CREATININE 06/26/2025 0.82     EGFR 06/26/2025 81     SODIUM 06/26/2025 138     POTASSIUM 06/26/2025 4.2     CHLORIDE 06/26/2025 105     CARBON DIOXIDE 06/26/2025 22     ELECTROLYTE BALANCE 06/26/2025 11     CALCIUM 06/26/2025 8.3 (L)     PROTEIN, TOTAL 06/26/2025 5.2 (L)     ALBUMIN 06/26/2025 2.8 (L)     BILIRUBIN, TOTAL 06/26/2025 0.2     ALKALINE PHOSPHATASE 06/26/2025 127     AST 06/26/2025 18     ALT 06/26/2025 22     Vitamin D, 25-Hydroxy, T* 06/23/2025 69     MAGNESIUM 06/26/2025 1.4 (L)     WHITE BLOOD CELL COUNT 06/26/2025 7.7     RED BLOOD CELL COUNT 06/26/2025 2.33 (L)     HEMOGLOBIN 06/26/2025 7.4 (L)     HEMATOCRIT 06/26/2025 23.8 (L)     MCV 06/26/2025 102.1 (H)     MCH 06/26/2025 31.8     MCHC 06/26/2025 31.1 (L)     RDW 06/26/2025 13.0     PLATELET COUNT 06/26/2025 525 (H)     MPV 06/26/2025 9.8     ABSOLUTE NEUTROPHILS 06/26/2025 4,936     ABSOLUTE LYMPHOCYTES 06/26/2025 1,386     ABSOLUTE MONOCYTES 06/26/2025 1,032 (H)     ABSOLUTE EOSINOPHILS 06/26/2025 208     ABSOLUTE BASOPHILS 06/26/2025 139     NEUTROPHILS 06/26/2025 64.1      LYMPHOCYTES 06/26/2025 18.0     MONOCYTES 06/26/2025 13.4     EOSINOPHILS 06/26/2025 2.7     BASOPHILS 06/26/2025 1.8     FERRITIN 06/26/2025 179        ASSESSMENT:  Caitlyn Atkins is a 60 y.o. female with MS, urinary retention, who presents for initial Urology visit after hospital discharge.    We discussed urinary retention in great detail. We discussed management of urinary retention to include indwelling catheter or CIC. We discussed risks of unmanaged urinary retention including irreversible kidney injury and increased risk of UTI. We discussed TOV today.    PLAN:  - TOV today passed  -  ml  - Patient encouraged to drink plenty of fluids to maintain hydration and clear urine output  - Discussed that they may have some irritative voiding symptoms over the next few days: burning, frequency, urgency  - Patient instructed to go to ED if unable to void in 4-6 hr or unable to void with urge  -Follow up in 1 month, or sooner if needed    All questions and concerns were addressed. Patient verbalizes understanding and has no other questions at this time.     E&M visit today is associated with current or anticipated ongoing medical care services related to a patient's single, serious condition or a complex condition.    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, followed by neurosurgery- Clearance in note on 5/21/25    Lower extremity weakness     Lumbar radiculopathy     Lumbar spondylosis     Multiple falls     Multiple sclerosis (Multi)     Multiple sclerosis, follows with  neuro, per patient well managed on Vumerity, no flare up since 2014    Osteoporosis     Palpitations     saw cardiology-wore holter-SVT was on Metoprolol but per patient did not help, never followed up with cardiology because palpitations stopped after quitting her stressful job    Pseudoclaudication syndrome     Sacral fracture (Multi)     Sacroiliitis     SDH (subdural hematoma) (Multi)     Spinal stenosis     Spondylolisthesis     Urinary incontinence     Vitamin D deficiency    [2]   Past Surgical History:  Procedure Laterality Date    CT ANGIO CORONARY ART WITH HEARTFLOW IF SCORE >30%  11/16/2023    CT ANGIO CORONARY ART WITH HEARTFLOW IF SCORE >30% 11/16/2023 [facility] CT    HIP ARTHROPLASTY Right 08/16/2023    HYSTERECTOMY  04/15/2013    Hysterectomy- cervical Cancer    OTHER SURGICAL HISTORY Left 04/21/2021    Arm surgery-fracture repair    REFRACTIVE SURGERY     [3] No Known Allergies  [4]   Social History  Tobacco Use    Smoking status: Former     Current packs/day: 1.00     Types: Cigarettes    Smokeless tobacco: Never    Tobacco comments:     Quit 1 month ago, smoked 1PPD for about 40 years   Vaping Use    Vaping status: Never Used   Substance Use Topics    Alcohol use: Not Currently    Drug use: Never   [5]   Family History  Problem Relation Name Age of Onset    Lung cancer Mother      Coronary artery disease Father      Hypertension Father      Other (THYROID SURGERY) Sister      Glaucoma Other GRANDMOTHER

## 2025-06-30 ENCOUNTER — APPOINTMENT (OUTPATIENT)
Dept: PRIMARY CARE | Facility: CLINIC | Age: 60
End: 2025-06-30
Payer: COMMERCIAL

## 2025-06-30 ENCOUNTER — APPOINTMENT (OUTPATIENT)
Dept: RADIOLOGY | Facility: CLINIC | Age: 60
End: 2025-06-30
Payer: COMMERCIAL

## 2025-06-30 ENCOUNTER — OFFICE VISIT (OUTPATIENT)
Dept: UROLOGY | Facility: HOSPITAL | Age: 60
End: 2025-06-30
Payer: COMMERCIAL

## 2025-06-30 DIAGNOSIS — R33.9 BLADDER RETENTION OF URINE: Primary | ICD-10-CM

## 2025-06-30 DIAGNOSIS — R33.9 URINARY RETENTION: ICD-10-CM

## 2025-06-30 PROCEDURE — 51798 US URINE CAPACITY MEASURE: CPT | Performed by: NURSE PRACTITIONER

## 2025-06-30 PROCEDURE — 4004F PT TOBACCO SCREEN RCVD TLK: CPT | Performed by: NURSE PRACTITIONER

## 2025-06-30 PROCEDURE — 99214 OFFICE O/P EST MOD 30 MIN: CPT | Mod: 25 | Performed by: NURSE PRACTITIONER

## 2025-06-30 PROCEDURE — 99204 OFFICE O/P NEW MOD 45 MIN: CPT | Performed by: NURSE PRACTITIONER

## 2025-06-30 ASSESSMENT — ENCOUNTER SYMPTOMS
SHORTNESS OF BREATH: 1
DYSPNEA ACTIVITY LEVEL: AFTER AMBULATING MORE THAN 20 FT
CHANGE IN APPETITE: UNCHANGED
FATIGUES EASILY: 1
APPETITE LEVEL: GOOD
CRAMPS: 1
BOWEL PATTERN NORMAL: 1
MUSCLE WEAKNESS: 1
ABDOMINAL PAIN: 1
HEADACHES: 1
FATIGUE: 1

## 2025-06-30 ASSESSMENT — ACTIVITIES OF DAILY LIVING (ADL)
AMBULATION ASSISTANCE: STAND BY ASSIST
OASIS_M1830: 03
AMBULATION ASSISTANCE: 1

## 2025-06-30 NOTE — PROGRESS NOTES
Pt is here today for a trial of void, name and date of birth was verified. Procedure was explained to pt, and understood. Pt tolerated 150cc of sterile saline infused through urinary catheter without difficulty. Urinary catheter was removed and patient voided 50cc. A PVR scan was done and showed 132mL of urine left in the bladder.   It was explained to pt that if unable to urinate to go to the emergency room. Pt was told and understood to drink plenty of fluids to keep the bladder stimulated. If there were any questions or concerns pt understood --- he/she is to call the office.

## 2025-07-01 DIAGNOSIS — E87.6 HYPOKALEMIA: Primary | ICD-10-CM

## 2025-07-02 ENCOUNTER — HOME CARE VISIT (OUTPATIENT)
Dept: HOME HEALTH SERVICES | Facility: HOME HEALTH | Age: 60
End: 2025-07-02
Payer: COMMERCIAL

## 2025-07-02 VITALS
TEMPERATURE: 97.8 F | OXYGEN SATURATION: 95 % | DIASTOLIC BLOOD PRESSURE: 52 MMHG | RESPIRATION RATE: 16 BRPM | HEART RATE: 76 BPM | SYSTOLIC BLOOD PRESSURE: 100 MMHG

## 2025-07-02 PROCEDURE — G0300 HHS/HOSPICE OF LPN EA 15 MIN: HCPCS

## 2025-07-02 ASSESSMENT — ENCOUNTER SYMPTOMS
MUSCLE WEAKNESS: 1
PAIN LOCATION - PAIN FREQUENCY: CONSTANT
LOWEST PAIN SEVERITY IN PAST 24 HOURS: 7/10
CHANGE IN APPETITE: UNCHANGED
PAIN LOCATION - PAIN SEVERITY: 8/10
PAIN LOCATION: BACK
HIGHEST PAIN SEVERITY IN PAST 24 HOURS: 8/10
PAIN SEVERITY GOAL: 5/10
LAST BOWEL MOVEMENT: 67388
APPETITE LEVEL: FAIR
PERSON REPORTING PAIN: PATIENT
PAIN: 1

## 2025-07-02 NOTE — CASE COMMUNICATION
PAtient had catheter rremoved on 6/25/30 and has been voiding independently. Both leg swollen and inflamed . Dr stated Lasix 20mg bid, on 6/28/25. Medication list updated.

## 2025-07-07 ENCOUNTER — OFFICE VISIT (OUTPATIENT)
Dept: NEPHROLOGY | Facility: CLINIC | Age: 60
End: 2025-07-07
Payer: COMMERCIAL

## 2025-07-07 VITALS
TEMPERATURE: 98.8 F | HEART RATE: 105 BPM | HEIGHT: 62 IN | BODY MASS INDEX: 17.59 KG/M2 | WEIGHT: 95.6 LBS | DIASTOLIC BLOOD PRESSURE: 50 MMHG | SYSTOLIC BLOOD PRESSURE: 85 MMHG | OXYGEN SATURATION: 95 %

## 2025-07-07 DIAGNOSIS — E87.6 HYPOKALEMIA: ICD-10-CM

## 2025-07-07 PROCEDURE — 3074F SYST BP LT 130 MM HG: CPT | Performed by: INTERNAL MEDICINE

## 2025-07-07 PROCEDURE — 99205 OFFICE O/P NEW HI 60 MIN: CPT | Performed by: INTERNAL MEDICINE

## 2025-07-07 PROCEDURE — 3078F DIAST BP <80 MM HG: CPT | Performed by: INTERNAL MEDICINE

## 2025-07-07 PROCEDURE — 3008F BODY MASS INDEX DOCD: CPT | Performed by: INTERNAL MEDICINE

## 2025-07-07 NOTE — PROGRESS NOTES
Here accompanied by daughter  Was seen in [facility] in  by dr. Crespo for hypokalemia and RTA.  Has a very complex medical history summarized below.  Admitted in 2025 when she was found to have fall and a sacral fracture.  Had urinary retention during that admission. Testing showed hypokalemia which was not treated at this admission and she was discharged.  During this time, the patient's  was diagnosed with lung cancer and was rapidly declining, eventually  at the end of May.  The patient was his main caregiver and had very poor oral intake during his illness and no oral intake for the 10 days following his death.  Readmitted on 2025 for severe persistent hypokalemia diagnosed during pre-op testing, following this episode of poor oral intake.  During this admission was also found to have non-anion gap metabolic acidosis, was seen by nephrology for suspicion of RTA.  During this admission also she was found to have urinary retention, had a Aguirre, now following with urology.  Currently following timed void every 4-5 hours.  Continues to endorse absence of feeling of bladder fullness or sensation of complete bladder emptying consistently.  Follows with urology on  when she is planned for postvoid residue measurement.  She also takes omeprazole as needed for heartburn.  States she used to be a heavy alcohol drinker, but last drink has been several months back.  During that admission on , she was diagnosed with new DVT in the left proximal peroneal vein, started on Eliquis.  Surgery for sacral fracture now postponed for at least 3 months while she is on anticoagulation.  Following this, she started developing severe bilateral lower extremity swelling with blisters and weeping and currently redness.  For this she was started on Lasix, currently taking Lasix 20 mg p.o. twice daily.  Has history of MS, was diagnosed to have 1 kidney stone in the left kidney during imaging but has not  "passed kidney stones.  Denies any other relevant medical history, specifically h/o Sjogren's/sarcoidosis/RA.  Daughter has RTA which was diagnosed in her 30s, has CaP stones  Currently, c/o LE swelling which has improved with redness and weeping and blisters  Denies SoB/orthopnea. In severe pain from sacral fracture, uses norco and Tylenol. Denies NSAID use.    RoS negative for all other systems except as noted above.        6/23/2025    11:57 AM 6/23/2025    12:35 PM 6/25/2025     2:13 PM 6/26/2025     3:14 PM 6/27/2025    11:46 AM 7/2/2025    10:34 AM 7/7/2025     4:35 PM   Vitals   Systolic 99  92 110  100 85   Diastolic 57  56 64  52 50   BP Location Left arm   Left arm  Left arm Right arm   Heart Rate   81 78  76 105   Temp 36.6 °C (97.8 °F)  36.8 °C (98.2 °F) 36.5 °C (97.7 °F)  36.6 °C (97.8 °F) 37.1 °C (98.8 °F)   Resp    12 16 16    Height    1.549 m (5' 1\")   1.575 m (5' 2\")   Weight (lb)  103 106 106   95.6   BMI  20.8 kg/m2 21.41 kg/m2 20.03 kg/m2   17.49 kg/m2   BSA (m2)  1.39 m2 1.42 m2 1.44 m2   1.38 m2   Visit Report   Report    Report     No distress  HEENT:  moist, no pallor  Deyvi Le 2+ edema up to knees with redness and weeping deroofed blisters  Breath sounds deyvi equal, clear  S1 S2 regular, normal, no rub or murmur  Abdomen soft  AAO x3, non focal    Lab Results   Component Value Date     06/26/2025     (L) 06/23/2025     06/22/2025    K 4.2 06/26/2025    K 4.7 06/23/2025    K 4.3 06/22/2025     06/26/2025     (H) 06/23/2025     (H) 06/22/2025    CO2 22 06/26/2025    CO2 21 06/23/2025    CO2 21 06/22/2025    BUN 18 06/26/2025    BUN 15 06/23/2025    BUN 11 06/22/2025    CREATININE 0.82 06/26/2025    CREATININE 0.81 06/23/2025    CREATININE 0.83 06/22/2025    CALCIUM 8.3 (L) 06/26/2025    CALCIUM 9.2 06/23/2025    CALCIUM 9.3 06/22/2025    PHOS 3.8 04/22/2025    PHOS 3.4 04/21/2025    PHOS 3.7 07/21/2023    VITD25 69 06/23/2025    VITD25 63 07/21/2023    VITD25 " 54 11/03/2022    HGB 7.4 (L) 06/26/2025    HGB 8.0 (L) 06/23/2025    HGB 8.1 (L) 06/22/2025      Current Outpatient Medications   Medication Instructions    acetaminophen (Tylenol) 325 mg tablet 2 tablets, Every 4 hours PRN    alendronate (FOSAMAX) 70 mg, oral, Every 7 days, Take in the morning with a full glass of water, on an empty stomach, and do not take anything else by mouth or lie down for the next 30 min.    apixaban (Eliquis) 5 mg tablet Take 2 tablets (10 mg) by mouth 2 times a day for 5 days, THEN 1 tablet (5 mg) 2 times a day.    b complex 0.4 mg tablet 1 tablet, Daily    calcium carbonate (CALCIUM 500 ORAL) 1 tablet, Daily    cholecalciferol (VITAMIN D-3) 2,000 Units, Daily    diroximel fumarate (Vumerity) 231 mg capsule,delayed release(DR/EC) 2 capsules, oral, 2 times daily    furosemide (LASIX) 20 mg, 2 times daily (morning and late afternoon)    gabapentin (NEURONTIN) 100 mg, oral, 3 times daily    glucose 4 gram chewable tablet As needed    [START ON 7/21/2025] HYDROcodone-acetaminophen (Norco) 7.5-325 mg tablet 1 tablet, oral, Every 6 hours PRN    HYDROcodone-acetaminophen (Norco) 7.5-325 mg tablet 1 tablet, oral, Every 6 hours PRN    naloxone (NARCAN) 4 mg, As needed    omeprazole OTC (PRILOSEC OTC) 20 mg, Every morning    tamsulosin (FLOMAX) 0.4 mg, oral, Nightly, Do not crush, chew, or split.    tiZANidine (ZANAFLEX) 2 mg, oral, Every 12 hours PRN       #Hypokalemia, hypomagnesemia  Etiology of hypokalemia multifactorial including poor oral intake, urinary retention, hypomagnesemia, current treatment with loop diuretic. Repeat sr K, Mg, treat with Po Kdur/mg/amiloride as needed to maintain normokalemia and normomagnesemia. Unable to diagnose RTA till urinary retention is resolved. Per daughter, urine K testing was done on the 1 L urine that was retrieved after placing tracey catheter to relieve retention. Was discharged after in-hospital K repletion without PO K supplements and K was normal when  last checked on 6/26, though Mg was still low at 1.4 mg/dl. states appetite is improved, eats high K diet. Advised to avoid omeprazole, use Pepcid and Tums if needed for heartburn.    # LE edema  Likely due to DVT, consider holding furosemide if able.  Ct to elevated LE and wear compression stockings if tolerated. F/up with PCP for need for abx.    RTC: 3 mo

## 2025-07-08 LAB
ATRIAL RATE: 77 BPM
P AXIS: 73 DEGREES
P OFFSET: 183 MS
P ONSET: 132 MS
PR INTERVAL: 172 MS
Q ONSET: 218 MS
QRS COUNT: 13 BEATS
QRS DURATION: 92 MS
QT INTERVAL: 308 MS
QTC CALCULATION(BAZETT): 348 MS
QTC FREDERICIA: 334 MS
R AXIS: 36 DEGREES
T AXIS: 206 DEGREES
T OFFSET: 372 MS
VENTRICULAR RATE: 77 BPM

## 2025-07-09 DIAGNOSIS — R33.9 BLADDER RETENTION OF URINE: Primary | ICD-10-CM

## 2025-07-09 DIAGNOSIS — M48.061 SPINAL STENOSIS OF LUMBAR REGION, UNSPECIFIED WHETHER NEUROGENIC CLAUDICATION PRESENT: ICD-10-CM

## 2025-07-09 DIAGNOSIS — S32.10XA SACRUM AND COCCYX FRACTURE, CLOSED, INITIAL ENCOUNTER (MULTI): ICD-10-CM

## 2025-07-09 DIAGNOSIS — S32.2XXA SACRUM AND COCCYX FRACTURE, CLOSED, INITIAL ENCOUNTER (MULTI): ICD-10-CM

## 2025-07-10 ENCOUNTER — APPOINTMENT (OUTPATIENT)
Dept: PRIMARY CARE | Facility: CLINIC | Age: 60
End: 2025-07-10
Payer: COMMERCIAL

## 2025-07-10 VITALS
SYSTOLIC BLOOD PRESSURE: 104 MMHG | DIASTOLIC BLOOD PRESSURE: 60 MMHG | TEMPERATURE: 98 F | BODY MASS INDEX: 16.64 KG/M2 | WEIGHT: 91 LBS | HEART RATE: 92 BPM

## 2025-07-10 DIAGNOSIS — R60.0 LOCALIZED EDEMA: ICD-10-CM

## 2025-07-10 DIAGNOSIS — D64.9 ANEMIA, UNSPECIFIED TYPE: ICD-10-CM

## 2025-07-10 DIAGNOSIS — L03.115 CELLULITIS OF RIGHT LEG: ICD-10-CM

## 2025-07-10 DIAGNOSIS — E55.9 VITAMIN D DEFICIENCY: ICD-10-CM

## 2025-07-10 DIAGNOSIS — S32.10XA SACRUM AND COCCYX FRACTURE, CLOSED, INITIAL ENCOUNTER (MULTI): ICD-10-CM

## 2025-07-10 DIAGNOSIS — M48.061 SPINAL STENOSIS OF LUMBAR REGION, UNSPECIFIED WHETHER NEUROGENIC CLAUDICATION PRESENT: ICD-10-CM

## 2025-07-10 DIAGNOSIS — I82.4Y2 ACUTE DEEP VEIN THROMBOSIS (DVT) OF PROXIMAL VEIN OF LEFT LOWER EXTREMITY: Primary | ICD-10-CM

## 2025-07-10 DIAGNOSIS — S32.2XXA SACRUM AND COCCYX FRACTURE, CLOSED, INITIAL ENCOUNTER (MULTI): ICD-10-CM

## 2025-07-10 DIAGNOSIS — E87.6 HYPOKALEMIA: ICD-10-CM

## 2025-07-10 DIAGNOSIS — R33.9 BLADDER RETENTION OF URINE: ICD-10-CM

## 2025-07-10 DIAGNOSIS — E83.42 HYPOMAGNESEMIA: ICD-10-CM

## 2025-07-10 DIAGNOSIS — K59.00 CONSTIPATION, UNSPECIFIED CONSTIPATION TYPE: ICD-10-CM

## 2025-07-10 LAB
ANION GAP SERPL CALCULATED.4IONS-SCNC: 13 MMOL/L (CALC) (ref 7–17)
BUN SERPL-MCNC: 17 MG/DL (ref 7–25)
BUN/CREAT SERPL: ABNORMAL (CALC) (ref 6–22)
CALCIUM SERPL-MCNC: 7.9 MG/DL (ref 8.6–10.4)
CHLORIDE SERPL-SCNC: 92 MMOL/L (ref 98–110)
CO2 SERPL-SCNC: 28 MMOL/L (ref 20–32)
CREAT SERPL-MCNC: 0.75 MG/DL (ref 0.5–1.05)
EGFRCR SERPLBLD CKD-EPI 2021: 91 ML/MIN/1.73M2
GLUCOSE SERPL-MCNC: 219 MG/DL (ref 65–139)
MAGNESIUM SERPL-MCNC: 1.3 MG/DL (ref 1.5–2.5)
POTASSIUM SERPL-SCNC: 3.7 MMOL/L (ref 3.5–5.3)
SODIUM SERPL-SCNC: 133 MMOL/L (ref 135–146)

## 2025-07-10 PROCEDURE — 3074F SYST BP LT 130 MM HG: CPT | Performed by: PHYSICIAN ASSISTANT

## 2025-07-10 PROCEDURE — 99214 OFFICE O/P EST MOD 30 MIN: CPT | Performed by: PHYSICIAN ASSISTANT

## 2025-07-10 PROCEDURE — 3078F DIAST BP <80 MM HG: CPT | Performed by: PHYSICIAN ASSISTANT

## 2025-07-10 RX ORDER — CEPHALEXIN 500 MG/1
500 CAPSULE ORAL 3 TIMES DAILY
Qty: 21 CAPSULE | Refills: 0 | Status: SHIPPED | OUTPATIENT
Start: 2025-07-10 | End: 2025-07-17

## 2025-07-10 NOTE — PROGRESS NOTES
Subjective   Patient ID: Caitlyn Atkins is a 60 y.o. female who presents for Dvt (Recheck ) and Leg Swelling (Recheck ).    HPI   Patient here for recheck of DVT and leg swelling.      DVT: Recently found to have DVT in left leg. Pain is improving. Tolerating eliquis. No bleeding. No CP or SOB.  Prior to finding the blood clot she had been very inactive following her 's recent death, as well as from her severe DDD lumbar spine and recent sacral fractures.     Edema legs: He is getting some edema in both legs but that is improved with Lasix.  She did have some fluid blisters that were weeping though and where 1 of those were at on her right shin is starting to get red around it the past couple days.  No fevers. Echo in hospital showed nml EF. No CP, SOB, or orthopnea.     Hypokalemia:  potassium was low when in the hospital.  Was not eating well prior to that due to her 's death.  Is eating better and getting her appetite back now.  Mg was low in hospital too.  Working with nephrologist Dr Orantes for this now and they are monitoring.   Lab Results   Component Value Date    K 3.7 07/09/2025    K 4.2 06/26/2025     Magnesium (mg/dL)   Date Value   06/23/2025 1.38 (L)   06/21/2025 1.99   06/20/2025 1.61     MAGNESIUM (mg/dL)   Date Value   07/09/2025 1.3 (L)   06/26/2025 1.4 (L)       Anemia: Hgb low in hospital. No unusual bleeding. Takes B-complex vitamin. No iron.      Lab Results   Component Value Date    HGB 7.4 (L) 06/26/2025    IRON 101 06/21/2025    FERRITIN 179 06/26/2025    [id number] 06/21/2025       RLL lesion: seen on CT chest. No cough. Former smoker. Needs repeat CT in 3-6 months. Former smoker.      US polyps/gallstones. Seen on US. Denies RUQ pain. Needs repeat in 6-12 mo       Spinal stenosis: Was planning to have back surgery with Dr. King but this has been delayed since they found her hypokalemia and eventually the DVT.  Surgery will need to be postponed until off of  anticoagulation.  She is seeing pain management.  Her pain from her spinal stenosis and sacral fractures along with leg pain from DVT and leg edema is making it impossible for her to work.  Works in a manufacturing plant.      Bladder retention: This started after her sacral fractures and with her spinal stenosis.  This has been doing better lately.  Stopped taking tamsulosin a few days ago since BP was getting low on the medication.  Still doing fine off the medicine and BP is better.   Seeing urology.     Vit D deficiency: Taking Vit D supplement.      Vitamin D, 25-Hydroxy, Total (ng/mL)   Date Value   06/23/2025 69     Vitamin D, 25-Hydroxy (ng/mL)   Date Value   07/21/2023 63   11/03/2022 54     Constipation: Pt on chronic opiates. Docolax or miralax and fiber helps. Better since eating better.      Review of Systems   Constitutional:  Negative for fever.   Respiratory:  Negative for chest tightness and shortness of breath.        Objective   /60   Pulse 92   Temp 36.7 °C (98 °F)   Wt (!) 41.3 kg (91 lb)   BMI 16.64 kg/m²     Physical Exam  Vitals and nursing note reviewed.   Constitutional:       Appearance: Normal appearance. She is well-developed. She is not toxic-appearing.      Comments: Appear weak, using walker.   Eyes:      General: No scleral icterus.  Cardiovascular:      Rate and Rhythm: Normal rate and regular rhythm.   Pulmonary:      Effort: Pulmonary effort is normal.      Breath sounds: Normal breath sounds.   Abdominal:      Palpations: Abdomen is soft. There is no mass.      Tenderness: There is no abdominal tenderness.   Musculoskeletal:      Cervical back: Neck supple.      Right lower leg: Edema (1+) present.      Left lower leg: Edema (1+) present.   Skin:     General: Skin is warm and dry.      Comments: There is a healing blister on right shin but around the peeled skin there is some redness.  No evidence of abscess.   Neurological:      Mental Status: She is alert.          Assessment/Plan   Diagnoses and all orders for this visit:  Acute deep vein thrombosis (DVT) of proximal vein of left lower extremity  Hypokalemia  -     Comprehensive Metabolic Panel; Future  Localized edema  -     Vitamin B12; Future  -     Comprehensive Metabolic Panel; Future  Constipation, unspecified constipation type  Bladder retention of urine  Spinal stenosis of lumbar region, unspecified whether neurogenic claudication present  Sacrum and coccyx fracture, closed, initial encounter (Multi)  Vitamin D deficiency  Hypomagnesemia  Cellulitis of right leg  -     cephalexin (Keflex) 500 mg capsule; Take 1 capsule (500 mg) by mouth 3 times a day for 7 days.  Anemia, unspecified type  -     CBC and Auto Differential; Future  -     Iron; Future  -     Ferritin; Future  -     Vitamin B12; Future  -     Comprehensive Metabolic Panel; Future  Other orders  -     Follow Up In Primary Care  -     Follow Up In Primary Care; Future       Rx Keflex 500 mg 3 times daily x 7 days.  Monitor for any worsening cellulitis --if worsens should go to the ER.  Get nonfasting labs in a couple days.  Will complete short-term disability paperwork for patient since she is currently not able to work since she is not able to ambulate or stand very well.  Has difficulty with any prolonged sitting as well.  Is unable to do any lifting or carrying of objects.  I do not anticipate that she will be able to return to work at least for the next month (through date of her follow-up appointment on 8/14/2025 ) but may not be able to return to full duty until after she has her back surgery eventually (Dr. King will need to determine ongoing disability after her surgery).  Follow-up in 1 month for recheck, earlier if needed.

## 2025-07-11 ENCOUNTER — HOME CARE VISIT (OUTPATIENT)
Dept: HOME HEALTH SERVICES | Facility: HOME HEALTH | Age: 60
End: 2025-07-11
Payer: COMMERCIAL

## 2025-07-11 ENCOUNTER — PATIENT OUTREACH (OUTPATIENT)
Dept: PRIMARY CARE | Facility: CLINIC | Age: 60
End: 2025-07-11
Payer: COMMERCIAL

## 2025-07-11 VITALS
OXYGEN SATURATION: 96 % | RESPIRATION RATE: 20 BRPM | HEART RATE: 98 BPM | TEMPERATURE: 98.3 F | SYSTOLIC BLOOD PRESSURE: 96 MMHG | DIASTOLIC BLOOD PRESSURE: 62 MMHG

## 2025-07-11 DIAGNOSIS — E87.6 HYPOKALEMIA: ICD-10-CM

## 2025-07-11 DIAGNOSIS — R60.0 LOCALIZED EDEMA: ICD-10-CM

## 2025-07-11 DIAGNOSIS — D64.9 ANEMIA, UNSPECIFIED TYPE: ICD-10-CM

## 2025-07-11 PROCEDURE — G0300 HHS/HOSPICE OF LPN EA 15 MIN: HCPCS

## 2025-07-11 ASSESSMENT — ENCOUNTER SYMPTOMS
PAIN LOCATION - PAIN SEVERITY: 7/10
MUSCLE WEAKNESS: 1
DEPRESSED MOOD: 1
APPETITE LEVEL: FAIR
HIGHEST PAIN SEVERITY IN PAST 24 HOURS: 8/10
PAIN LOCATION: BACK
LOWER EXTREMITY EDEMA: 1
PAIN SEVERITY GOAL: 5/10
LAST BOWEL MOVEMENT: 67396
PAIN LOCATION - PAIN FREQUENCY: CONSTANT
PERSON REPORTING PAIN: PATIENT
LOWEST PAIN SEVERITY IN PAST 24 HOURS: 7/10
PAIN: 1
CHANGE IN APPETITE: UNCHANGED

## 2025-07-11 ASSESSMENT — ACTIVITIES OF DAILY LIVING (ADL)
AMBULATION ASSISTANCE: INDEPENDENT
CURRENT_FUNCTION: INDEPENDENT
AMBULATION ASSISTANCE: 1
PHYSICAL TRANSFERS ASSESSED: 1

## 2025-07-11 NOTE — PROGRESS NOTES
Unable to reach patient for follow up call after recent hospitalization.   Left voicemail with call back number for patient to call if needed  to assist with any questions or concerns patient may have.    Office Visit with Zane Ang PA-C (07/10/2025)

## 2025-07-15 ENCOUNTER — APPOINTMENT (OUTPATIENT)
Dept: ORTHOPEDIC SURGERY | Facility: CLINIC | Age: 60
End: 2025-07-15
Payer: COMMERCIAL

## 2025-07-15 LAB
ALBUMIN SERPL-MCNC: 3 G/DL (ref 3.6–5.1)
ALP SERPL-CCNC: 144 U/L (ref 37–153)
ALT SERPL-CCNC: 9 U/L (ref 6–29)
ANION GAP SERPL CALCULATED.4IONS-SCNC: 12 MMOL/L (CALC) (ref 7–17)
AST SERPL-CCNC: 14 U/L (ref 10–35)
BASOPHILS # BLD AUTO: 111 CELLS/UL (ref 0–200)
BASOPHILS NFR BLD AUTO: 0.9 %
BILIRUB SERPL-MCNC: 0.2 MG/DL (ref 0.2–1.2)
BUN SERPL-MCNC: 16 MG/DL (ref 7–25)
CALCIUM SERPL-MCNC: 8.3 MG/DL (ref 8.6–10.4)
CHLORIDE SERPL-SCNC: 98 MMOL/L (ref 98–110)
CO2 SERPL-SCNC: 27 MMOL/L (ref 20–32)
CREAT SERPL-MCNC: 0.78 MG/DL (ref 0.5–1.05)
CREAT UR-MCNC: 61 MG/DL (ref 20–275)
EGFRCR SERPLBLD CKD-EPI 2021: 86 ML/MIN/1.73M2
EOSINOPHIL # BLD AUTO: 258 CELLS/UL (ref 15–500)
EOSINOPHIL NFR BLD AUTO: 2.1 %
ERYTHROCYTE [DISTWIDTH] IN BLOOD BY AUTOMATED COUNT: 13 % (ref 11–15)
FERRITIN SERPL-MCNC: 119 NG/ML (ref 16–288)
GLUCOSE SERPL-MCNC: 89 MG/DL (ref 65–139)
HCT VFR BLD AUTO: 30.9 % (ref 35–45)
HGB BLD-MCNC: 9.6 G/DL (ref 11.7–15.5)
IRON SERPL-MCNC: 34 MCG/DL (ref 45–160)
LYMPHOCYTES # BLD AUTO: 2189 CELLS/UL (ref 850–3900)
LYMPHOCYTES NFR BLD AUTO: 17.8 %
MAGNESIUM/CREAT UR: 41 MG/G CREAT (ref 22–130)
MCH RBC QN AUTO: 31.5 PG (ref 27–33)
MCHC RBC AUTO-ENTMCNC: 31.1 G/DL (ref 32–36)
MCV RBC AUTO: 101.3 FL (ref 80–100)
MONOCYTES # BLD AUTO: 1365 CELLS/UL (ref 200–950)
MONOCYTES NFR BLD AUTO: 11.1 %
NEUTROPHILS # BLD AUTO: 8376 CELLS/UL (ref 1500–7800)
NEUTROPHILS NFR BLD AUTO: 68.1 %
PLATELET # BLD AUTO: 613 THOUSAND/UL (ref 140–400)
PMV BLD REES-ECKER: 9.2 FL (ref 7.5–12.5)
POTASSIUM SERPL-SCNC: 3.1 MMOL/L (ref 3.5–5.3)
PROT SERPL-MCNC: 6 G/DL (ref 6.1–8.1)
RBC # BLD AUTO: 3.05 MILLION/UL (ref 3.8–5.1)
SODIUM SERPL-SCNC: 137 MMOL/L (ref 135–146)
VIT B12 SERPL-MCNC: 1290 PG/ML (ref 200–1100)
WBC # BLD AUTO: 12.3 THOUSAND/UL (ref 3.8–10.8)

## 2025-07-15 ASSESSMENT — ENCOUNTER SYMPTOMS
CHEST TIGHTNESS: 0
SHORTNESS OF BREATH: 0
FEVER: 0

## 2025-07-16 DIAGNOSIS — E87.6 HYPOKALEMIA: Primary | ICD-10-CM

## 2025-07-16 LAB
CREAT UR-MCNC: 65 MG/DL (ref 20–275)
POTASSIUM UR-SCNC: 29 MMOL/L (ref 12–129)
POTASSIUM/CREAT UR-SRTO: 45 MMOL/G CREAT (ref 17–121)

## 2025-07-16 RX ORDER — AMILORIDE HYDROCHLORIDE 5 MG/1
5 TABLET ORAL DAILY
Qty: 90 TABLET | Refills: 0 | Status: SHIPPED | OUTPATIENT
Start: 2025-07-16 | End: 2025-10-14

## 2025-07-17 ENCOUNTER — HOME CARE VISIT (OUTPATIENT)
Dept: HOME HEALTH SERVICES | Facility: HOME HEALTH | Age: 60
End: 2025-07-17
Payer: COMMERCIAL

## 2025-07-17 VITALS
SYSTOLIC BLOOD PRESSURE: 98 MMHG | OXYGEN SATURATION: 99 % | TEMPERATURE: 98.8 F | HEART RATE: 94 BPM | DIASTOLIC BLOOD PRESSURE: 50 MMHG

## 2025-07-17 PROCEDURE — G0300 HHS/HOSPICE OF LPN EA 15 MIN: HCPCS

## 2025-07-17 ASSESSMENT — ACTIVITIES OF DAILY LIVING (ADL)
CURRENT_FUNCTION: INDEPENDENT
AMBULATION ASSISTANCE: INDEPENDENT

## 2025-07-17 ASSESSMENT — ENCOUNTER SYMPTOMS
APPETITE LEVEL: FAIR
PAIN SEVERITY GOAL: 5/10
LOWER EXTREMITY EDEMA: 1
PAIN LOCATION - PAIN FREQUENCY: CONSTANT
PAIN LOCATION: BACK
PAIN: 1
CHANGE IN APPETITE: INCREASED
LOWEST PAIN SEVERITY IN PAST 24 HOURS: 5/10
MUSCLE WEAKNESS: 1
PAIN LOCATION - PAIN SEVERITY: 9/10
LAST BOWEL MOVEMENT: 67403
DEPRESSED MOOD: 1
HIGHEST PAIN SEVERITY IN PAST 24 HOURS: 9/10
PERSON REPORTING PAIN: PATIENT

## 2025-07-21 ENCOUNTER — OFFICE VISIT (OUTPATIENT)
Dept: NEUROSURGERY | Facility: HOSPITAL | Age: 60
End: 2025-07-21
Payer: COMMERCIAL

## 2025-07-21 VITALS
SYSTOLIC BLOOD PRESSURE: 88 MMHG | HEART RATE: 78 BPM | BODY MASS INDEX: 17.27 KG/M2 | TEMPERATURE: 97.5 F | RESPIRATION RATE: 16 BRPM | OXYGEN SATURATION: 100 % | DIASTOLIC BLOOD PRESSURE: 56 MMHG | WEIGHT: 94.4 LBS

## 2025-07-21 DIAGNOSIS — D32.9 MENINGIOMA (MULTI): Primary | ICD-10-CM

## 2025-07-21 PROCEDURE — 99214 OFFICE O/P EST MOD 30 MIN: CPT | Performed by: NEUROLOGICAL SURGERY

## 2025-07-21 PROCEDURE — 3078F DIAST BP <80 MM HG: CPT | Performed by: NEUROLOGICAL SURGERY

## 2025-07-21 PROCEDURE — 3074F SYST BP LT 130 MM HG: CPT | Performed by: NEUROLOGICAL SURGERY

## 2025-07-21 ASSESSMENT — VISUAL ACUITY: VA_NORMAL: 1

## 2025-07-21 ASSESSMENT — PAIN SCALES - GENERAL: PAINLEVEL_OUTOF10: 7

## 2025-07-21 NOTE — PROGRESS NOTES
[facility]  Neurosurgery    Subjective     Caitlyn Atkins is a right handed  60 y.o. year old female presenting for follow-up .     PMH CAD, HTN, HLD, DVT on Eliquis, lumbar spinal stenosis, MS, osteoporosis, ho presented to the ED with worsening low back pain and recent fall x 2. Workup was significant for mildly displaced sacral fracture, sacral insufficiency fractures (age indeterminate), and S2/S3 angulation. CTH with concerns for thin SDH and extracranial mass. She was seen in follow up and recommended for a MRI brain w/wo. 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 DOTATE PET and bone scan were recommended and completed without additional osseous lesions. Findings were consistent with meningioma. Patient presents to clinic for FUV.     She comes to clinic accompanied by one of her daughters. Patient denies fevers, headaches, nausea, vomiting, speech difficulty, seizures, double/blurry vision, sensory loss, weakness, incontinence. She uses a walker for ambulation.    Review of Systems   All other systems reviewed and are negative.          Objective     Performance and Vitals:  KARNOFSKY SCALE WITH ECOG EQUIVALENT:100, Fully active, able to carry on all pre-disease performed without restriction (ECOG equivalent 0)    Vitals:    07/21/25 0956   BP: 88/56   Pulse: 78   Resp: 16   Temp: 36.4 °C (97.5 °F)   SpO2: 100%         Neurological Exam  Mental Status   Oriented to person, place, time and situation. Speech is normal. Language is fluent with no aphasia. Attention and concentration are normal.    Cranial Nerves  CN II: Visual acuity is normal. Visual fields full to confrontation.  CN III, IV, VI: Extraocular movements intact bilaterally. Normal lids and orbits bilaterally. Pupils equal round and reactive to light bilaterally.  CN V: Facial sensation is normal.  CN VII: Full and symmetric facial movement.  CN VIII: Hearing is normal.  CN IX, X: Palate  elevates symmetrically. Normal gag reflex.  CN XI: Shoulder shrug strength is normal.  CN XII: Tongue midline without atrophy or fasciculations.    Motor   Strength is 5/5 throughout all four extremities.    Gait    Uses rolling walker..    Physical Exam  Constitutional:       Comments: Thin female in NAD   HENT:      Mouth/Throat:      Comments: Poor dentition    Eyes:      General: Lids are normal.      Extraocular Movements: Extraocular movements intact.      Pupils: Pupils are equal, round, and reactive to light.       Neurological:      Motor: Motor strength is normal.    Psychiatric:         Speech: Speech normal.         Imaging:   Imaging Results:   CT head wo IV contrast 06/20/2025    Narrative  Interpreted By:  Wilton Goldstein,  STUDY:  CT HEAD WO IV CONTRAST;  6/20/2025 12:52 pm    INDICATION:  Signs/Symptoms:evaluate for any bleed s/p heparinization.      COMPARISON:  CT head obtained earlier today.    ACCESSION NUMBER(S):  [id number]    ORDERING CLINICIAN:  NAEEM ADAN    TECHNIQUE:  Noncontrast axial CT scan of head was performed. Angled reformats in  brain and bone windows were generated. The images were reviewed in  bone, brain, blood and soft tissue windows.    FINDINGS:  There is no acute intracranial hemorrhage or infarct. Ventricles,  sulci, and basilar cisterns are unchanged in size, shape, and  configuration. There is no abnormal extra-axial fluid collection.    There are multiple areas of hypoattenuation within the cerebral  hemispheric white matter which are probably sequela of chronic small  vessel ischemic changes.    Stable spiculated and sclerotic lesion involving the right frontal  parietal bones. Prior MRI demonstrated adjacent dural enhancement  which is difficult to evaluate on CT. There is overlying subgaleal  soft tissue swelling.    Visualized paranasal sinuses and mastoid air cells are essentially  clear.    Impression  Unchanged CT head. Specifically, no acute intracranial  abnormality,  including hemorrhage, or mass effect.    This study was interpreted at [facility].    MACRO:  None    Signed by: Wilton Goldstein 6/20/2025 1:09 PM  Dictation workstation:   [id number]      CT head wo IV contrast 06/20/2025    Narrative  Interpreted By:  Yulissa Escalante,  STUDY:  CT HEAD WO IV CONTRAST;  6/20/2025 3:09 am    INDICATION:  Signs/Symptoms:pt with meningioma, possible Paget's diseas of the  calvarium.    COMPARISON:  05/05/2025    ACCESSION NUMBER(S):  [id number]    ORDERING CLINICIAN:  NAEEM ADAN    TECHNIQUE:  Axial noncontrast CT images of the head.    FINDINGS:  BRAIN PARENCHYMA:  deep and periventricular white matter  hypodensities are nonspecific, but favored to represent chronic small  vessel ischemic changes.  Gray-white matter interfaces are preserved.  No mass effect or midline shift.    HEMORRHAGE: No acute intracranial hemorrhage.  VENTRICLES and EXTRA-AXIAL SPACES: The ventricles and sulci are  within normal limits in size for brain volume. No abnormal extraaxial  fluid collection. EXTRACRANIAL SOFT TISSUES: Within normal limits.  PARANASAL SINUSES/MASTOIDS:  The visualized paranasal sinuses and  mastoid air cells are aerated. CALVARIUM: No depressed skull  fracture. Stable appearance of sclerosis and periosteal bone  formation involving with the right frontal bone.    OTHER FINDINGS: None.    Impression  No acute intracranial abnormality.    Stable indeterminate lesion of the right frontal bone. Please refer  to PET CT dated 06/18/2025 for characterization.    MACRO:  None    Signed by: Yulissa Escalante 6/20/2025 3:49 AM  Dictation workstation:   [id number]    MR brain w and wo IV contrast 05/21/2025    Narrative  Interpreted By:  Oliver Philippe,  STUDY:  MR BRAIN W AND WO IV CONTRAST; 5/21/2025 9:38 am    INDICATION:  Signs/Symptoms:brain lesion;    COMPARISON:  CT brain 05/05/2025    ACCESSION NUMBER(S):  [id number]    ORDERING  CLINICIAN:  NALLELY WILLARD    TECHNIQUE:  Routine brain MRI protocol without and with contrast including  diffusion and gradient echo images. Contrast: 9 mL IV Dotarem    FINDINGS:  RESULT:    Acute Change: There is no evidence of restricted diffusion to suggest  an acute infarct.    Hemorrhage: No evidence of prior parenchymal hemorrhage on the  gradient echo images.    Mass Lesion/ Mass Effect: 11 x 10 mm right extra-axial T2 isointense  and T1 hypointense mildly enhancing lesion ( series 13, image 28),  along the right frontal convexity with slight mass effect. There is  diffuse right cerebral convexity pachymeningeal enhancement (series  13, image 27) .    Chronic Change: Moderate T2/FLAIR hyperintense periventricular,  subcortical and juxtacortical nonenhancing white matter lesions,  suggestive of underlying demyelination.    Parenchyma: No significant volume loss for age. The brain parenchyma  is otherwise within normal limits of signal intensity and morphology.    Ventricles: Normal caliber and morphology.    Skull Base: Hypothalamic and pituitary region are grossly normal.  Craniocervical junction is normal. Heterogeneous T1 hypointense  slightly enhancing marrow replacement superior right frontal  calvarium (series 15, image 65 and series 6, image 26), which  correlates with the hyperostosis and sclerosis, noted on the recent  CT from 04/21/2025.    Vasculature: Major intracranial arterial structures, and dural venous  sinuses show typical flow void, suggesting patency by spin echo  criteria.    Other: The visualized paranasal sinuses and mastoid air cells are  clear. Orbits are unremarkable. 23 x 11 mm ovoid slightly enhancing  soft tissue lesion within the right frontal scalp superficial to the  area of marrow replacement described above (series 15, image 62)..    Impression  1. 11 x 10 mm right frontal enhancing extra-axial lesion, with  adjacent right cerebral convexity pachymeningeal  "enhancement  concerning for metastasis.  2. Slightly enhancing right frontal calvarium marrow replacement as  described above, which correlates with the area of sclerosis and  hyperostosis noted on the prior CT, also concerning for metastasis.  3. 23 x 11 mm right frontal scalp enhancing lesion, likely represents  a neoplastic process, such as melanoma.  4. Extensive T2/FLAIR hyperintensity within the subcortical white  matter as described above, concerning for underlying demyelination,  such as multiple sclerosis. However, chronic microvascular ischemic  disease can not be excluded.      Signed by: Oliver Philippe 5/21/2025 1:40 PM  Dictation workstation:   [id number]  not applicable  No results found for: \"FINALDX\"        MRI 04/26/2024 and 05/21/2025      Assessment & Plan     Assessment/Plan   Diagnoses and all orders for this visit:  Meningioma (Multi)  -     Tumor Board Request; Future    I discussed the natural history of intracranial meningomas, and discussed that many are found incidentally on intracranial imaging as an incidental finding. Patient is not symptomatic from the meningiomas at this time.     Therefore, majority of meningiomas remain stable or slowly increase in size over time. Will therefore, recommend MR imaging in 6 months time for followup.     I discussed that if there is any continued growth, can consider stereotactic radiotherapy for treatment, which would require a Rad Onc referral. Patient is not interested in this at this time.     Will also have her imaging discussed at our CNS Tumor Board meeting.     Will have patient see me back in 6 months time once followup MRI is completed.    Patient verbalized understanding and all of her questions were answered.                      Medical History     Medical History[1]  Surgical History[2]  Social History[3]  Family History[4]  Allergies[5]  Current Outpatient Medications   Medication Instructions    acetaminophen (Tylenol) 325 mg tablet " 2 tablets, Every 4 hours PRN    alendronate (FOSAMAX) 70 mg, oral, Every 7 days, Take in the morning with a full glass of water, on an empty stomach, and do not take anything else by mouth or lie down for the next 30 min.    aMILoride (MIDAMOR) 5 mg, oral, Daily    apixaban (Eliquis) 5 mg tablet Take 2 tablets (10 mg) by mouth 2 times a day for 5 days, THEN 1 tablet (5 mg) 2 times a day.    b complex 0.4 mg tablet 1 tablet, Daily    calcium carbonate (CALCIUM 500 ORAL) 1 tablet, Daily    cholecalciferol (VITAMIN D-3) 2,000 Units, Daily    diroximel fumarate (Vumerity) 231 mg capsule,delayed release(DR/EC) 2 capsules, oral, 2 times daily    furosemide (LASIX) 20 mg, 2 times daily (morning and late afternoon)    gabapentin (NEURONTIN) 100 mg, oral, 3 times daily    glucose 8 g, As needed    HYDROcodone-acetaminophen (Norco) 7.5-325 mg tablet 1 tablet, oral, Every 6 hours PRN    HYDROcodone-acetaminophen (Norco) 7.5-325 mg tablet 1 tablet, oral, Every 6 hours PRN    naloxone (NARCAN) 4 mg, As needed    tamsulosin (FLOMAX) 0.4 mg, oral, Nightly, Do not crush, chew, or split.    tiZANidine (ZANAFLEX) 2 mg, oral, Every 12 hours PRN            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, followed by neurosurgery- Clearance in note on 5/21/25    Lower extremity weakness     Lumbar radiculopathy     Lumbar spondylosis     Multiple falls     Multiple sclerosis (Multi)     Multiple sclerosis, follows with neuro, per patient well managed on Vumerity, no flare up since 2014    Osteoporosis     Palpitations     saw cardiology-wore holter-SVT was on Metoprolol but per  patient did not help, never followed up with cardiology because palpitations stopped after quitting her stressful job    Pseudoclaudication syndrome     Sacral fracture (Multi)     Sacroiliitis     SDH (subdural hematoma) (Multi)     Spinal stenosis     Spondylolisthesis     Urinary incontinence     Vitamin D deficiency    [2]   Past Surgical History:  Procedure Laterality Date    CT ANGIO CORONARY ART WITH HEARTFLOW IF SCORE >30%  11/16/2023    CT ANGIO CORONARY ART WITH HEARTFLOW IF SCORE >30% 11/16/2023 [facility] CT    HIP ARTHROPLASTY Right 08/16/2023    HYSTERECTOMY  04/15/2013    Hysterectomy- cervical Cancer    OTHER SURGICAL HISTORY Left 04/21/2021    Arm surgery-fracture repair    REFRACTIVE SURGERY     [3]   Social History  Tobacco Use    Smoking status: Former     Current packs/day: 1.00     Types: Cigarettes    Smokeless tobacco: Never    Tobacco comments:     Quit 1 month ago, smoked 1PPD for about 40 years   Vaping Use    Vaping status: Never Used   Substance Use Topics    Alcohol use: Not Currently    Drug use: Never   [4]   Family History  Problem Relation Name Age of Onset    Lung cancer Mother      Coronary artery disease Father      Hypertension Father      Other (THYROID SURGERY) Sister      Glaucoma Other GRANDMOTHER    [5] No Known Allergies

## 2025-07-22 ENCOUNTER — HOME CARE VISIT (OUTPATIENT)
Dept: HOME HEALTH SERVICES | Facility: HOME HEALTH | Age: 60
End: 2025-07-22
Payer: COMMERCIAL

## 2025-07-22 ENCOUNTER — OFFICE VISIT (OUTPATIENT)
Dept: UROLOGY | Facility: HOSPITAL | Age: 60
End: 2025-07-22
Payer: COMMERCIAL

## 2025-07-22 VITALS
OXYGEN SATURATION: 98 % | TEMPERATURE: 97.7 F | SYSTOLIC BLOOD PRESSURE: 82 MMHG | RESPIRATION RATE: 16 BRPM | HEART RATE: 94 BPM | DIASTOLIC BLOOD PRESSURE: 60 MMHG

## 2025-07-22 DIAGNOSIS — R33.9 BLADDER RETENTION OF URINE: Primary | ICD-10-CM

## 2025-07-22 PROCEDURE — 99213 OFFICE O/P EST LOW 20 MIN: CPT | Performed by: NURSE PRACTITIONER

## 2025-07-22 PROCEDURE — 51798 US URINE CAPACITY MEASURE: CPT | Performed by: NURSE PRACTITIONER

## 2025-07-22 PROCEDURE — G0299 HHS/HOSPICE OF RN EA 15 MIN: HCPCS

## 2025-07-22 ASSESSMENT — ENCOUNTER SYMPTOMS
MUSCLE WEAKNESS: 1
BOWEL PATTERN NORMAL: 1
SHORTNESS OF BREATH: 1
LOWER EXTREMITY EDEMA: 1
PAIN: 1
DYSPNEA ACTIVITY LEVEL: AFTER AMBULATING 10 - 20 FT
PAIN LOCATION: BACK
APPETITE LEVEL: GOOD
PAIN LOCATION - PAIN SEVERITY: 6/10
PERSON REPORTING PAIN: PATIENT
STOOL FREQUENCY: DAILY
FATIGUES EASILY: 1
CHANGE IN APPETITE: UNCHANGED
LAST BOWEL MOVEMENT: 67408

## 2025-07-22 ASSESSMENT — ACTIVITIES OF DAILY LIVING (ADL)
HOME_HEALTH_OASIS: 00
OASIS_M1830: 02

## 2025-07-22 NOTE — PROGRESS NOTES
[facility]  Outpatient Clinic Note    Patient Name:  Caitlyn Atkins  MRN:  [id number]  :  1965  Date of Service: 2025     Visit type: Follow up visit    HPI    Interval History:  Caitlyn Atkins is a 60 y.o. female with past medical history of MS, osteoporosis, lumbar radiculopathy, spinal stenosis, right total hip, extra -axial, calvarium marrow, scalp lesions, hypokalemia, DVT, who is being seen today for  problems listed below.     Problem list/Chief complaints:  Urinary retention likely due to sacral fracture - Tamsulosin       2025: NPV. Patient was seen in ER from preadmission testing for hypokalemia, found to have urinary retention likely due to sacral fracture, tracey catheter placed on 25, discharged with outpatient Urology referral.  Patient is accompanied by her daughter. She states that she started to experience urinary incontinence after falling and fracturing her sacrum. She was starting to regain the urge to void before she was hospitalized. When she was in the hospital, she was getting a lot of IV fluids and was not able to empty her bladder so she went into retention. She feels as though she will be able to urinate if catheter is removed and would like TOV today. TOV passed,  ml.    2025: Patient presents for follow-up. She is accompanied by her daughter. She states she's urinating with no issues. She's not taking Tamsulosin because it was decreasing her BP.  ml.       Medical History[1]    Surgical History[2]    Social History     Socioeconomic History    Marital status:      Spouse name: Not on file    Number of children: Not on file    Years of education: Not on file    Highest education level: Not on file   Occupational History    Not on file   Tobacco Use    Smoking status: Former     Current packs/day: 1.00     Types: Cigarettes    Smokeless tobacco: Never    Tobacco comments:     Quit 1 month ago, smoked 1PPD for about 40 years    Vaping Use    Vaping status: Never Used   Substance and Sexual Activity    Alcohol use: Not Currently    Drug use: Never    Sexual activity: Defer   Other Topics Concern    Not on file   Social History Narrative    Not on file     Social Drivers of Health     Financial Resource Strain: Medium Risk (6/22/2025)    Overall Financial Resource Strain (CARDIA)     Difficulty of Paying Living Expenses: Somewhat hard   Food Insecurity: No Food Insecurity (6/22/2025)    Hunger Vital Sign     Worried About Running Out of Food in the Last Year: Never true     Ran Out of Food in the Last Year: Never true   Transportation Needs: No Transportation Needs (6/26/2025)    OASIS : Transportation     Lack of Transportation (Medical): No     Lack of Transportation (Non-Medical): No     Patient Unable or Declines to Respond: No   Physical Activity: Not on file   Stress: Not on file   Social Connections: Feeling Somewhat Isolated (6/26/2025)    OASIS : Social Isolation     Frequency of experiencing loneliness or isolation: Sometimes   Intimate Partner Violence: Not At Risk (6/22/2025)    Humiliation, Afraid, Rape, and Kick questionnaire     Fear of Current or Ex-Partner: No     Emotionally Abused: No     Physically Abused: No     Sexually Abused: No   Housing Stability: Low Risk  (6/22/2025)    Housing Stability Vital Sign     Unable to Pay for Housing in the Last Year: No     Number of Times Moved in the Last Year: 0     Homeless in the Last Year: No       Allergies[3]     Current Medications[4]     Review of system:  All other systems have been reviewed and are negative for complaints      Last recorded vitals:  There were no vitals taken for this visit.    Physical Exam:  General: Appears comfortable and in no apparent distress.  Head: Normocephalic, atraumatic  Eyes: Non-injected conjunctiva, sclera clear, no proptosis  Lungs: Breathing is easy, non-labored. Speaking in clear and complete sentences. Normal diaphragmatic  movement.  Cardiovascular: no peripheral edema, cyanosis or pallor.   Abdomen: soft, non-distended, non-tender  : Bladder: non tender, not distended  MSK: Ambulatory with walker  Skin: No visible rashes or lesions  Neurologic: Alert, oriented to person, place, and time  Psychiatric: mood and affect appropriate      Imaging  === 06/19/25 ===    US RENAL COMPLETE    - Impression -  Aguirre catheter within an otherwise empty urinary bladder.    No shadowing stone or hydronephrosis in either kidney.    Cholelithiasis with borderline gallbladder dilatation. However, no  specific sonographic evidence of acute cholecystitis.    Nonspecific common bile duct dilatation. Laboratory correlation  needed. Consider nuclear medicine HIDA scan in follow-up.    Nonspecific increased echogenicity throughout the liver, most likely  fatty infiltration.    3 mm adherent echogenic nodule in the posterior gallbladder fundus,  most likely small polyp. Recommend a follow-up ultrasound in 6-12  months.    MACRO:  None    Signed by: Bereket Stein 6/20/2025 8:35 AM  Dictation workstation:   [id number]        Labs  Orders Only on 07/11/2025   Component Date Value    WHITE BLOOD CELL COUNT 07/14/2025 12.3 (H)     RED BLOOD CELL COUNT 07/14/2025 3.05 (L)     HEMOGLOBIN 07/14/2025 9.6 (L)     HEMATOCRIT 07/14/2025 30.9 (L)     MCV 07/14/2025 101.3 (H)     MCH 07/14/2025 31.5     MCHC 07/14/2025 31.1 (L)     RDW 07/14/2025 13.0     PLATELET COUNT 07/14/2025 613 (H)     MPV 07/14/2025 9.2     ABSOLUTE NEUTROPHILS 07/14/2025 8,376 (H)     ABSOLUTE LYMPHOCYTES 07/14/2025 2,189     ABSOLUTE MONOCYTES 07/14/2025 1,365 (H)     ABSOLUTE EOSINOPHILS 07/14/2025 258     ABSOLUTE BASOPHILS 07/14/2025 111     NEUTROPHILS 07/14/2025 68.1     LYMPHOCYTES 07/14/2025 17.8     MONOCYTES 07/14/2025 11.1     EOSINOPHILS 07/14/2025 2.1     BASOPHILS 07/14/2025 0.9     IRON, TOTAL 07/14/2025 34 (L)     FERRITIN 07/14/2025 119     VITAMIN B12 07/14/2025 1,290 (H)      GLUCOSE 07/14/2025 89     UREA NITROGEN (BUN) 07/14/2025 16     CREATININE 07/14/2025 0.78     EGFR 07/14/2025 86     SODIUM 07/14/2025 137     POTASSIUM 07/14/2025 3.1 (L)     CHLORIDE 07/14/2025 98     CARBON DIOXIDE 07/14/2025 27     ELECTROLYTE BALANCE 07/14/2025 12     CALCIUM 07/14/2025 8.3 (L)     PROTEIN, TOTAL 07/14/2025 6.0 (L)     ALBUMIN 07/14/2025 3.0 (L)     BILIRUBIN, TOTAL 07/14/2025 0.2     ALKALINE PHOSPHATASE 07/14/2025 144     AST 07/14/2025 14     ALT 07/14/2025 9        Assessment and Plan:  Caitlyn Atkins is a 60 y.o. female with history of urinary retention, who presents for follow-up.     Plan:  -Patient has no urinary complaints today  - ml. Patient denies feeling urge to urinate  -Patient will call our office with any worsening urinary symptoms  -Follow-up as needed    All questions and concerns were addressed. Patient verbalizes understanding and has no other questions at this time.     Some elements copied from my note on 6/30/2025, the elements have been updated and all reflect current decision making from today, 07/22/25    E&M visit today is associated with current or anticipated ongoing medical care services related to a patient's single, serious condition or a complex condition.    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, followed by neurosurgery- Clearance in note on 5/21/25    Lower extremity weakness     Lumbar radiculopathy     Lumbar spondylosis     Multiple falls     Multiple sclerosis (Multi)     Multiple sclerosis, follows with  neuro, per patient well managed on Vumerity, no flare up since 2014    Osteoporosis     Palpitations     saw cardiology-wore holter-SVT was on Metoprolol but per patient did not help, never followed up with cardiology because palpitations stopped after quitting her stressful job    Pseudoclaudication syndrome     Sacral fracture (Multi)     Sacroiliitis     SDH (subdural hematoma) (Multi)     Spinal stenosis     Spondylolisthesis     Urinary incontinence     Vitamin D deficiency    [2]   Past Surgical History:  Procedure Laterality Date    CT ANGIO CORONARY ART WITH HEARTFLOW IF SCORE >30%  11/16/2023    CT ANGIO CORONARY ART WITH HEARTFLOW IF SCORE >30% 11/16/2023 [facility] CT    HIP ARTHROPLASTY Right 08/16/2023    HYSTERECTOMY  04/15/2013    Hysterectomy- cervical Cancer    OTHER SURGICAL HISTORY Left 04/21/2021    Arm surgery-fracture repair    REFRACTIVE SURGERY     [3] No Known Allergies  [4]   Current Outpatient Medications:     acetaminophen (Tylenol) 325 mg tablet, Take 2 tablets (650 mg) by mouth every 4 hours if needed for moderate pain (4 - 6)., Disp: , Rfl:     alendronate (Fosamax) 70 mg tablet, Take 1 tablet (70 mg) by mouth every 7 days. Take in the morning with a full glass of water, on an empty stomach, and do not take anything else by mouth or lie down for the next 30 min., Disp: 4 tablet, Rfl: 5    aMILoride (Midamor) 5 mg tablet, Take 1 tablet (5 mg) by mouth once daily., Disp: 90 tablet, Rfl: 0    apixaban (Eliquis) 5 mg tablet, Take 2 tablets (10 mg) by mouth 2 times a day for 5 days, THEN 1 tablet (5 mg) 2 times a day., Disp: 80 tablet, Rfl: 0    b complex 0.4 mg tablet, Take 1 tablet by mouth once daily., Disp: , Rfl:     calcium carbonate (CALCIUM 500 ORAL), Take 1 tablet by mouth once daily., Disp: , Rfl:     cholecalciferol (Vitamin D-3) 50 MCG (2000 UT) tablet, Take 1 tablet (2,000 Units) by mouth once daily., Disp: , Rfl:     diroximel fumarate (Vumerity) 231 mg capsule,delayed  release(DR/EC), Take 2 capsules by mouth 2 times a day., Disp: 120 capsule, Rfl: 5    furosemide (Lasix) 20 mg tablet, Take 1 tablet (20 mg) by mouth 2 times daily (morning and late afternoon)., Disp: , Rfl:     gabapentin (Neurontin) 100 mg capsule, Take 1 capsule (100 mg) by mouth 3 times a day., Disp: 270 capsule, Rfl: 1    glucose 4 gram chewable tablet, Chew and swallow 2 tablets (8 g) if needed for low blood sugar - see comments., Disp: , Rfl:     HYDROcodone-acetaminophen (Norco) 7.5-325 mg tablet, Take 1 tablet by mouth every 6 hours if needed for severe pain (7 - 10) for up to 28 days. Do not fill before July 21, 2025., Disp: 112 tablet, Rfl: 0    HYDROcodone-acetaminophen (Norco) 7.5-325 mg tablet, Take 1 tablet by mouth every 6 hours if needed for severe pain (7 - 10) for up to 28 days., Disp: 112 tablet, Rfl: 0    naloxone (Narcan) 4 mg/0.1 mL nasal spray, Administer 1 spray (4 mg) into affected nostril(s) if needed for opioid reversal. May repeat every 2-3 minutes if needed, alternating nostrils, until medical assistance becomes available., Disp: , Rfl:     tamsulosin (Flomax) 0.4 mg 24 hr capsule, Take 1 capsule (0.4 mg) by mouth once daily at bedtime. Do not crush, chew, or split., Disp: 30 capsule, Rfl: 0    tiZANidine (Zanaflex) 2 mg tablet, Take 1 tablet (2 mg) by mouth every 12 hours if needed for muscle spasms for up to 28 days., Disp: 56 tablet, Rfl: 2

## 2025-07-23 ENCOUNTER — TUMOR BOARD CONFERENCE (OUTPATIENT)
Dept: HEMATOLOGY/ONCOLOGY | Facility: HOSPITAL | Age: 60
End: 2025-07-23
Payer: COMMERCIAL

## 2025-07-23 NOTE — TUMOR BOARD NOTE
CNS Tumor Board Recommendations       Patient was presented by Dr. Claudia Junior at our CNS Tumor Board on 07/23/2025 which included representatives from Radiation oncology, Surgical oncology, Neuro-oncology, Pathology, Radiology, Research, Neurosurgery, Social Work (Neurosurgery).     Current patient presents with history of the following treatment history: CAD, HTN, HLD, DVT on Eliquis, lumbar spinal stenosis, MS, osteoporosis, ho presented to the ED with worsening low back pain and recent fall x 2. Workup was significant for mildly displaced sacral fracture, sacral insufficiency fractures (age indeterminate), and S2/S3 angulation. CTH with concerns for thin SDH and extracranial mass. She was seen in follow up and recommended for a MRI brain w/wo. 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 DOTATE PET and bone scan were recommended and completed without additional osseous lesions.     MRI with interosseous lesion with intracranial / extra cranial component that has been slowly progressing when compared with prior imaging in 2023 and 2024. DOTATE PET consistent with meningioma. CT with bony sclerosis.     The CNS Tumor Board tumor board considered available treatment options and made the following recommendations: Radiation oncology referral recommended for fractionated RT. Can consider treatment with chemotherapy due to DOTATE PET positive. Patient has declined. Follow up in clinic with repeat MRI.    Clinical Trial Status: N/A     National site-specific guidelines were discussed with respect to the case.

## 2025-07-24 ENCOUNTER — OFFICE VISIT (OUTPATIENT)
Dept: CARDIOLOGY | Facility: CLINIC | Age: 60
End: 2025-07-24
Payer: COMMERCIAL

## 2025-07-24 ENCOUNTER — TELEPHONE (OUTPATIENT)
Dept: CARDIOLOGY | Facility: CLINIC | Age: 60
End: 2025-07-24

## 2025-07-24 VITALS
WEIGHT: 94.3 LBS | SYSTOLIC BLOOD PRESSURE: 103 MMHG | HEART RATE: 76 BPM | TEMPERATURE: 98.5 F | BODY MASS INDEX: 17.35 KG/M2 | HEIGHT: 62 IN | DIASTOLIC BLOOD PRESSURE: 71 MMHG

## 2025-07-24 DIAGNOSIS — R60.0 LOCALIZED EDEMA: ICD-10-CM

## 2025-07-24 DIAGNOSIS — I82.4Y9 ACUTE DEEP VEIN THROMBOSIS (DVT) OF PROXIMAL VEIN OF LOWER EXTREMITY, UNSPECIFIED LATERALITY: ICD-10-CM

## 2025-07-24 DIAGNOSIS — I95.9 HYPOTENSION, UNSPECIFIED HYPOTENSION TYPE: ICD-10-CM

## 2025-07-24 DIAGNOSIS — R00.0 SINUS TACHYCARDIA: ICD-10-CM

## 2025-07-24 DIAGNOSIS — I82.452 ACUTE DEEP VEIN THROMBOSIS (DVT) OF LEFT PERONEAL VEIN (MULTI): ICD-10-CM

## 2025-07-24 DIAGNOSIS — I47.10 SVT (SUPRAVENTRICULAR TACHYCARDIA): Primary | ICD-10-CM

## 2025-07-24 PROCEDURE — 3074F SYST BP LT 130 MM HG: CPT | Performed by: NURSE PRACTITIONER

## 2025-07-24 PROCEDURE — 99212 OFFICE O/P EST SF 10 MIN: CPT

## 2025-07-24 PROCEDURE — 3078F DIAST BP <80 MM HG: CPT | Performed by: NURSE PRACTITIONER

## 2025-07-24 PROCEDURE — 99215 OFFICE O/P EST HI 40 MIN: CPT | Performed by: NURSE PRACTITIONER

## 2025-07-24 PROCEDURE — 3008F BODY MASS INDEX DOCD: CPT | Performed by: NURSE PRACTITIONER

## 2025-07-24 NOTE — PROGRESS NOTES
Chief Complaint:   Hospital Follow Up     History Of Present Illness:    Caitlyn Atkins is a 60 y.o. female here for hospital follow up.     Caitlyn was supposed to undergo spinal surgery for sacral fractures when she was found to have a K  of 2.2. She reported weakness for several weeks. She was having nausea and vomiting prior to 6/14. Was not eating because  passed away.  Noted to have acute deep vein thrombosis at the proximal left peroneal vein. Patient was started on bicarbonate ggt for ph 7.96. metabolic acidosis improved afterwards. Hypokalemia thought to be 2/2 malnutrition. CT PE negative. Aguirre placed for urinary retention. Developed BL LE edema, started on lasix. CTH with concerns for thin SDH and extracranial mass. She was seen in follow up and recommended for a MRI brain w/wo. 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      BP has been soft. 80/55 on Tuesday. She denies dizziness and lightheadedness. Reports frequent mechanical falls.     She denies palpitations.     She is eating now.     TTE 6/20/25   1. Left ventricular ejection fraction is normal calculated by Colvin's biplane at 68%.   2. Spectral Doppler shows a Grade I (impaired relaxation pattern) of left ventricular diastolic filling with normal left atrial filling pressure.   3. There is normal right ventricular global systolic function.   4. The Doppler estimated RVSP is within normal limits at 26 mmHg.   5. Mild aortic valve regurgitation.   6. Compared with study dated 8/7/2023, no significant change.    Allergies:  Patient has no known allergies.    Review of Systems  All pertinent systems have been reviewed and are negative except for what is stated in the history of present illness.    All other systems have been reviewed and are negative and noncontributory to this patient's current ailments.     Visit Vitals  /71 (BP Location: Right arm, Patient Position: Sitting, BP Cuff Size: Child)   Pulse 76   Temp  "36.9 °C (98.5 °F) (Temporal)   Ht 1.575 m (5' 2\")   Wt (!) 42.8 kg (94 lb 4.8 oz)   BMI 17.25 kg/m²   OB Status Hysterectomy   Smoking Status Former   BSA 1.37 m²     Objective   Vitals reviewed.   Constitutional:       Appearance: Healthy appearance. Not in distress.   Eyes:      Conjunctiva/sclera: Conjunctivae normal.   Neck:      Vascular: No JVR. JVD normal.   Pulmonary:      Effort: Pulmonary effort is normal.      Breath sounds: Normal breath sounds. No wheezing. No rhonchi. No rales.   Chest:      Chest wall: Not tender to palpatation.   Cardiovascular:      PMI at left midclavicular line. Normal rate. Regular rhythm. Normal S1. Normal S2.       Murmurs: There is no murmur.      No gallop.  No click. No rub.   Edema:     Peripheral edema absent.   Abdominal:      General: Bowel sounds are normal.      Palpations: Abdomen is soft.      Tenderness: There is no abdominal tenderness.   Musculoskeletal: Normal range of motion.         General: No tenderness. Skin:     General: Skin is warm and dry.   Neurological:      General: No focal deficit present.      Mental Status: Alert and oriented to person, place and time.   Psychiatric:         Attention and Perception: Attention normal.         Mood and Affect: Mood normal.     Assessment/Plan   Diagnoses and all orders for this visit:  DVT  - continue Eliquis  - was provoked, she was bedridden for several weeks post  passing  - she wants back surgery. Has to be on eliquis for 3 months prior to surgery. Will get repeat US.   - restart eliquis post surgery to prevent dvt post surgery  SVT (supraventricular tachycardia)  - stable  - denies palpitations   - no metoprolol at this time due to soft bp and lack of palpitations   Sinus Tachycardia  - see above   - HR 76bpm  Hypotension  - bp soft today  - was hypotensive Tuesday  - she is watching her BP with pain medications  - encouraged hydration   LE edema  -2/2 PVD and DVT  - on non DVT leg encouraged " compression socks      Follow up in 2 months     Outpatient Medications:  Current Outpatient Medications   Medication Instructions    acetaminophen (Tylenol) 325 mg tablet 2 tablets, Every 4 hours PRN    alendronate (FOSAMAX) 70 mg, oral, Every 7 days, Take in the morning with a full glass of water, on an empty stomach, and do not take anything else by mouth or lie down for the next 30 min.    aMILoride (MIDAMOR) 5 mg, oral, Daily    apixaban (Eliquis) 5 mg tablet Take 2 tablets (10 mg) by mouth 2 times a day for 5 days, THEN 1 tablet (5 mg) 2 times a day.    b complex 0.4 mg tablet 1 tablet, Daily    calcium carbonate (CALCIUM 500 ORAL) 1 tablet, Daily    cholecalciferol (Vitamin D-3) 50 MCG (2000 UT) tablet 1 tablet, Daily    cyanocobalamin (B-12 DOTS) 1,000 mcg, Daily    diroximel fumarate (Vumerity) 231 mg capsule,delayed release(DR/EC) 2 capsules, oral, 2 times daily    famotidine (PEPCID) 1 mg, Daily    gabapentin (NEURONTIN) 100 mg, oral, 3 times daily    glucose 8 g, As needed    HYDROcodone-acetaminophen (Norco) 7.5-325 mg tablet 1 tablet, oral, Every 6 hours PRN    naloxone (NARCAN) 4 mg, As needed    tiZANidine (ZANAFLEX) 2 mg, oral, Every 12 hours PRN     Exclusive of any other services or procedures performed, I, Lisa DUGAN, spent 56 minutes in duration for this visit today.  This time consisted of chart review, obtaining history, and/or performing the exam as documented above, as well as, documenting clinical information for the encounter in the electronic record. In addition to the history, testing, notes, and labs I have noted above; I have reviewed Hospital notes from 6/23/25; pcp note 6/25/25; neurology note 7/7/25; pcp note 7/10/25; 7/21/25. TTE 6/20/25

## 2025-07-28 ENCOUNTER — TELEPHONE (OUTPATIENT)
Dept: CARDIOLOGY | Facility: CLINIC | Age: 60
End: 2025-07-28
Payer: COMMERCIAL

## 2025-07-28 NOTE — TELEPHONE ENCOUNTER
Pt called and left message saying at her last visit she gave you papers to fax to dentist and insurance.  She wanted to know if that had been done, I do not see anything in the system. Please advise

## 2025-07-29 ENCOUNTER — APPOINTMENT (OUTPATIENT)
Dept: PRIMARY CARE | Facility: CLINIC | Age: 60
End: 2025-07-29
Payer: COMMERCIAL

## 2025-07-29 VITALS
TEMPERATURE: 97.6 F | WEIGHT: 97.4 LBS | SYSTOLIC BLOOD PRESSURE: 102 MMHG | HEART RATE: 73 BPM | DIASTOLIC BLOOD PRESSURE: 64 MMHG | BODY MASS INDEX: 17.81 KG/M2

## 2025-07-29 DIAGNOSIS — R60.0 LOCALIZED EDEMA: Primary | ICD-10-CM

## 2025-07-29 DIAGNOSIS — E87.6 HYPOKALEMIA: ICD-10-CM

## 2025-07-29 DIAGNOSIS — I82.4Y2 ACUTE DEEP VEIN THROMBOSIS (DVT) OF PROXIMAL VEIN OF LEFT LOWER EXTREMITY: ICD-10-CM

## 2025-07-29 PROCEDURE — 3078F DIAST BP <80 MM HG: CPT | Performed by: PHYSICIAN ASSISTANT

## 2025-07-29 PROCEDURE — 3074F SYST BP LT 130 MM HG: CPT | Performed by: PHYSICIAN ASSISTANT

## 2025-07-29 PROCEDURE — 99214 OFFICE O/P EST MOD 30 MIN: CPT | Performed by: PHYSICIAN ASSISTANT

## 2025-07-29 RX ORDER — AMILORIDE HYDROCHLORIDE 5 MG/1
5 TABLET ORAL 2 TIMES DAILY
Qty: 180 TABLET | Refills: 0 | Status: SHIPPED | OUTPATIENT
Start: 2025-07-29

## 2025-07-29 ASSESSMENT — PATIENT HEALTH QUESTIONNAIRE - PHQ9
SUM OF ALL RESPONSES TO PHQ9 QUESTIONS 1 AND 2: 0
1. LITTLE INTEREST OR PLEASURE IN DOING THINGS: NOT AT ALL
2. FEELING DOWN, DEPRESSED OR HOPELESS: NOT AT ALL

## 2025-07-29 ASSESSMENT — ENCOUNTER SYMPTOMS
FEVER: 0
CHEST TIGHTNESS: 0
SHORTNESS OF BREATH: 0

## 2025-07-29 NOTE — PROGRESS NOTES
Subjective   Patient ID: Caitlyn Atkins is a 60 y.o. female who presents for Leg Swelling (Still has lower leg swelling and weeping ) and Leg Pain.    HPI   Patient here for recheck of DVT and leg swelling.      DVT: Found to have DVT in left leg back in 6/2025. Pain is improving. Tolerating eliquis. No bleeding. No CP or SOB.  Prior to finding the blood clot she had been very inactive following her 's recent death, as well as from her severe DDD lumbar spine and recent sacral fractures.     Edema legs:  She is getting some edema in both legs but that improved when she was on lasix. Her nephrologist switched her to amiloride 5mg every day recently.  She is intermittently still getting edema in her legs but seems worse on the right.  Rarely it weeps a little bit.  No longer having any redness in that area though since she was on the antibiotics.  Echo in hospital showed nml EF 6/20/25. No CP, SOB, or orthopnea.  Not been wearing compression stockings.    Hypokalemia:  potassium was low when in the hospital.  Was not eating well prior to that due to her 's death.  Is eating better and getting her appetite back now.   Working with nephrologist Dr Orantes for this now and she was started on amiloride to help this.   Lab Results   Component Value Date    K 3.7 07/09/2025    K 4.2 06/26/2025     Lab Results   Component Value Date    K 3.1 (L) 07/14/2025         Spinal stenosis: Was planning to have back surgery with Dr. King but this has been delayed since they found her hypokalemia and eventually the DVT.  Surgery will need to be postponed until off of anticoagulation.  She is seeing pain management.  Her pain from her spinal stenosis and sacral fractures along with leg pain from DVT and leg edema is making it impossible for her to work.  Works in a manufacturing plant.       Review of Systems   Constitutional:  Negative for fever.   Respiratory:  Negative for chest tightness and shortness of breath.         Objective   /64   Pulse 73   Temp 36.4 °C (97.6 °F)   Wt (!) 44.2 kg (97 lb 6.4 oz)   BMI 17.81 kg/m²     Physical Exam  Vitals and nursing note reviewed.   Constitutional:       Appearance: Normal appearance. She is well-developed. She is not toxic-appearing.      Comments: Appears weak.      Eyes:      General: No scleral icterus.      Cardiovascular:      Rate and Rhythm: Normal rate and regular rhythm.   Pulmonary:      Effort: Pulmonary effort is normal.      Breath sounds: Normal breath sounds.   Abdominal:      Palpations: Abdomen is soft. There is no mass.      Tenderness: There is no abdominal tenderness.     Musculoskeletal:      Cervical back: Neck supple.      Right lower leg: Edema (1+. No calf tenderness.) present.      Left lower leg: Edema (trace) present.     Skin:     General: Skin is warm and dry.      Comments: No redness on shins.      Neurological:      Mental Status: She is alert.         Assessment/Plan   Diagnoses and all orders for this visit:  Localized edema  Hypokalemia  -     aMILoride (Midamor) 5 mg tablet; Take 1 tablet (5 mg) by mouth 2 times a day.  Acute deep vein thrombosis (DVT) of proximal vein of left lower extremity         Increase Amiloride 5mg bid to see if this will help her edema better, while sparing her potassium.   Elevate legs.   Wear compression stockings.   Monitor BP to ensure not dipping low.   If develops any significant shortness of breath or chest pain she should go to the ER.  Follow-up with specialist as directed.  Follow-up as scheduled in 2 weeks, earlier if needed.

## 2025-08-12 ENCOUNTER — APPOINTMENT (OUTPATIENT)
Dept: PAIN MEDICINE | Facility: CLINIC | Age: 60
End: 2025-08-12
Payer: COMMERCIAL

## 2025-08-12 DIAGNOSIS — M54.32 BILATERAL SCIATICA: ICD-10-CM

## 2025-08-12 DIAGNOSIS — M54.2 NECK PAIN: ICD-10-CM

## 2025-08-12 DIAGNOSIS — M54.31 BILATERAL SCIATICA: ICD-10-CM

## 2025-08-12 DIAGNOSIS — M62.838 CERVICAL PARASPINAL MUSCLE SPASM: ICD-10-CM

## 2025-08-12 PROCEDURE — 99214 OFFICE O/P EST MOD 30 MIN: CPT | Performed by: PHYSICAL MEDICINE & REHABILITATION

## 2025-08-12 RX ORDER — TIZANIDINE 2 MG/1
2 TABLET ORAL EVERY 12 HOURS PRN
Qty: 56 TABLET | Refills: 2 | Status: SHIPPED | OUTPATIENT
Start: 2025-08-12 | End: 2025-09-09

## 2025-08-12 RX ORDER — HYDROCODONE BITARTRATE AND ACETAMINOPHEN 7.5; 325 MG/1; MG/1
1 TABLET ORAL EVERY 6 HOURS PRN
Qty: 112 TABLET | Refills: 0 | Status: SHIPPED | OUTPATIENT
Start: 2025-09-15 | End: 2025-10-13

## 2025-08-12 RX ORDER — HYDROCODONE BITARTRATE AND ACETAMINOPHEN 7.5; 325 MG/1; MG/1
1 TABLET ORAL EVERY 6 HOURS PRN
Qty: 112 TABLET | Refills: 0 | Status: SHIPPED | OUTPATIENT
Start: 2025-08-18 | End: 2025-09-15

## 2025-08-12 RX ORDER — GABAPENTIN 100 MG/1
100 CAPSULE ORAL 3 TIMES DAILY
Qty: 270 CAPSULE | Refills: 1 | Status: SHIPPED | OUTPATIENT
Start: 2025-08-12 | End: 2025-11-10

## 2025-08-13 LAB
ANION GAP SERPL CALCULATED.4IONS-SCNC: 8 MMOL/L (CALC) (ref 7–17)
BUN SERPL-MCNC: 14 MG/DL (ref 7–25)
BUN/CREAT SERPL: NORMAL (CALC) (ref 6–22)
CALCIUM SERPL-MCNC: 8.7 MG/DL (ref 8.6–10.4)
CHLORIDE SERPL-SCNC: 110 MMOL/L (ref 98–110)
CO2 SERPL-SCNC: 20 MMOL/L (ref 20–32)
CREAT SERPL-MCNC: 0.7 MG/DL (ref 0.5–1.05)
EGFRCR SERPLBLD CKD-EPI 2021: 98 ML/MIN/1.73M2
GLUCOSE SERPL-MCNC: 125 MG/DL (ref 65–139)
MAGNESIUM SERPL-MCNC: 1.8 MG/DL (ref 1.5–2.5)
POTASSIUM SERPL-SCNC: 4.3 MMOL/L (ref 3.5–5.3)
SODIUM SERPL-SCNC: 138 MMOL/L (ref 135–146)

## 2025-08-14 ENCOUNTER — TELEPHONE (OUTPATIENT)
Dept: CARDIOLOGY | Facility: CLINIC | Age: 60
End: 2025-08-14

## 2025-08-14 ENCOUNTER — APPOINTMENT (OUTPATIENT)
Dept: PRIMARY CARE | Facility: CLINIC | Age: 60
End: 2025-08-14
Payer: COMMERCIAL

## 2025-08-14 VITALS
WEIGHT: 95.2 LBS | DIASTOLIC BLOOD PRESSURE: 70 MMHG | BODY MASS INDEX: 17.41 KG/M2 | SYSTOLIC BLOOD PRESSURE: 114 MMHG | TEMPERATURE: 97.7 F

## 2025-08-14 DIAGNOSIS — S32.10XD: ICD-10-CM

## 2025-08-14 DIAGNOSIS — D64.9 ANEMIA, UNSPECIFIED TYPE: ICD-10-CM

## 2025-08-14 DIAGNOSIS — E87.6 HYPOKALEMIA: ICD-10-CM

## 2025-08-14 DIAGNOSIS — R60.0 LOCALIZED EDEMA: ICD-10-CM

## 2025-08-14 DIAGNOSIS — M48.061 SPINAL STENOSIS OF LUMBAR REGION, UNSPECIFIED WHETHER NEUROGENIC CLAUDICATION PRESENT: ICD-10-CM

## 2025-08-14 DIAGNOSIS — I82.4Y2 ACUTE DEEP VEIN THROMBOSIS (DVT) OF PROXIMAL VEIN OF LEFT LOWER EXTREMITY: Primary | ICD-10-CM

## 2025-08-14 DIAGNOSIS — S32.2XXD: ICD-10-CM

## 2025-08-14 DIAGNOSIS — E55.9 VITAMIN D DEFICIENCY: ICD-10-CM

## 2025-08-14 PROCEDURE — 99214 OFFICE O/P EST MOD 30 MIN: CPT | Performed by: PHYSICIAN ASSISTANT

## 2025-08-14 PROCEDURE — 3078F DIAST BP <80 MM HG: CPT | Performed by: PHYSICIAN ASSISTANT

## 2025-08-14 PROCEDURE — 3074F SYST BP LT 130 MM HG: CPT | Performed by: PHYSICIAN ASSISTANT

## 2025-08-14 ASSESSMENT — ENCOUNTER SYMPTOMS
CHEST TIGHTNESS: 0
FEVER: 0
SHORTNESS OF BREATH: 0

## 2025-08-21 DIAGNOSIS — D64.9 ANEMIA, UNSPECIFIED TYPE: ICD-10-CM

## 2025-09-02 ENCOUNTER — OFFICE VISIT (OUTPATIENT)
Dept: ORTHOPEDIC SURGERY | Facility: CLINIC | Age: 60
End: 2025-09-02
Payer: COMMERCIAL

## 2025-09-02 ENCOUNTER — TELEPHONE (OUTPATIENT)
Dept: PRIMARY CARE | Facility: CLINIC | Age: 60
End: 2025-09-02

## 2025-09-02 DIAGNOSIS — M48.062 LUMBAR STENOSIS WITH NEUROGENIC CLAUDICATION: Primary | ICD-10-CM

## 2025-09-02 DIAGNOSIS — M43.10 ACQUIRED SPONDYLOLISTHESIS: ICD-10-CM

## 2025-09-02 DIAGNOSIS — S32.10XA CLOSED FRACTURE OF SACRUM, UNSPECIFIED PORTION OF SACRUM, INITIAL ENCOUNTER (MULTI): ICD-10-CM

## 2025-09-02 DIAGNOSIS — M54.16 LUMBAR RADICULOPATHY: ICD-10-CM

## 2025-09-02 PROCEDURE — 99212 OFFICE O/P EST SF 10 MIN: CPT

## 2025-09-02 PROCEDURE — 99215 OFFICE O/P EST HI 40 MIN: CPT | Performed by: ORTHOPAEDIC SURGERY

## 2025-09-02 ASSESSMENT — PAIN - FUNCTIONAL ASSESSMENT: PAIN_FUNCTIONAL_ASSESSMENT: 0-10

## 2025-09-02 ASSESSMENT — PAIN SCALES - GENERAL: PAINLEVEL_OUTOF10: 7

## 2025-09-23 ENCOUNTER — APPOINTMENT (OUTPATIENT)
Dept: ENDOCRINOLOGY | Facility: CLINIC | Age: 60
End: 2025-09-23
Payer: COMMERCIAL

## 2025-10-06 ENCOUNTER — APPOINTMENT (OUTPATIENT)
Dept: NEPHROLOGY | Facility: CLINIC | Age: 60
End: 2025-10-06
Payer: COMMERCIAL

## 2025-10-07 ENCOUNTER — APPOINTMENT (OUTPATIENT)
Dept: PAIN MEDICINE | Facility: CLINIC | Age: 60
End: 2025-10-07
Payer: COMMERCIAL

## 2025-10-20 ENCOUNTER — APPOINTMENT (OUTPATIENT)
Dept: PRIMARY CARE | Facility: CLINIC | Age: 60
End: 2025-10-20
Payer: COMMERCIAL